# Patient Record
Sex: MALE | Race: WHITE | NOT HISPANIC OR LATINO | Employment: OTHER | URBAN - METROPOLITAN AREA
[De-identification: names, ages, dates, MRNs, and addresses within clinical notes are randomized per-mention and may not be internally consistent; named-entity substitution may affect disease eponyms.]

---

## 2018-11-07 ENCOUNTER — OFFICE VISIT (OUTPATIENT)
Dept: SURGERY | Facility: CLINIC | Age: 68
End: 2018-11-07

## 2018-11-07 VITALS
BODY MASS INDEX: 34.72 KG/M2 | DIASTOLIC BLOOD PRESSURE: 92 MMHG | HEIGHT: 71 IN | WEIGHT: 248 LBS | SYSTOLIC BLOOD PRESSURE: 148 MMHG

## 2018-11-07 DIAGNOSIS — K43.9 VENTRAL HERNIA WITHOUT OBSTRUCTION OR GANGRENE: Primary | ICD-10-CM

## 2018-11-07 PROBLEM — I10 ESSENTIAL HYPERTENSION: Status: ACTIVE | Noted: 2018-11-07

## 2018-11-07 PROBLEM — E78.5 HYPERLIPIDEMIA: Status: ACTIVE | Noted: 2018-11-07

## 2018-11-07 PROBLEM — J43.9 PULMONARY EMPHYSEMA: Status: ACTIVE | Noted: 2017-12-04

## 2018-11-07 PROBLEM — J44.9 CHRONIC OBSTRUCTIVE LUNG DISEASE (HCC): Status: ACTIVE | Noted: 2018-11-07

## 2018-11-07 PROCEDURE — 99203 OFFICE O/P NEW LOW 30 MIN: CPT | Performed by: SURGERY

## 2018-11-07 RX ORDER — AMLODIPINE BESYLATE 5 MG/1
TABLET ORAL
COMMUNITY
End: 2018-11-16

## 2018-11-07 RX ORDER — LOVASTATIN 40 MG/1
TABLET ORAL
COMMUNITY
End: 2018-11-16

## 2018-11-07 RX ORDER — PREDNISONE 50 MG/1
TABLET ORAL
COMMUNITY
End: 2018-11-16

## 2018-11-07 RX ORDER — PRAMIPEXOLE DIHYDROCHLORIDE 0.5 MG/1
TABLET ORAL
COMMUNITY
End: 2019-01-31

## 2018-11-07 NOTE — PROGRESS NOTES
PATIENT INFORMATION  Yves Hastings  POSSIBLE VENTRAL HERNIA, ON PREDNISONE FOR A COLD, SOME DISCOMFORT, NO IMAGING     - 1950    CHIEF COMPLAINT  Chief Complaint   Patient presents with   • Hernia   Wall bulge    HISTORY OF PRESENT ILLNESS  HPI he complains of an abdominal wall bulge for several months.  He says it is uncomfortable and has been increasing in size.  He had a prior laparoscopic umbilical hernia repair by Dr. Casey.        REVIEW OF SYSTEMS  Review of Systems he continues to smoke and drink alcohol.  He wants to get this done so he can get back to work  Otherwise negative      ACTIVE PROBLEMS  Patient Active Problem List    Diagnosis   • Chronic obstructive lung disease (CMS/HCC) [J44.9]   • Essential hypertension [I10]   • Hyperlipidemia [E78.5]   • Pulmonary emphysema (CMS/HCC) [J43.9]         PAST MEDICAL HISTORY  Past Medical History:   Diagnosis Date   • COPD (chronic obstructive pulmonary disease) (CMS/HCC)    • Emphysema of lung (CMS/HCC)    • HLD (hyperlipidemia)    • HTN (hypertension)          SURGICAL HISTORY  Past Surgical History:   Procedure Laterality Date   • UMBILICAL HERNIA REPAIR      DR. CASEY - YEARS AGO         FAMILY HISTORY  Family History   Problem Relation Age of Onset   • Cancer Mother    • Heart disease Father          SOCIAL HISTORY  Social History     Occupational History   • Not on file.     Social History Main Topics   • Smoking status: Current Every Day Smoker   • Smokeless tobacco: Not on file   • Alcohol use Yes   • Drug use: Unknown   • Sexual activity: Not on file       Debilities/Disabilities Identified: None    Emotional Behavior: Appropriate    CURRENT MEDICATIONS    Current Outpatient Prescriptions:   •  amLODIPine (NORVASC) 5 MG tablet, amlodipine 5 mg tablet  Take one (1) tablet orally (by mouth) once daily, Disp: , Rfl:   •  lovastatin (MEVACOR) 40 MG tablet, lovastatin 40 mg tablet  Take one (1) tablet orally (by mouth) once daily, Disp: ,  "Rfl:   •  mometasone-formoterol (DULERA) 200-5 MCG/ACT inhaler, Every 12 (Twelve) Hours., Disp: , Rfl:   •  pramipexole (MIRAPEX) 0.5 MG tablet, pramipexole 0.5 mg tablet, Disp: , Rfl:   •  predniSONE (DELTASONE) 50 MG tablet, prednisone 50 mg tablet  Take 1 tablet every day by oral route., Disp: , Rfl:     ALLERGIES  Patient has no known allergies.    VITALS  Vitals:    11/07/18 1446   BP: 148/92   Weight: 112 kg (248 lb)   Height: 180.3 cm (71\")       LAST RESULTS   No results found for any previous visit.     No results found.    PHYSICAL EXAM  Physical Exam this an obese white male in no active distress.  He is oriented ×3.  His lungs are clear and equal was heart shows regular rate and rhythm.  His abdomen is obese soft nontender with a reducible ventral hernia this appears to be cephalad to his prior mesh repair.  I reviewed his prior operative note and a ventral ex mesh was used.    ASSESSMENT  Ventral hernia      PLAN  The risks benefits and options were discussed with the patient in detail.  We will proceed with a laparoscopic repair this has been arranged for [facility] on an outpatient basis for November 28    Discussed risks of surgery with patient and in particular increased risk of wound infection, poor wound healing, hernias (with abdominal surgery) and post-operative pulmonary complications associated with smoking.                         "

## 2018-11-07 NOTE — H&P
PATIENT INFORMATION  Yevs Hastings  POSSIBLE VENTRAL HERNIA, ON PREDNISONE FOR A COLD, SOME DISCOMFORT, NO IMAGING     - 1950    CHIEF COMPLAINT  Chief Complaint   Patient presents with   • Hernia   Wall bulge    HISTORY OF PRESENT ILLNESS  HPI he complains of an abdominal wall bulge for several months.  He says it is uncomfortable and has been increasing in size.  He had a prior laparoscopic umbilical hernia repair by Dr. Casey.        REVIEW OF SYSTEMS  Review of Systems he continues to smoke and drink alcohol.  He wants to get this done so he can get back to work  Otherwise negative      ACTIVE PROBLEMS  Patient Active Problem List    Diagnosis   • Chronic obstructive lung disease (CMS/HCC) [J44.9]   • Essential hypertension [I10]   • Hyperlipidemia [E78.5]   • Pulmonary emphysema (CMS/HCC) [J43.9]         PAST MEDICAL HISTORY  Past Medical History:   Diagnosis Date   • COPD (chronic obstructive pulmonary disease) (CMS/HCC)    • Emphysema of lung (CMS/HCC)    • HLD (hyperlipidemia)    • HTN (hypertension)          SURGICAL HISTORY  Past Surgical History:   Procedure Laterality Date   • UMBILICAL HERNIA REPAIR      DR. CASEY - YEARS AGO         FAMILY HISTORY  Family History   Problem Relation Age of Onset   • Cancer Mother    • Heart disease Father          SOCIAL HISTORY  Social History     Occupational History   • Not on file.     Social History Main Topics   • Smoking status: Current Every Day Smoker   • Smokeless tobacco: Not on file   • Alcohol use Yes   • Drug use: Unknown   • Sexual activity: Not on file       Debilities/Disabilities Identified: None    Emotional Behavior: Appropriate    CURRENT MEDICATIONS    Current Outpatient Prescriptions:   •  amLODIPine (NORVASC) 5 MG tablet, amlodipine 5 mg tablet  Take one (1) tablet orally (by mouth) once daily, Disp: , Rfl:   •  lovastatin (MEVACOR) 40 MG tablet, lovastatin 40 mg tablet  Take one (1) tablet orally (by mouth) once daily, Disp: ,  "Rfl:   •  mometasone-formoterol (DULERA) 200-5 MCG/ACT inhaler, Every 12 (Twelve) Hours., Disp: , Rfl:   •  pramipexole (MIRAPEX) 0.5 MG tablet, pramipexole 0.5 mg tablet, Disp: , Rfl:   •  predniSONE (DELTASONE) 50 MG tablet, prednisone 50 mg tablet  Take 1 tablet every day by oral route., Disp: , Rfl:     ALLERGIES  Patient has no known allergies.    VITALS  Vitals:    11/07/18 1446   BP: 148/92   Weight: 112 kg (248 lb)   Height: 180.3 cm (71\")       LAST RESULTS   No results found for any previous visit.     No results found.    PHYSICAL EXAM  Physical Exam this an obese white male in no active distress.  He is oriented ×3.  His lungs are clear and equal was heart shows regular rate and rhythm.  His abdomen is obese soft nontender with a reducible ventral hernia this appears to be cephalad to his prior mesh repair.  I reviewed his prior operative note and a ventral ex mesh was used.    ASSESSMENT  Ventral hernia      PLAN  The risks benefits and options were discussed with the patient in detail.  We will proceed with a laparoscopic repair this has been arranged for [facility] on an outpatient basis for November 28    Discussed risks of surgery with patient and in particular increased risk of wound infection, poor wound healing, hernias (with abdominal surgery) and post-operative pulmonary complications associated with smoking.                         "

## 2018-11-08 ENCOUNTER — HOSPITAL ENCOUNTER (OUTPATIENT)
Facility: HOSPITAL | Age: 68
Setting detail: HOSPITAL OUTPATIENT SURGERY
End: 2018-11-08
Attending: SURGERY | Admitting: SURGERY

## 2018-11-08 PROBLEM — K43.9 VENTRAL HERNIA WITHOUT OBSTRUCTION OR GANGRENE: Status: ACTIVE | Noted: 2018-11-08

## 2018-11-16 ENCOUNTER — HOSPITAL ENCOUNTER (OUTPATIENT)
Dept: GENERAL RADIOLOGY | Facility: HOSPITAL | Age: 68
Discharge: HOME | End: 2018-11-16
Admitting: SURGERY

## 2018-11-16 ENCOUNTER — APPOINTMENT (OUTPATIENT)
Dept: PREADMISSION TESTING | Facility: HOSPITAL | Age: 68
End: 2018-11-16

## 2018-11-16 DIAGNOSIS — K43.9 VENTRAL HERNIA WITHOUT OBSTRUCTION OR GANGRENE: ICD-10-CM

## 2018-11-16 LAB
ANION GAP SERPL CALCULATED.3IONS-SCNC: 9.1 MMOL/L
BASOPHILS # BLD AUTO: 0.04 10*3/MM3 (ref 0–0.2)
BASOPHILS NFR BLD AUTO: 0.5 % (ref 0–2)
BUN BLD-MCNC: 10 MG/DL (ref 8–23)
BUN/CREAT SERPL: 11.1 (ref 7–25)
CALCIUM SPEC-SCNC: 9 MG/DL (ref 8.8–10.5)
CHLORIDE SERPL-SCNC: 102 MMOL/L (ref 98–107)
CO2 SERPL-SCNC: 27.9 MMOL/L (ref 22–29)
CREAT BLD-MCNC: 0.9 MG/DL (ref 0.76–1.27)
DEPRECATED RDW RBC AUTO: 49.3 FL (ref 37–54)
EOSINOPHIL # BLD AUTO: 0.24 10*3/MM3 (ref 0.1–0.3)
EOSINOPHIL NFR BLD AUTO: 2.9 % (ref 0–4)
ERYTHROCYTE [DISTWIDTH] IN BLOOD BY AUTOMATED COUNT: 13.2 % (ref 11.5–14.5)
GFR SERPL CREATININE-BSD FRML MDRD: 84 ML/MIN/1.73
GLUCOSE BLD-MCNC: 95 MG/DL (ref 65–99)
HCT VFR BLD AUTO: 49.2 % (ref 42–52)
HGB BLD-MCNC: 16.4 G/DL (ref 14–18)
IMM GRANULOCYTES # BLD: 0.12 10*3/MM3 (ref 0–0.03)
IMM GRANULOCYTES NFR BLD: 1.4 % (ref 0–0.5)
LYMPHOCYTES # BLD AUTO: 2.55 10*3/MM3 (ref 0.6–4.8)
LYMPHOCYTES NFR BLD AUTO: 30.7 % (ref 20–45)
MCH RBC QN AUTO: 33.4 PG (ref 27–31)
MCHC RBC AUTO-ENTMCNC: 33.3 G/DL (ref 31–37)
MCV RBC AUTO: 100.2 FL (ref 80–94)
MONOCYTES # BLD AUTO: 0.77 10*3/MM3 (ref 0–1)
MONOCYTES NFR BLD AUTO: 9.3 % (ref 3–8)
NEUTROPHILS # BLD AUTO: 4.58 10*3/MM3 (ref 1.5–8.3)
NEUTROPHILS NFR BLD AUTO: 55.2 % (ref 45–70)
NRBC BLD MANUAL-RTO: 0 /100 WBC (ref 0–0)
PLATELET # BLD AUTO: 186 10*3/MM3 (ref 140–500)
PMV BLD AUTO: 8.9 FL (ref 7.4–10.4)
POTASSIUM BLD-SCNC: 4.4 MMOL/L (ref 3.5–5.2)
RBC # BLD AUTO: 4.91 10*6/MM3 (ref 4.7–6.1)
SODIUM BLD-SCNC: 139 MMOL/L (ref 136–145)
WBC NRBC COR # BLD: 8.3 10*3/MM3 (ref 4.8–10.8)

## 2018-11-16 PROCEDURE — 85025 COMPLETE CBC W/AUTO DIFF WBC: CPT | Performed by: SURGERY

## 2018-11-16 PROCEDURE — 71046 X-RAY EXAM CHEST 2 VIEWS: CPT

## 2018-11-16 PROCEDURE — 93005 ELECTROCARDIOGRAM TRACING: CPT

## 2018-11-16 PROCEDURE — 36415 COLL VENOUS BLD VENIPUNCTURE: CPT

## 2018-11-16 PROCEDURE — 80048 BASIC METABOLIC PNL TOTAL CA: CPT | Performed by: SURGERY

## 2018-11-16 PROCEDURE — 93010 ELECTROCARDIOGRAM REPORT: CPT | Performed by: INTERNAL MEDICINE

## 2018-11-16 RX ORDER — AMLODIPINE BESYLATE 5 MG/1
10 TABLET ORAL DAILY
COMMUNITY

## 2018-11-16 RX ORDER — LOVASTATIN 40 MG/1
40 TABLET ORAL NIGHTLY
COMMUNITY
End: 2019-01-31

## 2018-11-16 RX ORDER — ALBUTEROL SULFATE 90 UG/1
2 AEROSOL, METERED RESPIRATORY (INHALATION) EVERY 4 HOURS PRN
COMMUNITY

## 2018-11-16 NOTE — DISCHARGE INSTRUCTIONS
PRE-ADMISSION TESTING INSTRUCTIONS FOR ADULTS    Take these medications the morning of surgery with a small sip of water:  Use your inhalers and take your amlodipine      No aspirin, advil, aleve, ibuprofen, naproxen, diet pills, decongestants, or herbal/vitamins for a week prior to surgery.    General Instructions:    • Do not eat solid food after midnight the night before surgery.  No gum, mints, or hard candy after midnight the night before surgery.  • You may drink clear liquids the day of surgery up until 2 hours before your arrival time.  (Until 4:30 am)  • Clear liquids are liquids you can see through. Nothing RED in color.    Plain water    Sports drinks  Sodas     Gelatin (Jell-O)  Fruit juices without pulp such as white grape juice and apple juice  Popsicles that contain no fruit or yogurt  Tea or coffee (no cream or milk added)    • It is beneficial for you to have a clear drink that contains carbohydrates just before you leave your house and before your fasting time begins.  We suggest a 20 ounce bottle of Gatorade or Powerade for non-diabetic patients or a 20 ounce bottle of G2 or Powerade Zero for diabetic patients.     • Patients who avoid smoking, chewing tobacco and alcohol for 4 weeks prior to surgery have a reduced risk of post-operative complications.  If at all possible, quit smoking as many days before surgery as you can.    • Do not smoke, use chewing tobacco or drink alcohol the day of surgery    • Bring your C-PAP/ BI-PAP machine if you use one.  • Wear clean comfortable clothes and socks.  • Do not wear contact lenses, lotion, deodorant, or make-up.  Bring a case for your glasses if applicable. You may brush your teeth the morning of surgery.  • You may wear dentures/partials, do not put adhesive/glue on them.  • Bring crutches or walker if applicable.  • Leave all other jewelry and valuables at home.      Preventing a Surgical Site Infection:    • Shower the night before and on the morning  of surgery using the chlorhexidine soap you were given.  Use a clean washcloth with the soap.  Place clean sheets on your bed after showering the night before surgery. Do not use the CHG soap on your hair, face, or private areas. Wash your body gently for five (5) minutes. Do not scrub your skin.  Dry with a clean towel and dress in clean clothing.    • Do not shave the surgical area for 10 days-2 weeks prior to surgery  because the razor can irritate skin and make it easier to develop an infection.  • Make sure you, your family, and all healthcare providers clean their hands with soap and water or an alcohol based hand  before caring for you or your wound.      Day of surgery:    Your surgeon’s office will advise you of your arrival time for the day of surgery.    Upon arrival, a Pre-op nurse and Anesthesia provider will review your health history, obtain vital signs, and answer questions you may have.  The only belongings needed at this time will be your home medications and if applicable your C-PAP/BI-PAP machine.  If you are staying overnight your family can leave the rest of your belongings in the car and bring them to your room later.  A Pre-op nurse will start an IV and you may receive medication in preparation for surgery, including something to help you relax.  Your family will be able to see you in the Pre-op area.  While you are in surgery your family should notify the waiting room  if they leave the waiting room area and provide a contact phone number.    IF you have any questions, you can call the Pre-Admission Department at [phone] or your surgeon's office.  Notify your surgeon if  you become sick, have a fever, productive cough, or cannot be here the day of surgery    Please be aware that surgery does come with discomfort.  We want to make every effort to control your discomfort so please discuss any uncontrolled symptoms with your nurse.   Your doctor will most likely have  prescribed pain medications.      If you are going home after surgery, you will receive individualized written care instructions before being discharged.  A responsible adult (over the age of 18) must drive you to and from the hospital on the day of your surgery and stay with you for 24 hours after anesthesia.    If you are staying overnight following surgery, you will be transported to your hospital room following the recovery period.  [facility] has all private rooms.    Deductibles and co-payments are collected on the day of service. Please be prepared to pay the required co-pay, deductible or deposit on the day of service as defined by your plan.    Laparoscopic Ventral (Incisional)  Hernia Repair  Laparoscopic ventral hernia repair is a surgery to fix a ventral hernia. A ventral hernia, also called an incisional hernia, is a bulge of body tissue or intestines that pushes through the front part of the abdomen. This can happen if the connective tissue covering the muscles over the abdomen has a weak spot or is torn because of a surgical cut (incision) from a previous surgery. Laparoscopic ventral hernia repair is often done soon after diagnosis to stop the hernia from getting bigger, becoming uncomfortable, or becoming an emergency. This surgery usually takes about 2 hours, but the time can vary greatly.  LET YOUR HEALTH CARE PROVIDER KNOW ABOUT:  · Any allergies you have.  · All medicines you are taking, including steroids, vitamins, herbs, eye drops, creams, and over-the-counter medicines.  · Previous problems you or members of your family have had with the use of anesthetics.  · Any blood disorders you have.  · Previous surgeries you have had.  · Medical conditions you have.  RISKS AND COMPLICATIONS   Generally, laparoscopic ventral hernia repair is a safe procedure. However, as with any surgical procedure, problems can occur. Possible problems include:  · Bleeding.  · Trouble passing urine or  having a bowel movement after the surgery.  · Infection.  · Pneumonia.  · Blood clots.  · Pain in the area of the hernia.  · A bulge in the area of the hernia that may be caused by a collection of fluid.  · Injury to intestines or other structures in the abdomen.  · Return of the hernia after surgery.  In some cases, your health care provider may need to stop the laparoscopic procedure and do regular, open surgery. This may be necessary for very difficult hernias, when organs are hard to see, or when bleeding problems occur during surgery.  BEFORE THE PROCEDURE   · You may need to have blood tests, urine tests, a chest X-ray, or an electrocardiogram done before the day of the surgery.  · Ask your health care provider about changing or stopping your regular medicines. This is especially important if you are taking diabetes medicines or blood thinners.  · You may need to wash with a special type of germ-killing soap.  · Do not eat or drink anything after midnight the night before the procedure or as directed by your health care provider.  · Make plans to have someone drive you home after the procedure.  PROCEDURE   · Small monitors will be put on your body. They are used to check your heart, blood pressure, and oxygen level.  · An IV access tube will be put into a vein in your hand or arm. Fluids and medicine will flow directly into your body through the IV tube.  · You will be given medicine that makes you go to sleep (general anesthetic).  · Your abdomen will be cleaned with a special soap to kill any germs on your skin.  · Once you are asleep, several small incisions will be made in your abdomen.  · The large space in your abdomen will be filled with air so that it expands. This gives your health care provider more room and a better view.  · A thin, lighted tube with a tiny camera on the end (laparoscope) is put through a small incision in your abdomen. The camera on the laparoscope sends a picture to a TV screen  in the operating room. This gives your health care provider a good view inside your abdomen.  · Hollow tubes are put through the other small incisions in your abdomen. The tools needed for the procedure are put through these tubes.  · Your health care provider puts the tissue or intestines that formed the hernia back in place.  · A screen-like patch (mesh) is used to close the hernia. This helps make the area stronger. Stitches, tacks, or surgical staples are used to keep the mesh in place.  · Medicine and a bandage (dressing) or skin glue will be put over the incisions.  AFTER THE PROCEDURE   · You will stay in a recovery area until the anesthetic wears off. Your blood pressure and pulse will be checked often.  · You may be able to go home the same day or may need to stay in the hospital for 1-2 days after surgery. Your health care provider will decide when you can go home.  · You may feel some pain. You may be given medicine for pain.  · You will be urged to do breathing exercises that involve taking deep breaths. This helps prevent a lung infection after a surgery.  · You may have to wear compression stockings while you are in the hospital. These stockings help keep blood clots from forming in your legs.     This information is not intended to replace advice given to you by your health care provider. Make sure you discuss any questions you have with your health care provider.     Document Released: 12/04/2013 Document Revised: 12/23/2014 Document Reviewed: 12/04/2013  [employer]® Patient Information ©2016 [employer].

## 2018-11-16 NOTE — PAT
Pt and spouse here for PAT visit.  Pre-op tests completed, chg soap given, and instructions reviewed.  Instructed clears until 4:30 am dos, voiced understanding.  Instructed no smoking after MN night prior to surgery, voiced understanding.  Discussed w/WB, CRNA, pt w/exertional wheezing (states chronic).  Requested CXR be done at visit.

## 2018-11-20 ENCOUNTER — ANESTHESIA EVENT (OUTPATIENT)
Dept: PERIOP | Facility: HOSPITAL | Age: 68
End: 2018-11-20

## 2018-11-20 ENCOUNTER — TELEPHONE (OUTPATIENT)
Dept: SURGERY | Facility: CLINIC | Age: 68
End: 2018-11-20

## 2018-11-20 VITALS
HEIGHT: 71 IN | RESPIRATION RATE: 20 BRPM | BODY MASS INDEX: 34.54 KG/M2 | HEART RATE: 84 BPM | WEIGHT: 246.7 LBS | OXYGEN SATURATION: 98 % | DIASTOLIC BLOOD PRESSURE: 84 MMHG | SYSTOLIC BLOOD PRESSURE: 132 MMHG

## 2018-11-20 DIAGNOSIS — R94.31 ABNORMAL EKG: Primary | ICD-10-CM

## 2018-11-20 DIAGNOSIS — Z01.818 PREOPERATIVE CLEARANCE: ICD-10-CM

## 2018-11-20 NOTE — PAT
Pt needs cardiac clearance prior to anesthesia due to abnormal EKG with prior normal EKG.  PAT will follow up with PCP regarding COPD/pulmonary clearance.

## 2018-11-28 ENCOUNTER — TRANSCRIBE ORDERS (OUTPATIENT)
Dept: ADMINISTRATIVE | Facility: HOSPITAL | Age: 68
End: 2018-11-28

## 2018-11-28 ENCOUNTER — ANESTHESIA (OUTPATIENT)
Dept: PERIOP | Facility: HOSPITAL | Age: 68
End: 2018-11-28

## 2018-11-28 DIAGNOSIS — R07.89 CHEST DISCOMFORT: Primary | ICD-10-CM

## 2018-12-05 ENCOUNTER — HOSPITAL ENCOUNTER (OUTPATIENT)
Dept: NUCLEAR MEDICINE | Facility: HOSPITAL | Age: 68
Discharge: HOME | End: 2018-12-05

## 2018-12-05 ENCOUNTER — HOSPITAL ENCOUNTER (OUTPATIENT)
Dept: CARDIOLOGY | Facility: HOSPITAL | Age: 68
Discharge: HOME | End: 2018-12-05

## 2018-12-05 DIAGNOSIS — R07.89 CHEST DISCOMFORT: ICD-10-CM

## 2018-12-05 LAB
BH CV STRESS BP STAGE 1: NORMAL
BH CV STRESS COMMENTS STAGE 1: NORMAL
BH CV STRESS DOSE REGADENOSON STAGE 1: 0.4
BH CV STRESS DURATION MIN STAGE 1: 0
BH CV STRESS DURATION SEC STAGE 1: 30
BH CV STRESS HR STAGE 1: 78
BH CV STRESS O2 STAGE 1: 97
BH CV STRESS PROTOCOL 1: NORMAL
BH CV STRESS RECOVERY BP: NORMAL MMHG
BH CV STRESS RECOVERY HR: 77 BPM
BH CV STRESS RECOVERY O2: 96 %
BH CV STRESS STAGE 1: 1
LV EF NUC BP: 63 %
MAXIMAL PREDICTED HEART RATE: 153 BPM
PERCENT MAX PREDICTED HR: 61.44 %
STRESS BASELINE BP: NORMAL MMHG
STRESS BASELINE HR: 72 BPM
STRESS O2 SAT REST: 96 %
STRESS PERCENT HR: 72 %
STRESS POST ESTIMATED WORKLOAD: 1 METS
STRESS POST EXERCISE DUR SEC: 30 SEC
STRESS POST O2 SAT PEAK: 99 %
STRESS POST PEAK BP: NORMAL MMHG
STRESS POST PEAK HR: 94 BPM
STRESS TARGET HR: 130 BPM

## 2018-12-05 PROCEDURE — 93017 CV STRESS TEST TRACING ONLY: CPT

## 2018-12-05 PROCEDURE — 78452 HT MUSCLE IMAGE SPECT MULT: CPT

## 2018-12-05 PROCEDURE — 0 TECHNETIUM SESTAMIBI: Performed by: NURSE PRACTITIONER

## 2018-12-05 PROCEDURE — 93018 CV STRESS TEST I&R ONLY: CPT | Performed by: INTERNAL MEDICINE

## 2018-12-05 PROCEDURE — 25010000002 REGADENOSON 0.4 MG/5ML SOLUTION: Performed by: NURSE PRACTITIONER

## 2018-12-05 PROCEDURE — 93016 CV STRESS TEST SUPVJ ONLY: CPT | Performed by: INTERNAL MEDICINE

## 2018-12-05 PROCEDURE — 78452 HT MUSCLE IMAGE SPECT MULT: CPT | Performed by: INTERNAL MEDICINE

## 2018-12-05 PROCEDURE — A9500 TC99M SESTAMIBI: HCPCS | Performed by: NURSE PRACTITIONER

## 2018-12-05 RX ADMIN — TECHNETIUM TC 99M SESTAMIBI 1 DOSE: 1 INJECTION INTRAVENOUS at 09:15

## 2018-12-05 RX ADMIN — TECHNETIUM TC 99M SESTAMIBI 1 DOSE: 1 INJECTION INTRAVENOUS at 07:59

## 2018-12-05 RX ADMIN — REGADENOSON 0.4 MG: 0.08 INJECTION, SOLUTION INTRAVENOUS at 09:15

## 2018-12-11 ENCOUNTER — OFFICE VISIT (OUTPATIENT)
Dept: CARDIOLOGY | Facility: CLINIC | Age: 68
End: 2018-12-11

## 2018-12-11 VITALS
HEIGHT: 71 IN | HEART RATE: 77 BPM | WEIGHT: 248 LBS | BODY MASS INDEX: 34.72 KG/M2 | SYSTOLIC BLOOD PRESSURE: 138 MMHG | DIASTOLIC BLOOD PRESSURE: 95 MMHG

## 2018-12-11 DIAGNOSIS — E78.5 HYPERLIPIDEMIA, UNSPECIFIED HYPERLIPIDEMIA TYPE: ICD-10-CM

## 2018-12-11 DIAGNOSIS — R07.2 PRECORDIAL PAIN: Primary | ICD-10-CM

## 2018-12-11 DIAGNOSIS — R07.89 OTHER CHEST PAIN: ICD-10-CM

## 2018-12-11 DIAGNOSIS — E66.09 CLASS 1 OBESITY DUE TO EXCESS CALORIES WITHOUT SERIOUS COMORBIDITY WITH BODY MASS INDEX (BMI) OF 30.0 TO 30.9 IN ADULT: ICD-10-CM

## 2018-12-11 DIAGNOSIS — I10 ESSENTIAL HYPERTENSION: ICD-10-CM

## 2018-12-11 DIAGNOSIS — Z72.0 TOBACCO ABUSE: ICD-10-CM

## 2018-12-11 PROBLEM — E66.811 CLASS 1 OBESITY DUE TO EXCESS CALORIES WITHOUT SERIOUS COMORBIDITY WITH BODY MASS INDEX (BMI) OF 30.0 TO 30.9 IN ADULT: Status: ACTIVE | Noted: 2018-12-11

## 2018-12-11 PROCEDURE — 99204 OFFICE O/P NEW MOD 45 MIN: CPT | Performed by: INTERNAL MEDICINE

## 2018-12-11 PROCEDURE — 93000 ELECTROCARDIOGRAM COMPLETE: CPT | Performed by: INTERNAL MEDICINE

## 2018-12-11 NOTE — PROGRESS NOTES
Subjective:        [facility]  Cardiology Consult Note    Patient Identification:  Name: Yves Hastings  Age: 68 y.o.  Sex: male  :  1950  MRN: [id number]             CC  HERNIA SURG CLEARANCE    FATIGUE    BILETERAL LEG SWELLING    FEET BURNING    History of Present Illness:   68-year-old male with significant cardiac risk factors including COPD, hyperlipidemia, hypertension, sleep apnea, needs hernia surgery clearance, complains of extreme fatigue sensation, also complains of bilateral leg swelling mild-to-moderate in intensity along with the feet burning    Yves Hastings has been complaining of the shortness of breath mild-to-moderate in intensity with mild-to-moderate usually relieved with rest associated with anxiety and fatigue    Patient continues to also complains of chest pains and tightness in chest, patient had a stress test done which was negative    Comorbid cardiac risk factors:     Past Medical History:  Past Medical History:   Diagnosis Date   • COPD (chronic obstructive pulmonary disease) (CMS/HCC)    • Emphysema of lung (CMS/HCC)    • HLD (hyperlipidemia)    • HTN (hypertension)    • Sleep apnea     no machine   • Ventral hernia     sched repair     Past Surgical History:  Past Surgical History:   Procedure Laterality Date   • CYST REMOVAL      tailbone   • UMBILICAL HERNIA REPAIR      DR. CASEY - YEARS AGO      Allergies:  No Known Allergies  Home Meds:    (Not in a hospital admission)  Current Meds:   [unfilled]  Social History:   Social History     Tobacco Use   • Smoking status: Current Every Day Smoker     Packs/day: 1.00     Years: 55.00     Pack years: 55.00     Types: Cigarettes   • Smokeless tobacco: Never Used   Substance Use Topics   • Alcohol use: Yes     Comment: 1/5 whiskey per week      Family History:  Family History   Problem Relation Age of Onset   • Cancer Mother         breast   • Heart disease Mother    • Heart disease Father    • Cancer Maternal  Aunt    • Heart disease Paternal Uncle    • Malig Hyperthermia Neg Hx         Review of Systems    Constitutional: No weakness,fatigue, fever, rigors, chills   Eyes: No vision changes, eye pain   ENT/oropharynx: No difficulty swallowing, sore throat, epistaxis, changes in hearing   Cardiovascular: Chest pain    Respiratory: Shortness of breath    Gastrointestinal: No abdominal pain, nausea, vomiting, diarrhea, bloody stools   Genitourinary: No hematuria, dysuria   Neurological: No headache, tremors, numbness,  one-sided weakness    Musculoskeletal: No cramps, myalgias,  joint pain, joint swelling   Integument: No rash, edema           Constitutional:  Heart Rate:  [77] 77  BP: (138)/(95) 138/95    Physical Exam   General:  Appears in no acute distress  Eyes: PERTL,  HEENT:  No JVD. Thyroid not visibly enlarged. No mucosal pallor or cyanosis  Respiratory: Respirations regular and unlabored at rest. BBS with good air entry in all fields. No crackles, rubs or wheezes auscultated  Cardiovascular: S1S2 Regular rate and rhythm. No murmur, rub or gallop auscultated. No carotid bruits. DP/PT pulses    . No pretibial pitting edema  Gastrointestinal: Abdomen soft, flat, non tender. Bowel sounds present. No hepatosplenomegaly. No ascites  Musculoskeletal: OBREGON x4. No abnormal movements  Extremities: No digital clubbing or cyanosis  Skin: Color pink. Skin warm and dry to touch. No rashes  No xanthoma  Neuro: AAO x3 CN II-XII grossly intact            ECG 12 Lead  Date/Time: 12/11/2018 2:17 PM  Performed by: Mika Carranza MD  Authorized by: Mika Carranza MD   Comparison: not compared with previous ECG   Previous ECG: no previous ECG available  Rhythm: sinus rhythm  ST Flattening: all  Clinical impression: non-specific ECG            Interpretation Summary     · Left ventricular ejection fraction is normal (Calculated EF = 63%).  · Myocardial perfusion imaging indicates a normal myocardial perfusion study with no  evidence of ischemia.  · Impressions are consistent with a low risk study.         Cardiographics  ECG:     Telemetry:    Echocardiogram:     Imaging  Chest X-ray:     Lab Review               [email or web address]@              Assessment:/ Recommendations / Plan:   Patient Active Problem List   Diagnosis   • Chronic obstructive lung disease (CMS/HCC)   • Essential hypertension   • Hyperlipidemia   • Pulmonary emphysema (CMS/HCC)   • Ventral hernia without obstruction or gangrene                    ICD-10-CM ICD-9-CM   1. Precordial pain R07.2 786.51   2. Tobacco abuse Z72.0 305.1   3. Essential hypertension I10 401.9   4. Hyperlipidemia, unspecified hyperlipidemia type E78.5 272.4   5. Class 1 obesity due to excess calories without serious comorbidity with body mass index (BMI) of 30.0 to 30.9 in adult E66.09 278.00    Z68.30 V85.30   6. Other chest pain  R07.89 786.59     1. Tobacco abuse  Yves Hastings has been explained that tobacco abuse is extremely harmful to the body including to the cardiovascular, it significantly increases the risk of atherosclerosis, myocardial infarction, strokes and peripheral vascular disease  Strongly advised to stop tobacco abuse  Secondhand smoking also has been explained    [unfilled]    - Case Request Cath Lab: Left Heart Cath  - CBC & Differential  - Basic Metabolic Panel  - aPTT  - Protime-INR  - Adult Transthoracic Echo Complete W/ Cont if Necessary Per Protocol  - CBC Auto Differential    2. Essential hypertension  Blood pressure controlled  - ECG 12 Lead  - Case Request Cath Lab: Left Heart Cath  - CBC & Differential  - Basic Metabolic Panel  - aPTT  - Protime-INR  - Adult Transthoracic Echo Complete W/ Cont if Necessary Per Protocol  - CBC Auto Differential    3. Hyperlipidemia, unspecified hyperlipidemia type  Risk of the hyperlipidemia, importance of the treatment has been explained    Pros and cons of the antilipemic drugs has been explained    Regular blood workup as well as  side effects including the liver failure, myelopathy death has been explained        - ECG 12 Lead  - Case Request Cath Lab: Left Heart Cath  - CBC & Differential  - Basic Metabolic Panel  - aPTT  - Protime-INR  - Adult Transthoracic Echo Complete W/ Cont if Necessary Per Protocol  - CBC Auto Differential    4. Class 1 obesity due to excess calories without serious comorbidity with body mass index (BMI) of 30.0 to 30.9 in adult  Significant risk of obesity to CAD, HTN has been explained  Advantages of wt reduction has been explained    - Case Request Cath Lab: Left Heart Cath  - CBC & Differential  - Basic Metabolic Panel  - aPTT  - Protime-INR  - Adult Transthoracic Echo Complete W/ Cont if Necessary Per Protocol  - CBC Auto Differential    5. Precordial pain    - Case Request Cath Lab: Left Heart Cath  - CBC & Differential  - Basic Metabolic Panel  - aPTT  - Protime-INR  - Adult Transthoracic Echo Complete W/ Cont if Necessary Per Protocol  - CBC Auto Differential    6. Other chest pain     - aPTT  - Adult Transthoracic Echo Complete W/ Cont if Necessary Per Protocol       STILL HAVING CP      ECHO    Procedure, risks and options of cardiac cath explained to pt INCLUDING BUT NOT LIMITED TO MI, STROKE, DEATH, INFECTION HAEMORRHAGE, . Pt understands well and agrees with no further questions.    Labs/tests ordered for am      Mika Carranza MD  12/11/2018, 2:16 PM      EMR Dragon/Transcription:   Dictated utilizing Dragon dictation

## 2018-12-11 NOTE — H&P (VIEW-ONLY)
Subjective:        [facility]  Cardiology Consult Note    Patient Identification:  Name: Yves Hastings  Age: 68 y.o.  Sex: male  :  1950  MRN: [id number]             CC  HERNIA SURG CLEARANCE    FATIGUE    BILETERAL LEG SWELLING    FEET BURNING    History of Present Illness:   68-year-old male with significant cardiac risk factors including COPD, hyperlipidemia, hypertension, sleep apnea, needs hernia surgery clearance, complains of extreme fatigue sensation, also complains of bilateral leg swelling mild-to-moderate in intensity along with the feet burning    Yves Hastings has been complaining of the shortness of breath mild-to-moderate in intensity with mild-to-moderate usually relieved with rest associated with anxiety and fatigue    Patient continues to also complains of chest pains and tightness in chest, patient had a stress test done which was negative    Comorbid cardiac risk factors:     Past Medical History:  Past Medical History:   Diagnosis Date   • COPD (chronic obstructive pulmonary disease) (CMS/HCC)    • Emphysema of lung (CMS/HCC)    • HLD (hyperlipidemia)    • HTN (hypertension)    • Sleep apnea     no machine   • Ventral hernia     sched repair     Past Surgical History:  Past Surgical History:   Procedure Laterality Date   • CYST REMOVAL      tailbone   • UMBILICAL HERNIA REPAIR      DR. CASEY - YEARS AGO      Allergies:  No Known Allergies  Home Meds:    (Not in a hospital admission)  Current Meds:   [unfilled]  Social History:   Social History     Tobacco Use   • Smoking status: Current Every Day Smoker     Packs/day: 1.00     Years: 55.00     Pack years: 55.00     Types: Cigarettes   • Smokeless tobacco: Never Used   Substance Use Topics   • Alcohol use: Yes     Comment: 1/5 whiskey per week      Family History:  Family History   Problem Relation Age of Onset   • Cancer Mother         breast   • Heart disease Mother    • Heart disease Father    • Cancer Maternal  Aunt    • Heart disease Paternal Uncle    • Malig Hyperthermia Neg Hx         Review of Systems    Constitutional: No weakness,fatigue, fever, rigors, chills   Eyes: No vision changes, eye pain   ENT/oropharynx: No difficulty swallowing, sore throat, epistaxis, changes in hearing   Cardiovascular: Chest pain    Respiratory: Shortness of breath    Gastrointestinal: No abdominal pain, nausea, vomiting, diarrhea, bloody stools   Genitourinary: No hematuria, dysuria   Neurological: No headache, tremors, numbness,  one-sided weakness    Musculoskeletal: No cramps, myalgias,  joint pain, joint swelling   Integument: No rash, edema           Constitutional:  Heart Rate:  [77] 77  BP: (138)/(95) 138/95    Physical Exam   General:  Appears in no acute distress  Eyes: PERTL,  HEENT:  No JVD. Thyroid not visibly enlarged. No mucosal pallor or cyanosis  Respiratory: Respirations regular and unlabored at rest. BBS with good air entry in all fields. No crackles, rubs or wheezes auscultated  Cardiovascular: S1S2 Regular rate and rhythm. No murmur, rub or gallop auscultated. No carotid bruits. DP/PT pulses    . No pretibial pitting edema  Gastrointestinal: Abdomen soft, flat, non tender. Bowel sounds present. No hepatosplenomegaly. No ascites  Musculoskeletal: OBREGON x4. No abnormal movements  Extremities: No digital clubbing or cyanosis  Skin: Color pink. Skin warm and dry to touch. No rashes  No xanthoma  Neuro: AAO x3 CN II-XII grossly intact            ECG 12 Lead  Date/Time: 12/11/2018 2:17 PM  Performed by: Mika Carranza MD  Authorized by: Mika Carranza MD   Comparison: not compared with previous ECG   Previous ECG: no previous ECG available  Rhythm: sinus rhythm  ST Flattening: all  Clinical impression: non-specific ECG            Interpretation Summary     · Left ventricular ejection fraction is normal (Calculated EF = 63%).  · Myocardial perfusion imaging indicates a normal myocardial perfusion study with no  evidence of ischemia.  · Impressions are consistent with a low risk study.         Cardiographics  ECG:     Telemetry:    Echocardiogram:     Imaging  Chest X-ray:     Lab Review               [email or web address]@              Assessment:/ Recommendations / Plan:   Patient Active Problem List   Diagnosis   • Chronic obstructive lung disease (CMS/HCC)   • Essential hypertension   • Hyperlipidemia   • Pulmonary emphysema (CMS/HCC)   • Ventral hernia without obstruction or gangrene                    ICD-10-CM ICD-9-CM   1. Precordial pain R07.2 786.51   2. Tobacco abuse Z72.0 305.1   3. Essential hypertension I10 401.9   4. Hyperlipidemia, unspecified hyperlipidemia type E78.5 272.4   5. Class 1 obesity due to excess calories without serious comorbidity with body mass index (BMI) of 30.0 to 30.9 in adult E66.09 278.00    Z68.30 V85.30   6. Other chest pain  R07.89 786.59     1. Tobacco abuse  Yves Hastings has been explained that tobacco abuse is extremely harmful to the body including to the cardiovascular, it significantly increases the risk of atherosclerosis, myocardial infarction, strokes and peripheral vascular disease  Strongly advised to stop tobacco abuse  Secondhand smoking also has been explained    [unfilled]    - Case Request Cath Lab: Left Heart Cath  - CBC & Differential  - Basic Metabolic Panel  - aPTT  - Protime-INR  - Adult Transthoracic Echo Complete W/ Cont if Necessary Per Protocol  - CBC Auto Differential    2. Essential hypertension  Blood pressure controlled  - ECG 12 Lead  - Case Request Cath Lab: Left Heart Cath  - CBC & Differential  - Basic Metabolic Panel  - aPTT  - Protime-INR  - Adult Transthoracic Echo Complete W/ Cont if Necessary Per Protocol  - CBC Auto Differential    3. Hyperlipidemia, unspecified hyperlipidemia type  Risk of the hyperlipidemia, importance of the treatment has been explained    Pros and cons of the antilipemic drugs has been explained    Regular blood workup as well as  side effects including the liver failure, myelopathy death has been explained        - ECG 12 Lead  - Case Request Cath Lab: Left Heart Cath  - CBC & Differential  - Basic Metabolic Panel  - aPTT  - Protime-INR  - Adult Transthoracic Echo Complete W/ Cont if Necessary Per Protocol  - CBC Auto Differential    4. Class 1 obesity due to excess calories without serious comorbidity with body mass index (BMI) of 30.0 to 30.9 in adult  Significant risk of obesity to CAD, HTN has been explained  Advantages of wt reduction has been explained    - Case Request Cath Lab: Left Heart Cath  - CBC & Differential  - Basic Metabolic Panel  - aPTT  - Protime-INR  - Adult Transthoracic Echo Complete W/ Cont if Necessary Per Protocol  - CBC Auto Differential    5. Precordial pain    - Case Request Cath Lab: Left Heart Cath  - CBC & Differential  - Basic Metabolic Panel  - aPTT  - Protime-INR  - Adult Transthoracic Echo Complete W/ Cont if Necessary Per Protocol  - CBC Auto Differential    6. Other chest pain     - aPTT  - Adult Transthoracic Echo Complete W/ Cont if Necessary Per Protocol       STILL HAVING CP      ECHO    Procedure, risks and options of cardiac cath explained to pt INCLUDING BUT NOT LIMITED TO MI, STROKE, DEATH, INFECTION HAEMORRHAGE, . Pt understands well and agrees with no further questions.    Labs/tests ordered for am      Mika Carranza MD  12/11/2018, 2:16 PM      EMR Dragon/Transcription:   Dictated utilizing Dragon dictation

## 2018-12-12 ENCOUNTER — TRANSCRIBE ORDERS (OUTPATIENT)
Dept: ADMINISTRATIVE | Facility: HOSPITAL | Age: 68
End: 2018-12-12

## 2018-12-12 ENCOUNTER — APPOINTMENT (OUTPATIENT)
Dept: LAB | Facility: HOSPITAL | Age: 68
End: 2018-12-12
Attending: INTERNAL MEDICINE

## 2018-12-12 DIAGNOSIS — R07.9 CHEST PAIN, UNSPECIFIED TYPE: Primary | ICD-10-CM

## 2018-12-12 LAB
ANION GAP SERPL CALCULATED.3IONS-SCNC: 11.8 MMOL/L
APTT PPP: 29.4 SECONDS (ref 24.3–38.1)
BASOPHILS # BLD AUTO: 0.06 10*3/MM3 (ref 0–0.2)
BASOPHILS NFR BLD AUTO: 0.8 % (ref 0–2)
BUN BLD-MCNC: 10 MG/DL (ref 8–23)
BUN/CREAT SERPL: 11.4 (ref 7–25)
CALCIUM SPEC-SCNC: 8.9 MG/DL (ref 8.8–10.5)
CHLORIDE SERPL-SCNC: 101 MMOL/L (ref 98–107)
CO2 SERPL-SCNC: 24.2 MMOL/L (ref 22–29)
CREAT BLD-MCNC: 0.88 MG/DL (ref 0.76–1.27)
DEPRECATED RDW RBC AUTO: 48.7 FL (ref 37–54)
EOSINOPHIL # BLD AUTO: 0.26 10*3/MM3 (ref 0.1–0.3)
EOSINOPHIL NFR BLD AUTO: 3.4 % (ref 0–4)
ERYTHROCYTE [DISTWIDTH] IN BLOOD BY AUTOMATED COUNT: 13.1 % (ref 11.5–14.5)
GFR SERPL CREATININE-BSD FRML MDRD: 86 ML/MIN/1.73
GLUCOSE BLD-MCNC: 105 MG/DL (ref 65–99)
HCT VFR BLD AUTO: 48.2 % (ref 42–52)
HGB BLD-MCNC: 16.2 G/DL (ref 14–18)
IMM GRANULOCYTES # BLD: 0.06 10*3/MM3 (ref 0–0.03)
IMM GRANULOCYTES NFR BLD: 0.8 % (ref 0–0.5)
INR PPP: 0.86 (ref 0.9–1.1)
LYMPHOCYTES # BLD AUTO: 2.46 10*3/MM3 (ref 0.6–4.8)
LYMPHOCYTES NFR BLD AUTO: 31.9 % (ref 20–45)
MCH RBC QN AUTO: 33.7 PG (ref 27–31)
MCHC RBC AUTO-ENTMCNC: 33.6 G/DL (ref 31–37)
MCV RBC AUTO: 100.2 FL (ref 80–94)
MONOCYTES # BLD AUTO: 0.74 10*3/MM3 (ref 0–1)
MONOCYTES NFR BLD AUTO: 9.6 % (ref 3–8)
NEUTROPHILS # BLD AUTO: 4.14 10*3/MM3 (ref 1.5–8.3)
NEUTROPHILS NFR BLD AUTO: 53.5 % (ref 45–70)
NRBC BLD MANUAL-RTO: 0 /100 WBC (ref 0–0)
PLATELET # BLD AUTO: 192 10*3/MM3 (ref 140–500)
PMV BLD AUTO: 9.1 FL (ref 7.4–10.4)
POTASSIUM BLD-SCNC: 4.8 MMOL/L (ref 3.5–5.2)
PROTHROMBIN TIME: 11.7 SECONDS (ref 12.1–15)
RBC # BLD AUTO: 4.81 10*6/MM3 (ref 4.7–6.1)
SODIUM BLD-SCNC: 137 MMOL/L (ref 136–145)
WBC NRBC COR # BLD: 7.72 10*3/MM3 (ref 4.8–10.8)

## 2018-12-12 PROCEDURE — 85730 THROMBOPLASTIN TIME PARTIAL: CPT | Performed by: INTERNAL MEDICINE

## 2018-12-12 PROCEDURE — 85025 COMPLETE CBC W/AUTO DIFF WBC: CPT | Performed by: INTERNAL MEDICINE

## 2018-12-12 PROCEDURE — 85610 PROTHROMBIN TIME: CPT | Performed by: INTERNAL MEDICINE

## 2018-12-12 PROCEDURE — 80048 BASIC METABOLIC PNL TOTAL CA: CPT | Performed by: INTERNAL MEDICINE

## 2018-12-12 PROCEDURE — 36415 COLL VENOUS BLD VENIPUNCTURE: CPT | Performed by: INTERNAL MEDICINE

## 2018-12-14 ENCOUNTER — HOSPITAL ENCOUNTER (OUTPATIENT)
Facility: HOSPITAL | Age: 68
Setting detail: HOSPITAL OUTPATIENT SURGERY
Discharge: HOME | End: 2018-12-14
Attending: INTERNAL MEDICINE | Admitting: INTERNAL MEDICINE

## 2018-12-14 VITALS
TEMPERATURE: 99.2 F | RESPIRATION RATE: 16 BRPM | DIASTOLIC BLOOD PRESSURE: 98 MMHG | HEIGHT: 71 IN | BODY MASS INDEX: 34.72 KG/M2 | HEART RATE: 75 BPM | WEIGHT: 248 LBS | OXYGEN SATURATION: 95 % | SYSTOLIC BLOOD PRESSURE: 129 MMHG

## 2018-12-14 DIAGNOSIS — E66.09 CLASS 1 OBESITY DUE TO EXCESS CALORIES WITHOUT SERIOUS COMORBIDITY WITH BODY MASS INDEX (BMI) OF 30.0 TO 30.9 IN ADULT: ICD-10-CM

## 2018-12-14 DIAGNOSIS — R07.2 PRECORDIAL PAIN: ICD-10-CM

## 2018-12-14 DIAGNOSIS — I10 ESSENTIAL HYPERTENSION: ICD-10-CM

## 2018-12-14 DIAGNOSIS — E78.5 HYPERLIPIDEMIA, UNSPECIFIED HYPERLIPIDEMIA TYPE: ICD-10-CM

## 2018-12-14 DIAGNOSIS — Z72.0 TOBACCO ABUSE: ICD-10-CM

## 2018-12-14 LAB — ACT BLD: 164 SECONDS (ref 82–152)

## 2018-12-14 PROCEDURE — C1887 CATHETER, GUIDING: HCPCS | Performed by: INTERNAL MEDICINE

## 2018-12-14 PROCEDURE — 93458 L HRT ARTERY/VENTRICLE ANGIO: CPT | Performed by: INTERNAL MEDICINE

## 2018-12-14 PROCEDURE — C1769 GUIDE WIRE: HCPCS | Performed by: INTERNAL MEDICINE

## 2018-12-14 PROCEDURE — C1894 INTRO/SHEATH, NON-LASER: HCPCS | Performed by: INTERNAL MEDICINE

## 2018-12-14 PROCEDURE — 25010000002 BH (CUPID ONLY) ADENOSINE 6 MG/100ML MIXTURE: Performed by: INTERNAL MEDICINE

## 2018-12-14 PROCEDURE — 0 IOPAMIDOL PER 1 ML: Performed by: INTERNAL MEDICINE

## 2018-12-14 PROCEDURE — 99153 MOD SED SAME PHYS/QHP EA: CPT | Performed by: INTERNAL MEDICINE

## 2018-12-14 PROCEDURE — 25010000002 FENTANYL CITRATE (PF) 100 MCG/2ML SOLUTION: Performed by: INTERNAL MEDICINE

## 2018-12-14 PROCEDURE — 25010000002 MIDAZOLAM PER 1 MG: Performed by: INTERNAL MEDICINE

## 2018-12-14 PROCEDURE — 93571 IV DOP VEL&/PRESS C FLO 1ST: CPT | Performed by: INTERNAL MEDICINE

## 2018-12-14 PROCEDURE — 99152 MOD SED SAME PHYS/QHP 5/>YRS: CPT | Performed by: INTERNAL MEDICINE

## 2018-12-14 PROCEDURE — 25010000002 HEPARIN (PORCINE) PER 1000 UNITS: Performed by: INTERNAL MEDICINE

## 2018-12-14 PROCEDURE — 85347 COAGULATION TIME ACTIVATED: CPT

## 2018-12-14 RX ORDER — LIDOCAINE HYDROCHLORIDE 20 MG/ML
INJECTION, SOLUTION INFILTRATION; PERINEURAL AS NEEDED
Status: DISCONTINUED | OUTPATIENT
Start: 2018-12-14 | End: 2018-12-14 | Stop reason: HOSPADM

## 2018-12-14 RX ORDER — FENTANYL CITRATE 50 UG/ML
INJECTION, SOLUTION INTRAMUSCULAR; INTRAVENOUS AS NEEDED
Status: DISCONTINUED | OUTPATIENT
Start: 2018-12-14 | End: 2018-12-14 | Stop reason: HOSPADM

## 2018-12-14 RX ORDER — SODIUM CHLORIDE 9 MG/ML
100 INJECTION, SOLUTION INTRAVENOUS CONTINUOUS
Status: DISCONTINUED | OUTPATIENT
Start: 2018-12-14 | End: 2018-12-14 | Stop reason: HOSPADM

## 2018-12-14 RX ORDER — MIDAZOLAM HYDROCHLORIDE 1 MG/ML
INJECTION INTRAMUSCULAR; INTRAVENOUS AS NEEDED
Status: DISCONTINUED | OUTPATIENT
Start: 2018-12-14 | End: 2018-12-14 | Stop reason: HOSPADM

## 2018-12-14 RX ORDER — LIDOCAINE HYDROCHLORIDE 10 MG/ML
0.1 INJECTION, SOLUTION EPIDURAL; INFILTRATION; INTRACAUDAL; PERINEURAL ONCE AS NEEDED
Status: DISCONTINUED | OUTPATIENT
Start: 2018-12-14 | End: 2018-12-14 | Stop reason: HOSPADM

## 2018-12-14 RX ORDER — SODIUM CHLORIDE 9 MG/ML
75 INJECTION, SOLUTION INTRAVENOUS CONTINUOUS
Status: DISCONTINUED | OUTPATIENT
Start: 2018-12-14 | End: 2018-12-14 | Stop reason: HOSPADM

## 2018-12-14 RX ORDER — SODIUM CHLORIDE 0.9 % (FLUSH) 0.9 %
3-10 SYRINGE (ML) INJECTION AS NEEDED
Status: DISCONTINUED | OUTPATIENT
Start: 2018-12-14 | End: 2018-12-14 | Stop reason: HOSPADM

## 2018-12-14 RX ORDER — SODIUM CHLORIDE 9 MG/ML
250 INJECTION, SOLUTION INTRAVENOUS ONCE AS NEEDED
Status: DISCONTINUED | OUTPATIENT
Start: 2018-12-14 | End: 2018-12-14 | Stop reason: HOSPADM

## 2018-12-14 RX ORDER — HEPARIN SODIUM 1000 [USP'U]/ML
INJECTION, SOLUTION INTRAVENOUS; SUBCUTANEOUS AS NEEDED
Status: DISCONTINUED | OUTPATIENT
Start: 2018-12-14 | End: 2018-12-14 | Stop reason: HOSPADM

## 2018-12-14 RX ORDER — SODIUM CHLORIDE 0.9 % (FLUSH) 0.9 %
3 SYRINGE (ML) INJECTION EVERY 12 HOURS SCHEDULED
Status: DISCONTINUED | OUTPATIENT
Start: 2018-12-14 | End: 2018-12-14 | Stop reason: HOSPADM

## 2018-12-14 RX ADMIN — SODIUM CHLORIDE 75 ML/HR: 9 INJECTION, SOLUTION INTRAVENOUS at 08:50

## 2018-12-14 NOTE — DISCHARGE INSTRUCTIONS
[facility]  [address] [place] [address]      Coronary Angiogram (Femoral Approach) After Care     Refer to this sheet in the next few weeks. These instructions provide you with information on caring for yourself after your procedure. Your health care provider may also give you more specific instructions. Your treatment has been planned according to current medical practices, but problems sometimes occur. Call your health care provider if you have any problems or questions after your procedure.      What to Expect After the Procedure:  After your procedure, it is typical to have the following sensations:  · Minor discomfort or tenderness and a small bump at the catheter insertion site. The bump should usually decrease in size and tenderness within 1 to 2 weeks.  · Any bruising will usually fade within 2 to 4 weeks.  Home Care Instructions:  · Do not apply powder or lotion to the site.  · Do not take baths, swim, or use a hot tub until your health care provider approves and the site is completely healed.  · Do not bend, squat, or lift anything over 20 lb (9 kg) or as directed by your health care provider. However, we recommend lifting nothing heavier than a gallon of milk.    · You may shower 24 hours after the procedure. Remove the bandage (dressing) and gently wash the site with plain soap and water. Gently pat the site dry. You may apply a band aid daily for 2 days if desired.    · Inspect the site at least twice daily.  · Increase your fluid intake for the next 2 days.    · Limit your activity for the first 48 hours. .    · Avoid strenuous activity for 1 week or as advised by your physician.    · Follow instructions about when you can drive or return to work as directed by your physician.    · Hold direct pressure over the site when you cough, sneeze, laugh or change positions.  Do this for the next 2 days.    · Do not operate machinery or power tools for 24 hours.  · A responsible adult  should be with you for the first 24 hours after you arrive home. Do not make any important legal decisions or sign legal papers for 24 hours.  Do not drink alcohol for 24 hours.  · Metformin or any medications containing Metformin should not be taken for 48 hours after your procedure.    Call Your Doctor If:  · You have drainage (other than a small amount of blood on the dressing).  · You have chills or a fever > 101.  · You have redness, warmth, swelling(larger than a walnut), or pain at the insertion site  · .You have heavy bleeding from the site. If this happens, hold pressure on the site and call 911.  · You develop chest pain or shortness of breath, feel faint, or pass out.  · You develop pain, discoloration, coldness, numbness, tingling, or severe bruising in the leg that held the catheter.  · You develop bleeding from any other place, such as the bowels.    Make Sure You:  · Understand these instructions.  · Will watch your condition.  · Will get help right away if you are not doing well or get worse.

## 2018-12-18 ENCOUNTER — HOSPITAL ENCOUNTER (OUTPATIENT)
Dept: CARDIOLOGY | Facility: HOSPITAL | Age: 68
Discharge: HOME | End: 2018-12-18
Attending: INTERNAL MEDICINE | Admitting: INTERNAL MEDICINE

## 2018-12-18 VITALS — WEIGHT: 248 LBS | HEIGHT: 71 IN | BODY MASS INDEX: 34.72 KG/M2

## 2018-12-18 PROCEDURE — 93306 TTE W/DOPPLER COMPLETE: CPT | Performed by: INTERNAL MEDICINE

## 2018-12-18 PROCEDURE — 93306 TTE W/DOPPLER COMPLETE: CPT

## 2018-12-19 LAB
BH CV ECHO MEAS - ACS: 2.2 CM
BH CV ECHO MEAS - AO MAX PG (FULL): 2.2 MMHG
BH CV ECHO MEAS - AO MAX PG: 5.9 MMHG
BH CV ECHO MEAS - AO MEAN PG (FULL): 1 MMHG
BH CV ECHO MEAS - AO MEAN PG: 3 MMHG
BH CV ECHO MEAS - AO ROOT AREA (BSA CORRECTED): 1.5
BH CV ECHO MEAS - AO ROOT AREA: 9.1 CM^2
BH CV ECHO MEAS - AO ROOT DIAM: 3.4 CM
BH CV ECHO MEAS - AO V2 MAX: 121 CM/SEC
BH CV ECHO MEAS - AO V2 MEAN: 72.4 CM/SEC
BH CV ECHO MEAS - AO V2 VTI: 21.1 CM
BH CV ECHO MEAS - AVA(I,A): 3.5 CM^2
BH CV ECHO MEAS - AVA(I,D): 3.5 CM^2
BH CV ECHO MEAS - AVA(V,A): 3.3 CM^2
BH CV ECHO MEAS - AVA(V,D): 3.3 CM^2
BH CV ECHO MEAS - BSA(HAYCOCK): 2.4 M^2
BH CV ECHO MEAS - BSA: 2.3 M^2
BH CV ECHO MEAS - BZI_BMI: 34.6 KILOGRAMS/M^2
BH CV ECHO MEAS - BZI_METRIC_HEIGHT: 180.3 CM
BH CV ECHO MEAS - BZI_METRIC_WEIGHT: 112.5 KG
BH CV ECHO MEAS - EDV(CUBED): 76.8 ML
BH CV ECHO MEAS - EDV(MOD-SP2): 84.7 ML
BH CV ECHO MEAS - EDV(MOD-SP4): 98.9 ML
BH CV ECHO MEAS - EDV(TEICH): 80.8 ML
BH CV ECHO MEAS - EF(CUBED): 83.7 %
BH CV ECHO MEAS - EF(MOD-BP): 71 %
BH CV ECHO MEAS - EF(MOD-SP2): 70.1 %
BH CV ECHO MEAS - EF(MOD-SP4): 71 %
BH CV ECHO MEAS - EF(TEICH): 77.1 %
BH CV ECHO MEAS - ESV(CUBED): 12.5 ML
BH CV ECHO MEAS - ESV(MOD-SP2): 25.3 ML
BH CV ECHO MEAS - ESV(MOD-SP4): 28.7 ML
BH CV ECHO MEAS - ESV(TEICH): 18.5 ML
BH CV ECHO MEAS - FS: 45.4 %
BH CV ECHO MEAS - IVS/LVPW: 1.2
BH CV ECHO MEAS - IVSD: 1.6 CM
BH CV ECHO MEAS - LA DIMENSION: 3.9 CM
BH CV ECHO MEAS - LA/AO: 1.1
BH CV ECHO MEAS - LAT PEAK E' VEL: 9 CM/SEC
BH CV ECHO MEAS - LV DIASTOLIC VOL/BSA (35-75): 42.8 ML/M^2
BH CV ECHO MEAS - LV MASS(C)D: 244.7 GRAMS
BH CV ECHO MEAS - LV MASS(C)DI: 105.9 GRAMS/M^2
BH CV ECHO MEAS - LV MAX PG: 3.6 MMHG
BH CV ECHO MEAS - LV MEAN PG: 2 MMHG
BH CV ECHO MEAS - LV SYSTOLIC VOL/BSA (12-30): 12.4 ML/M^2
BH CV ECHO MEAS - LV V1 MAX: 95.3 CM/SEC
BH CV ECHO MEAS - LV V1 MEAN: 56.3 CM/SEC
BH CV ECHO MEAS - LV V1 VTI: 17.6 CM
BH CV ECHO MEAS - LVIDD: 4.3 CM
BH CV ECHO MEAS - LVIDS: 2.3 CM
BH CV ECHO MEAS - LVLD AP2: 8 CM
BH CV ECHO MEAS - LVLD AP4: 7.7 CM
BH CV ECHO MEAS - LVLS AP2: 5.8 CM
BH CV ECHO MEAS - LVLS AP4: 6 CM
BH CV ECHO MEAS - LVOT AREA (M): 4.2 CM^2
BH CV ECHO MEAS - LVOT AREA: 4.2 CM^2
BH CV ECHO MEAS - LVOT DIAM: 2.3 CM
BH CV ECHO MEAS - LVPWD: 1.3 CM
BH CV ECHO MEAS - MED PEAK E' VEL: 7 CM/SEC
BH CV ECHO MEAS - MV A DUR: 0.11 SEC
BH CV ECHO MEAS - MV A MAX VEL: 64 CM/SEC
BH CV ECHO MEAS - MV DEC SLOPE: 429 CM/SEC^2
BH CV ECHO MEAS - MV DEC TIME: 0.24 SEC
BH CV ECHO MEAS - MV E MAX VEL: 55.8 CM/SEC
BH CV ECHO MEAS - MV E/A: 0.87
BH CV ECHO MEAS - MV MAX PG: 2.5 MMHG
BH CV ECHO MEAS - MV MEAN PG: 1 MMHG
BH CV ECHO MEAS - MV P1/2T MAX VEL: 80.1 CM/SEC
BH CV ECHO MEAS - MV P1/2T: 54.7 MSEC
BH CV ECHO MEAS - MV V2 MAX: 78.9 CM/SEC
BH CV ECHO MEAS - MV V2 MEAN: 50.2 CM/SEC
BH CV ECHO MEAS - MV V2 VTI: 20.4 CM
BH CV ECHO MEAS - MVA P1/2T LCG: 2.7 CM^2
BH CV ECHO MEAS - MVA(P1/2T): 4 CM^2
BH CV ECHO MEAS - MVA(VTI): 3.6 CM^2
BH CV ECHO MEAS - PA ACC TIME: 0.16 SEC
BH CV ECHO MEAS - PA MAX PG (FULL): 0.73 MMHG
BH CV ECHO MEAS - PA MAX PG: 3.9 MMHG
BH CV ECHO MEAS - PA PR(ACCEL): 9.3 MMHG
BH CV ECHO MEAS - PA V2 MAX: 99.1 CM/SEC
BH CV ECHO MEAS - PULM A REVS DUR: 0.1 SEC
BH CV ECHO MEAS - PULM A REVS VEL: 29.4 CM/SEC
BH CV ECHO MEAS - PULM DIAS VEL: 37.9 CM/SEC
BH CV ECHO MEAS - PULM S/D: 1.4
BH CV ECHO MEAS - PULM SYS VEL: 51.9 CM/SEC
BH CV ECHO MEAS - PVA(V,A): 3.4 CM^2
BH CV ECHO MEAS - PVA(V,D): 3.4 CM^2
BH CV ECHO MEAS - QP/QS: 1.1
BH CV ECHO MEAS - RV MAX PG: 3.2 MMHG
BH CV ECHO MEAS - RV MEAN PG: 2 MMHG
BH CV ECHO MEAS - RV V1 MAX: 89.4 CM/SEC
BH CV ECHO MEAS - RV V1 MEAN: 62.2 CM/SEC
BH CV ECHO MEAS - RV V1 VTI: 21 CM
BH CV ECHO MEAS - RVOT AREA: 3.8 CM^2
BH CV ECHO MEAS - RVOT DIAM: 2.2 CM
BH CV ECHO MEAS - SI(AO): 82.9 ML/M^2
BH CV ECHO MEAS - SI(CUBED): 27.8 ML/M^2
BH CV ECHO MEAS - SI(LVOT): 31.6 ML/M^2
BH CV ECHO MEAS - SI(MOD-SP2): 25.7 ML/M^2
BH CV ECHO MEAS - SI(MOD-SP4): 30.4 ML/M^2
BH CV ECHO MEAS - SI(TEICH): 27 ML/M^2
BH CV ECHO MEAS - SV(AO): 191.6 ML
BH CV ECHO MEAS - SV(CUBED): 64.3 ML
BH CV ECHO MEAS - SV(LVOT): 73.1 ML
BH CV ECHO MEAS - SV(MOD-SP2): 59.4 ML
BH CV ECHO MEAS - SV(MOD-SP4): 70.2 ML
BH CV ECHO MEAS - SV(RVOT): 79.8 ML
BH CV ECHO MEAS - SV(TEICH): 62.3 ML
BH CV ECHO MEAS - TAPSE (>1.6): 2.4 CM2
BH CV ECHO MEASUREMENTS AVERAGE E/E' RATIO: 6.98
BH CV VAS BP RIGHT ARM: NORMAL MMHG
BH CV XLRA - RV BASE: 3.7 CM
BH CV XLRA - TDI S': 13 CM/SEC
LEFT ATRIUM VOLUME INDEX: 26 ML/M2
MAXIMAL PREDICTED HEART RATE: 153 BPM
STRESS TARGET HR: 130 BPM

## 2018-12-21 ENCOUNTER — TRANSCRIBE ORDERS (OUTPATIENT)
Dept: SLEEP MEDICINE | Facility: HOSPITAL | Age: 68
End: 2018-12-21

## 2018-12-21 DIAGNOSIS — G47.33 OSA (OBSTRUCTIVE SLEEP APNEA): Primary | ICD-10-CM

## 2018-12-26 ENCOUNTER — HOSPITAL ENCOUNTER (OUTPATIENT)
Dept: SLEEP MEDICINE | Facility: HOSPITAL | Age: 68
Discharge: HOME | End: 2018-12-26
Admitting: INTERNAL MEDICINE

## 2018-12-26 DIAGNOSIS — G47.33 OSA (OBSTRUCTIVE SLEEP APNEA): ICD-10-CM

## 2018-12-26 PROCEDURE — 95806 SLEEP STUDY UNATT&RESP EFFT: CPT

## 2019-01-03 ENCOUNTER — HOSPITAL ENCOUNTER (OUTPATIENT)
Dept: SLEEP MEDICINE | Facility: HOSPITAL | Age: 69
Discharge: HOME | End: 2019-01-03

## 2019-01-08 ENCOUNTER — OFFICE VISIT (OUTPATIENT)
Dept: CARDIOLOGY | Facility: CLINIC | Age: 69
End: 2019-01-08

## 2019-01-08 VITALS
HEIGHT: 71 IN | HEART RATE: 77 BPM | BODY MASS INDEX: 35.42 KG/M2 | WEIGHT: 253 LBS | SYSTOLIC BLOOD PRESSURE: 143 MMHG | DIASTOLIC BLOOD PRESSURE: 93 MMHG

## 2019-01-08 DIAGNOSIS — E78.5 HYPERLIPIDEMIA, UNSPECIFIED HYPERLIPIDEMIA TYPE: Primary | ICD-10-CM

## 2019-01-08 DIAGNOSIS — E66.09 CLASS 1 OBESITY DUE TO EXCESS CALORIES WITHOUT SERIOUS COMORBIDITY WITH BODY MASS INDEX (BMI) OF 30.0 TO 30.9 IN ADULT: ICD-10-CM

## 2019-01-08 DIAGNOSIS — Z72.0 TOBACCO ABUSE: ICD-10-CM

## 2019-01-08 DIAGNOSIS — I10 ESSENTIAL HYPERTENSION: ICD-10-CM

## 2019-01-08 PROCEDURE — 99213 OFFICE O/P EST LOW 20 MIN: CPT | Performed by: INTERNAL MEDICINE

## 2019-01-08 NOTE — PROGRESS NOTES
Subjective:        Yves Hastings is a 68 y.o. male who here for follow up    CC  PT NOT TAKING NORVASC AND LIPITOR DUE TO LEG MEDS  HPI  68-year-old male here for the follow-up after the recent diagnostic heart catheter, patient had no complications    Patient has stopped taking Norvasc as well as Lipitor as the patient has been complaining that the legs are hurting     Problem List Items Addressed This Visit        Cardiovascular and Mediastinum    Essential hypertension    Hyperlipidemia - Primary       Digestive    Class 1 obesity due to excess calories without serious comorbidity with body mass index (BMI) of 30.0 to 30.9 in adult       Other    Tobacco abuse        1. .Normal left main  2. Tortuous with 30-40% ostial LAD with negative FFR, with mild haziness  3. Circumflex artery nondominant at the level of obtuse marginal branch 50-60% stenosis  4. Right coronary artery dominant and normal  5. Normal LV gram  6. Normal FFR of proximal ostial LAD 0.95     Recommendations:      1. Moderate disease of the circumflex and as there was a question of ostial LAD FFR was performed which was normal, will be treated medically         The following portions of the patient's history were reviewed and updated as appropriate: allergies, current medications, past family history, past medical history, past social history, past surgical history and problem list.    Past Medical History:   Diagnosis Date   • COPD (chronic obstructive pulmonary disease) (CMS/HCC)    • Emphysema of lung (CMS/HCC)    • HLD (hyperlipidemia)    • HTN (hypertension)    • Sleep apnea     no machine   • Ventral hernia     sched repair     reports that he has been smoking cigarettes.  He has a 55.00 pack-year smoking history. he has never used smokeless tobacco. He reports that he drinks about 4.2 oz of alcohol per week. He reports that he uses drugs. Drug: Marijuana.   Family History   Problem Relation Age of Onset   • Cancer Mother         breast  "  • Heart disease Mother    • Heart disease Father    • Cancer Maternal Aunt    • Heart disease Paternal Uncle    • Malig Hyperthermia Neg Hx        Review of Systems  Constitutional: No wt loss, fever, fatigue  Gastrointestinal: No nausea, abdominal pain  Behavioral/Psych: No insomnia or anxiety   Cardiovascular no chest pains or tightness in chest  Objective:       Physical Exam           Physical Exam  /93   Pulse 77   Ht 180.3 cm (71\")   Wt 115 kg (253 lb)   BMI 35.29 kg/m²     General appearance: NAD, conversant   Eyes: anicteric sclerae, moist conjunctivae; no lid-lag; PERRLA   HENT: Atraumatic; oropharynx clear with moist mucous membranes and no mucosal ulcerations;  normal hard and soft palate   Neck: Trachea midline; FROM, supple, no thyromegaly or lymphadenopathy   Lungs: CTA, with normal respiratory effort and no intercostal retractions   CV: S1-S2 regular, no murmurs, no rub, no gallop   Abdomen: Soft, non-tender; no masses or HSM   Extremities: No peripheral edema or extremity lymphadenopathy  Skin: Normal temperature, turgor and texture; no rash, ulcers or subcutaneous nodules   Psych: Appropriate affect, alert and oriented to person, place and time           Procedures      Echocardiogram:        Current Outpatient Medications:   •  albuterol (PROVENTIL HFA;VENTOLIN HFA) 108 (90 Base) MCG/ACT inhaler, Inhale 2 puffs Every 4 (Four) Hours As Needed for Wheezing., Disp: , Rfl:   •  amLODIPine (NORVASC) 5 MG tablet, Take 10 mg by mouth Daily., Disp: , Rfl:   •  cyanocobalamin (VITAMIN B-12) 500 MCG tablet, Take 500 mcg by mouth Daily., Disp: , Rfl:   •  lovastatin (MEVACOR) 40 MG tablet, Take 40 mg by mouth Every Night., Disp: , Rfl:   •  mometasone-formoterol (DULERA) 200-5 MCG/ACT inhaler, Inhale 2 puffs 2 (Two) Times a Day., Disp: , Rfl:   •  pramipexole (MIRAPEX) 0.5 MG tablet, pramipexole 0.5 mg tablet, Disp: , Rfl:   •  tiotropium (SPIRIVA) 18 MCG per inhalation capsule, Place 1 capsule " into inhaler and inhale Daily., Disp: , Rfl:    Assessment:        Patient Active Problem List   Diagnosis   • Chronic obstructive lung disease (CMS/HCC)   • Essential hypertension   • Hyperlipidemia   • Pulmonary emphysema (CMS/HCC)   • Ventral hernia without obstruction or gangrene   • Tobacco abuse   • Class 1 obesity due to excess calories without serious comorbidity with body mass index (BMI) of 30.0 to 30.9 in adult   • Precordial pain               Plan:            ICD-10-CM ICD-9-CM   1. Hyperlipidemia, unspecified hyperlipidemia type E78.5 272.4   2. Essential hypertension I10 401.9   3. Class 1 obesity due to excess calories without serious comorbidity with body mass index (BMI) of 30.0 to 30.9 in adult E66.09 278.00    Z68.30 V85.30   4. Tobacco abuse Z72.0 305.1     1. Hyperlipidemia, unspecified hyperlipidemia type  Risk of the hyperlipidemia, importance of the treatment has been explained    Pros and cons of the antilipemic drugs has been explained    Regular blood workup as well as side effects including the liver failure, myelopathy death has been explained          2. Essential hypertension  Restart the Norvasc    3. Class 1 obesity due to excess calories without serious comorbidity with body mass index (BMI) of 30.0 to 30.9 in adult  Counseling done    4. Tobacco abuse  Counseling done       RESTART NORVASC 5M     PT NOT TAKING CHOL MEDS    NON COMPLIANCE    SEE IN 1 YR  COUNSELING:    Yves Briones was given to patient for the following topics: diagnostic results, risk factor reductions, impressions, risks and benefits of treatment options and importance of treatment compliance .       SMOKING COUNSELING:    Ready to quit: Yes  Counseling given: Yes      Dictated using Dragon dictation

## 2019-01-09 ENCOUNTER — TELEPHONE (OUTPATIENT)
Dept: SURGERY | Facility: CLINIC | Age: 69
End: 2019-01-09

## 2019-01-10 NOTE — TELEPHONE ENCOUNTER
RECEIVED CARDIAC CLEARANCE. Patient is waiting on sleep study results. They will call me back when ready to schedule surgery.

## 2019-01-15 ENCOUNTER — TELEPHONE (OUTPATIENT)
Dept: SURGERY | Facility: CLINIC | Age: 69
End: 2019-01-15

## 2019-01-15 NOTE — TELEPHONE ENCOUNTER
Ready to schedule surgery.daughter is going to check with sleep doctor for results. And return call to schedule.

## 2019-01-25 NOTE — TELEPHONE ENCOUNTER
Received pulmonary clearance. Patient would like schedule surgery on 2/4/19. Will he need to see PAT again? Did you want to order any testing?

## 2019-01-28 ENCOUNTER — PREP FOR SURGERY (OUTPATIENT)
Dept: OTHER | Facility: HOSPITAL | Age: 69
End: 2019-01-28

## 2019-01-28 DIAGNOSIS — K43.9 VENTRAL HERNIA WITHOUT OBSTRUCTION OR GANGRENE: Primary | ICD-10-CM

## 2019-01-28 RX ORDER — CEFAZOLIN SODIUM 2 G/50ML
2 SOLUTION INTRAVENOUS ONCE
Status: CANCELLED | OUTPATIENT
Start: 2019-01-28 | End: 2019-01-28

## 2019-01-28 NOTE — TELEPHONE ENCOUNTER
PAT scheduled on 1/31/19 @10:00am. patient to arrive at 7:30am on 2/4/19 for surgery. Instructions have been scanned in.

## 2019-01-29 ENCOUNTER — TELEPHONE (OUTPATIENT)
Dept: SLEEP MEDICINE | Facility: HOSPITAL | Age: 69
End: 2019-01-29

## 2019-01-29 NOTE — TELEPHONE ENCOUNTER
Spoke with patient about SS results and CPAP recommendation from Dr. Pham.  Pt gave verbal consent for this office to communicate information and inquires to his daughter, Margarita Hankins @ [phone] (pt's preferred contact #), as pt is hard of hearing.  Pt is amenable to treatment with CPAP as long as insurance covers treatment.  Pt is financially unable to pay for treatment not fully covered by insurance.  Tech also left follow up message for pt's daughter to schedule follow up appt 6-8wks post set up.  Pt was unsure if he would like to schedule compliance visit at [facility] or [facility].  Set up faxed to [facility], spoke with Lisa to verify set up rec'd and insurances accepted. erasto

## 2019-01-31 ENCOUNTER — TRANSCRIBE ORDERS (OUTPATIENT)
Dept: ADMINISTRATIVE | Facility: HOSPITAL | Age: 69
End: 2019-01-31

## 2019-01-31 ENCOUNTER — APPOINTMENT (OUTPATIENT)
Dept: PREADMISSION TESTING | Facility: HOSPITAL | Age: 69
End: 2019-01-31

## 2019-01-31 VITALS
HEART RATE: 72 BPM | RESPIRATION RATE: 16 BRPM | BODY MASS INDEX: 34.86 KG/M2 | OXYGEN SATURATION: 97 % | WEIGHT: 249 LBS | SYSTOLIC BLOOD PRESSURE: 130 MMHG | DIASTOLIC BLOOD PRESSURE: 72 MMHG | HEIGHT: 71 IN

## 2019-01-31 DIAGNOSIS — Z12.2 ENCOUNTER FOR SCREENING FOR LUNG CANCER: Primary | ICD-10-CM

## 2019-01-31 DIAGNOSIS — F17.218 NICOTINE DEPENDENCE, CIGARETTES, WITH OTHER NICOTINE-INDUCED DISORDERS: ICD-10-CM

## 2019-01-31 DIAGNOSIS — K43.9 VENTRAL HERNIA WITHOUT OBSTRUCTION OR GANGRENE: ICD-10-CM

## 2019-01-31 LAB
ANION GAP SERPL CALCULATED.3IONS-SCNC: 9.2 MMOL/L
BUN BLD-MCNC: 13 MG/DL (ref 8–23)
BUN/CREAT SERPL: 13.5 (ref 7–25)
CALCIUM SPEC-SCNC: 9.4 MG/DL (ref 8.8–10.5)
CHLORIDE SERPL-SCNC: 100 MMOL/L (ref 98–107)
CO2 SERPL-SCNC: 27.8 MMOL/L (ref 22–29)
CREAT BLD-MCNC: 0.96 MG/DL (ref 0.76–1.27)
DEPRECATED RDW RBC AUTO: 48.4 FL (ref 37–54)
ERYTHROCYTE [DISTWIDTH] IN BLOOD BY AUTOMATED COUNT: 13 % (ref 11.5–14.5)
GFR SERPL CREATININE-BSD FRML MDRD: 78 ML/MIN/1.73
GLUCOSE BLD-MCNC: 124 MG/DL (ref 65–99)
HCT VFR BLD AUTO: 50.6 % (ref 42–52)
HGB BLD-MCNC: 17.1 G/DL (ref 14–18)
MCH RBC QN AUTO: 33.9 PG (ref 27–31)
MCHC RBC AUTO-ENTMCNC: 33.8 G/DL (ref 31–37)
MCV RBC AUTO: 100.2 FL (ref 80–94)
PLATELET # BLD AUTO: 181 10*3/MM3 (ref 140–500)
PMV BLD AUTO: 8.8 FL (ref 7.4–10.4)
POTASSIUM BLD-SCNC: 4.7 MMOL/L (ref 3.5–5.2)
RBC # BLD AUTO: 5.05 10*6/MM3 (ref 4.7–6.1)
SODIUM BLD-SCNC: 137 MMOL/L (ref 136–145)
WBC NRBC COR # BLD: 6.74 10*3/MM3 (ref 4.8–10.8)

## 2019-01-31 PROCEDURE — 36415 COLL VENOUS BLD VENIPUNCTURE: CPT

## 2019-01-31 PROCEDURE — 85027 COMPLETE CBC AUTOMATED: CPT | Performed by: SURGERY

## 2019-01-31 PROCEDURE — 80048 BASIC METABOLIC PNL TOTAL CA: CPT | Performed by: SURGERY

## 2019-02-01 ENCOUNTER — ANESTHESIA EVENT (OUTPATIENT)
Dept: PERIOP | Facility: HOSPITAL | Age: 69
End: 2019-02-01

## 2019-02-04 ENCOUNTER — ANESTHESIA (OUTPATIENT)
Dept: PERIOP | Facility: HOSPITAL | Age: 69
End: 2019-02-04

## 2019-02-04 ENCOUNTER — HOSPITAL ENCOUNTER (OUTPATIENT)
Facility: HOSPITAL | Age: 69
Setting detail: HOSPITAL OUTPATIENT SURGERY
Discharge: HOME | End: 2019-02-04
Attending: SURGERY | Admitting: SURGERY

## 2019-02-04 VITALS
SYSTOLIC BLOOD PRESSURE: 128 MMHG | WEIGHT: 247.4 LBS | RESPIRATION RATE: 16 BRPM | HEART RATE: 84 BPM | OXYGEN SATURATION: 95 % | TEMPERATURE: 97.5 F | DIASTOLIC BLOOD PRESSURE: 82 MMHG | HEIGHT: 71 IN | BODY MASS INDEX: 34.64 KG/M2

## 2019-02-04 DIAGNOSIS — K43.9 VENTRAL HERNIA WITHOUT OBSTRUCTION OR GANGRENE: ICD-10-CM

## 2019-02-04 PROCEDURE — 49653 PR LAP, VENTRAL HERNIA REPAIR,INCARCERATED: CPT | Performed by: SURGERY

## 2019-02-04 PROCEDURE — 25010000002 NEOSTIGMINE PER 0.5 MG: Performed by: ANESTHESIOLOGY

## 2019-02-04 PROCEDURE — 25010000002 HYDROMORPHONE PER 4 MG: Performed by: ANESTHESIOLOGY

## 2019-02-04 PROCEDURE — 25010000002 DEXAMETHASONE PER 1 MG: Performed by: NURSE ANESTHETIST, CERTIFIED REGISTERED

## 2019-02-04 PROCEDURE — 25010000002 ONDANSETRON PER 1 MG: Performed by: NURSE ANESTHETIST, CERTIFIED REGISTERED

## 2019-02-04 PROCEDURE — 94640 AIRWAY INHALATION TREATMENT: CPT

## 2019-02-04 PROCEDURE — 25010000003 CEFAZOLIN SODIUM-DEXTROSE 2-3 GM-%(50ML) RECONSTITUTED SOLUTION: Performed by: SURGERY

## 2019-02-04 PROCEDURE — 25010000002 FENTANYL CITRATE (PF) 100 MCG/2ML SOLUTION: Performed by: ANESTHESIOLOGY

## 2019-02-04 PROCEDURE — C1781 MESH (IMPLANTABLE): HCPCS | Performed by: SURGERY

## 2019-02-04 PROCEDURE — S0260 H&P FOR SURGERY: HCPCS | Performed by: SURGERY

## 2019-02-04 PROCEDURE — 25010000002 PROPOFOL 10 MG/ML EMULSION: Performed by: ANESTHESIOLOGY

## 2019-02-04 PROCEDURE — 25010000002 SUCCINYLCHOLINE PER 20 MG: Performed by: ANESTHESIOLOGY

## 2019-02-04 PROCEDURE — 25010000002 MIDAZOLAM PER 1 MG: Performed by: NURSE ANESTHETIST, CERTIFIED REGISTERED

## 2019-02-04 DEVICE — BARD COMPOSIX L/P MESH
Type: IMPLANTABLE DEVICE | Site: ABDOMEN | Status: FUNCTIONAL
Brand: BARD COMPOSIX L/P MESH

## 2019-02-04 RX ORDER — GLYCOPYRROLATE 0.2 MG/ML
INJECTION INTRAMUSCULAR; INTRAVENOUS AS NEEDED
Status: DISCONTINUED | OUTPATIENT
Start: 2019-02-04 | End: 2019-02-04 | Stop reason: SURG

## 2019-02-04 RX ORDER — SODIUM CHLORIDE 0.9 % (FLUSH) 0.9 %
3 SYRINGE (ML) INJECTION EVERY 12 HOURS SCHEDULED
Status: DISCONTINUED | OUTPATIENT
Start: 2019-02-04 | End: 2019-02-04 | Stop reason: HOSPADM

## 2019-02-04 RX ORDER — SODIUM CHLORIDE 9 MG/ML
40 INJECTION, SOLUTION INTRAVENOUS AS NEEDED
Status: DISCONTINUED | OUTPATIENT
Start: 2019-02-04 | End: 2019-02-04 | Stop reason: HOSPADM

## 2019-02-04 RX ORDER — LIDOCAINE HYDROCHLORIDE 10 MG/ML
0.5 INJECTION, SOLUTION EPIDURAL; INFILTRATION; INTRACAUDAL; PERINEURAL ONCE AS NEEDED
Status: COMPLETED | OUTPATIENT
Start: 2019-02-04 | End: 2019-02-04

## 2019-02-04 RX ORDER — PROPOFOL 10 MG/ML
VIAL (ML) INTRAVENOUS AS NEEDED
Status: DISCONTINUED | OUTPATIENT
Start: 2019-02-04 | End: 2019-02-04 | Stop reason: SURG

## 2019-02-04 RX ORDER — LOVASTATIN 40 MG/1
40 TABLET ORAL NIGHTLY
COMMUNITY

## 2019-02-04 RX ORDER — MAGNESIUM HYDROXIDE 1200 MG/15ML
LIQUID ORAL AS NEEDED
Status: DISCONTINUED | OUTPATIENT
Start: 2019-02-04 | End: 2019-02-04 | Stop reason: HOSPADM

## 2019-02-04 RX ORDER — DEXAMETHASONE SODIUM PHOSPHATE 4 MG/ML
8 INJECTION, SOLUTION INTRA-ARTICULAR; INTRALESIONAL; INTRAMUSCULAR; INTRAVENOUS; SOFT TISSUE ONCE AS NEEDED
Status: COMPLETED | OUTPATIENT
Start: 2019-02-04 | End: 2019-02-04

## 2019-02-04 RX ORDER — MIDAZOLAM HYDROCHLORIDE 1 MG/ML
1 INJECTION INTRAMUSCULAR; INTRAVENOUS
Status: DISCONTINUED | OUTPATIENT
Start: 2019-02-04 | End: 2019-02-04 | Stop reason: HOSPADM

## 2019-02-04 RX ORDER — ESMOLOL HYDROCHLORIDE 10 MG/ML
INJECTION INTRAVENOUS AS NEEDED
Status: DISCONTINUED | OUTPATIENT
Start: 2019-02-04 | End: 2019-02-04 | Stop reason: SURG

## 2019-02-04 RX ORDER — BUPIVACAINE HYDROCHLORIDE AND EPINEPHRINE 2.5; 5 MG/ML; UG/ML
INJECTION, SOLUTION INFILTRATION; PERINEURAL AS NEEDED
Status: DISCONTINUED | OUTPATIENT
Start: 2019-02-04 | End: 2019-02-04 | Stop reason: HOSPADM

## 2019-02-04 RX ORDER — OXYCODONE HYDROCHLORIDE AND ACETAMINOPHEN 5; 325 MG/1; MG/1
1-2 TABLET ORAL EVERY 4 HOURS PRN
Qty: 40 TABLET | Refills: 0 | Status: SHIPPED | OUTPATIENT
Start: 2019-02-04 | End: 2021-06-15

## 2019-02-04 RX ORDER — SODIUM CHLORIDE, SODIUM LACTATE, POTASSIUM CHLORIDE, CALCIUM CHLORIDE 600; 310; 30; 20 MG/100ML; MG/100ML; MG/100ML; MG/100ML
9 INJECTION, SOLUTION INTRAVENOUS CONTINUOUS
Status: DISCONTINUED | OUTPATIENT
Start: 2019-02-04 | End: 2019-02-04 | Stop reason: HOSPADM

## 2019-02-04 RX ORDER — CEFAZOLIN SODIUM 2 G/50ML
2 SOLUTION INTRAVENOUS ONCE
Status: COMPLETED | OUTPATIENT
Start: 2019-02-04 | End: 2019-02-04

## 2019-02-04 RX ORDER — ONDANSETRON 2 MG/ML
4 INJECTION INTRAMUSCULAR; INTRAVENOUS ONCE AS NEEDED
Status: DISCONTINUED | OUTPATIENT
Start: 2019-02-04 | End: 2019-02-04 | Stop reason: HOSPADM

## 2019-02-04 RX ORDER — ROCURONIUM BROMIDE 10 MG/ML
INJECTION, SOLUTION INTRAVENOUS AS NEEDED
Status: DISCONTINUED | OUTPATIENT
Start: 2019-02-04 | End: 2019-02-04 | Stop reason: SURG

## 2019-02-04 RX ORDER — IPRATROPIUM BROMIDE AND ALBUTEROL SULFATE 2.5; .5 MG/3ML; MG/3ML
3 SOLUTION RESPIRATORY (INHALATION) ONCE
Status: COMPLETED | OUTPATIENT
Start: 2019-02-04 | End: 2019-02-04

## 2019-02-04 RX ORDER — FAMOTIDINE 20 MG/1
20 TABLET, FILM COATED ORAL EVERY 12 HOURS SCHEDULED
Status: DISCONTINUED | OUTPATIENT
Start: 2019-02-04 | End: 2019-02-04 | Stop reason: HOSPADM

## 2019-02-04 RX ORDER — SODIUM CHLORIDE 0.9 % (FLUSH) 0.9 %
1-10 SYRINGE (ML) INJECTION AS NEEDED
Status: DISCONTINUED | OUTPATIENT
Start: 2019-02-04 | End: 2019-02-04 | Stop reason: HOSPADM

## 2019-02-04 RX ORDER — OXYCODONE HYDROCHLORIDE AND ACETAMINOPHEN 5; 325 MG/1; MG/1
1 TABLET ORAL ONCE AS NEEDED
Status: COMPLETED | OUTPATIENT
Start: 2019-02-04 | End: 2019-02-04

## 2019-02-04 RX ORDER — FENTANYL CITRATE 50 UG/ML
INJECTION, SOLUTION INTRAMUSCULAR; INTRAVENOUS AS NEEDED
Status: DISCONTINUED | OUTPATIENT
Start: 2019-02-04 | End: 2019-02-04 | Stop reason: SURG

## 2019-02-04 RX ORDER — MEPERIDINE HYDROCHLORIDE 25 MG/ML
12.5 INJECTION INTRAMUSCULAR; INTRAVENOUS; SUBCUTANEOUS
Status: DISCONTINUED | OUTPATIENT
Start: 2019-02-04 | End: 2019-02-04 | Stop reason: HOSPADM

## 2019-02-04 RX ORDER — SODIUM CHLORIDE, SODIUM LACTATE, POTASSIUM CHLORIDE, CALCIUM CHLORIDE 600; 310; 30; 20 MG/100ML; MG/100ML; MG/100ML; MG/100ML
100 INJECTION, SOLUTION INTRAVENOUS CONTINUOUS
Status: DISCONTINUED | OUTPATIENT
Start: 2019-02-04 | End: 2019-02-04 | Stop reason: HOSPADM

## 2019-02-04 RX ORDER — ONDANSETRON 2 MG/ML
4 INJECTION INTRAMUSCULAR; INTRAVENOUS ONCE AS NEEDED
Status: COMPLETED | OUTPATIENT
Start: 2019-02-04 | End: 2019-02-04

## 2019-02-04 RX ORDER — LIDOCAINE HYDROCHLORIDE 20 MG/ML
INJECTION, SOLUTION INFILTRATION; PERINEURAL AS NEEDED
Status: DISCONTINUED | OUTPATIENT
Start: 2019-02-04 | End: 2019-02-04 | Stop reason: SURG

## 2019-02-04 RX ORDER — SUCCINYLCHOLINE CHLORIDE 20 MG/ML
INJECTION INTRAMUSCULAR; INTRAVENOUS AS NEEDED
Status: DISCONTINUED | OUTPATIENT
Start: 2019-02-04 | End: 2019-02-04 | Stop reason: SURG

## 2019-02-04 RX ORDER — MIDAZOLAM HYDROCHLORIDE 1 MG/ML
2 INJECTION INTRAMUSCULAR; INTRAVENOUS
Status: DISCONTINUED | OUTPATIENT
Start: 2019-02-04 | End: 2019-02-04 | Stop reason: HOSPADM

## 2019-02-04 RX ORDER — GLYCOPYRROLATE 0.2 MG/ML
0.2 INJECTION INTRAMUSCULAR; INTRAVENOUS
Status: DISCONTINUED | OUTPATIENT
Start: 2019-02-04 | End: 2019-02-04 | Stop reason: HOSPADM

## 2019-02-04 RX ADMIN — ONDANSETRON 4 MG: 2 INJECTION, SOLUTION INTRAMUSCULAR; INTRAVENOUS at 09:40

## 2019-02-04 RX ADMIN — GLYCOPYRROLATE 0.2 MG: 0.2 INJECTION INTRAMUSCULAR; INTRAVENOUS at 09:37

## 2019-02-04 RX ADMIN — IPRATROPIUM BROMIDE AND ALBUTEROL SULFATE 3 ML: .5; 3 SOLUTION RESPIRATORY (INHALATION) at 09:29

## 2019-02-04 RX ADMIN — GLYCOPYRROLATE 0.2 MG: 0.2 INJECTION INTRAMUSCULAR; INTRAVENOUS at 10:47

## 2019-02-04 RX ADMIN — LIDOCAINE HYDROCHLORIDE 100 MG: 20 INJECTION, SOLUTION INFILTRATION; PERINEURAL at 09:50

## 2019-02-04 RX ADMIN — FAMOTIDINE 20 MG: 10 INJECTION, SOLUTION INTRAVENOUS at 09:38

## 2019-02-04 RX ADMIN — FENTANYL CITRATE 50 MCG: 50 INJECTION, SOLUTION INTRAMUSCULAR; INTRAVENOUS at 09:48

## 2019-02-04 RX ADMIN — SUCCINYLCHOLINE CHLORIDE 120 MG: 20 INJECTION, SOLUTION INTRAMUSCULAR; INTRAVENOUS at 09:50

## 2019-02-04 RX ADMIN — ROCURONIUM BROMIDE 25 MG: 10 INJECTION INTRAVENOUS at 09:56

## 2019-02-04 RX ADMIN — MIDAZOLAM HYDROCHLORIDE 1 MG: 1 INJECTION, SOLUTION INTRAMUSCULAR; INTRAVENOUS at 09:37

## 2019-02-04 RX ADMIN — PROPOFOL 20 MG: 10 INJECTION, EMULSION INTRAVENOUS at 10:12

## 2019-02-04 RX ADMIN — FENTANYL CITRATE 50 MCG: 50 INJECTION, SOLUTION INTRAMUSCULAR; INTRAVENOUS at 11:25

## 2019-02-04 RX ADMIN — SODIUM CHLORIDE, POTASSIUM CHLORIDE, SODIUM LACTATE AND CALCIUM CHLORIDE 9 ML/HR: 600; 310; 30; 20 INJECTION, SOLUTION INTRAVENOUS at 08:24

## 2019-02-04 RX ADMIN — LIDOCAINE HYDROCHLORIDE 0.5 ML: 10 INJECTION, SOLUTION EPIDURAL; INFILTRATION; INTRACAUDAL; PERINEURAL at 08:23

## 2019-02-04 RX ADMIN — DEXAMETHASONE SODIUM PHOSPHATE 8 MG: 4 INJECTION, SOLUTION INTRAMUSCULAR; INTRAVENOUS at 09:40

## 2019-02-04 RX ADMIN — PROPOFOL 20 MG: 10 INJECTION, EMULSION INTRAVENOUS at 10:24

## 2019-02-04 RX ADMIN — PROPOFOL 30 MG: 10 INJECTION, EMULSION INTRAVENOUS at 10:15

## 2019-02-04 RX ADMIN — PROPOFOL 20 MG: 10 INJECTION, EMULSION INTRAVENOUS at 10:04

## 2019-02-04 RX ADMIN — PROPOFOL 20 MG: 10 INJECTION, EMULSION INTRAVENOUS at 10:20

## 2019-02-04 RX ADMIN — NEOSTIGMINE METHYLSULFATE 2 MG: 1 INJECTION, SOLUTION INTRAMUSCULAR; INTRAVENOUS; SUBCUTANEOUS at 10:47

## 2019-02-04 RX ADMIN — PROPOFOL 150 MG: 10 INJECTION, EMULSION INTRAVENOUS at 09:50

## 2019-02-04 RX ADMIN — HYDROMORPHONE HYDROCHLORIDE 0.5 MG: 1 INJECTION, SOLUTION INTRAMUSCULAR; INTRAVENOUS; SUBCUTANEOUS at 13:25

## 2019-02-04 RX ADMIN — LIDOCAINE HYDROCHLORIDE 80 MG: 20 INJECTION, SOLUTION INFILTRATION; PERINEURAL at 10:55

## 2019-02-04 RX ADMIN — PROPOFOL 20 MG: 10 INJECTION, EMULSION INTRAVENOUS at 10:17

## 2019-02-04 RX ADMIN — ESMOLOL HYDROCHLORIDE 10 MG: 100 INJECTION, SOLUTION INTRAVENOUS at 10:11

## 2019-02-04 RX ADMIN — OXYCODONE AND ACETAMINOPHEN 1 TABLET: 5; 325 TABLET ORAL at 14:06

## 2019-02-04 RX ADMIN — PROPOFOL 30 MG: 10 INJECTION, EMULSION INTRAVENOUS at 09:53

## 2019-02-04 RX ADMIN — CEFAZOLIN SODIUM 2 G: 2 SOLUTION INTRAVENOUS at 09:54

## 2019-02-04 RX ADMIN — HYDROMORPHONE HYDROCHLORIDE 1 MG: 1 INJECTION, SOLUTION INTRAMUSCULAR; INTRAVENOUS; SUBCUTANEOUS at 11:40

## 2019-02-04 RX ADMIN — ROCURONIUM BROMIDE 5 MG: 10 INJECTION INTRAVENOUS at 09:49

## 2019-02-04 RX ADMIN — ESMOLOL HYDROCHLORIDE 10 MG: 100 INJECTION, SOLUTION INTRAVENOUS at 10:20

## 2019-02-04 NOTE — INTERVAL H&P NOTE
"  H&P updated. The patient was examined and the following changes are noted:  vs  /97 (BP Location: Left arm, Patient Position: Sitting)   Pulse 73   Temp 97.5 °F (36.4 °C) (Oral)   Resp 16   Ht 180.3 cm (70.98\")   Wt 112 kg (247 lb 6.4 oz)   SpO2 94%   BMI 34.52 kg/m²         "

## 2019-02-04 NOTE — ANESTHESIA PREPROCEDURE EVALUATION
Anesthesia Evaluation     Patient summary reviewed and Nursing notes reviewed   no history of anesthetic complications:  NPO Solid Status: > 8 hours  NPO Liquid Status: > 8 hours           Airway   Mallampati: I  TM distance: >3 FB  Neck ROM: full  No difficulty expected  Dental    (+) edentulous    Pulmonary - normal exam   (+) a smoker (1+ ppd x 50+ yrs, 3 ppd up to about 1 year ago) Current Smoked day of surgery, COPD moderate, shortness of breath, sleep apnea, decreased breath sounds, wheezes,     ROS comment: Room air SaO2 95  Cardiovascular - normal exam  Exercise tolerance: good (4-7 METS)    ECG reviewed  Rhythm: regular  Rate: normal    (+) hypertension well controlled less than 2 medications, TERRY, hyperlipidemia,     ROS comment: · Left atrial cavity size is mildly dilated.  ·· Left ventricular ejection fraction is normal (Calculated EF = 63%).  · Myocardial perfusion imaging indicates a normal myocardial perfusion study with no evidence of ischemia.  · Impressions are consistent with a low risk study.    Calculated EF = 71.0%    Neuro/Psych- negative ROS  GI/Hepatic/Renal/Endo    (+) obesity,  GERD (Rx w/ OTC ),      Musculoskeletal     Abdominal   (+) obese,    Substance History   (+) alcohol use,      OB/GYN          Other   (+) arthritis                   Anesthesia Plan    ASA 3     general     intravenous induction   Anesthetic plan, all risks, benefits, and alternatives have been provided, discussed and informed consent has been obtained with: patient.  Use of blood products discussed with patient  Consented to blood products.

## 2019-02-04 NOTE — ANESTHESIA POSTPROCEDURE EVALUATION
Patient: Yves Hastings    Procedure Summary     Date:  02/04/19 Room / Location:   [facility] 4 /  [facility]    Anesthesia Start:  0945 Anesthesia Stop:  1101    Procedure:  VENTRAL/INCISIONAL HERNIA REPAIR LAPAROSCOPIC (N/A Abdomen) Diagnosis:       Ventral hernia without obstruction or gangrene      (Ventral hernia without obstruction or gangrene [K43.9])    Surgeon:  Adelfo Nieves MD Provider:  Kavya Yee MD    Anesthesia Type:  general ASA Status:  3          Anesthesia Type: general  Last vitals  BP   124/82 (02/04/19 1215)   Temp   97.5 °F (36.4 °C) (02/04/19 1215)   Pulse   80 (02/04/19 1215)   Resp   16 (02/04/19 1215)     SpO2   93 % (02/04/19 1215)     Post Anesthesia Care and Evaluation    Patient location during evaluation: PHASE II  Patient participation: complete - patient participated  Level of consciousness: awake and alert  Pain score: 0  Pain management: adequate  Airway patency: patent  Anesthetic complications: No anesthetic complications  PONV Status: none  Cardiovascular status: acceptable  Respiratory status: acceptable  Hydration status: acceptable

## 2019-02-04 NOTE — OP NOTE
Preoperative diagnosis ventral hernia  Postoperative Diagnosis is same  Procedure laparoscopic repair of ventral hernia incarcerated  Complications none  Drains none  Specimen none  Estimated blood loss 25 cc  Anesthesia Gen. via endotracheal tube  Surgeon Dr. Nieves  Assistant Bridgette Mercer  Findings omental adhesions to the prior umbilical hernia repair 1 cm epigastric hernia with incarcerated omentum  Procedure per satisfactory induction of general anesthesia via endotracheal tube the patient was straight cathetered and oral gastric tube was placed.  His abdomen was prepped and draped sterile fashion.  5 mm Visiport was placed in left upper quadrant under direct vision after the site had been locally infiltrated with quarter percent Marcaine with epinephrine.  Abdomen was insufflated to 14 mmHg with use CO2.  General survey of the abdomen showed omentum adhesed up to the anterior abdominal wall.  5 mm ports ×2 placed one in the left lower quadrant one in the right upper quadrant and a 12 mm port was placed in the right lower quadrant under direct vision after each site had been locally infiltrated with quarter percent Marcaine with epinephrine.  omentum was taken down from the anterior abdominal wall sharply between hemoclips where appropriate and with use of electrocautery where appropriate.  He had incarcerated omentum in the epigastric hernia this sac was divided with use of electrocautery.  Hemostasis was assured.  The new hernia was approximately 2 cm cephalad to the prior mesh repair.  A 10.4 cm round composex LP mesh was chosen to cover the defect 0 Vicryl suture was placed centrally in the Marlex side.  Mesh was rolled dropped within the abdomen Endo Close instrument was passed through the abdominal wall through the center of the defect suture was grasped and the mesh was pulled up to the anterior abdominal wall orienting the Marlex side to the peritoneum.  The mesh was tacked circumferentially for 2 rows of  protacks there was good coverage circumferentially. permanentTacks were utilized because the mesh covered a portion of the prior mesh repair.  Mesh lay flat on the abdominal wall and there was good coverage.  Hemostasis was assured.  Suture was cut to length.  12 mm port site fascia was closed in interrupted simple fashion with use of 0 Ethibond placing this suture with the Endo Close instrument.  Fascia was well closed at this juncture.  5 mm ports were sequentially pulled back all were hemostatic.  Abdomen was desufflated.  Skin incisions were closed with 4-0 Monocryl running subcuticular stitch burying the knots.  Wounds were cleaned and dried and sterilely dressed.  The patient tolerated the procedure well was transferred to the recovery room in stable condition.  When he is awake alert stable tolerating by mouth and has voided he'll be discharged home to follow-up in my office next week call for problems.  Diet as tolerated.  No lifting more than 5 pounds ×6 weeks.  He may shower in 24 hours.  He was given a prescription for Percocet 5/325 one to 2 by mouth every 4 hours prn  Pain 40 these were dispensed without refills.  If He has problems voiding he should report to the emergency room.  He should call for problems and call for an appointment.

## 2019-02-04 NOTE — ANESTHESIA PROCEDURE NOTES
Airway  Urgency: elective    Date/Time: 2/4/2019 9:53 AM  Airway not difficult    General Information and Staff    Patient location during procedure: OR  Anesthesiologist: Kavya Yee MD    Indications and Patient Condition  Indications for airway management: airway protection    Preoxygenated: yes  MILS maintained throughout  Mask difficulty assessment: 2 - vent by mask + OA or adjuvant +/- NMBA    Final Airway Details  Final airway type: endotracheal airway      Successful airway: ETT  Cuffed: yes   Successful intubation technique: direct laryngoscopy  Facilitating devices/methods: intubating stylet  Endotracheal tube insertion site: oral  Blade: Lucas  Blade size: 3  ETT size (mm): 7.5  Cormack-Lehane Classification: grade I - full view of glottis  Placement verified by: chest auscultation and capnometry   Cuff volume (mL): 7  Measured from: lips  ETT to lips (cm): 21  Number of attempts at approach: 1    Additional Comments  Atraumatic intubation, equal bilateral breath sounds, positive end tidal CO2.

## 2019-02-04 NOTE — H&P
Adelfo Nieves MD   Physician   General Surgery   H&P   Signed   Encounter Date:  2018               Signed                   Show:Clear all  []Manual[x]Template[]Copied    Added by:  [x]Adelfo Nieves MD      []Katya for details            PATIENT INFORMATION  Yves Hastings  POSSIBLE VENTRAL HERNIA, ON PREDNISONE FOR A COLD, SOME DISCOMFORT, NO IMAGING      - 1950     CHIEF COMPLAINT      Chief Complaint   Patient presents with   • Hernia   Wall bulge     HISTORY OF PRESENT ILLNESS  HPI he complains of an abdominal wall bulge for several months.  He says it is uncomfortable and has been increasing in size.  He had a prior laparoscopic umbilical hernia repair by Dr. Casey.           REVIEW OF SYSTEMS  Review of Systems he continues to smoke and drink alcohol.  He wants to get this done so he can get back to work  Otherwise negative        ACTIVE PROBLEMS  Patient Active Problem List     Diagnosis   • Chronic obstructive lung disease (CMS/HCC) [J44.9]   • Essential hypertension [I10]   • Hyperlipidemia [E78.5]   • Pulmonary emphysema (CMS/HCC) [J43.9]            PAST MEDICAL HISTORY       Past Medical History:   Diagnosis Date   • COPD (chronic obstructive pulmonary disease) (CMS/HCC)     • Emphysema of lung (CMS/HCC)     • HLD (hyperlipidemia)     • HTN (hypertension)              SURGICAL HISTORY        Past Surgical History:   Procedure Laterality Date   • UMBILICAL HERNIA REPAIR         DR. CASEY - YEARS AGO            FAMILY HISTORY        Family History   Problem Relation Age of Onset   • Cancer Mother     • Heart disease Father              SOCIAL HISTORY  Social History          Occupational History   • Not on file.           Social History Main Topics   • Smoking status: Current Every Day Smoker   • Smokeless tobacco: Not on file   • Alcohol use Yes   • Drug use: Unknown   • Sexual activity: Not on file         Debilities/Disabilities Identified: None     Emotional Behavior:  "Appropriate     CURRENT MEDICATIONS     Current Outpatient Prescriptions:   •  amLODIPine (NORVASC) 5 MG tablet, amlodipine 5 mg tablet  Take one (1) tablet orally (by mouth) once daily, Disp: , Rfl:   •  lovastatin (MEVACOR) 40 MG tablet, lovastatin 40 mg tablet  Take one (1) tablet orally (by mouth) once daily, Disp: , Rfl:   •  mometasone-formoterol (DULERA) 200-5 MCG/ACT inhaler, Every 12 (Twelve) Hours., Disp: , Rfl:   •  pramipexole (MIRAPEX) 0.5 MG tablet, pramipexole 0.5 mg tablet, Disp: , Rfl:   •  predniSONE (DELTASONE) 50 MG tablet, prednisone 50 mg tablet  Take 1 tablet every day by oral route., Disp: , Rfl:      ALLERGIES  Patient has no known allergies.     VITALS      Vitals:     11/07/18 1446   BP: 148/92   Weight: 112 kg (248 lb)   Height: 180.3 cm (71\")         LAST RESULTS           No results found for any previous visit.      No results found.     PHYSICAL EXAM  Physical Exam this an obese white male in no active distress.  He is oriented ×3.  His lungs are clear and equal was heart shows regular rate and rhythm.  His abdomen is obese soft nontender with a reducible ventral hernia this appears to be cephalad to his prior mesh repair.  I reviewed his prior operative note and a ventral ex mesh was used.     ASSESSMENT  Ventral hernia        PLAN  The risks benefits and options were discussed with the patient in detail.  We will proceed with a laparoscopic repair this has been arranged for [facility] on an outpatient basis for November 28     Discussed risks of surgery with patient and in particular increased risk of wound infection, poor wound healing, hernias (with abdominal surgery) and post-operative pulmonary complications associated with smoking.                                                      Office Visit on 11/7/2018            Routing History            Detailed Report          "

## 2019-02-18 ENCOUNTER — OFFICE VISIT (OUTPATIENT)
Dept: SURGERY | Facility: CLINIC | Age: 69
End: 2019-02-18

## 2019-02-18 DIAGNOSIS — Z09 SURGICAL FOLLOW-UP CARE: Primary | ICD-10-CM

## 2019-02-18 PROCEDURE — 99024 POSTOP FOLLOW-UP VISIT: CPT | Performed by: SURGERY

## 2019-02-18 NOTE — PROGRESS NOTES
2 wk PO repair of ventral hernia incarcerated, BURNING AROUND INCISION   Complains of some burning around his right lower quadrant incision.  This is at the 12 mm port site.  Incisions are healing well there is no impulse is no cellulitis.  His surgery was discussed and I told him that this is normal.  I will see him back in 1 month

## 2019-03-25 ENCOUNTER — OFFICE VISIT (OUTPATIENT)
Dept: SURGERY | Facility: CLINIC | Age: 69
End: 2019-03-25

## 2019-03-25 DIAGNOSIS — Z09 SURGICAL FOLLOW-UP CARE: Primary | ICD-10-CM

## 2019-03-25 PROCEDURE — 99024 POSTOP FOLLOW-UP VISIT: CPT | Performed by: SURGERY

## 2019-03-25 NOTE — PROGRESS NOTES
6 wk PO lap repair of ventral hernia incarcerated - no problems  He is without complaints although he has gained some weight.  His incisions are healing well.  There is no impulse.  There is no evidence of cellulitis.  He can resume normal activities and I will see him back as needed

## 2019-04-03 ENCOUNTER — APPOINTMENT (OUTPATIENT)
Dept: CT IMAGING | Facility: HOSPITAL | Age: 69
End: 2019-04-03

## 2019-07-29 ENCOUNTER — TRANSCRIBE ORDERS (OUTPATIENT)
Dept: ADMINISTRATIVE | Facility: HOSPITAL | Age: 69
End: 2019-07-29

## 2019-10-29 ENCOUNTER — TRANSCRIBE ORDERS (OUTPATIENT)
Dept: ADMINISTRATIVE | Facility: HOSPITAL | Age: 69
End: 2019-10-29

## 2019-10-29 DIAGNOSIS — Z12.2 ENCOUNTER FOR SCREENING FOR LUNG CANCER: Primary | ICD-10-CM

## 2019-10-31 ENCOUNTER — HOSPITAL ENCOUNTER (OUTPATIENT)
Dept: CT IMAGING | Facility: HOSPITAL | Age: 69
Discharge: HOME | End: 2019-10-31
Admitting: NURSE PRACTITIONER

## 2019-10-31 DIAGNOSIS — Z12.2 ENCOUNTER FOR SCREENING FOR LUNG CANCER: ICD-10-CM

## 2019-10-31 PROCEDURE — G0297 LDCT FOR LUNG CA SCREEN: HCPCS

## 2021-05-03 ENCOUNTER — TELEPHONE (OUTPATIENT)
Dept: GASTROENTEROLOGY | Facility: CLINIC | Age: 71
End: 2021-05-03

## 2021-05-04 ENCOUNTER — PREP FOR SURGERY (OUTPATIENT)
Dept: OTHER | Facility: HOSPITAL | Age: 71
End: 2021-05-04

## 2021-05-04 DIAGNOSIS — Z12.11 ENCOUNTER FOR SCREENING FOR MALIGNANT NEOPLASM OF COLON: Primary | ICD-10-CM

## 2021-05-18 PROBLEM — Z12.11 ENCOUNTER FOR SCREENING FOR MALIGNANT NEOPLASM OF COLON: Status: ACTIVE | Noted: 2021-05-18

## 2021-05-28 ENCOUNTER — APPOINTMENT (OUTPATIENT)
Dept: CT IMAGING | Facility: HOSPITAL | Age: 71
End: 2021-05-28

## 2021-05-28 ENCOUNTER — HOSPITAL ENCOUNTER (OUTPATIENT)
Facility: HOSPITAL | Age: 71
Setting detail: OBSERVATION
Discharge: HOME | End: 2021-05-29
Attending: EMERGENCY MEDICINE | Admitting: SURGERY

## 2021-05-28 DIAGNOSIS — K61.1 PERIRECTAL ABSCESS: Primary | ICD-10-CM

## 2021-05-28 LAB
ALBUMIN SERPL-MCNC: 3.8 G/DL (ref 3.5–5.2)
ALBUMIN/GLOB SERPL: 1.4 G/DL
ALP SERPL-CCNC: 67 U/L (ref 39–117)
ALT SERPL W P-5'-P-CCNC: 11 U/L (ref 1–41)
ANION GAP SERPL CALCULATED.3IONS-SCNC: 9.1 MMOL/L (ref 5–15)
AST SERPL-CCNC: 9 U/L (ref 1–40)
BASOPHILS # BLD AUTO: 0.04 10*3/MM3 (ref 0–0.2)
BASOPHILS NFR BLD AUTO: 0.4 % (ref 0–1.5)
BILIRUB SERPL-MCNC: 0.7 MG/DL (ref 0–1.2)
BUN SERPL-MCNC: 12 MG/DL (ref 8–23)
BUN/CREAT SERPL: 10 (ref 7–25)
CALCIUM SPEC-SCNC: 9 MG/DL (ref 8.6–10.5)
CHLORIDE SERPL-SCNC: 103 MMOL/L (ref 98–107)
CO2 SERPL-SCNC: 22.9 MMOL/L (ref 22–29)
CREAT SERPL-MCNC: 1.2 MG/DL (ref 0.76–1.27)
D-LACTATE SERPL-SCNC: 1.1 MMOL/L (ref 0.5–2)
DEPRECATED RDW RBC AUTO: 46 FL (ref 37–54)
EOSINOPHIL # BLD AUTO: 0.15 10*3/MM3 (ref 0–0.4)
EOSINOPHIL NFR BLD AUTO: 1.4 % (ref 0.3–6.2)
ERYTHROCYTE [DISTWIDTH] IN BLOOD BY AUTOMATED COUNT: 12.4 % (ref 12.3–15.4)
GFR SERPL CREATININE-BSD FRML MDRD: 60 ML/MIN/1.73
GLOBULIN UR ELPH-MCNC: 2.8 GM/DL
GLUCOSE SERPL-MCNC: 106 MG/DL (ref 65–99)
HCT VFR BLD AUTO: 38.2 % (ref 37.5–51)
HGB BLD-MCNC: 13.7 G/DL (ref 13–17.7)
IMM GRANULOCYTES # BLD AUTO: 0.11 10*3/MM3 (ref 0–0.05)
IMM GRANULOCYTES NFR BLD AUTO: 1 % (ref 0–0.5)
INR PPP: 0.99 (ref 0.9–1.1)
LYMPHOCYTES # BLD AUTO: 2.31 10*3/MM3 (ref 0.7–3.1)
LYMPHOCYTES NFR BLD AUTO: 21.2 % (ref 19.6–45.3)
MCH RBC QN AUTO: 36.6 PG (ref 26.6–33)
MCHC RBC AUTO-ENTMCNC: 35.9 G/DL (ref 31.5–35.7)
MCV RBC AUTO: 102.1 FL (ref 79–97)
MONOCYTES # BLD AUTO: 1.13 10*3/MM3 (ref 0.1–0.9)
MONOCYTES NFR BLD AUTO: 10.4 % (ref 5–12)
NEUTROPHILS NFR BLD AUTO: 65.6 % (ref 42.7–76)
NEUTROPHILS NFR BLD AUTO: 7.15 10*3/MM3 (ref 1.7–7)
NRBC BLD AUTO-RTO: 0 /100 WBC (ref 0–0.2)
PLATELET # BLD AUTO: 157 10*3/MM3 (ref 140–450)
PMV BLD AUTO: 9.7 FL (ref 6–12)
POTASSIUM SERPL-SCNC: 4.5 MMOL/L (ref 3.5–5.2)
PROT SERPL-MCNC: 6.6 G/DL (ref 6–8.5)
PROTHROMBIN TIME: 12.9 SECONDS (ref 11.7–14.2)
RBC # BLD AUTO: 3.74 10*6/MM3 (ref 4.14–5.8)
SODIUM SERPL-SCNC: 135 MMOL/L (ref 136–145)
WBC # BLD AUTO: 10.89 10*3/MM3 (ref 3.4–10.8)

## 2021-05-28 PROCEDURE — 96375 TX/PRO/DX INJ NEW DRUG ADDON: CPT

## 2021-05-28 PROCEDURE — U0004 COV-19 TEST NON-CDC HGH THRU: HCPCS | Performed by: EMERGENCY MEDICINE

## 2021-05-28 PROCEDURE — G0378 HOSPITAL OBSERVATION PER HR: HCPCS

## 2021-05-28 PROCEDURE — 25010000002 IOPAMIDOL 61 % SOLUTION: Performed by: EMERGENCY MEDICINE

## 2021-05-28 PROCEDURE — 25010000002 PIPERACILLIN SOD-TAZOBACTAM PER 1 G: Performed by: EMERGENCY MEDICINE

## 2021-05-28 PROCEDURE — 25010000002 HYDROMORPHONE PER 4 MG: Performed by: EMERGENCY MEDICINE

## 2021-05-28 PROCEDURE — 80053 COMPREHEN METABOLIC PANEL: CPT | Performed by: EMERGENCY MEDICINE

## 2021-05-28 PROCEDURE — C9803 HOPD COVID-19 SPEC COLLECT: HCPCS

## 2021-05-28 PROCEDURE — 96374 THER/PROPH/DIAG INJ IV PUSH: CPT

## 2021-05-28 PROCEDURE — 25010000002 DROPERIDOL PER 5 MG: Performed by: EMERGENCY MEDICINE

## 2021-05-28 PROCEDURE — 85610 PROTHROMBIN TIME: CPT | Performed by: EMERGENCY MEDICINE

## 2021-05-28 PROCEDURE — 99283 EMERGENCY DEPT VISIT LOW MDM: CPT

## 2021-05-28 PROCEDURE — 85025 COMPLETE CBC W/AUTO DIFF WBC: CPT | Performed by: EMERGENCY MEDICINE

## 2021-05-28 PROCEDURE — 87040 BLOOD CULTURE FOR BACTERIA: CPT | Performed by: EMERGENCY MEDICINE

## 2021-05-28 PROCEDURE — 83605 ASSAY OF LACTIC ACID: CPT | Performed by: EMERGENCY MEDICINE

## 2021-05-28 PROCEDURE — 96376 TX/PRO/DX INJ SAME DRUG ADON: CPT

## 2021-05-28 PROCEDURE — 25010000002 HYDROMORPHONE 1 MG/ML SOLUTION: Performed by: EMERGENCY MEDICINE

## 2021-05-28 PROCEDURE — 74177 CT ABD & PELVIS W/CONTRAST: CPT

## 2021-05-28 RX ORDER — AMLODIPINE BESYLATE 10 MG/1
10 TABLET ORAL NIGHTLY
Status: DISCONTINUED | OUTPATIENT
Start: 2021-05-29 | End: 2021-05-29 | Stop reason: HOSPADM

## 2021-05-28 RX ORDER — HYDROMORPHONE HYDROCHLORIDE 1 MG/ML
0.5 INJECTION, SOLUTION INTRAMUSCULAR; INTRAVENOUS; SUBCUTANEOUS ONCE
Status: COMPLETED | OUTPATIENT
Start: 2021-05-28 | End: 2021-05-28

## 2021-05-28 RX ORDER — DEXTROSE, SODIUM CHLORIDE, SODIUM LACTATE, POTASSIUM CHLORIDE, AND CALCIUM CHLORIDE 5; .6; .31; .03; .02 G/100ML; G/100ML; G/100ML; G/100ML; G/100ML
125 INJECTION, SOLUTION INTRAVENOUS CONTINUOUS
Status: DISCONTINUED | OUTPATIENT
Start: 2021-05-28 | End: 2021-05-29

## 2021-05-28 RX ORDER — ALBUTEROL SULFATE 2.5 MG/3ML
2.5 SOLUTION RESPIRATORY (INHALATION) EVERY 6 HOURS PRN
Status: DISCONTINUED | OUTPATIENT
Start: 2021-05-28 | End: 2021-05-29 | Stop reason: HOSPADM

## 2021-05-28 RX ORDER — SODIUM CHLORIDE 0.9 % (FLUSH) 0.9 %
10 SYRINGE (ML) INJECTION AS NEEDED
Status: DISCONTINUED | OUTPATIENT
Start: 2021-05-28 | End: 2021-05-29 | Stop reason: HOSPADM

## 2021-05-28 RX ORDER — ATORVASTATIN CALCIUM 20 MG/1
10 TABLET, FILM COATED ORAL DAILY
Status: DISCONTINUED | OUTPATIENT
Start: 2021-05-29 | End: 2021-05-29 | Stop reason: HOSPADM

## 2021-05-28 RX ORDER — DROPERIDOL 2.5 MG/ML
2.5 INJECTION, SOLUTION INTRAMUSCULAR; INTRAVENOUS ONCE
Status: COMPLETED | OUTPATIENT
Start: 2021-05-28 | End: 2021-05-28

## 2021-05-28 RX ORDER — ONDANSETRON 2 MG/ML
4 INJECTION INTRAMUSCULAR; INTRAVENOUS EVERY 6 HOURS PRN
Status: DISCONTINUED | OUTPATIENT
Start: 2021-05-28 | End: 2021-05-29 | Stop reason: HOSPADM

## 2021-05-28 RX ORDER — HYDROMORPHONE HYDROCHLORIDE 1 MG/ML
0.5 INJECTION, SOLUTION INTRAMUSCULAR; INTRAVENOUS; SUBCUTANEOUS
Status: DISCONTINUED | OUTPATIENT
Start: 2021-05-28 | End: 2021-05-29 | Stop reason: HOSPADM

## 2021-05-28 RX ADMIN — IOPAMIDOL 85 ML: 612 INJECTION, SOLUTION INTRAVENOUS at 19:00

## 2021-05-28 RX ADMIN — DROPERIDOL 2.5 MG: 2.5 INJECTION, SOLUTION INTRAMUSCULAR; INTRAVENOUS at 18:22

## 2021-05-28 RX ADMIN — HYDROMORPHONE HYDROCHLORIDE 1 MG: 1 INJECTION, SOLUTION INTRAMUSCULAR; INTRAVENOUS; SUBCUTANEOUS at 18:24

## 2021-05-28 RX ADMIN — TAZOBACTAM SODIUM AND PIPERACILLIN SODIUM 3.38 G: 375; 3 INJECTION, SOLUTION INTRAVENOUS at 20:22

## 2021-05-28 RX ADMIN — HYDROMORPHONE HYDROCHLORIDE 0.5 MG: 1 INJECTION, SOLUTION INTRAMUSCULAR; INTRAVENOUS; SUBCUTANEOUS at 20:33

## 2021-05-28 RX ADMIN — SODIUM CHLORIDE, SODIUM LACTATE, POTASSIUM CHLORIDE, CALCIUM CHLORIDE AND DEXTROSE MONOHYDRATE 125 ML/HR: 5; 600; 310; 30; 20 INJECTION, SOLUTION INTRAVENOUS at 23:40

## 2021-05-29 ENCOUNTER — ANESTHESIA (OUTPATIENT)
Dept: PERIOP | Facility: HOSPITAL | Age: 71
End: 2021-05-29

## 2021-05-29 ENCOUNTER — ANESTHESIA EVENT (OUTPATIENT)
Dept: PERIOP | Facility: HOSPITAL | Age: 71
End: 2021-05-29

## 2021-05-29 VITALS
RESPIRATION RATE: 16 BRPM | BODY MASS INDEX: 33.46 KG/M2 | DIASTOLIC BLOOD PRESSURE: 74 MMHG | SYSTOLIC BLOOD PRESSURE: 123 MMHG | WEIGHT: 239 LBS | HEART RATE: 94 BPM | TEMPERATURE: 98 F | HEIGHT: 71 IN | OXYGEN SATURATION: 95 %

## 2021-05-29 LAB — SARS-COV-2 ORF1AB RESP QL NAA+PROBE: NOT DETECTED

## 2021-05-29 PROCEDURE — 25010000002 PIPERACILLIN SOD-TAZOBACTAM PER 1 G: Performed by: SURGERY

## 2021-05-29 PROCEDURE — 99218 PR INITIAL OBSERVATION CARE/DAY 30 MINUTES: CPT | Performed by: SURGERY

## 2021-05-29 PROCEDURE — 87077 CULTURE AEROBIC IDENTIFY: CPT | Performed by: SURGERY

## 2021-05-29 PROCEDURE — G0378 HOSPITAL OBSERVATION PER HR: HCPCS

## 2021-05-29 PROCEDURE — 25010000002 NEOSTIGMINE 5 MG/10ML SOLUTION: Performed by: NURSE ANESTHETIST, CERTIFIED REGISTERED

## 2021-05-29 PROCEDURE — 25010000002 FENTANYL CITRATE (PF) 50 MCG/ML SOLUTION: Performed by: NURSE ANESTHETIST, CERTIFIED REGISTERED

## 2021-05-29 PROCEDURE — 87015 SPECIMEN INFECT AGNT CONCNTJ: CPT | Performed by: SURGERY

## 2021-05-29 PROCEDURE — 87186 SC STD MICRODIL/AGAR DIL: CPT | Performed by: SURGERY

## 2021-05-29 PROCEDURE — 94799 UNLISTED PULMONARY SVC/PX: CPT

## 2021-05-29 PROCEDURE — 87205 SMEAR GRAM STAIN: CPT | Performed by: SURGERY

## 2021-05-29 PROCEDURE — 94640 AIRWAY INHALATION TREATMENT: CPT

## 2021-05-29 PROCEDURE — 25010000002 DEXAMETHASONE PER 1 MG: Performed by: NURSE ANESTHETIST, CERTIFIED REGISTERED

## 2021-05-29 PROCEDURE — 87075 CULTR BACTERIA EXCEPT BLOOD: CPT | Performed by: SURGERY

## 2021-05-29 PROCEDURE — 87070 CULTURE OTHR SPECIMN AEROBIC: CPT | Performed by: SURGERY

## 2021-05-29 PROCEDURE — 25010000002 HYDROMORPHONE PER 4 MG: Performed by: NURSE ANESTHETIST, CERTIFIED REGISTERED

## 2021-05-29 PROCEDURE — 25010000002 ONDANSETRON PER 1 MG: Performed by: NURSE ANESTHETIST, CERTIFIED REGISTERED

## 2021-05-29 PROCEDURE — 46040 I&D ISCHIORCT&/PERIRCT ABSC: CPT | Performed by: SURGERY

## 2021-05-29 PROCEDURE — 25010000002 PROPOFOL 10 MG/ML EMULSION: Performed by: NURSE ANESTHETIST, CERTIFIED REGISTERED

## 2021-05-29 RX ORDER — OXYCODONE HYDROCHLORIDE AND ACETAMINOPHEN 5; 325 MG/1; MG/1
1 TABLET ORAL EVERY 4 HOURS PRN
Status: DISCONTINUED | OUTPATIENT
Start: 2021-05-29 | End: 2021-05-29 | Stop reason: HOSPADM

## 2021-05-29 RX ORDER — PROMETHAZINE HYDROCHLORIDE 25 MG/1
25 SUPPOSITORY RECTAL ONCE AS NEEDED
Status: DISCONTINUED | OUTPATIENT
Start: 2021-05-29 | End: 2021-05-29

## 2021-05-29 RX ORDER — HYDROMORPHONE HCL 110MG/55ML
PATIENT CONTROLLED ANALGESIA SYRINGE INTRAVENOUS AS NEEDED
Status: DISCONTINUED | OUTPATIENT
Start: 2021-05-29 | End: 2021-05-29 | Stop reason: SURG

## 2021-05-29 RX ORDER — LIDOCAINE HYDROCHLORIDE 20 MG/ML
INJECTION, SOLUTION INFILTRATION; PERINEURAL AS NEEDED
Status: DISCONTINUED | OUTPATIENT
Start: 2021-05-29 | End: 2021-05-29 | Stop reason: SURG

## 2021-05-29 RX ORDER — PROMETHAZINE HYDROCHLORIDE 25 MG/1
25 TABLET ORAL ONCE AS NEEDED
Status: DISCONTINUED | OUTPATIENT
Start: 2021-05-29 | End: 2021-05-29

## 2021-05-29 RX ORDER — MIDAZOLAM HYDROCHLORIDE 1 MG/ML
0.5 INJECTION INTRAMUSCULAR; INTRAVENOUS
Status: DISCONTINUED | OUTPATIENT
Start: 2021-05-29 | End: 2021-05-29 | Stop reason: HOSPADM

## 2021-05-29 RX ORDER — DIPHENHYDRAMINE HCL 25 MG
25 CAPSULE ORAL
Status: DISCONTINUED | OUTPATIENT
Start: 2021-05-29 | End: 2021-05-29

## 2021-05-29 RX ORDER — IBUPROFEN 600 MG/1
600 TABLET ORAL ONCE AS NEEDED
Status: DISCONTINUED | OUTPATIENT
Start: 2021-05-29 | End: 2021-05-29

## 2021-05-29 RX ORDER — GLYCOPYRROLATE 0.2 MG/ML
INJECTION INTRAMUSCULAR; INTRAVENOUS AS NEEDED
Status: DISCONTINUED | OUTPATIENT
Start: 2021-05-29 | End: 2021-05-29 | Stop reason: SURG

## 2021-05-29 RX ORDER — MAGNESIUM HYDROXIDE 1200 MG/15ML
LIQUID ORAL AS NEEDED
Status: DISCONTINUED | OUTPATIENT
Start: 2021-05-29 | End: 2021-05-29 | Stop reason: HOSPADM

## 2021-05-29 RX ORDER — FLUMAZENIL 0.1 MG/ML
0.2 INJECTION INTRAVENOUS AS NEEDED
Status: DISCONTINUED | OUTPATIENT
Start: 2021-05-29 | End: 2021-05-29

## 2021-05-29 RX ORDER — ONDANSETRON 2 MG/ML
4 INJECTION INTRAMUSCULAR; INTRAVENOUS ONCE AS NEEDED
Status: DISCONTINUED | OUTPATIENT
Start: 2021-05-29 | End: 2021-05-29

## 2021-05-29 RX ORDER — HYDROMORPHONE HYDROCHLORIDE 1 MG/ML
0.5 INJECTION, SOLUTION INTRAMUSCULAR; INTRAVENOUS; SUBCUTANEOUS
Status: DISCONTINUED | OUTPATIENT
Start: 2021-05-29 | End: 2021-05-29

## 2021-05-29 RX ORDER — OXYCODONE AND ACETAMINOPHEN 10; 325 MG/1; MG/1
1 TABLET ORAL EVERY 4 HOURS PRN
Status: DISCONTINUED | OUTPATIENT
Start: 2021-05-29 | End: 2021-05-29

## 2021-05-29 RX ORDER — FENTANYL CITRATE 50 UG/ML
INJECTION, SOLUTION INTRAMUSCULAR; INTRAVENOUS AS NEEDED
Status: DISCONTINUED | OUTPATIENT
Start: 2021-05-29 | End: 2021-05-29 | Stop reason: SURG

## 2021-05-29 RX ORDER — NALOXONE HCL 0.4 MG/ML
0.2 VIAL (ML) INJECTION AS NEEDED
Status: DISCONTINUED | OUTPATIENT
Start: 2021-05-29 | End: 2021-05-29

## 2021-05-29 RX ORDER — ALBUTEROL SULFATE 2.5 MG/3ML
2.5 SOLUTION RESPIRATORY (INHALATION) ONCE AS NEEDED
Status: DISCONTINUED | OUTPATIENT
Start: 2021-05-29 | End: 2021-05-29

## 2021-05-29 RX ORDER — FENTANYL CITRATE 50 UG/ML
50 INJECTION, SOLUTION INTRAMUSCULAR; INTRAVENOUS
Status: DISCONTINUED | OUTPATIENT
Start: 2021-05-29 | End: 2021-05-29

## 2021-05-29 RX ORDER — NEOSTIGMINE METHYLSULFATE 0.5 MG/ML
INJECTION, SOLUTION INTRAVENOUS AS NEEDED
Status: DISCONTINUED | OUTPATIENT
Start: 2021-05-29 | End: 2021-05-29 | Stop reason: SURG

## 2021-05-29 RX ORDER — HYDROCODONE BITARTRATE AND ACETAMINOPHEN 7.5; 325 MG/1; MG/1
1 TABLET ORAL ONCE AS NEEDED
Status: DISCONTINUED | OUTPATIENT
Start: 2021-05-29 | End: 2021-05-29

## 2021-05-29 RX ORDER — PROPOFOL 10 MG/ML
VIAL (ML) INTRAVENOUS AS NEEDED
Status: DISCONTINUED | OUTPATIENT
Start: 2021-05-29 | End: 2021-05-29 | Stop reason: SURG

## 2021-05-29 RX ORDER — SODIUM CHLORIDE 0.9 % (FLUSH) 0.9 %
10 SYRINGE (ML) INJECTION EVERY 12 HOURS SCHEDULED
Status: DISCONTINUED | OUTPATIENT
Start: 2021-05-29 | End: 2021-05-29 | Stop reason: HOSPADM

## 2021-05-29 RX ORDER — ONDANSETRON 2 MG/ML
INJECTION INTRAMUSCULAR; INTRAVENOUS AS NEEDED
Status: DISCONTINUED | OUTPATIENT
Start: 2021-05-29 | End: 2021-05-29 | Stop reason: SURG

## 2021-05-29 RX ORDER — SODIUM CHLORIDE, SODIUM LACTATE, POTASSIUM CHLORIDE, CALCIUM CHLORIDE 600; 310; 30; 20 MG/100ML; MG/100ML; MG/100ML; MG/100ML
9 INJECTION, SOLUTION INTRAVENOUS CONTINUOUS PRN
Status: DISCONTINUED | OUTPATIENT
Start: 2021-05-29 | End: 2021-05-29

## 2021-05-29 RX ORDER — DIPHENHYDRAMINE HYDROCHLORIDE 50 MG/ML
12.5 INJECTION INTRAMUSCULAR; INTRAVENOUS
Status: DISCONTINUED | OUTPATIENT
Start: 2021-05-29 | End: 2021-05-29

## 2021-05-29 RX ORDER — SODIUM CHLORIDE 0.9 % (FLUSH) 0.9 %
10 SYRINGE (ML) INJECTION AS NEEDED
Status: DISCONTINUED | OUTPATIENT
Start: 2021-05-29 | End: 2021-05-29 | Stop reason: HOSPADM

## 2021-05-29 RX ORDER — DEXAMETHASONE SODIUM PHOSPHATE 10 MG/ML
INJECTION INTRAMUSCULAR; INTRAVENOUS AS NEEDED
Status: DISCONTINUED | OUTPATIENT
Start: 2021-05-29 | End: 2021-05-29 | Stop reason: SURG

## 2021-05-29 RX ORDER — IPRATROPIUM BROMIDE AND ALBUTEROL SULFATE 2.5; .5 MG/3ML; MG/3ML
3 SOLUTION RESPIRATORY (INHALATION) ONCE
Status: COMPLETED | OUTPATIENT
Start: 2021-05-29 | End: 2021-05-29

## 2021-05-29 RX ORDER — OXYCODONE HYDROCHLORIDE AND ACETAMINOPHEN 5; 325 MG/1; MG/1
TABLET ORAL
Qty: 20 TABLET | Refills: 0 | Status: SHIPPED | OUTPATIENT
Start: 2021-05-29 | End: 2021-06-15

## 2021-05-29 RX ORDER — ROCURONIUM BROMIDE 10 MG/ML
INJECTION, SOLUTION INTRAVENOUS AS NEEDED
Status: DISCONTINUED | OUTPATIENT
Start: 2021-05-29 | End: 2021-05-29 | Stop reason: SURG

## 2021-05-29 RX ORDER — LIDOCAINE HYDROCHLORIDE 10 MG/ML
0.5 INJECTION, SOLUTION EPIDURAL; INFILTRATION; INTRACAUDAL; PERINEURAL ONCE AS NEEDED
Status: DISCONTINUED | OUTPATIENT
Start: 2021-05-29 | End: 2021-05-29 | Stop reason: HOSPADM

## 2021-05-29 RX ORDER — LABETALOL HYDROCHLORIDE 5 MG/ML
5 INJECTION, SOLUTION INTRAVENOUS
Status: DISCONTINUED | OUTPATIENT
Start: 2021-05-29 | End: 2021-05-29

## 2021-05-29 RX ORDER — EPHEDRINE SULFATE 50 MG/ML
5 INJECTION, SOLUTION INTRAVENOUS ONCE AS NEEDED
Status: DISCONTINUED | OUTPATIENT
Start: 2021-05-29 | End: 2021-05-29

## 2021-05-29 RX ORDER — SODIUM CHLORIDE, SODIUM LACTATE, POTASSIUM CHLORIDE, CALCIUM CHLORIDE 600; 310; 30; 20 MG/100ML; MG/100ML; MG/100ML; MG/100ML
100 INJECTION, SOLUTION INTRAVENOUS CONTINUOUS
Status: DISCONTINUED | OUTPATIENT
Start: 2021-05-29 | End: 2021-05-29 | Stop reason: HOSPADM

## 2021-05-29 RX ADMIN — DEXAMETHASONE SODIUM PHOSPHATE 8 MG: 10 INJECTION INTRAMUSCULAR; INTRAVENOUS at 10:38

## 2021-05-29 RX ADMIN — GLYCOPYRROLATE 0.6 MG: 0.2 INJECTION INTRAMUSCULAR; INTRAVENOUS at 11:07

## 2021-05-29 RX ADMIN — PROPOFOL 200 MG: 10 INJECTION, EMULSION INTRAVENOUS at 10:32

## 2021-05-29 RX ADMIN — FENTANYL CITRATE 100 MCG: 50 INJECTION INTRAMUSCULAR; INTRAVENOUS at 10:32

## 2021-05-29 RX ADMIN — ROCURONIUM BROMIDE 35 MG: 50 INJECTION INTRAVENOUS at 10:32

## 2021-05-29 RX ADMIN — IPRATROPIUM BROMIDE AND ALBUTEROL SULFATE 3 ML: 2.5; .5 SOLUTION RESPIRATORY (INHALATION) at 10:17

## 2021-05-29 RX ADMIN — ONDANSETRON 4 MG: 2 INJECTION INTRAMUSCULAR; INTRAVENOUS at 11:07

## 2021-05-29 RX ADMIN — TAZOBACTAM SODIUM AND PIPERACILLIN SODIUM 3.38 G: 375; 3 INJECTION, SOLUTION INTRAVENOUS at 01:24

## 2021-05-29 RX ADMIN — LIDOCAINE HYDROCHLORIDE 100 MG: 20 INJECTION, SOLUTION INFILTRATION; PERINEURAL at 10:32

## 2021-05-29 RX ADMIN — HYDROMORPHONE HYDROCHLORIDE 0.5 MG: 2 INJECTION, SOLUTION INTRAMUSCULAR; INTRAVENOUS; SUBCUTANEOUS at 11:12

## 2021-05-29 RX ADMIN — TAZOBACTAM SODIUM AND PIPERACILLIN SODIUM 3.38 G: 375; 3 INJECTION, SOLUTION INTRAVENOUS at 09:37

## 2021-05-29 RX ADMIN — AMLODIPINE BESYLATE 10 MG: 10 TABLET ORAL at 01:24

## 2021-05-29 RX ADMIN — SODIUM CHLORIDE, POTASSIUM CHLORIDE, SODIUM LACTATE AND CALCIUM CHLORIDE 9 ML/HR: 600; 310; 30; 20 INJECTION, SOLUTION INTRAVENOUS at 10:14

## 2021-05-29 RX ADMIN — NEOSTIGMINE METHYLSULFATE 4 MG: 0.5 INJECTION INTRAVENOUS at 11:07

## 2021-05-29 NOTE — ANESTHESIA PREPROCEDURE EVALUATION
Anesthesia Evaluation     Patient summary reviewed and Nursing notes reviewed   NPO Solid Status: > 8 hours  NPO Liquid Status: > 8 hours           Airway   Mallampati: II  TM distance: >3 FB  Neck ROM: full  No difficulty expected  Dental - normal exam   (+) edentulous    Pulmonary - normal exam   (+) a smoker Current, COPD moderate, sleep apnea,   Cardiovascular - normal exam  Exercise tolerance: good (4-7 METS)    (+) hypertension, hyperlipidemia,       Neuro/Psych- negative ROS  GI/Hepatic/Renal/Endo    (+) obesity,       Musculoskeletal (-) negative ROS    Abdominal    Substance History - negative use     OB/GYN          Other - negative ROS                       Anesthesia Plan    ASA 3 - emergent     general     intravenous induction     Anesthetic plan, all risks, benefits, and alternatives have been provided, discussed and informed consent has been obtained with: patient.    Plan discussed with CRNA.

## 2021-05-29 NOTE — ANESTHESIA POSTPROCEDURE EVALUATION
"Patient: Yves Hastings    Procedure Summary     Date: 05/29/21 Room / Location: [facility] OR 06 / [facility] MAIN OR    Anesthesia Start: 1023 Anesthesia Stop: 1126    Procedure: INCISION AND DRAINAGE OF PERIRECTAL ABSCESS (N/A Perirectal) Diagnosis:       Perirectal abscess      (Perirectal abscess [K61.1])    Surgeons: Reddy Johnson Jr., MD Provider: Walter العلي MD    Anesthesia Type: general ASA Status: 3 - Emergent          Anesthesia Type: general    Vitals  Vitals Value Taken Time   /76 05/29/21 1145   Temp 36.9 °C (98.5 °F) 05/29/21 1145   Pulse 94 05/29/21 1150   Resp 16 05/29/21 1145   SpO2 97 % 05/29/21 1152   Vitals shown include unvalidated device data.        Post Anesthesia Care and Evaluation    Patient location during evaluation: bedside  Patient participation: complete - patient participated  Level of consciousness: awake and alert  Pain management: adequate  Airway patency: patent  Anesthetic complications: No anesthetic complications  PONV Status: none  Cardiovascular status: acceptable and hemodynamically stable  Respiratory status: acceptable and spontaneous ventilation  Hydration status: acceptable    Comments: /74 (BP Location: Left arm, Patient Position: Lying)   Pulse 94   Temp 36.7 °C (98 °F) (Oral)   Resp 16   Ht 180.3 cm (71\")   Wt 108 kg (239 lb)   SpO2 95%   BMI 33.33 kg/m²         "

## 2021-05-29 NOTE — ANESTHESIA PROCEDURE NOTES
Airway  Urgency: elective    Date/Time: 5/29/2021 10:33 AM  Airway not difficult    General Information and Staff    Patient location during procedure: OR  CRNA: Bekah Pleitez CRNA    Indications and Patient Condition  Indications for airway management: airway protection    Preoxygenated: yes  MILS not maintained throughout  Mask difficulty assessment: 1 - vent by mask    Final Airway Details  Final airway type: endotracheal airway      Successful airway: ETT  Cuffed: yes   Successful intubation technique: direct laryngoscopy  Facilitating devices/methods: intubating stylet  Endotracheal tube insertion site: oral  Blade: Kathi  Blade size: 3  ETT size (mm): 8.0  Cormack-Lehane Classification: grade I - full view of glottis  Placement verified by: chest auscultation   Cuff volume (mL): 9  Measured from: lips  Number of attempts at approach: 1  Assessment: lips, teeth, and gum same as pre-op and atraumatic intubation    Additional Comments  PreO2 100% face mask, IV induction, easy mask, DVL x1, cords noted, tube through, cuff up, EBBSH, +etCO2, = chest movement, tube secured in place, atraumatic,  No teeth and lips intact as preop.

## 2021-05-31 LAB
BACTERIA SPEC AEROBE CULT: ABNORMAL
GRAM STN SPEC: ABNORMAL
GRAM STN SPEC: ABNORMAL

## 2021-06-01 LAB — BACTERIA SPEC ANAEROBE CULT: ABNORMAL

## 2021-06-01 NOTE — CASE MANAGEMENT/SOCIAL WORK
Case Management Discharge Note      Final Note: Home--no needs         Selected Continued Care - Discharged on 5/29/2021 Admission date: 5/28/2021 - Discharge disposition: Home or Self Care    Destination    No services have been selected for the patient.              Durable Medical Equipment    No services have been selected for the patient.              Dialysis/Infusion    No services have been selected for the patient.              Home Medical Care    No services have been selected for the patient.              Therapy    No services have been selected for the patient.              Community Resources    No services have been selected for the patient.                       Final Discharge Disposition Code: 01 - home or self-care

## 2021-06-02 LAB
BACTERIA SPEC AEROBE CULT: NORMAL
BACTERIA SPEC AEROBE CULT: NORMAL

## 2021-06-15 ENCOUNTER — OFFICE VISIT (OUTPATIENT)
Dept: SURGERY | Facility: CLINIC | Age: 71
End: 2021-06-15

## 2021-06-15 VITALS — WEIGHT: 238.2 LBS | BODY MASS INDEX: 33.35 KG/M2 | HEIGHT: 71 IN

## 2021-06-15 DIAGNOSIS — Z48.89 POSTOPERATIVE VISIT: Primary | ICD-10-CM

## 2021-06-15 PROCEDURE — 99024 POSTOP FOLLOW-UP VISIT: CPT | Performed by: SURGERY

## 2021-06-15 NOTE — PROGRESS NOTES
Subjective   Yves Hastings is a 71 y.o. male who returns to the office after undergoing an incision and drainage of a perirectal abscess on 5/29/2021.     History of Present Illness     The patient is recovering well with no significant postop symptoms.  He is having no abdominal pain.  He has a good appetite and normal bowel function.  His energy level is good.      Review of Systems   Constitutional: Negative for activity change, appetite change, fatigue and fever.   Respiratory: Negative for chest tightness and shortness of breath.    Cardiovascular: Negative for chest pain and palpitations.   Gastrointestinal: Negative for abdominal pain, constipation, diarrhea and nausea.   Skin: Negative for rash and wound.   Psychiatric/Behavioral: Negative for agitation and confusion.       Objective   Physical Exam  Constitutional:       General: He is not in acute distress.     Appearance: Normal appearance. He is not ill-appearing or toxic-appearing.   Pulmonary:      Effort: Pulmonary effort is normal. No respiratory distress.   Skin:     Comments: Incision: intact with no evidence of infection.   Neurological:      Mental Status: He is alert.   Psychiatric:         Behavior: Behavior normal.         Assessment/Plan   The encounter diagnosis was Postoperative visit.    The patient is recovering well from the incision and drainage of a perirectal abscess.  At this point he has no limitations.  He will follow-up on an as-needed basis.

## 2021-08-16 ENCOUNTER — TRANSCRIBE ORDERS (OUTPATIENT)
Dept: ADMINISTRATIVE | Facility: HOSPITAL | Age: 71
End: 2021-08-16

## 2021-08-16 DIAGNOSIS — R06.02 SOB (SHORTNESS OF BREATH): Primary | ICD-10-CM

## 2021-09-13 ENCOUNTER — HOSPITAL ENCOUNTER (OUTPATIENT)
Dept: CT IMAGING | Facility: HOSPITAL | Age: 71
Discharge: HOME | End: 2021-09-13

## 2021-09-13 ENCOUNTER — CLINICAL SUPPORT (OUTPATIENT)
Dept: OTHER | Facility: HOSPITAL | Age: 71
End: 2021-09-13

## 2021-09-13 DIAGNOSIS — Z12.2 SCREENING FOR MALIGNANT NEOPLASM OF RESPIRATORY ORGAN: ICD-10-CM

## 2021-09-13 DIAGNOSIS — F17.210 SMOKING GREATER THAN 30 PACK YEARS: ICD-10-CM

## 2021-09-13 DIAGNOSIS — Z12.2 SCREENING FOR MALIGNANT NEOPLASM OF RESPIRATORY ORGAN: Primary | ICD-10-CM

## 2021-09-13 PROCEDURE — 71271 CT THORAX LUNG CANCER SCR C-: CPT

## 2021-09-13 PROCEDURE — G0296 VISIT TO DETERM LDCT ELIG: HCPCS | Performed by: CLINICAL NURSE SPECIALIST

## 2021-09-15 NOTE — PROGRESS NOTES
At the patient's request, I have called his daughter Margarita with the results and she will let him know.  We discussed that there were no new changes on his scan as compared to 2019 and nothing concerning for lung cancer.  He does have some emphysema changes, scarring, and coronary artery calcifications that were also visualized on his last scan.  He is aware that his next screening is due in 12 months.

## 2021-10-03 VITALS — HEIGHT: 69 IN | WEIGHT: 240.2 LBS | BODY MASS INDEX: 35.58 KG/M2

## 2021-10-03 NOTE — PROGRESS NOTES
[facility] Low-Dose Lung Cancer CT Screening Visit    Yves Hastings is a pleasant 71 y.o. male seen today at the request of WOO Carroll, in our Lung Cancer Screening Clinic, being seen for Lung Cancer Screening Counseling and a Shared Decision Making Visit prior to Low-Dose Lung Cancer Screening CT exam.    SMOKING HISTORY:   Social History     Tobacco Use   Smoking Status Current Every Day Smoker   • Packs/day: 1.00   • Types: Cigarettes   Smokeless Tobacco Never Used   Tobacco Comment    Began smoking at age 13.  Smoked 1 ppd for 28 years and 2 ppd for 30 years for an 88 pack year history.       Yves Hastings is currently smoking 1 pack per day with an 88 pack year history.  Reports no use of alternate forms of tobacco, electronic cigarettes, marijuana or other substances.  Based on the recommendation of the [place] Preventive Services Task Force, this patient is at high risk for lung cancer and a low-dose CT screening scan is recommended.     We discussed the connection between Radon and Lung Cancer and the availability of free test kits.  He has no basement.  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  He also has worked as a  and  exposed to welding exhaust.  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  Some second-hand smoke exposure as a child with both parents smoking in their home.    The patient has had no hemoptysis, unintentional weight loss or increasing shortness of breath. The patient is asymptomatic and has no signs or symptoms of lung cancer.   The patient reports NO personal history of cancer.  Additionally, reports NO family history of lung cancer.  He has COPD with emphysema.  He has sleep apnea but does not use CPAP.  We discussed the findings on his last scan in 2019 including chronic bronchitis, emphysema and coronary artery disease.      Together we discussed the potential benefits and potential harms of being screened for lung cancer including the potential for follow-up diagnostic testing, risk for over diagnosis, false positive rate and total radiation exposure using the [facility] standardized decision aid. We reviewed the patient's smoking history and counseled on the importance and health benefits of quitting smoking.    Smoking Cessation  DISCUSSION HELD TODAY:     We discussed that there are a number of resources and tobacco cessation interventions to assist with smoking cessation including the 1-800-Quit Now line, Health Department programs, [place] [facility] resources, and use of the [place] Department of Health and Human Services five keys for quitting and quit plan worksheet.  On a scale of zero to ten, the patient rates their motivation and readiness to quit at a 0 out of 10 today.  He has cut back to a pack per day but is not interested in developing a plan to quit tobacco at this time.    Recommendations for continued lung cancer screening:      We discussed the NCCN guidelines for lung cancer screening and the patient verbalized understanding that annual screening is recommended until fifteen years beyond smoking as long as they have no other disease or comorbidity that would prevent them from receiving cancer treatments such as surgery should a lung cancer be detected. The [facility] Lung Cancer Screening Shared Decision-Making Tool was utilized as an aid in discussing whether or not screening was right. The patient has decided to proceed with a Low Dose Lung Cancer Screening CT today. The patient is aware that the results of his screening will be shared with the referring provider or PCP as well.       The patient verbalizes understanding that results of this low dose lung CT exam will be called and that assistance will be provided in arranging any necessary follow-up.    After review of the NCCN  guidelines and recommendations for ongoing screening, the patient verbalized understanding of recommendations for follow-up. We discussed the importance of adherence to continued annual screening until 15 years beyond smoking or until other life-limiting comorbidities are present. We discussed the impact of comorbidities and ability and willing to undergo diagnosis and treatment.    The patient has been counseled on the health benefits of quitting tobacco, smoking cessation strategies and resources, as well as the importance of adherence to annual lung cancer low-dose CT screening.     Lita Dorman, MSN, APRN, ACNS-BC, AOCN, CHPN  Clinical Nurse Specialist  [facility]

## 2021-10-03 NOTE — PATIENT INSTRUCTIONS
The patient verbalizes understanding that he is at high risk for developing lung cancer and that annual screening is recommended through the age of 80.

## 2021-11-03 DIAGNOSIS — Z01.818 OTHER SPECIFIED PRE-OPERATIVE EXAMINATION: Primary | ICD-10-CM

## 2021-11-08 ENCOUNTER — LAB (OUTPATIENT)
Dept: LAB | Facility: HOSPITAL | Age: 71
End: 2021-11-08

## 2021-11-08 DIAGNOSIS — Z01.818 OTHER SPECIFIED PRE-OPERATIVE EXAMINATION: ICD-10-CM

## 2021-11-08 LAB — SARS-COV-2 RNA PNL SPEC NAA+PROBE: NOT DETECTED

## 2021-11-08 PROCEDURE — 87635 SARS-COV-2 COVID-19 AMP PRB: CPT | Performed by: INTERNAL MEDICINE

## 2021-11-08 PROCEDURE — C9803 HOPD COVID-19 SPEC COLLECT: HCPCS

## 2021-11-09 ENCOUNTER — ANESTHESIA EVENT (OUTPATIENT)
Dept: PERIOP | Facility: HOSPITAL | Age: 71
End: 2021-11-09

## 2021-11-10 ENCOUNTER — HOSPITAL ENCOUNTER (OUTPATIENT)
Facility: HOSPITAL | Age: 71
Setting detail: HOSPITAL OUTPATIENT SURGERY
Discharge: HOME | End: 2021-11-10
Attending: INTERNAL MEDICINE | Admitting: INTERNAL MEDICINE

## 2021-11-10 ENCOUNTER — ANESTHESIA (OUTPATIENT)
Dept: PERIOP | Facility: HOSPITAL | Age: 71
End: 2021-11-10

## 2021-11-10 VITALS
DIASTOLIC BLOOD PRESSURE: 74 MMHG | TEMPERATURE: 97.4 F | SYSTOLIC BLOOD PRESSURE: 110 MMHG | OXYGEN SATURATION: 94 % | BODY MASS INDEX: 33.65 KG/M2 | WEIGHT: 240.4 LBS | RESPIRATION RATE: 18 BRPM | HEART RATE: 77 BPM | HEIGHT: 71 IN

## 2021-11-10 DIAGNOSIS — Z12.11 ENCOUNTER FOR SCREENING FOR MALIGNANT NEOPLASM OF COLON: ICD-10-CM

## 2021-11-10 LAB — QT INTERVAL: 413 MS

## 2021-11-10 PROCEDURE — 93010 ELECTROCARDIOGRAM REPORT: CPT | Performed by: INTERNAL MEDICINE

## 2021-11-10 PROCEDURE — 93005 ELECTROCARDIOGRAM TRACING: CPT | Performed by: NURSE ANESTHETIST, CERTIFIED REGISTERED

## 2021-11-10 PROCEDURE — 88305 TISSUE EXAM BY PATHOLOGIST: CPT | Performed by: INTERNAL MEDICINE

## 2021-11-10 PROCEDURE — 94640 AIRWAY INHALATION TREATMENT: CPT

## 2021-11-10 PROCEDURE — 45385 COLONOSCOPY W/LESION REMOVAL: CPT | Performed by: INTERNAL MEDICINE

## 2021-11-10 PROCEDURE — 25010000002 PROPOFOL 10 MG/ML EMULSION: Performed by: REGISTERED NURSE

## 2021-11-10 RX ORDER — SODIUM CHLORIDE 0.9 % (FLUSH) 0.9 %
10 SYRINGE (ML) INJECTION EVERY 12 HOURS SCHEDULED
Status: DISCONTINUED | OUTPATIENT
Start: 2021-11-10 | End: 2021-11-10 | Stop reason: HOSPADM

## 2021-11-10 RX ORDER — SODIUM CHLORIDE 0.9 % (FLUSH) 0.9 %
10 SYRINGE (ML) INJECTION AS NEEDED
Status: DISCONTINUED | OUTPATIENT
Start: 2021-11-10 | End: 2021-11-10 | Stop reason: HOSPADM

## 2021-11-10 RX ORDER — DEXMEDETOMIDINE HYDROCHLORIDE 100 UG/ML
INJECTION, SOLUTION INTRAVENOUS AS NEEDED
Status: DISCONTINUED | OUTPATIENT
Start: 2021-11-10 | End: 2021-11-10 | Stop reason: SURG

## 2021-11-10 RX ORDER — IPRATROPIUM BROMIDE AND ALBUTEROL SULFATE 2.5; .5 MG/3ML; MG/3ML
3 SOLUTION RESPIRATORY (INHALATION) ONCE
Status: COMPLETED | OUTPATIENT
Start: 2021-11-10 | End: 2021-11-10

## 2021-11-10 RX ORDER — LIDOCAINE HYDROCHLORIDE 10 MG/ML
0.5 INJECTION, SOLUTION EPIDURAL; INFILTRATION; INTRACAUDAL; PERINEURAL ONCE AS NEEDED
Status: DISCONTINUED | OUTPATIENT
Start: 2021-11-10 | End: 2021-11-10 | Stop reason: HOSPADM

## 2021-11-10 RX ORDER — SODIUM CHLORIDE, SODIUM LACTATE, POTASSIUM CHLORIDE, CALCIUM CHLORIDE 600; 310; 30; 20 MG/100ML; MG/100ML; MG/100ML; MG/100ML
100 INJECTION, SOLUTION INTRAVENOUS CONTINUOUS
Status: DISCONTINUED | OUTPATIENT
Start: 2021-11-10 | End: 2021-11-10 | Stop reason: HOSPADM

## 2021-11-10 RX ORDER — SODIUM CHLORIDE 9 MG/ML
40 INJECTION, SOLUTION INTRAVENOUS AS NEEDED
Status: DISCONTINUED | OUTPATIENT
Start: 2021-11-10 | End: 2021-11-10 | Stop reason: HOSPADM

## 2021-11-10 RX ORDER — ONDANSETRON 2 MG/ML
4 INJECTION INTRAMUSCULAR; INTRAVENOUS ONCE AS NEEDED
Status: DISCONTINUED | OUTPATIENT
Start: 2021-11-10 | End: 2021-11-10 | Stop reason: HOSPADM

## 2021-11-10 RX ORDER — SODIUM CHLORIDE, SODIUM LACTATE, POTASSIUM CHLORIDE, CALCIUM CHLORIDE 600; 310; 30; 20 MG/100ML; MG/100ML; MG/100ML; MG/100ML
9 INJECTION, SOLUTION INTRAVENOUS CONTINUOUS
Status: DISCONTINUED | OUTPATIENT
Start: 2021-11-10 | End: 2021-11-10 | Stop reason: HOSPADM

## 2021-11-10 RX ORDER — PROPOFOL 10 MG/ML
VIAL (ML) INTRAVENOUS CONTINUOUS PRN
Status: DISCONTINUED | OUTPATIENT
Start: 2021-11-10 | End: 2021-11-10 | Stop reason: SURG

## 2021-11-10 RX ADMIN — IPRATROPIUM BROMIDE AND ALBUTEROL SULFATE 3 ML: .5; 3 SOLUTION RESPIRATORY (INHALATION) at 12:23

## 2021-11-10 RX ADMIN — SODIUM CHLORIDE, POTASSIUM CHLORIDE, SODIUM LACTATE AND CALCIUM CHLORIDE 9 ML/HR: 600; 310; 30; 20 INJECTION, SOLUTION INTRAVENOUS at 12:30

## 2021-11-10 RX ADMIN — DEXMEDETOMIDINE 4 MCG: 100 INJECTION, SOLUTION, CONCENTRATE INTRAVENOUS at 13:46

## 2021-11-10 RX ADMIN — PROPOFOL 75 MCG/KG/MIN: 10 INJECTION, EMULSION INTRAVENOUS at 13:38

## 2021-11-10 RX ADMIN — DEXMEDETOMIDINE 4 MCG: 100 INJECTION, SOLUTION, CONCENTRATE INTRAVENOUS at 13:38

## 2021-11-10 NOTE — DISCHARGE INSTRUCTIONS
Colonoscopy, Adult, Care After  This sheet gives you information about how to care for yourself after your procedure. Your doctor may also give you more specific instructions. If you have problems or questions, call your doctor.  What can I expect after the procedure?  After the procedure, it is common to have:  · A small amount of blood in your poop for 24 hours.  · Some gas.  · Mild cramping or bloating in your belly.  Follow these instructions at home:  General instructions    ***************DO NOT DRIVE TODAY*******************    · For the first 24 hours after the procedure:  ? Do not sign important documents.  ? Do not drink alcohol.  ? Do your daily activities more slowly than normal.  ? Eat foods that are soft and easy to digest.  · Take over-the-counter or prescription medicines only as told by your doctor.  To help cramping and bloating:       · Try walking around.  · Put heat on your belly (abdomen) as told by your doctor. Use a heat source that your doctor recommends, such as a moist heat pack or a heating pad.  ? Put a towel between your skin and the heat source.  ? Leave the heat on for 20-30 minutes.  ? Remove the heat if your skin turns bright red. This is especially important if you cannot feel pain, heat, or cold. You can get burned.  Eating and drinking

## 2021-11-10 NOTE — BRIEF OP NOTE
COLONOSCOPY  Progress Note    Yves Hastings  11/10/2021    Pre-op Diagnosis:   Encounter for screening for malignant neoplasm of colon [Z12.11]       Post-Op Diagnosis Codes:     * Encounter for screening for malignant neoplasm of colon [Z12.11]     * Diverticulosis [K57.90]     * Colon polyp [K63.5]    Procedure/CPT® Codes:        Procedure(s):  COLONOSCOPY; POLYPECTOMY    Surgeon(s):  Alexander Dobbs MD    Anesthesia: Monitored Anesthesia Care    Staff:   Circulator: Ca Guerrero RN  Scrub Person: Guillermina Duong         Estimated Blood Loss: none    Urine Voided: * No values recorded between 11/10/2021  1:38 PM and 11/10/2021  1:59 PM *    Specimens:                Specimens     ID Source Type Tests Collected By Collected At Frozen?    A Large Intestine, Right / Ascending Colon Polyp · TISSUE PATHOLOGY EXAM   Alexander Dobbs MD 11/10/21 5113     Comment: TRAP 1 COLD SNARE                Drains: * No LDAs found *    Findings: Colon to TI good Prep  Polyp-Cold Snare  Sigmoid Diverticulosis    Complications: None          Alexander Dobbs MD     Date: 11/10/2021  Time: 14:01 EST

## 2021-11-10 NOTE — H&P
Patient Care Team:  Jose Gonzales MD as PCP - General (Family Medicine)  Mika Carranza MD as Consulting Physician (Cardiology)  Lita Dorman APRN as Nurse Practitioner (Oncology)    CHIEF COMPLAINT: Screening CRC    HISTORY OF PRESENT ILLNESS:  . 10 years since last Colonoscopy    Past Medical History:   Diagnosis Date   • COPD (chronic obstructive pulmonary disease) (HCC)    • Emphysema of lung (HCC)    • HLD (hyperlipidemia)    • HTN (hypertension)    • Sleep apnea     no machine   • Ventral hernia     sched repair     Past Surgical History:   Procedure Laterality Date   • CARDIAC CATHETERIZATION N/A 12/14/2018    Procedure: Left Heart Cath;  Surgeon: Mika Carranza MD;  Location: [facility] CATH INVASIVE LOCATION;  Service: Cardiology   • CARDIAC CATHETERIZATION N/A 12/14/2018    Procedure: Left ventriculography;  Surgeon: Mika Carranza MD;  Location: [facility] CATH INVASIVE LOCATION;  Service: Cardiology   • CARDIAC CATHETERIZATION N/A 12/14/2018    Procedure: Coronary angiography;  Surgeon: Mika Carranza MD;  Location: [facility] CATH INVASIVE LOCATION;  Service: Cardiology   • CARDIAC CATHETERIZATION  12/14/2018    Procedure: Functional Flow [place];  Surgeon: Mika Carranza MD;  [facility];  Service: Cardiology   • CYST REMOVAL      tailbone   • INCISION AND DRAINAGE PERIRECTAL ABSCESS N/A 5/29/2021    Procedure: INCISION AND DRAINAGE OF PERIRECTAL ABSCESS;  Surgeon: Reddy Johnson Jr., MD;  Location: [facility] OR;  Service: General;  Laterality: N/A;   • UMBILICAL HERNIA REPAIR      DR. CASEY - YEARS AGO   • VEIN LIGATION AND STRIPPING BILATERAL     • VENTRAL/INCISIONAL HERNIA REPAIR N/A 2/4/2019    Procedure: VENTRAL/INCISIONAL HERNIA REPAIR LAPAROSCOPIC;  Surgeon: Adelfo Nieves MD;  Location: [facility] OR;  Service: General     Family History   Problem Relation Age of Onset   • Cancer Mother         breast   • Heart  "disease Mother    • Heart disease Father    • Cancer Maternal Aunt    • Heart disease Paternal Uncle    • Malig Hyperthermia Neg Hx      Social History     Tobacco Use   • Smoking status: Current Every Day Smoker     Packs/day: 1.00     Types: Cigarettes   • Smokeless tobacco: Never Used   • Tobacco comment: Began smoking at age 13.  Smoked 1 ppd for 28 years and 2 ppd for 30 years for an 88 pack year history.   Vaping Use   • Vaping Use: Former   Substance Use Topics   • Alcohol use: Yes     Alcohol/week: 7.0 standard drinks     Types: 7 Shots of liquor per week     Comment: 1/5 whiskey per week   • Drug use: Yes     Types: Marijuana     Comment: history of, doesn't anymore; Reports using THC at bedtime.     Medications Prior to Admission   Medication Sig Dispense Refill Last Dose   • albuterol (PROVENTIL HFA;VENTOLIN HFA) 108 (90 Base) MCG/ACT inhaler Inhale 2 puffs Every 4 (Four) Hours As Needed for Wheezing.   11/9/2021 at Unknown time   • amLODIPine (NORVASC) 5 MG tablet Take 10 mg by mouth Daily.   11/9/2021 at 2130   • lovastatin (MEVACOR) 40 MG tablet Take 40 mg by mouth Every Night.   11/9/2021 at 2130   • tiotropium (SPIRIVA) 18 MCG per inhalation capsule Place 1 capsule into inhaler and inhale Daily.   11/10/2021 at 0800     Allergies:  Patient has no known allergies.    REVIEW OF SYSTEMS:  Please see the above history of present illness for pertinent positives and negatives.  The remainder of the patient's systems have been reviewed and are negative.     Vital Signs  Temp:  [97.8 °F (36.6 °C)] 97.8 °F (36.6 °C)  Heart Rate:  [70-72] 72  Resp:  [12-18] 18  BP: (120)/(74) 120/74    Flowsheet Rows      First Filed Value   Admission Height 180.3 cm (71\") Documented at 11/10/2021 1231   Admission Weight 109 kg (240 lb 6.4 oz) Documented at 11/10/2021 1231           Physical Exam:  Physical Exam   Constitutional: Patient appears well-developed and well-nourished and in no acute distress   HEENT:   Head: " Normocephalic and atraumatic.   Eyes:  Pupils are equal, round, and reactive to light. EOM are intact. Sclerae are anicteric and non-injected.  Mouth and Throat: Patient has moist mucous membranes. Oropharynx is clear of any erythema or exudate.     Neck: Neck supple. No JVD present. No thyromegaly present. No lymphadenopathy present.  Cardiovascular: Regular rate, regular rhythm, S1 normal and S2 normal.  Exam reveals no gallop and no friction rub.  No murmur heard.  Pulmonary/Chest: Lungs are clear to auscultation bilaterally. No respiratory distress. No wheezes. No rhonchi. No rales.   Abdominal: Soft. Bowel sounds are normal. No distension and no mass. There is no hepatosplenomegaly. There is no tenderness.   Musculoskeletal: Normal Muscle tone  Extremities: No edema. Pulses are palpable in all 4 extremities.  Neurological: Patient is alert and oriented to person, place, and time. Cranial nerves II-XII are grossly intact with no focal deficits.  Skin: Skin is warm. No rash noted. Nails show no clubbing.  No cyanosis or erythema.    Debilities/Disabilities Identified: None  Emotional Behavior: Appropriate     Results Review:   I reviewed the patient's new clinical results.    Lab Results (most recent)     None          Imaging Results (Most Recent)     None        reviewed    ECG/EMG Results (most recent)     Procedure Component Value Units Date/Time    ECG 12 Lead [[id number]] Collected: 11/10/21 1232     Updated: 11/10/21 1234     QT Interval 413 ms     Narrative:      HEART RATE= 74  bpm  RR Interval= 808  ms  MT Interval= 172  ms  P Horizontal Axis= -11  deg  P Front Axis= -44  deg  QRSD Interval= 104  ms  QT Interval= 413  ms  QRS Axis= -58  deg  T Wave Axis= 51  deg  - ABNORMAL ECG -  Sinus rhythm  Incomplete RBBB and LAFB  Electronically Signed By:   Date and Time of Study: 2021-11-10 12:32:39        reviewed    Assessment/Plan   Screening CRC/  colonoscopy      I discussed the patient's findings and my  recommendations with patient.     Alexander Dobbs MD  11/10/21  12:59 EST    Time: 10 min prior to procedure.

## 2021-11-10 NOTE — ANESTHESIA PREPROCEDURE EVALUATION
Anesthesia Evaluation     Patient summary reviewed and Nursing notes reviewed   NPO Solid Status: > 8 hours  NPO Liquid Status: > 8 hours           Airway   Mallampati: II  Dental    (+) edentulous    Pulmonary    (+) a smoker Current Smoked day of surgery, COPD, sleep apnea ( does not use CPAP), decreased breath sounds, wheezes,   Cardiovascular   Exercise tolerance: good (4-7 METS)    ECG reviewed  Rhythm: regular  Rate: normal    (+) hypertension well controlled less than 2 medications, hyperlipidemia,       Neuro/Psych  GI/Hepatic/Renal/Endo    (+) obesity,  GERD well controlled,      Musculoskeletal (-) negative ROS    Abdominal   (+) obese,    Substance History   (+) alcohol use ( several times a week, 4 drinks),      OB/GYN negative ob/gyn ROS         Other - negative ROS                       Anesthesia Plan    ASA 2     MAC     intravenous induction     Anesthetic plan, all risks, benefits, and alternatives have been provided, discussed and informed consent has been obtained with: patient.  Use of blood products discussed with patient  Consented to blood products.

## 2021-11-10 NOTE — OP NOTE
COLONOSCOPY  Procedure Report    Patient Name:  Yves Hastings  YOB: 1950    Date of Surgery:  11/10/2021     Indications:  Encounter for screening for malignant neoplasm of colon [Z12.11]      Pre-op Diagnosis:   Encounter for screening for malignant neoplasm of colon [Z12.11]    Post-Op Diagnosis Codes:     * Encounter for screening for malignant neoplasm of colon [Z12.11]     * Diverticulosis [K57.90]     * Colon polyp [K63.5]         Procedure/CPT® Codes:      Procedure(s):  COLONOSCOPY; POLYPECTOMY    Staff:  Surgeon(s):  Alexander Dobbs MD         Anesthesia: Monitored Anesthesia Care    Estimated Blood Loss: none    Specimens:   ID Type Source Tests Collected by Time   A :  Polyp Large Intestine, Right / Ascending Colon TISSUE PATHOLOGY EXAM Alexander Dobbs MD 11/10/2021 1351       Implants:    Nothing was implanted during the procedure      Description of Procedure: After having signed informed consent, he was brought to the endoscopy suite and placed in the left lateral decubitus position and given his IV sedation. Rectal exam revealed no external lesions, normal anal tone, no rectal mass. The prostate bed was particularly flat. The scope advanced into the rectum, through the rectum and sigmoid colon, past scattered diverticulum, through the descending colon, to and around splenic flexure, reduced, advanced through the transverse colon with some looping, to and around the hepatic flexure. The scope was reduced in the ascending colon then advanced, with variable resistance increased using rotational techniques, into the cecum.  The cecum was identified by appendiceal orifice and the ileocecal valve. The cecum itself was normal appearing. The ileocecal valve was intubated. The terminal ileum was normal appearing. The scope was withdrawn back into the ascending colon, withdrawn slowly, examining the colon in circumferential fashion. Two folds from the cecum was a sessile  9-10 mm polyp that was removed with cold snare technique and sent separately as ascending colon polyp. The scope was withdrawn through the remainder of the colon. No further mucosal lesions were noted. The diverticulum were limited to the sigmoid colon. Within the rectum the scope was retroflexed, revealing intact dentate line and no mucosal lesion. The scope was deretroflexed and withdrawn. The patient tolerated the procedure very well.          Findings: Colon to TI good Prep  Polyp-Cold Snare  Sigmoid Diverticulosis     Complications: None    Recommendations: Results to be called. and Repeat in 5 years      Alexander Dobbs MD     Date: 11/10/2021  Time: 14:02 EST

## 2021-11-10 NOTE — ANESTHESIA POSTPROCEDURE EVALUATION
Patient: Yves Hastings    Procedure Summary     Date: 11/10/21 Room / Location: [facility] ENDOSCOPY 1 /  [facility] OR    Anesthesia Start: 1337 Anesthesia Stop: 1400    Procedure: COLONOSCOPY; POLYPECTOMY (N/A ) Diagnosis:       Encounter for screening for malignant neoplasm of colon      Diverticulosis      Colon polyp      (Encounter for screening for malignant neoplasm of colon [Z12.11])    Surgeons: Alexander Dobbs MD Provider: Ezra Oakes CRNA    Anesthesia Type: MAC ASA Status: 2          Anesthesia Type: MAC    Vitals  Vitals Value Taken Time   /74 11/10/21 1431   Temp 97.4 °F (36.3 °C) 11/10/21 1405   Pulse 77 11/10/21 1431   Resp 18 11/10/21 1431   SpO2 94 % 11/10/21 1431           Post Anesthesia Care and Evaluation    Patient location during evaluation: bedside  Patient participation: complete - patient participated  Level of consciousness: awake and alert  Pain score: 0  Pain management: adequate  Airway patency: patent  Anesthetic complications: No anesthetic complications  PONV Status: none  Cardiovascular status: acceptable  Respiratory status: acceptable  Hydration status: acceptable  No anesthesia care post op

## 2021-11-12 LAB
LAB AP CASE REPORT: NORMAL
LAB AP CLINICAL INFORMATION: NORMAL
PATH REPORT.FINAL DX SPEC: NORMAL
PATH REPORT.GROSS SPEC: NORMAL

## 2022-06-30 ENCOUNTER — TRANSCRIBE ORDERS (OUTPATIENT)
Dept: ADMINISTRATIVE | Facility: HOSPITAL | Age: 72
End: 2022-06-30

## 2022-06-30 DIAGNOSIS — Z01.818 OTHER SPECIFIED PRE-OPERATIVE EXAMINATION: Primary | ICD-10-CM

## 2022-07-08 ENCOUNTER — LAB (OUTPATIENT)
Dept: LAB | Facility: HOSPITAL | Age: 72
End: 2022-07-08

## 2022-07-08 DIAGNOSIS — Z01.818 OTHER SPECIFIED PRE-OPERATIVE EXAMINATION: ICD-10-CM

## 2022-07-08 LAB — SARS-COV-2 RNA PNL SPEC NAA+PROBE: NOT DETECTED

## 2022-07-08 PROCEDURE — C9803 HOPD COVID-19 SPEC COLLECT: HCPCS

## 2022-07-08 PROCEDURE — 87635 SARS-COV-2 COVID-19 AMP PRB: CPT | Performed by: INTERNAL MEDICINE

## 2022-07-11 ENCOUNTER — HOSPITAL ENCOUNTER (OUTPATIENT)
Dept: PULMONOLOGY | Facility: HOSPITAL | Age: 72
Discharge: HOME | End: 2022-07-11
Admitting: INTERNAL MEDICINE

## 2022-07-11 DIAGNOSIS — R06.02 SOB (SHORTNESS OF BREATH): ICD-10-CM

## 2022-07-11 LAB
BDY SITE: ABNORMAL
HGB BLDA-MCNC: 10.8 G/DL (ref 14–18)
Lab: ABNORMAL
SAO2 % BLDCOA: 91.1 % (ref 94–99)

## 2022-07-11 PROCEDURE — 94060 EVALUATION OF WHEEZING: CPT

## 2022-07-11 PROCEDURE — 82820 HEMOGLOBIN-OXYGEN AFFINITY: CPT

## 2022-07-11 PROCEDURE — 94729 DIFFUSING CAPACITY: CPT

## 2022-07-11 PROCEDURE — 94726 PLETHYSMOGRAPHY LUNG VOLUMES: CPT

## 2022-07-11 RX ORDER — ALBUTEROL SULFATE 90 UG/1
4 AEROSOL, METERED RESPIRATORY (INHALATION) ONCE
Status: COMPLETED | OUTPATIENT
Start: 2022-07-11 | End: 2022-07-11

## 2022-07-11 RX ADMIN — ALBUTEROL SULFATE 4 PUFF: 90 AEROSOL, METERED RESPIRATORY (INHALATION) at 10:14

## 2022-09-01 ENCOUNTER — TRANSCRIBE ORDERS (OUTPATIENT)
Dept: ADMINISTRATIVE | Facility: HOSPITAL | Age: 72
End: 2022-09-01

## 2022-09-01 DIAGNOSIS — J44.9 CHRONIC OBSTRUCTIVE PULMONARY DISEASE, UNSPECIFIED COPD TYPE: Primary | ICD-10-CM

## 2022-09-08 ENCOUNTER — TRANSCRIBE ORDERS (OUTPATIENT)
Dept: ADMINISTRATIVE | Facility: HOSPITAL | Age: 72
End: 2022-09-08

## 2022-09-08 DIAGNOSIS — Z12.2 ENCOUNTER FOR SCREENING FOR LUNG CANCER: Primary | ICD-10-CM

## 2023-01-12 ENCOUNTER — APPOINTMENT (OUTPATIENT)
Dept: GENERAL RADIOLOGY | Facility: HOSPITAL | Age: 73
End: 2023-01-12
Payer: MEDICARE

## 2023-01-12 ENCOUNTER — HOSPITAL ENCOUNTER (EMERGENCY)
Facility: HOSPITAL | Age: 73
Discharge: ANOTHER HOSPITAL | End: 2023-01-12
Attending: EMERGENCY MEDICINE | Admitting: EMERGENCY MEDICINE
Payer: MEDICARE

## 2023-01-12 VITALS
RESPIRATION RATE: 20 BRPM | SYSTOLIC BLOOD PRESSURE: 106 MMHG | TEMPERATURE: 98.6 F | WEIGHT: 230 LBS | HEIGHT: 71 IN | OXYGEN SATURATION: 97 % | BODY MASS INDEX: 32.2 KG/M2 | DIASTOLIC BLOOD PRESSURE: 71 MMHG | HEART RATE: 82 BPM

## 2023-01-12 DIAGNOSIS — I21.4 NSTEMI (NON-ST ELEVATED MYOCARDIAL INFARCTION): Primary | ICD-10-CM

## 2023-01-12 LAB
ALBUMIN SERPL-MCNC: 4.2 G/DL (ref 3.5–5.2)
ALBUMIN/GLOB SERPL: 1.4 G/DL
ALP SERPL-CCNC: 84 U/L (ref 39–117)
ALT SERPL W P-5'-P-CCNC: 8 U/L (ref 1–41)
ANION GAP SERPL CALCULATED.3IONS-SCNC: 11.4 MMOL/L (ref 5–15)
APTT PPP: 31.5 SECONDS (ref 24.3–38.1)
AST SERPL-CCNC: 17 U/L (ref 1–40)
BASOPHILS # BLD AUTO: 0.08 10*3/MM3 (ref 0–0.2)
BASOPHILS NFR BLD AUTO: 0.7 % (ref 0–1.5)
BILIRUB SERPL-MCNC: 1.1 MG/DL (ref 0–1.2)
BUN SERPL-MCNC: 14 MG/DL (ref 8–23)
BUN/CREAT SERPL: 15.6 (ref 7–25)
CALCIUM SPEC-SCNC: 9 MG/DL (ref 8.6–10.5)
CHLORIDE SERPL-SCNC: 102 MMOL/L (ref 98–107)
CO2 SERPL-SCNC: 22.6 MMOL/L (ref 22–29)
CREAT SERPL-MCNC: 0.9 MG/DL (ref 0.76–1.27)
DEPRECATED RDW RBC AUTO: 61.4 FL (ref 37–54)
EGFRCR SERPLBLD CKD-EPI 2021: 91.3 ML/MIN/1.73
EOSINOPHIL # BLD AUTO: 0.1 10*3/MM3 (ref 0–0.4)
EOSINOPHIL NFR BLD AUTO: 0.9 % (ref 0.3–6.2)
ERYTHROCYTE [DISTWIDTH] IN BLOOD BY AUTOMATED COUNT: 15.2 % (ref 12.3–15.4)
FLUAV RNA RESP QL NAA+PROBE: NOT DETECTED
FLUBV RNA RESP QL NAA+PROBE: NOT DETECTED
GLOBULIN UR ELPH-MCNC: 3 GM/DL
GLUCOSE SERPL-MCNC: 99 MG/DL (ref 65–99)
HCT VFR BLD AUTO: 33.8 % (ref 37.5–51)
HGB BLD-MCNC: 11.1 G/DL (ref 13–17.7)
HOLD SPECIMEN: NORMAL
HOLD SPECIMEN: NORMAL
IMM GRANULOCYTES # BLD AUTO: 0.11 10*3/MM3 (ref 0–0.05)
IMM GRANULOCYTES NFR BLD AUTO: 0.9 % (ref 0–0.5)
INR PPP: 1 (ref 0.9–1.1)
LYMPHOCYTES # BLD AUTO: 2.57 10*3/MM3 (ref 0.7–3.1)
LYMPHOCYTES NFR BLD AUTO: 21.9 % (ref 19.6–45.3)
MCH RBC QN AUTO: 37.9 PG (ref 26.6–33)
MCHC RBC AUTO-ENTMCNC: 32.8 G/DL (ref 31.5–35.7)
MCV RBC AUTO: 115.4 FL (ref 79–97)
MONOCYTES # BLD AUTO: 1.66 10*3/MM3 (ref 0.1–0.9)
MONOCYTES NFR BLD AUTO: 14.1 % (ref 5–12)
NEUTROPHILS NFR BLD AUTO: 61.5 % (ref 42.7–76)
NEUTROPHILS NFR BLD AUTO: 7.23 10*3/MM3 (ref 1.7–7)
NT-PROBNP SERPL-MCNC: 3909 PG/ML (ref 0–900)
PLATELET # BLD AUTO: 134 10*3/MM3 (ref 140–450)
PMV BLD AUTO: 9.6 FL (ref 6–12)
POTASSIUM SERPL-SCNC: 4.3 MMOL/L (ref 3.5–5.2)
PROT SERPL-MCNC: 7.2 G/DL (ref 6–8.5)
PROTHROMBIN TIME: 13.2 SECONDS (ref 12.1–15)
QT INTERVAL: 378 MS
RBC # BLD AUTO: 2.93 10*6/MM3 (ref 4.14–5.8)
RBC MORPH BLD: NORMAL
SARS-COV-2 RNA RESP QL NAA+PROBE: NOT DETECTED
SMALL PLATELETS BLD QL SMEAR: NORMAL
SODIUM SERPL-SCNC: 136 MMOL/L (ref 136–145)
TROPONIN T SERPL-MCNC: 0.14 NG/ML (ref 0–0.03)
TROPONIN T SERPL-MCNC: 0.14 NG/ML (ref 0–0.03)
WBC MORPH BLD: NORMAL
WBC NRBC COR # BLD: 11.75 10*3/MM3 (ref 3.4–10.8)
WHOLE BLOOD HOLD COAG: NORMAL
WHOLE BLOOD HOLD SPECIMEN: NORMAL

## 2023-01-12 PROCEDURE — 87636 SARSCOV2 & INF A&B AMP PRB: CPT | Performed by: EMERGENCY MEDICINE

## 2023-01-12 PROCEDURE — 85025 COMPLETE CBC W/AUTO DIFF WBC: CPT | Performed by: EMERGENCY MEDICINE

## 2023-01-12 PROCEDURE — 85610 PROTHROMBIN TIME: CPT | Performed by: EMERGENCY MEDICINE

## 2023-01-12 PROCEDURE — 84484 ASSAY OF TROPONIN QUANT: CPT | Performed by: EMERGENCY MEDICINE

## 2023-01-12 PROCEDURE — 80053 COMPREHEN METABOLIC PANEL: CPT | Performed by: EMERGENCY MEDICINE

## 2023-01-12 PROCEDURE — 93005 ELECTROCARDIOGRAM TRACING: CPT | Performed by: EMERGENCY MEDICINE

## 2023-01-12 PROCEDURE — 96361 HYDRATE IV INFUSION ADD-ON: CPT

## 2023-01-12 PROCEDURE — 96365 THER/PROPH/DIAG IV INF INIT: CPT

## 2023-01-12 PROCEDURE — 96366 THER/PROPH/DIAG IV INF ADDON: CPT

## 2023-01-12 PROCEDURE — 36415 COLL VENOUS BLD VENIPUNCTURE: CPT

## 2023-01-12 PROCEDURE — 71045 X-RAY EXAM CHEST 1 VIEW: CPT

## 2023-01-12 PROCEDURE — 99285 EMERGENCY DEPT VISIT HI MDM: CPT

## 2023-01-12 PROCEDURE — 85007 BL SMEAR W/DIFF WBC COUNT: CPT | Performed by: EMERGENCY MEDICINE

## 2023-01-12 PROCEDURE — 25010000002 HEPARIN (PORCINE) 25000-0.45 UT/250ML-% SOLUTION: Performed by: EMERGENCY MEDICINE

## 2023-01-12 PROCEDURE — 93010 ELECTROCARDIOGRAM REPORT: CPT | Performed by: INTERNAL MEDICINE

## 2023-01-12 PROCEDURE — 83880 ASSAY OF NATRIURETIC PEPTIDE: CPT | Performed by: EMERGENCY MEDICINE

## 2023-01-12 PROCEDURE — 25010000002 HEPARIN (PORCINE) PER 1000 UNITS: Performed by: EMERGENCY MEDICINE

## 2023-01-12 PROCEDURE — 96376 TX/PRO/DX INJ SAME DRUG ADON: CPT

## 2023-01-12 PROCEDURE — 85730 THROMBOPLASTIN TIME PARTIAL: CPT | Performed by: EMERGENCY MEDICINE

## 2023-01-12 RX ORDER — HEPARIN SODIUM 5000 [USP'U]/ML
4000 INJECTION, SOLUTION INTRAVENOUS; SUBCUTANEOUS ONCE
Status: COMPLETED | OUTPATIENT
Start: 2023-01-12 | End: 2023-01-12

## 2023-01-12 RX ORDER — ASPIRIN 325 MG
325 TABLET ORAL ONCE
Status: COMPLETED | OUTPATIENT
Start: 2023-01-12 | End: 2023-01-12

## 2023-01-12 RX ORDER — SODIUM CHLORIDE 0.9 % (FLUSH) 0.9 %
10 SYRINGE (ML) INJECTION AS NEEDED
Status: DISCONTINUED | OUTPATIENT
Start: 2023-01-12 | End: 2023-01-12 | Stop reason: HOSPADM

## 2023-01-12 RX ORDER — HEPARIN SODIUM 10000 [USP'U]/100ML
9.62 INJECTION, SOLUTION INTRAVENOUS
Status: DISCONTINUED | OUTPATIENT
Start: 2023-01-12 | End: 2023-01-12 | Stop reason: HOSPADM

## 2023-01-12 RX ORDER — SODIUM CHLORIDE, SODIUM LACTATE, POTASSIUM CHLORIDE, CALCIUM CHLORIDE 600; 310; 30; 20 MG/100ML; MG/100ML; MG/100ML; MG/100ML
500 INJECTION, SOLUTION INTRAVENOUS CONTINUOUS
Status: DISCONTINUED | OUTPATIENT
Start: 2023-01-12 | End: 2023-01-12 | Stop reason: HOSPADM

## 2023-01-12 RX ADMIN — ASPIRIN 325 MG: 325 TABLET ORAL at 15:39

## 2023-01-12 RX ADMIN — HEPARIN SODIUM 4000 UNITS: 5000 INJECTION, SOLUTION INTRAVENOUS; SUBCUTANEOUS at 16:13

## 2023-01-12 RX ADMIN — SODIUM CHLORIDE, POTASSIUM CHLORIDE, SODIUM LACTATE AND CALCIUM CHLORIDE 500 ML/HR: 600; 310; 30; 20 INJECTION, SOLUTION INTRAVENOUS at 15:39

## 2023-01-12 RX ADMIN — HEPARIN SODIUM 9.62 UNITS/KG/HR: 10000 INJECTION, SOLUTION INTRAVENOUS at 16:14

## 2023-01-12 NOTE — ED NOTES
Called Report to Candelaria CHAMBERS in the Angioplasty Recovery Unit at [facility]. Pt was transported ALS by [facility].

## 2023-01-12 NOTE — ED PROVIDER NOTES
Subjective   History of Present Illness  72-year-old  gentleman with past medical history of coronary artery disease status post PCI to LAD for anterior STEMI, PCI to circumflex/OM, hypertension, hyperlipidemia, ischemic cardiomyopathy LVEF 40 to 45% comes to intermittent dyspnea on exertion and aching in all 4 extremities.  Patient states that this has been happening intermittently since his last cardiac catheterization in June 2022.  Patient had this procedure done at [facility] and is followed by Dr. Addison for cardiology.  His last visit was in November 2022 for recheck and at that time patient was started on Plavix because he was not taking his Brilinta as prescribed in addition to aspirin.  Patient states that he has no concerns however he thinks his family is overprotective and asked him to come the emergency room today because of his dyspnea and because of his global extremity pain..  Patient denies chest pain diaphoresis fever cough nausea vomiting diarrhea or anorexia.  Patient denies neck or jaw pain.  Patient states he has been taking his medicines as the family told him to although he does not know if he is taking his Plavix specifically or not.        PCI in June 2022: PCI of left circumflex with 4.0 x 38 Xience postdilated 4.2 and 5.0 NC. PCI of OM with 3.5 x 23 postdilated 4.0 NC/culotte stenting.  PCI of LAD with 3.5 x 38 Xience postdilated 4.0 NC              Review of Systems   Constitutional: Negative for activity change, appetite change, chills, diaphoresis, fatigue and fever.   HENT: Negative for congestion, sinus pressure, sneezing and sore throat.    Eyes: Negative for photophobia and visual disturbance.   Respiratory: Positive for shortness of breath. Negative for cough.    Cardiovascular: Negative for chest pain, palpitations and leg swelling.   Gastrointestinal: Negative for abdominal distention, abdominal pain, diarrhea, nausea and vomiting.   Genitourinary: Negative for  dysuria and flank pain.   Musculoskeletal: Positive for arthralgias. Negative for back pain and myalgias.   Skin: Negative for rash.   Neurological: Negative for dizziness, weakness and headaches.   Psychiatric/Behavioral: Negative for behavioral problems and confusion.       Past Medical History:   Diagnosis Date   • COPD (chronic obstructive pulmonary disease) (HCC)    • Emphysema of lung (HCC)    • HLD (hyperlipidemia)    • HTN (hypertension)    • Sleep apnea     no machine   • Ventral hernia     sched repair       No Known Allergies    Past Surgical History:   Procedure Laterality Date   • CARDIAC CATHETERIZATION N/A 12/14/2018    Procedure: Left Heart Cath;  Surgeon: Mika Carranza MD;  Location: [facility] CATH INVASIVE LOCATION;  Service: Cardiology   • CARDIAC CATHETERIZATION N/A 12/14/2018    Procedure: Left ventriculography;  Surgeon: Mika Carranza MD;  Location: [facility] CATH INVASIVE LOCATION;  Service: Cardiology   • CARDIAC CATHETERIZATION N/A 12/14/2018    Procedure: Coronary angiography;  Surgeon: Mika Carranza MD;  Location: [facility] CATH INVASIVE LOCATION;  Service: Cardiology   • CARDIAC CATHETERIZATION  12/14/2018    Procedure: Functional Flow [place];  Surgeon: Mika Carranza MD;  Location: [facility] CATH INVASIVE LOCATION;  Service: Cardiology   • COLONOSCOPY W/ POLYPECTOMY N/A 11/10/2021    Procedure: COLONOSCOPY; POLYPECTOMY;  Surgeon: Alexander Dobbs MD;  Location: [facility] OR;  Service: Gastroenterology;  Laterality: N/A;  DIVERTICULOSIS  POLYP   • CYST REMOVAL      tailbone   • INCISION AND DRAINAGE PERIRECTAL ABSCESS N/A 5/29/2021    Procedure: INCISION AND DRAINAGE OF PERIRECTAL ABSCESS;  Surgeon: Reddy Johnson Jr., MD;  Location: [facility] OR;  Service: General;  Laterality: N/A;   • UMBILICAL HERNIA REPAIR      DR. CASEY - YEARS AGO   • VEIN LIGATION AND STRIPPING BILATERAL     • VENTRAL/INCISIONAL HERNIA REPAIR N/A 2/4/2019    Procedure:  VENTRAL/INCISIONAL HERNIA REPAIR LAPAROSCOPIC;  Surgeon: Adelfo Nieves MD;  Location: [facility];  Service: General       Family History   Problem Relation Age of Onset   • Cancer Mother         breast   • Heart disease Mother    • Heart disease Father    • Cancer Maternal Aunt    • Heart disease Paternal Uncle    • Malig Hyperthermia Neg Hx        Social History     Socioeconomic History   • Marital status:    Tobacco Use   • Smoking status: Every Day     Packs/day: 1.00     Types: Cigarettes   • Smokeless tobacco: Never   • Tobacco comments:     Began smoking at age 13.  Smoked 1 ppd for 28 years and 2 ppd for 30 years for an 88 pack year history.   Vaping Use   • Vaping Use: Former   Substance and Sexual Activity   • Alcohol use: Yes     Alcohol/week: 7.0 standard drinks     Types: 7 Shots of liquor per week     Comment: 1/5 whiskey per week   • Drug use: Yes     Types: Marijuana     Comment: history of, doesn't anymore; Reports using THC at bedtime.   • Sexual activity: Defer           Objective   Physical Exam  Constitutional:       General: He is not in acute distress.     Appearance: Normal appearance. He is not toxic-appearing or diaphoretic.   HENT:      Head: Normocephalic and atraumatic.      Mouth/Throat:      Mouth: Mucous membranes are moist.   Eyes:      Conjunctiva/sclera: Conjunctivae normal.   Cardiovascular:      Rate and Rhythm: Normal rate and regular rhythm.      Pulses: Normal pulses.   Pulmonary:      Effort: Pulmonary effort is normal. No respiratory distress.      Breath sounds: Normal breath sounds. No wheezing or rales.   Abdominal:      General: Abdomen is flat. There is no distension.      Tenderness: There is no abdominal tenderness.   Musculoskeletal:         General: No swelling or signs of injury. Normal range of motion.      Cervical back: Normal range of motion and neck supple.   Skin:     General: Skin is warm and dry.      Findings: No rash.   Neurological:       General: No focal deficit present.      Mental Status: He is alert and oriented to person, place, and time.   Psychiatric:         Mood and Affect: Mood normal.         Behavior: Behavior normal.         Procedures           ED Course  ED Course as of 01/12/23 1624   Thu Jan 12, 2023   1555 Spoke with patient's wife who gives better history as to why they wanted the patient To come to the emergency room.  Patient's wife states patient had difficulty breathing last night could not sleep and had pain radiating down his left arm that continued into the morning intermittently. []   1556 Wife states that he has been taking the Plavix as directed []   1556 Troponin T(!!): 0.136 []   1556 proBNP(!): 3,909.0 []   1556 Hemoglobin(!): 11.1 [BH]   1556 Hematocrit(!): 33.8 [BH]   1556 Patient's EKG does not show acute ST elevation but does show flipped T waves in the lateral leads in addition to which patient's troponin is elevated at 0.136.  My concern is that patient is having an NSTEMI or had an event last evening and as such I have paged Dr. Addison's group at [facility] for probable transfer admission for NSTEMI and possible Cardiac catheterization. []   1557 I have initiated heparin bolus and heparin drip in the emergency room.  If patient starts to have shortness of breath chest pain or radiation down his left arm we will give nitroglycerin but as patient is not complaining of the symptoms now and patient's blood pressure is actually moderately low we will hold nitroglycerin at the present time. []   1617 Awaiting [facility] cardiology callback.  I spoke with Dr. Gorman who is excepted transfer of care here in the ER. []   1623 Spoke with NP Catie Boateng for Dr Addison who agreed with transfer to [facility] for dx of NSTEMI. []      ED Course User Index  [] Cecilio Chan MD                                           Medical Decision Making  My differential diagnosis for dyspnea includes but  is not limited to:  Asthma, COPD, pneumonia, pulmonary embolus, acute respiratory distress syndrome, pneumothorax, pleural effusion, pulmonary fibrosis, congestive heart failure, myocardial infarction, DKA, uremia, acidosis, sepsis, anemia, drug related, hyperventilation, CNS disease    Amount and/or Complexity of Data Reviewed  Labs: ordered.  Radiology: ordered.  ECG/medicine tests: ordered.     Details: EKG time 1444  Normal sinus rhythm  Heart rate 78  Left axis deviation present  LAFB present  T waves are flipped in lateral leads    When compared to EKG on 2021 LAFB is present at that time however patient had a incomplete right bundle branch block at that time.  In addition T wave are not inverted in lateral leads at that time however patient has had a cardiac event with catheterization at least once since the past EKG.      Risk  Prescription drug management.          Final diagnoses:   NSTEMI (non-ST elevated myocardial infarction) (HCC)       ED Disposition  ED Disposition     ED Disposition   Transfer to Another Facility     Condition   --    Comment   --             No follow-up provider specified.       Medication List      No changes were made to your prescriptions during this visit.          Cecilio Chan MD  01/12/23 [id number]

## 2023-03-26 ENCOUNTER — HOSPITAL ENCOUNTER (EMERGENCY)
Facility: HOSPITAL | Age: 73
Discharge: HOME | End: 2023-03-26
Attending: EMERGENCY MEDICINE | Admitting: EMERGENCY MEDICINE
Payer: MEDICARE

## 2023-03-26 ENCOUNTER — APPOINTMENT (OUTPATIENT)
Dept: GENERAL RADIOLOGY | Facility: HOSPITAL | Age: 73
End: 2023-03-26
Payer: MEDICARE

## 2023-03-26 VITALS
DIASTOLIC BLOOD PRESSURE: 95 MMHG | HEART RATE: 65 BPM | TEMPERATURE: 98.2 F | RESPIRATION RATE: 20 BRPM | OXYGEN SATURATION: 97 % | SYSTOLIC BLOOD PRESSURE: 154 MMHG

## 2023-03-26 DIAGNOSIS — D50.8 IRON DEFICIENCY ANEMIA SECONDARY TO INADEQUATE DIETARY IRON INTAKE: Primary | ICD-10-CM

## 2023-03-26 DIAGNOSIS — J43.9 PULMONARY EMPHYSEMA, UNSPECIFIED EMPHYSEMA TYPE: ICD-10-CM

## 2023-03-26 LAB
ALBUMIN SERPL-MCNC: 3.9 G/DL (ref 3.5–5.2)
ALBUMIN/GLOB SERPL: 1.3 G/DL
ALP SERPL-CCNC: 101 U/L (ref 39–117)
ALT SERPL W P-5'-P-CCNC: <5 U/L (ref 1–41)
ANION GAP SERPL CALCULATED.3IONS-SCNC: 9.3 MMOL/L (ref 5–15)
ANISOCYTOSIS BLD QL: NORMAL
AST SERPL-CCNC: 7 U/L (ref 1–40)
BASOPHILS # BLD AUTO: 0.01 10*3/MM3 (ref 0–0.2)
BASOPHILS NFR BLD AUTO: 0.2 % (ref 0–1.5)
BILIRUB SERPL-MCNC: 0.7 MG/DL (ref 0–1.2)
BUN SERPL-MCNC: 15 MG/DL (ref 8–23)
BUN/CREAT SERPL: 12.8 (ref 7–25)
CALCIUM SPEC-SCNC: 9.2 MG/DL (ref 8.6–10.5)
CHLORIDE SERPL-SCNC: 101 MMOL/L (ref 98–107)
CO2 SERPL-SCNC: 24.7 MMOL/L (ref 22–29)
CREAT SERPL-MCNC: 1.17 MG/DL (ref 0.76–1.27)
DEPRECATED RDW RBC AUTO: 68.1 FL (ref 37–54)
EGFRCR SERPLBLD CKD-EPI 2021: 66.2 ML/MIN/1.73
EOSINOPHIL # BLD AUTO: 0.06 10*3/MM3 (ref 0–0.4)
EOSINOPHIL NFR BLD AUTO: 1.4 % (ref 0.3–6.2)
ERYTHROCYTE [DISTWIDTH] IN BLOOD BY AUTOMATED COUNT: 17.3 % (ref 12.3–15.4)
FLUAV RNA RESP QL NAA+PROBE: NOT DETECTED
FLUBV RNA RESP QL NAA+PROBE: NOT DETECTED
GLOBULIN UR ELPH-MCNC: 3.1 GM/DL
GLUCOSE SERPL-MCNC: 119 MG/DL (ref 65–99)
HCT VFR BLD AUTO: 24.3 % (ref 37.5–51)
HGB BLD-MCNC: 7.8 G/DL (ref 13–17.7)
HOLD SPECIMEN: NORMAL
HOLD SPECIMEN: NORMAL
IMM GRANULOCYTES # BLD AUTO: 0.05 10*3/MM3 (ref 0–0.05)
IMM GRANULOCYTES NFR BLD AUTO: 1.2 % (ref 0–0.5)
LYMPHOCYTES # BLD AUTO: 1.24 10*3/MM3 (ref 0.7–3.1)
LYMPHOCYTES NFR BLD AUTO: 29.8 % (ref 19.6–45.3)
MACROCYTES BLD QL SMEAR: NORMAL
MCH RBC QN AUTO: 35.6 PG (ref 26.6–33)
MCHC RBC AUTO-ENTMCNC: 32.1 G/DL (ref 31.5–35.7)
MCV RBC AUTO: 111 FL (ref 79–97)
MONOCYTES # BLD AUTO: 0.77 10*3/MM3 (ref 0.1–0.9)
MONOCYTES NFR BLD AUTO: 18.5 % (ref 5–12)
NEUTROPHILS NFR BLD AUTO: 2.03 10*3/MM3 (ref 1.7–7)
NEUTROPHILS NFR BLD AUTO: 48.9 % (ref 42.7–76)
NRBC BLD AUTO-RTO: 0.5 /100 WBC (ref 0–0.2)
NT-PROBNP SERPL-MCNC: 3877 PG/ML (ref 0–900)
PLAT MORPH BLD: NORMAL
PLATELET # BLD AUTO: 158 10*3/MM3 (ref 140–450)
PMV BLD AUTO: 10.2 FL (ref 6–12)
POTASSIUM SERPL-SCNC: 4.7 MMOL/L (ref 3.5–5.2)
PROT SERPL-MCNC: 7 G/DL (ref 6–8.5)
RBC # BLD AUTO: 2.19 10*6/MM3 (ref 4.14–5.8)
SARS-COV-2 RNA RESP QL NAA+PROBE: NOT DETECTED
SODIUM SERPL-SCNC: 135 MMOL/L (ref 136–145)
TROPONIN T SERPL HS-MCNC: 15 NG/L
WBC MORPH BLD: NORMAL
WBC NRBC COR # BLD: 4.16 10*3/MM3 (ref 3.4–10.8)
WHOLE BLOOD HOLD COAG: NORMAL
WHOLE BLOOD HOLD SPECIMEN: NORMAL

## 2023-03-26 PROCEDURE — 87636 SARSCOV2 & INF A&B AMP PRB: CPT | Performed by: EMERGENCY MEDICINE

## 2023-03-26 PROCEDURE — 93010 ELECTROCARDIOGRAM REPORT: CPT | Performed by: INTERNAL MEDICINE

## 2023-03-26 PROCEDURE — 93005 ELECTROCARDIOGRAM TRACING: CPT

## 2023-03-26 PROCEDURE — 71045 X-RAY EXAM CHEST 1 VIEW: CPT

## 2023-03-26 PROCEDURE — 99284 EMERGENCY DEPT VISIT MOD MDM: CPT

## 2023-03-26 PROCEDURE — 85007 BL SMEAR W/DIFF WBC COUNT: CPT | Performed by: EMERGENCY MEDICINE

## 2023-03-26 PROCEDURE — 80053 COMPREHEN METABOLIC PANEL: CPT | Performed by: EMERGENCY MEDICINE

## 2023-03-26 PROCEDURE — 84484 ASSAY OF TROPONIN QUANT: CPT | Performed by: EMERGENCY MEDICINE

## 2023-03-26 PROCEDURE — 83880 ASSAY OF NATRIURETIC PEPTIDE: CPT | Performed by: EMERGENCY MEDICINE

## 2023-03-26 PROCEDURE — 93005 ELECTROCARDIOGRAM TRACING: CPT | Performed by: EMERGENCY MEDICINE

## 2023-03-26 PROCEDURE — 36415 COLL VENOUS BLD VENIPUNCTURE: CPT

## 2023-03-26 PROCEDURE — 85025 COMPLETE CBC W/AUTO DIFF WBC: CPT | Performed by: EMERGENCY MEDICINE

## 2023-03-26 RX ORDER — SODIUM CHLORIDE 0.9 % (FLUSH) 0.9 %
10 SYRINGE (ML) INJECTION AS NEEDED
Status: DISCONTINUED | OUTPATIENT
Start: 2023-03-26 | End: 2023-03-26 | Stop reason: HOSPADM

## 2023-03-26 RX ORDER — DOXYCYCLINE HYCLATE 50 MG/1
324 CAPSULE, GELATIN COATED ORAL
Qty: 90 TABLET | Refills: 0 | Status: SHIPPED | OUTPATIENT
Start: 2023-03-26 | End: 2023-04-25

## 2023-03-26 NOTE — ED PROVIDER NOTES
Subjective   History of Present Illness  Yves Hastings is a 73-year-old white male who present secondary to shortness of breath.  Patient states he has been short of breath since his open heart surgery in January.  However his family felt that he was too short of breath this morning.  Thus they brought him to the ED for evaluation.  Mr. Hernandez has a history of COPD.  He gave himself 1 mini neb treatment earlier today.  Patient feels better now.  Symptoms have resolved.  Nonetheless patient presents for evaluation.    History provided by:  Patient      Review of Systems   Constitutional: Negative for fever.   HENT: Negative for rhinorrhea.    Eyes: Negative for redness.   Respiratory: Positive for shortness of breath. Negative for cough.    Cardiovascular: Negative for chest pain.   Gastrointestinal: Negative for abdominal pain.   Genitourinary: Negative for dysuria.   Musculoskeletal: Negative for back pain.   Skin: Negative for rash.   Neurological: Negative for syncope.   All other systems reviewed and are negative.      Past Medical History:   Diagnosis Date   • COPD (chronic obstructive pulmonary disease) (HCC)    • Emphysema of lung (HCC)    • HLD (hyperlipidemia)    • HTN (hypertension)    • Myocardial infarction (HCC)    • Sleep apnea     no machine   • Ventral hernia     sched repair       No Known Allergies    Past Surgical History:   Procedure Laterality Date   • CARDIAC CATHETERIZATION N/A 12/14/2018    Procedure: Left Heart Cath;  Surgeon: Mika Carranza MD;  Location: [facility] INVASIVE LOCATION;  Service: Cardiology   • CARDIAC CATHETERIZATION N/A 12/14/2018    Procedure: Left ventriculography;  Surgeon: Mika Carranza MD;  Location: [facility] CATH INVASIVE LOCATION;  Service: Cardiology   • CARDIAC CATHETERIZATION N/A 12/14/2018    Procedure: Coronary angiography;  Surgeon: Mika Carranza MD;  Location: [facility] CATH INVASIVE LOCATION;  Service: Cardiology   • CARDIAC  CATHETERIZATION  12/14/2018    Procedure: Functional Flow Amelia;  Surgeon: Mika Carranza MD;  Location:  [facility] CATH INVASIVE LOCATION;  Service: Cardiology   • COLONOSCOPY W/ POLYPECTOMY N/A 11/10/2021    Procedure: COLONOSCOPY; POLYPECTOMY;  Surgeon: Alexander Dobbs MD;  Location:  [facility] OR;  Service: Gastroenterology;  Laterality: N/A;  DIVERTICULOSIS  POLYP   • CORONARY STENT PLACEMENT     • CYST REMOVAL      tailbone   • INCISION AND DRAINAGE PERIRECTAL ABSCESS N/A 05/29/2021    Procedure: INCISION AND DRAINAGE OF PERIRECTAL ABSCESS;  Surgeon: Reddy Johnson Jr., MD;  Location: [facility] MAIN OR;  Service: General;  Laterality: N/A;   • UMBILICAL HERNIA REPAIR      DR. CASEY - YEARS AGO   • VEIN LIGATION AND STRIPPING BILATERAL     • VENTRAL/INCISIONAL HERNIA REPAIR N/A 02/04/2019    Procedure: VENTRAL/INCISIONAL HERNIA REPAIR LAPAROSCOPIC;  Surgeon: Adelfo Nieves MD;  Location:  [facility] OR;  Service: General       Family History   Problem Relation Age of Onset   • Cancer Mother         breast   • Heart disease Mother    • Heart disease Father    • Cancer Maternal Aunt    • Heart disease Paternal Uncle    • Malig Hyperthermia Neg Hx        Social History     Socioeconomic History   • Marital status:    Tobacco Use   • Smoking status: Every Day     Packs/day: 1.00     Types: Cigarettes   • Smokeless tobacco: Never   • Tobacco comments:     Began smoking at age 13.  Smoked 1 ppd for 28 years and 2 ppd for 30 years for an 88 pack year history.   Vaping Use   • Vaping Use: Former   Substance and Sexual Activity   • Alcohol use: Yes     Alcohol/week: 7.0 standard drinks     Types: 7 Shots of liquor per week     Comment: 1/5 whiskey per week   • Drug use: Yes     Types: Marijuana     Comment: history of, doesn't anymore; Reports using THC at bedtime.   • Sexual activity: Defer           Objective   Physical Exam  Vitals and nursing note reviewed.   Constitutional:       General: He is  not in acute distress.     Appearance: Normal appearance. He is well-developed. He is not ill-appearing, toxic-appearing or diaphoretic.      Comments: 73-year-old white male sitting in bed.  The patient appears in good overall health.  Vital signs notable for BP of 154/95.  Otherwise unremarkable.  Patient friendly and cooperative.  He speaking full sentences without any difficulty.   HENT:      Head: Normocephalic and atraumatic.      Right Ear: Tympanic membrane, ear canal and external ear normal.      Left Ear: Tympanic membrane, ear canal and external ear normal.      Nose: Nose normal.      Mouth/Throat:      Mouth: Mucous membranes are moist.      Pharynx: Oropharynx is clear.   Eyes:      Extraocular Movements: Extraocular movements intact.      Conjunctiva/sclera: Conjunctivae normal.      Pupils: Pupils are equal, round, and reactive to light.   Cardiovascular:      Rate and Rhythm: Normal rate and regular rhythm.      Heart sounds: Normal heart sounds. No murmur heard.    No friction rub. No gallop.   Pulmonary:      Effort: Pulmonary effort is normal. No respiratory distress.      Breath sounds: No stridor. Decreased breath sounds (Slightly decreased in all lung fields.) present. No wheezing, rhonchi or rales.   Abdominal:      General: There is no distension.      Palpations: Abdomen is soft. There is no mass.      Tenderness: There is no abdominal tenderness. There is no guarding or rebound.      Hernia: No hernia is present.   Musculoskeletal:         General: Normal range of motion.      Cervical back: Normal range of motion and neck supple.   Skin:     General: Skin is warm and dry.      Findings: No erythema or rash.   Neurological:      General: No focal deficit present.      Mental Status: He is alert and oriented to person, place, and time.      Cranial Nerves: No cranial nerve deficit.      Deep Tendon Reflexes: Reflexes are normal and symmetric.   Psychiatric:         Mood and Affect: Mood  normal.         Behavior: Behavior normal.         Procedures       EKG 12-lead  Date 3/26/2023  Time 15: 10  Normal sinus rhythm  Normal rate  Left axis deviation  Interventricular conduction delay present  Abnormal R wave progression  LVH with repolarization abnormality  Nonspecific T wave flattening  Abnormal EKG    Similar to EKG dated 1/12/2023.   However inverted T waves seen on prior EKG V4 through V6 are now flattened or upright.    ED Course  ED Course as of 03/26/23 2043   Sun Mar 26, 2023   1559 Obtaining EKG, chest x-ray and full set of lab work.  Patient has no specific complaint at this time.  States he feels better after getting up and around.  Mr. Hastings states a frosty from [employer] would make him feel much better. [SS]   1702 Hemoglobin(!): 7.8 [SS]   1702 Hematocrit(!): 24.3 [SS]   1813 Glucose(!): 119 [SS]   1813 Sodium(!): 135 [SS]   1814 Was notable lab findings are anemia with hemoglobin 7.8 hematocrit 24.3.  Normal platelets.  Troponin is upper limit of normal.  proBNP mildly elevated.  Chest x-ray shows chronic infiltrate and mild edema.  This is unchanged from prior.  I feel patient's shortness of breath a combination of COPD and anemia.  Patient was Hemoccult negative.  No history of GI bleeds.  Will place on iron tablets.  Recommend patient follow-up with his PCP in a couple weeks for repeat blood work.  Discussed at length with the patient and his daughter all results, diagnoses, treatment and follow-up.  Will DC home.    Prescription  1-iron tablets [SS]      ED Course User Index  [SS] Juan Howard MD      Labs Reviewed   COMPREHENSIVE METABOLIC PANEL - Abnormal; Notable for the following components:       Result Value    Glucose 119 (*)     Sodium 135 (*)     All other components within normal limits    Narrative:     GFR Normal >60  Chronic Kidney Disease <60  Kidney Failure <15    The GFR formula is only valid for adults with stable renal function between ages 18 and 70.   BNP  (IN-HOUSE) - Abnormal; Notable for the following components:    proBNP 3,877.0 (*)     All other components within normal limits    Narrative:     Among patients with dyspnea, NT-proBNP is highly sensitive for the detection of acute congestive heart failure. In addition NT-proBNP of <300 pg/ml effectively rules out acute congestive heart failure with 99% negative predictive value.    Results may be falsely decreased if patient taking Biotin.     SINGLE HSTROPONIN T - Abnormal; Notable for the following components:    HS Troponin T 15 (*)     All other components within normal limits    Narrative:     High Sensitive Troponin T Reference Range:  <10.0 ng/L- Negative Female for AMI  <15.0 ng/L- Negative Male for AMI  >=10 - Abnormal Female indicating possible myocardial injury.  >=15 - Abnormal Male indicating possible myocardial injury.   Clinicians would have to utilize clinical acumen, EKG, Troponin, and serial changes to determine if it is an Acute Myocardial Infarction or myocardial injury due to an underlying chronic condition.        CBC WITH AUTO DIFFERENTIAL - Abnormal; Notable for the following components:    RBC 2.19 (*)     Hemoglobin 7.8 (*)     Hematocrit 24.3 (*)     .0 (*)     MCH 35.6 (*)     RDW 17.3 (*)     RDW-SD 68.1 (*)     Monocyte % 18.5 (*)     Immature Grans % 1.2 (*)     nRBC 0.5 (*)     All other components within normal limits   COVID-19 AND FLU A/B, NP SWAB IN TRANSPORT MEDIA 8-12 HR TAT - Normal    Narrative:     Fact sheet for providers: https://www.fda.gov/media/756559/download    Fact sheet for patients: https://www.fda.gov/media/018536/download    Test performed by PCR.   RAINBOW DRAW    Narrative:     The following orders were created for panel order [place] Draw.  Procedure                               Abnormality         Status                     ---------                               -----------         ------                     Green Top (Gel)[[id number]]                                   Final result               Lavender Top[[id number]]                                     Final result               Gold Top - SST[[id number]]                                   Final result               Light Blue Top[[id number]]                                   Final result                 Please view results for these tests on the individual orders.   SCAN SLIDE   CBC AND DIFFERENTIAL    Narrative:     The following orders were created for panel order CBC & Differential.  Procedure                               Abnormality         Status                     ---------                               -----------         ------                     CBC Auto Differential[[id number]]        Abnormal            Final result               Scan Slide[[id number]]                                       Final result                 Please view results for these tests on the individual orders.   GREEN TOP   LAVENDER TOP   GOLD TOP - SST   LIGHT BLUE TOP     XR Chest 1 View    Result Date: 3/26/2023  Narrative: CR Chest 1 Vw INDICATION: Shortness of breath for the past month COMPARISON:  1/12/2023 FINDINGS: Portable AP view(s) of the chest.  Cardiomegaly is unchanged. No progressive mediastinal widening is noted. Increased lung markings are again seen along the left heart border, unchanged. No new focal infiltrates are seen. Vascular markings are mildly prominent but unchanged. No effusions are noted.     Impression: Chronic infiltrate along the left heart border, unchanged. No new infiltrates are seen since the previous exam. Mild vascular congestion is again noted but shows no worsening since the previous examination. Signer Name: Markos Dee MD  Signed: 3/26/2023 4:38 PM  Workstation Name: [id number]  [facility]    XR CHEST 1 VW PORTABLE    Result Date: 3/10/2023  Narrative: CR Chest 1 Vw Portable Date of Exam: 3/10/2023 3:54 PM INDICATION: Chest Pain.  Shortness of breath. COMPARISON  EXAMS: Radiograph 1/25/2023, 1/24/2023, and 1/23/2023. TECHNIQUE: A single portable AP view of the chest was obtained. FINDINGS: Lordotic positioning. Overlying artifacts. Stable sternotomy wires, sternal plate and screw fixation hardware, and postoperative changes in the mediastinum. Ill-defined hazy bilateral perihilar and left greater than right bibasilar opacities with mild interstitial thickening. Blunting of the left costophrenic angle. No pneumothorax. Stable enlargement of the cardiac contours, likely accentuated by technique. No acute osseous abnormality is identified. IMPRESSION: 1. Ill-defined hazy perihilar and left greater than right bibasilar opacities mild interstitial thickening, which could represent atelectasis, pneumonia, and/or mild pulmonary edema, with a possible small left pleural effusion. 2. Stable cardiomegaly, likely accentuated by technique Dictated by: Butch Mann MD Signed by Butch Mann MD on 3/10/2023 4:09 PM ##### Final ##### Dictated by:    BUTCH MANN MD-RAD Dictated DT/TM: 03/10/2023 4:09 pm Interpreted and electronically signed by:  BUTCH MANN MD-RAD Signed DT/TM:  03/10/2023 4:09 pm         My differential diagnosis for dyspnea includes but is not limited to:  Asthma, COPD, COVID-19, pneumonia, pulmonary embolus, acute respiratory distress syndrome, RSV, pneumothorax, pleural effusion, pulmonary fibrosis, congestive heart failure, myocardial infarction, DKA, uremia, acidosis, sepsis, anemia, drug related, hyperventilation, CNS disease                                  MDM    Final diagnoses:   Iron deficiency anemia secondary to inadequate dietary iron intake   Pulmonary emphysema, unspecified emphysema type (HCC)       ED Disposition  ED Disposition     ED Disposition   Discharge    Condition   Stable    Comment   --             Jose Gonzales MD  [address] [facility] [address] [phone]    Schedule an appointment as soon as possible for a visit    For hemoglobin and hematocrit recheck.  Sooner if needed.         Medication List      New Prescriptions    ferrous gluconate 324 MG tablet  Commonly known as: FERGON  Take 1 tablet by mouth 3 (Three) Times a Day With Meals for 30 days.           Where to Get Your Medications      These medications were sent to [id number] - [place], [place] - [address] - [phone]  - [phone]   [address] [place] [address]    Phone: [phone]   · ferrous gluconate 324 MG tablet          Juan Howard MD  03/26/23 2046

## 2023-03-26 NOTE — DISCHARGE INSTRUCTIONS
Medications as directed.  Iron tablets can cause constipation.  Recommend stool softener such as Colace as needed.  Increase fiber intake and plenty of fluids.  Follow-up with your PCP as above.  Follow-up with cardiology and pulmonology as discussed.  Return to ED for worsening symptoms, medical emergencies.

## 2023-03-27 LAB — QT INTERVAL: 443 MS

## 2023-03-31 ENCOUNTER — APPOINTMENT (OUTPATIENT)
Dept: GENERAL RADIOLOGY | Facility: HOSPITAL | Age: 73
End: 2023-03-31
Payer: MEDICARE

## 2023-03-31 ENCOUNTER — HOSPITAL ENCOUNTER (EMERGENCY)
Facility: HOSPITAL | Age: 73
Discharge: ANOTHER HOSPITAL | End: 2023-03-31
Attending: EMERGENCY MEDICINE | Admitting: EMERGENCY MEDICINE
Payer: MEDICARE

## 2023-03-31 VITALS
HEART RATE: 55 BPM | BODY MASS INDEX: 32.2 KG/M2 | OXYGEN SATURATION: 96 % | SYSTOLIC BLOOD PRESSURE: 112 MMHG | TEMPERATURE: 98.3 F | WEIGHT: 230 LBS | DIASTOLIC BLOOD PRESSURE: 69 MMHG | RESPIRATION RATE: 22 BRPM | HEIGHT: 71 IN

## 2023-03-31 DIAGNOSIS — R06.09 DYSPNEA ON EXERTION: ICD-10-CM

## 2023-03-31 DIAGNOSIS — R53.83 FATIGUE, UNSPECIFIED TYPE: ICD-10-CM

## 2023-03-31 DIAGNOSIS — D64.9 SYMPTOMATIC ANEMIA: Primary | ICD-10-CM

## 2023-03-31 DIAGNOSIS — I50.9 ACUTE ON CHRONIC CONGESTIVE HEART FAILURE, UNSPECIFIED HEART FAILURE TYPE: ICD-10-CM

## 2023-03-31 DIAGNOSIS — Z86.79 HISTORY OF CORONARY ARTERY DISEASE: ICD-10-CM

## 2023-03-31 LAB
ABO GROUP BLD: NORMAL
ALBUMIN SERPL-MCNC: 4.2 G/DL (ref 3.5–5.2)
ALBUMIN/GLOB SERPL: 1.3 G/DL
ALP SERPL-CCNC: 104 U/L (ref 39–117)
ALT SERPL W P-5'-P-CCNC: 7 U/L (ref 1–41)
ANION GAP SERPL CALCULATED.3IONS-SCNC: 10.4 MMOL/L (ref 5–15)
APTT PPP: 30.5 SECONDS (ref 22.7–35.4)
AST SERPL-CCNC: 8 U/L (ref 1–40)
B PARAPERT DNA SPEC QL NAA+PROBE: NOT DETECTED
B PERT DNA SPEC QL NAA+PROBE: NOT DETECTED
BASOPHILS # BLD AUTO: 0.03 10*3/MM3 (ref 0–0.2)
BASOPHILS NFR BLD AUTO: 0.8 % (ref 0–1.5)
BILIRUB SERPL-MCNC: 0.6 MG/DL (ref 0–1.2)
BLD GP AB SCN SERPL QL: NEGATIVE
BUN SERPL-MCNC: 17 MG/DL (ref 8–23)
BUN/CREAT SERPL: 14.2 (ref 7–25)
C PNEUM DNA NPH QL NAA+NON-PROBE: NOT DETECTED
CALCIUM SPEC-SCNC: 9.5 MG/DL (ref 8.6–10.5)
CHLORIDE SERPL-SCNC: 106 MMOL/L (ref 98–107)
CO2 SERPL-SCNC: 24.6 MMOL/L (ref 22–29)
CREAT SERPL-MCNC: 1.2 MG/DL (ref 0.76–1.27)
DEPRECATED RDW RBC AUTO: 62.8 FL (ref 37–54)
EGFRCR SERPLBLD CKD-EPI 2021: 64.3 ML/MIN/1.73
EOSINOPHIL # BLD AUTO: 0.05 10*3/MM3 (ref 0–0.4)
EOSINOPHIL NFR BLD AUTO: 1.3 % (ref 0.3–6.2)
ERYTHROCYTE [DISTWIDTH] IN BLOOD BY AUTOMATED COUNT: 16.4 % (ref 12.3–15.4)
FLUAV SUBTYP SPEC NAA+PROBE: NOT DETECTED
FLUBV RNA ISLT QL NAA+PROBE: NOT DETECTED
GEN 5 2HR TROPONIN T REFLEX: 16 NG/L
GLOBULIN UR ELPH-MCNC: 3.3 GM/DL
GLUCOSE SERPL-MCNC: 99 MG/DL (ref 65–99)
HADV DNA SPEC NAA+PROBE: NOT DETECTED
HCOV 229E RNA SPEC QL NAA+PROBE: NOT DETECTED
HCOV HKU1 RNA SPEC QL NAA+PROBE: NOT DETECTED
HCOV NL63 RNA SPEC QL NAA+PROBE: NOT DETECTED
HCOV OC43 RNA SPEC QL NAA+PROBE: NOT DETECTED
HCT VFR BLD AUTO: 23.9 % (ref 37.5–51)
HGB BLD-MCNC: 7.7 G/DL (ref 13–17.7)
HMPV RNA NPH QL NAA+NON-PROBE: NOT DETECTED
HPIV1 RNA ISLT QL NAA+PROBE: NOT DETECTED
HPIV2 RNA SPEC QL NAA+PROBE: NOT DETECTED
HPIV3 RNA NPH QL NAA+PROBE: NOT DETECTED
HPIV4 P GENE NPH QL NAA+PROBE: NOT DETECTED
INR PPP: 1.04 (ref 0.9–1.1)
LYMPHOCYTES # BLD AUTO: 1.2 10*3/MM3 (ref 0.7–3.1)
LYMPHOCYTES NFR BLD AUTO: 30.2 % (ref 19.6–45.3)
M PNEUMO IGG SER IA-ACNC: NOT DETECTED
MCH RBC QN AUTO: 34.5 PG (ref 26.6–33)
MCHC RBC AUTO-ENTMCNC: 32.2 G/DL (ref 31.5–35.7)
MCV RBC AUTO: 107.2 FL (ref 79–97)
MONOCYTES # BLD AUTO: 0.71 10*3/MM3 (ref 0.1–0.9)
MONOCYTES NFR BLD AUTO: 17.8 % (ref 5–12)
NEUTROPHILS NFR BLD AUTO: 1.92 10*3/MM3 (ref 1.7–7)
NEUTROPHILS NFR BLD AUTO: 48.1 % (ref 42.7–76)
NT-PROBNP SERPL-MCNC: 3786 PG/ML (ref 0–900)
PLATELET # BLD AUTO: 161 10*3/MM3 (ref 140–450)
PMV BLD AUTO: 10.3 FL (ref 6–12)
POTASSIUM SERPL-SCNC: 3.9 MMOL/L (ref 3.5–5.2)
PROT SERPL-MCNC: 7.5 G/DL (ref 6–8.5)
PROTHROMBIN TIME: 13.7 SECONDS (ref 11.7–14.2)
QT INTERVAL: 479 MS
QT INTERVAL: 510 MS
RBC # BLD AUTO: 2.23 10*6/MM3 (ref 4.14–5.8)
RH BLD: POSITIVE
RHINOVIRUS RNA SPEC NAA+PROBE: NOT DETECTED
RSV RNA NPH QL NAA+NON-PROBE: NOT DETECTED
SARS-COV-2 RNA NPH QL NAA+NON-PROBE: NOT DETECTED
SODIUM SERPL-SCNC: 141 MMOL/L (ref 136–145)
T&S EXPIRATION DATE: NORMAL
TROPONIN T DELTA: -1 NG/L
TROPONIN T SERPL HS-MCNC: 17 NG/L
WBC NRBC COR # BLD: 3.98 10*3/MM3 (ref 3.4–10.8)

## 2023-03-31 PROCEDURE — 86900 BLOOD TYPING SEROLOGIC ABO: CPT | Performed by: EMERGENCY MEDICINE

## 2023-03-31 PROCEDURE — 86901 BLOOD TYPING SEROLOGIC RH(D): CPT | Performed by: EMERGENCY MEDICINE

## 2023-03-31 PROCEDURE — 86850 RBC ANTIBODY SCREEN: CPT | Performed by: EMERGENCY MEDICINE

## 2023-03-31 PROCEDURE — 25010000002 FUROSEMIDE PER 20 MG: Performed by: EMERGENCY MEDICINE

## 2023-03-31 PROCEDURE — 84484 ASSAY OF TROPONIN QUANT: CPT | Performed by: EMERGENCY MEDICINE

## 2023-03-31 PROCEDURE — 96374 THER/PROPH/DIAG INJ IV PUSH: CPT

## 2023-03-31 PROCEDURE — 93005 ELECTROCARDIOGRAM TRACING: CPT | Performed by: EMERGENCY MEDICINE

## 2023-03-31 PROCEDURE — 99285 EMERGENCY DEPT VISIT HI MDM: CPT

## 2023-03-31 PROCEDURE — 71045 X-RAY EXAM CHEST 1 VIEW: CPT

## 2023-03-31 PROCEDURE — 93010 ELECTROCARDIOGRAM REPORT: CPT | Performed by: INTERNAL MEDICINE

## 2023-03-31 PROCEDURE — 36415 COLL VENOUS BLD VENIPUNCTURE: CPT

## 2023-03-31 PROCEDURE — 85730 THROMBOPLASTIN TIME PARTIAL: CPT | Performed by: EMERGENCY MEDICINE

## 2023-03-31 PROCEDURE — 85610 PROTHROMBIN TIME: CPT | Performed by: EMERGENCY MEDICINE

## 2023-03-31 PROCEDURE — 36430 TRANSFUSION BLD/BLD COMPNT: CPT

## 2023-03-31 PROCEDURE — 93010 ELECTROCARDIOGRAM REPORT: CPT | Performed by: STUDENT IN AN ORGANIZED HEALTH CARE EDUCATION/TRAINING PROGRAM

## 2023-03-31 PROCEDURE — 86900 BLOOD TYPING SEROLOGIC ABO: CPT

## 2023-03-31 PROCEDURE — 83880 ASSAY OF NATRIURETIC PEPTIDE: CPT | Performed by: EMERGENCY MEDICINE

## 2023-03-31 PROCEDURE — 80053 COMPREHEN METABOLIC PANEL: CPT | Performed by: EMERGENCY MEDICINE

## 2023-03-31 PROCEDURE — 0202U NFCT DS 22 TRGT SARS-COV-2: CPT | Performed by: EMERGENCY MEDICINE

## 2023-03-31 PROCEDURE — 86920 COMPATIBILITY TEST SPIN: CPT

## 2023-03-31 PROCEDURE — P9016 RBC LEUKOCYTES REDUCED: HCPCS

## 2023-03-31 PROCEDURE — 85025 COMPLETE CBC W/AUTO DIFF WBC: CPT | Performed by: EMERGENCY MEDICINE

## 2023-03-31 RX ORDER — SODIUM CHLORIDE 0.9 % (FLUSH) 0.9 %
10 SYRINGE (ML) INJECTION AS NEEDED
Status: DISCONTINUED | OUTPATIENT
Start: 2023-03-31 | End: 2023-04-01 | Stop reason: HOSPADM

## 2023-03-31 RX ORDER — ATORVASTATIN CALCIUM 40 MG/1
80 TABLET, FILM COATED ORAL DAILY
Qty: 60 TABLET | Refills: 11 | COMMUNITY
Start: 2022-11-16 | End: 2023-11-16

## 2023-03-31 RX ORDER — CLOPIDOGREL BISULFATE 75 MG/1
TABLET ORAL
COMMUNITY
Start: 2022-11-16

## 2023-03-31 RX ORDER — AMIODARONE HYDROCHLORIDE 200 MG/1
TABLET ORAL
COMMUNITY
Start: 2023-01-25

## 2023-03-31 RX ORDER — FLUOXETINE HYDROCHLORIDE 20 MG/1
CAPSULE ORAL EVERY 24 HOURS
COMMUNITY
Start: 2023-03-10

## 2023-03-31 RX ORDER — LISINOPRIL 10 MG/1
TABLET ORAL
COMMUNITY

## 2023-03-31 RX ORDER — FUROSEMIDE 10 MG/ML
80 INJECTION INTRAMUSCULAR; INTRAVENOUS ONCE
Status: COMPLETED | OUTPATIENT
Start: 2023-03-31 | End: 2023-03-31

## 2023-03-31 RX ORDER — FUROSEMIDE 40 MG/1
40 TABLET ORAL DAILY
Qty: 30 TABLET | Refills: 11 | COMMUNITY
Start: 2023-03-15 | End: 2024-03-14

## 2023-03-31 RX ORDER — NITROGLYCERIN 0.4 MG/1
TABLET SUBLINGUAL
COMMUNITY
Start: 2022-06-29

## 2023-03-31 RX ORDER — HYDROXYZINE HYDROCHLORIDE 25 MG/1
1 TABLET, FILM COATED ORAL 3 TIMES DAILY
COMMUNITY
Start: 2023-01-27

## 2023-03-31 RX ORDER — BUDESONIDE AND FORMOTEROL FUMARATE DIHYDRATE 160; 4.5 UG/1; UG/1
2 AEROSOL RESPIRATORY (INHALATION) 2 TIMES DAILY
COMMUNITY
Start: 2023-02-06

## 2023-03-31 RX ORDER — METOPROLOL SUCCINATE 25 MG/1
TABLET, EXTENDED RELEASE ORAL
COMMUNITY
Start: 2023-03-27

## 2023-03-31 RX ORDER — POTASSIUM CHLORIDE 20 MEQ/1
TABLET, EXTENDED RELEASE ORAL
COMMUNITY

## 2023-03-31 RX ORDER — ASPIRIN 81 MG/1
TABLET ORAL
COMMUNITY

## 2023-03-31 RX ADMIN — FUROSEMIDE 80 MG: 10 INJECTION, SOLUTION INTRAMUSCULAR; INTRAVENOUS at 16:37

## 2023-03-31 NOTE — CASE MANAGEMENT/SOCIAL WORK
Discharge Planning Assessment  [facility]     Patient Name: Yves Hastings  MRN: [id number]  Today's Date: 3/31/2023    Admit Date: 3/31/2023        Discharge Needs Assessment    No documentation.                Discharge Plan     Row Name 03/31/23 1711       Plan    Plan Comments Spoke w/film library who will forward imaging to [facility]- Updated primary RN on ETA of EMS- updated charge RN Tarah- advised patient and family of ETA for EMS transport    Row Name 03/31/23 1703       Plan    Plan Comments Call placed to [facility] to schedule ALS transport to [facility]; left message with automated line              Continued Care and Services - Admitted Since 3/31/2023    Coordination has not been started for this encounter.          Demographic Summary    No documentation.                Functional Status    No documentation.                Psychosocial    No documentation.                Abuse/Neglect    No documentation.                Legal    No documentation.                Substance Abuse    No documentation.                Patient Forms    No documentation.                   Brenda Hinojosa RN

## 2023-03-31 NOTE — ED PROVIDER NOTES
EMERGENCY DEPARTMENT ENCOUNTER    Room Number:  21/21  Date of encounter:  3/31/2023  PCP: Jose Gonzales MD  Historian: Patient and family    Patient was placed in face mask during triage process. Patient was wearing facemask when I entered the room and throughout our encounter. I wore full protective equipment throughout this patient encounter including a face mask, eye protection, and gloves. Hand hygiene was performed before donning protective equipment and again following doffing of PPE after leaving the room.    HPI:  Chief Complaint: Fatigue, dyspnea, anemia  A complete HPI/ROS/PMH/PSH/SH/FH are unobtainable due to: N/A   Context: Yves Hastings is a 73 y.o. male who presents to the ED c/o progressive fatigue and dyspnea ever since CABG in January at [facility].  Patient noted to be anemic last week at [facility].  By report he went to see his pulmonologist today who noted his hemoglobin to be down near 6 and sent him to the ED for further.  No black or bloody stools.  No focal pain just fatigue.  Patient reportedly required transfusion of blood during his admission following CABG.    MEDICAL HISTORY REVIEW  EMR reviewed:    Patient was seen in an outside ED in January of this year and transferred to [facility] for NSTEMI.    PAST MEDICAL HISTORY  Active Ambulatory Problems     Diagnosis Date Noted   • Chronic obstructive lung disease (HCC) 11/07/2018   • Essential hypertension 11/07/2018   • Hyperlipidemia 11/07/2018   • Pulmonary emphysema (HCC) 12/04/2017   • Ventral hernia without obstruction or gangrene 11/08/2018   • Tobacco abuse 12/11/2018   • Class 1 obesity due to excess calories without serious comorbidity with body mass index (BMI) of 30.0 to 30.9 in adult 12/11/2018   • Precordial pain 12/11/2018   • Encounter for screening for malignant neoplasm of colon 05/18/2021   • Perirectal abscess 05/28/2021     Resolved Ambulatory Problems     Diagnosis Date Noted   • No Resolved Ambulatory  Problems     Past Medical History:   Diagnosis Date   • COPD (chronic obstructive pulmonary disease) (HCC)    • Emphysema of lung (HCC)    • HLD (hyperlipidemia)    • HTN (hypertension)    • Myocardial infarction (HCC)    • Sleep apnea    • Ventral hernia          PAST SURGICAL HISTORY  Past Surgical History:   Procedure Laterality Date   • CARDIAC CATHETERIZATION N/A 12/14/2018    Procedure: Left Heart Cath;  Surgeon: Mika Carranza MD;  Location: [facility] CATH INVASIVE LOCATION;  Service: Cardiology   • CARDIAC CATHETERIZATION N/A 12/14/2018    Procedure: Left ventriculography;  Surgeon: Mika Carranza MD;  Location: [facility] CATH INVASIVE LOCATION;  Service: Cardiology   • CARDIAC CATHETERIZATION N/A 12/14/2018    Procedure: Coronary angiography;  Surgeon: Mika Carranza MD;  [facility];  Service: Cardiology   • CARDIAC CATHETERIZATION  12/14/2018    Procedure: Functional Flow [place];  Surgeon: Mika Carranza MD;  Location: [facility] CATH INVASIVE LOCATION;  Service: Cardiology   • COLONOSCOPY W/ POLYPECTOMY N/A 11/10/2021    Procedure: COLONOSCOPY; POLYPECTOMY;  Surgeon: Alexander Dobbs MD;  Location: [facility] OR;  Service: Gastroenterology;  Laterality: N/A;  DIVERTICULOSIS  POLYP   • CORONARY STENT PLACEMENT     • CYST REMOVAL      tailbone   • INCISION AND DRAINAGE PERIRECTAL ABSCESS N/A 05/29/2021    Procedure: INCISION AND DRAINAGE OF PERIRECTAL ABSCESS;  Surgeon: Reddy Johnson Jr., MD;  Location: [facility] OR;  Service: General;  Laterality: N/A;   • UMBILICAL HERNIA REPAIR      DR. CASEY - YEARS AGO   • VEIN LIGATION AND STRIPPING BILATERAL     • VENTRAL/INCISIONAL HERNIA REPAIR N/A 02/04/2019    Procedure: VENTRAL/INCISIONAL HERNIA REPAIR LAPAROSCOPIC;  Surgeon: Adelfo Nieves MD;  Location: [facility] OR;  Service: General         FAMILY HISTORY  Family History   Problem Relation Age of Onset   • Cancer Mother         breast   •  Heart disease Mother    • Heart disease Father    • Cancer Maternal Aunt    • Heart disease Paternal Uncle    • Malig Hyperthermia Neg Hx          SOCIAL HISTORY  Social History     Socioeconomic History   • Marital status:    Tobacco Use   • Smoking status: Every Day     Packs/day: 1.00     Types: Cigarettes   • Smokeless tobacco: Never   • Tobacco comments:     Began smoking at age 13.  Smoked 1 ppd for 28 years and 2 ppd for 30 years for an 88 pack year history.   Vaping Use   • Vaping Use: Former   Substance and Sexual Activity   • Alcohol use: Yes     Alcohol/week: 7.0 standard drinks     Types: 7 Shots of liquor per week     Comment: hx of daily   • Drug use: Yes     Types: Marijuana     Comment: history of, doesn't anymore; Reports using THC at bedtime.   • Sexual activity: Defer         ALLERGIES  Patient has no known allergies.        REVIEW OF SYSTEMS  Review of Systems     All systems reviewed and negative except for those discussed in HPI.       PHYSICAL EXAM    I have reviewed the triage vital signs and nursing notes.    ED Triage Vitals   Temp Heart Rate Resp BP SpO2   03/31/23 1208 03/31/23 1207 03/31/23 1208 03/31/23 1218 03/31/23 1207   97.9 °F (36.6 °C) 66 16 142/78 94 %      Temp src Heart Rate Source Patient Position BP Location FiO2 (%)   03/31/23 1613 -- -- -- --   Oral             Physical Exam    Physical Exam   Constitutional: No distress.   HENT:  Head: Normocephalic and atraumatic.   Oropharynx: Mucous membranes are moist.   Eyes: No scleral icterus.  Mild conjunctival pallor.  Neck: Painless range of motion noted. Neck supple.   Cardiovascular: Normal rate, regular rhythm and intact distal pulses.  Pulmonary/Chest: Tachypnea with any simple movements.  Some dyspnea with prolonged speaking.  Diminished throughout.    Abdominal: Soft. There is no tenderness. There is no rebound and no guarding.   Hemoccult negative  Musculoskeletal: Moves all extremities equally. There is no pedal  edema or calf tenderness.   Neurological: Alert.  Baseline strength and sensation noted.   Skin: Skin is pink, warm, and dry.  Mild pallor.   Psychiatric: Mood and affect normal.   Nursing note and vitals reviewed.    LAB RESULTS  Recent Results (from the past 24 hour(s))   Comprehensive Metabolic Panel    Collection Time: 03/31/23 12:26 PM    Specimen: Blood   Result Value Ref Range    Glucose 99 65 - 99 mg/dL    BUN 17 8 - 23 mg/dL    Creatinine 1.20 0.76 - 1.27 mg/dL    Sodium 141 136 - 145 mmol/L    Potassium 3.9 3.5 - 5.2 mmol/L    Chloride 106 98 - 107 mmol/L    CO2 24.6 22.0 - 29.0 mmol/L    Calcium 9.5 8.6 - 10.5 mg/dL    Total Protein 7.5 6.0 - 8.5 g/dL    Albumin 4.2 3.5 - 5.2 g/dL    ALT (SGPT) 7 1 - 41 U/L    AST (SGOT) 8 1 - 40 U/L    Alkaline Phosphatase 104 39 - 117 U/L    Total Bilirubin 0.6 0.0 - 1.2 mg/dL    Globulin 3.3 gm/dL    A/G Ratio 1.3 g/dL    BUN/Creatinine Ratio 14.2 7.0 - 25.0    Anion Gap 10.4 5.0 - 15.0 mmol/L    eGFR 64.3 >60.0 mL/min/1.73   Protime-INR    Collection Time: 03/31/23 12:26 PM    Specimen: Blood   Result Value Ref Range    Protime 13.7 11.7 - 14.2 Seconds    INR 1.04 0.90 - 1.10   aPTT    Collection Time: 03/31/23 12:26 PM    Specimen: Blood   Result Value Ref Range    PTT 30.5 22.7 - 35.4 seconds   BNP    Collection Time: 03/31/23 12:26 PM    Specimen: Blood   Result Value Ref Range    proBNP 3,786.0 (H) 0.0 - 900.0 pg/mL   High Sensitivity Troponin T    Collection Time: 03/31/23 12:26 PM    Specimen: Blood   Result Value Ref Range    HS Troponin T 17 (H) <15 ng/L   Type & Screen    Collection Time: 03/31/23 12:26 PM    Specimen: Blood   Result Value Ref Range    ABO Type O     RH type Positive     Antibody Screen Negative     T&S Expiration Date 4/3/2023 11:59:59 PM    CBC Auto Differential    Collection Time: 03/31/23 12:26 PM    Specimen: Blood   Result Value Ref Range    WBC 3.98 3.40 - 10.80 10*3/mm3    RBC 2.23 (L) 4.14 - 5.80 10*6/mm3    Hemoglobin 7.7 (L) 13.0 -  17.7 g/dL    Hematocrit 23.9 (L) 37.5 - 51.0 %    .2 (H) 79.0 - 97.0 fL    MCH 34.5 (H) 26.6 - 33.0 pg    MCHC 32.2 31.5 - 35.7 g/dL    RDW 16.4 (H) 12.3 - 15.4 %    RDW-SD 62.8 (H) 37.0 - 54.0 fl    MPV 10.3 6.0 - 12.0 fL    Platelets 161 140 - 450 10*3/mm3    Neutrophil % 48.1 42.7 - 76.0 %    Lymphocyte % 30.2 19.6 - 45.3 %    Monocyte % 17.8 (H) 5.0 - 12.0 %    Eosinophil % 1.3 0.3 - 6.2 %    Basophil % 0.8 0.0 - 1.5 %    Neutrophils, Absolute 1.92 1.70 - 7.00 10*3/mm3    Lymphocytes, Absolute 1.20 0.70 - 3.10 10*3/mm3    Monocytes, Absolute 0.71 0.10 - 0.90 10*3/mm3    Eosinophils, Absolute 0.05 0.00 - 0.40 10*3/mm3    Basophils, Absolute 0.03 0.00 - 0.20 10*3/mm3   ECG 12 Lead Dyspnea    Collection Time: 03/31/23 12:39 PM   Result Value Ref Range    QT Interval 510 ms   High Sensitivity Troponin T 2Hr    Collection Time: 03/31/23  3:22 PM    Specimen: Blood   Result Value Ref Range    HS Troponin T 16 (H) <15 ng/L    Troponin T Delta -1 >=-4 - <+4 ng/L   ECG 12 Lead Dyspnea    Collection Time: 03/31/23  4:28 PM   Result Value Ref Range    QT Interval 479 ms   Prepare RBC, 1 Units    Collection Time: 03/31/23  4:48 PM   Result Value Ref Range    Product Code [id number]     Unit Number [id number]     UNIT  ABO O     UNIT  RH POS     Crossmatch Interpretation Compatible     Dispense Status IS     Blood Expiration Date [id number]     Blood Type Barcode 5100        Ordered the above labs and independently reviewed the results.        RADIOLOGY  XR Chest 1 View    Result Date: 3/31/2023  ONE VIEW PORTABLE CHEST  HISTORY: Shortness of breath. Weakness.  FINDINGS: There is cardiomegaly with sternal wires from previous cardiac surgery. There is moderate vascular congestion suspicious for changes of mild CHF.  This report was finalized on 3/31/2023 4:29 PM by Dr. Caesar Palomino M.D.        I ordered the above noted radiological studies. Reviewed by me and discussed with radiologist.  See dictation for  official radiology interpretation.      PROCEDURES    Procedures        MEDICATIONS GIVEN IN ER    Medications   sodium chloride 0.9 % flush 10 mL (has no administration in time range)   furosemide (LASIX) injection 80 mg (80 mg Intravenous Given 3/31/23 1637)         PROGRESS, DATA ANALYSIS, CONSULTS, AND MEDICAL DECISION MAKING    My differential diagnosis includes but is not limited to generalized weakness, electrolyte abnormality, CVA, TIA, Bell's palsy, acute MI, GI bleed, urinary tract infection, systemic infections including sepsis, alcohol abuse, drug abuse including prescription and street drug.    Total critical care time: Approximately 35 minutes    Due to a high probability of clinically significant, life threatening deterioration, the patient required my highest level of preparedness to intervene emergently and I personally spent this critical care time directly and personally managing the patient. This critical care time included obtaining a history; examining the patient; vital sign monitoring; ordering and review of studies; arranging urgent treatment with development of a management plan; evaluation of patient's response to treatment; frequent reassessment; and, discussions with other providers.    This critical care time was performed to assess and manage the high probability of imminent, life-threatening deterioration that could result in multi-organ failure. It was exclusive of separately billable procedures and treating other patients and teaching time.    Please see MDM section and the rest of the note for further information on patient assessment and treatment.      All labs have been independently reviewed by me.  All radiology studies have been reviewed by me and discussed with radiologist dictating the report.   EKG's independently viewed and interpreted by me.  Discussion below represents my analysis of pertinent findings related to patient's condition, differential diagnosis, treatment  plan and final disposition.      ED Course as of 03/31/23 1712   Fri Mar 31, 2023   1255 EKG           EKG time: 1239  Rhythm/Rate: Sinus, 60  P waves and WV: Borderline prolonged ABBE  QRS, axis: Nonspecific IVCD with slow R wave progression  ST and T waves: No STEMI; ST/T wave repolarization abnormality especially in 1 and aVL; QTc borderline prolonged    Interpreted Contemporaneously by me, independently viewed  Comparison: 3/26/2023-no acute change compared to prior   [RS]   1256 WBC: 3.98 [RS]   1256 Hemoglobin(!): 7.7  Stable as compared to 3/26/2023 [RS]   1256 Platelets: 161 [RS]   1301 Reviewed initial lab test with the patient and family.  Recommend that he return to the care of his primary cardiologist and cardiothoracic surgeon at [facility] where he had his procedure and has not felt well since.  They are agree with plan for transfer. [RS]   1327 Glucose: 99 [RS]   1327 BUN: 17 [RS]   1327 Creatinine: 1.20 [RS]   1327 Sodium: 141 [RS]   1327 Potassium: 3.9 [RS]   1327 ALT (SGPT): 7 [RS]   1327 AST (SGOT): 8 [RS]   1327 Total Bilirubin: 0.6 [RS]   1412 CONSULT        Provider: Jessica FLYNN cardiology    Discussion: Reviewed patient history, ED presentation and evaluation.  Agreeable to accept patient in transfer for further evaluation and treatment.    Agreeable c treatment and planned disposition.         [RS]   1612 Troponin T Delta: -1 [RS]   1622 I reassessed the patient who is sitting up in bed and appears in no distress.  He was placed on supplemental oxygen by nursing staff because I saw SPO2 dropped to 90 at some point.  Patient has no new complaints for me.  I have added a chest x-ray, respiratory viral panel and repeat EKG to his work-up.  We continue to wait for an open bed at the receiving facility. [RS]   1624 RADIOLOGY      Study: Single view portable chest  Findings: Mild diffuse interstitial haziness with mild cardiomegaly.  No pleural effusion appreciated.  I independently viewed and  interpreted these images contemporaneously with treatment.    [RS]   1624 Chest x-ray consistent with congestive heart failure.  It may be because he is anemic.  Furosemide 80 and first unit of packed red cells ordered stat. [RS]   1627 Discussed at length with patient and family delaying care and transfer.  Recommendation for packed red cells is made as well as discussion of risk benefits and alternatives.  They are agreeable to move forward with this plan. [RS]   1630 EKG           EKG time: 1628  Rhythm/Rate: Sinus, 65  P waves and MO: Borderline ABBE prolongation  QRS, axis: IVCD  ST and T waves: No STEMI; QTc borderline prolonged    Interpreted Contemporaneously by me, independently viewed  Comparison: Unchanged as compared to prior same date   [RS]   1711 Small amount of diuresis at this point.  Patient sitting up in bed and looks more spry.  PRBCs being initiated at this time.  We have received a room assignment from [facility]  ALS transfer initiated.  Patient agreeable with plan. [RS]      ED Course User Index  [RS] Ignacio Washington MD       AS OF 17:12 EDT VITALS:    BP - 113/64  HR - 64  TEMP - 98.3 °F (36.8 °C) (Oral)  O2 SATS - 98%        DIAGNOSIS  Final diagnoses:   Symptomatic anemia   History of coronary artery disease   Dyspnea on exertion   Fatigue, unspecified type   Acute on chronic congestive heart failure, unspecified heart failure type ([facility])         DISPOSITION  Transfer-[facility]              Ignacio Washington MD  03/31/23 8652

## 2023-04-01 LAB
BH BB BLOOD EXPIRATION DATE: NORMAL
BH BB BLOOD TYPE BARCODE: 5100
BH BB DISPENSE STATUS: NORMAL
BH BB PRODUCT CODE: NORMAL
BH BB UNIT NUMBER: NORMAL
CROSSMATCH INTERPRETATION: NORMAL
UNIT  ABO: NORMAL
UNIT  RH: NORMAL

## 2023-08-09 ENCOUNTER — HOSPITAL ENCOUNTER (OUTPATIENT)
Dept: PULMONOLOGY | Facility: HOSPITAL | Age: 73
Discharge: HOME | End: 2023-08-09
Payer: MEDICARE

## 2023-08-09 ENCOUNTER — HOSPITAL ENCOUNTER (OUTPATIENT)
Dept: CT IMAGING | Facility: HOSPITAL | Age: 73
Discharge: HOME | End: 2023-08-09
Payer: MEDICARE

## 2023-08-09 DIAGNOSIS — Z12.2 ENCOUNTER FOR SCREENING FOR LUNG CANCER: ICD-10-CM

## 2023-08-09 DIAGNOSIS — J44.9 CHRONIC OBSTRUCTIVE PULMONARY DISEASE, UNSPECIFIED COPD TYPE: ICD-10-CM

## 2023-08-09 PROCEDURE — 94060 EVALUATION OF WHEEZING: CPT

## 2023-08-09 PROCEDURE — 71271 CT THORAX LUNG CANCER SCR C-: CPT

## 2023-08-09 RX ORDER — ALBUTEROL SULFATE 2.5 MG/3ML
2.5 SOLUTION RESPIRATORY (INHALATION) ONCE
Status: COMPLETED | OUTPATIENT
Start: 2023-08-09 | End: 2023-08-09

## 2023-08-09 RX ADMIN — ALBUTEROL SULFATE 2.5 MG: 2.5 SOLUTION RESPIRATORY (INHALATION) at 14:42

## 2023-09-26 ENCOUNTER — OFFICE VISIT (OUTPATIENT)
Dept: CARDIOLOGY | Age: 73
End: 2023-09-26
Payer: MEDICARE

## 2023-09-26 VITALS
WEIGHT: 223 LBS | DIASTOLIC BLOOD PRESSURE: 67 MMHG | SYSTOLIC BLOOD PRESSURE: 109 MMHG | BODY MASS INDEX: 31.22 KG/M2 | HEART RATE: 76 BPM | HEIGHT: 71 IN

## 2023-09-26 DIAGNOSIS — R94.31 ABNORMAL ELECTROCARDIOGRAM: ICD-10-CM

## 2023-09-26 DIAGNOSIS — E78.5 HYPERLIPIDEMIA, UNSPECIFIED HYPERLIPIDEMIA TYPE: ICD-10-CM

## 2023-09-26 DIAGNOSIS — I25.10 ATHEROSCLEROSIS OF NATIVE CORONARY ARTERY OF NATIVE HEART WITHOUT ANGINA PECTORIS: ICD-10-CM

## 2023-09-26 DIAGNOSIS — I10 ESSENTIAL HYPERTENSION: Primary | ICD-10-CM

## 2023-09-26 PROCEDURE — 93000 ELECTROCARDIOGRAM COMPLETE: CPT | Performed by: INTERNAL MEDICINE

## 2023-09-26 PROCEDURE — 99203 OFFICE O/P NEW LOW 30 MIN: CPT | Performed by: INTERNAL MEDICINE

## 2023-09-26 RX ORDER — CARVEDILOL 3.12 MG/1
3.12 TABLET ORAL 2 TIMES DAILY WITH MEALS
COMMUNITY

## 2023-09-26 RX ORDER — IPRATROPIUM BROMIDE AND ALBUTEROL SULFATE 2.5; .5 MG/3ML; MG/3ML
SOLUTION RESPIRATORY (INHALATION)
COMMUNITY
Start: 2022-06-29

## 2023-09-26 RX ORDER — FERROUS FUMARATE 324(106)MG
1 TABLET ORAL EVERY OTHER DAY
COMMUNITY

## 2023-09-26 RX ORDER — FLUTICASONE FUROATE, UMECLIDINIUM BROMIDE AND VILANTEROL TRIFENATATE 100; 62.5; 25 UG/1; UG/1; UG/1
POWDER RESPIRATORY (INHALATION)
COMMUNITY
Start: 2023-09-24

## 2023-09-26 NOTE — PROGRESS NOTES
Patient states he needs to establish care. Has had cardiac stent. Patient has also had multiple heart attacks after stents and pacemaker.    Subjective:        [facility]  Cardiology Consult Note    Patient Identification:  Name: Yves Hastings  Age: 73 y.o.  Sex: male  :  1950  MRN: [id number]             CC  Cad  stent  Cabg  Fatigue  Varicose veins  Smokes    History of Present Illness:   73-year-old male here for the cardiac evaluation as well as establishment of the care, with known history of coronary artery disease stents has been feeling weak and fatigued    Comorbid cardiac risk factors:     Past Medical History:  Past Medical History:   Diagnosis Date    COPD (chronic obstructive pulmonary disease)     Emphysema of lung     HLD (hyperlipidemia)     HTN (hypertension)     Myocardial infarction     Sleep apnea     no machine    Ventral hernia     sched repair     Past Surgical History:  Past Surgical History:   Procedure Laterality Date    CARDIAC CATHETERIZATION N/A 2018    Procedure: Left Heart Cath;  Surgeon: Mika Carranza MD;  Location: [facility] INVASIVE LOCATION;  Service: Cardiology    CARDIAC CATHETERIZATION N/A 2018    Procedure: Left ventriculography;  Surgeon: Mika Carranza MD;  [facility];  Service: Cardiology    CARDIAC CATHETERIZATION N/A 2018    Procedure: Coronary angiography;  Surgeon: Mika Carranza MD;  Location: [facility] CATH INVASIVE LOCATION;  Service: Cardiology    CARDIAC CATHETERIZATION  2018    Procedure: Functional Flow [place];  Surgeon: Mika Carranza MD;  Location: [facility] INVASIVE LOCATION;  Service: Cardiology    COLONOSCOPY W/ POLYPECTOMY N/A 11/10/2021    Procedure: COLONOSCOPY; POLYPECTOMY;  Surgeon: Alexander Dobbs MD;  Location: [facility];  Service: Gastroenterology;  Laterality: N/A;  DIVERTICULOSIS  POLYP    CORONARY STENT PLACEMENT      CYST  REMOVAL      tailbone    INCISION AND DRAINAGE PERIRECTAL ABSCESS N/A 05/29/2021    Procedure: INCISION AND DRAINAGE OF PERIRECTAL ABSCESS;  Surgeon: Reddy Johnson Jr., MD;  Location: [facility] MAIN OR;  Service: General;  Laterality: N/A;    UMBILICAL HERNIA REPAIR      DR. CASEY - YEARS AGO    VEIN LIGATION AND STRIPPING BILATERAL      VENTRAL/INCISIONAL HERNIA REPAIR N/A 02/04/2019    Procedure: VENTRAL/INCISIONAL HERNIA REPAIR LAPAROSCOPIC;  Surgeon: Adelfo Nieves MD;  Location: [facility] OR;  Service: General      Allergies:  No Known Allergies  Home Meds:  (Not in a hospital admission)    Current Meds:     Current Outpatient Medications:     albuterol (PROVENTIL HFA;VENTOLIN HFA) 108 (90 Base) MCG/ACT inhaler, Inhale 2 puffs Every 4 (Four) Hours As Needed for Wheezing., Disp: , Rfl:     aspirin 81 MG EC tablet, aspirin 81 mg tablet,delayed release  TAKE 1 TABLET BY MOUTH DAILY, Disp: , Rfl:     atorvastatin (LIPITOR) 40 MG tablet, Take 2 tablets by mouth Daily., Disp: 60 tablet, Rfl: 11    carvedilol (COREG) 3.125 MG tablet, Take 1 tablet by mouth 2 (Two) Times a Day With Meals., Disp: , Rfl:     clopidogrel (PLAVIX) 75 MG tablet, clopidogrel 75 mg tablet, Disp: , Rfl:     empagliflozin (Jardiance) 10 MG tablet tablet, Take 1 tablet by mouth Daily., Disp: , Rfl:     Ferrous Fumarate (Ferrocite) 324 (106 Fe) MG tablet, Take 1 tablet by mouth Every Other Day., Disp: , Rfl:     FLUoxetine (PROzac) 20 MG capsule, Daily., Disp: , Rfl:     furosemide (LASIX) 40 MG tablet, Take 1 tablet by mouth Daily., Disp: 30 tablet, Rfl: 11    ipratropium-albuterol (DUO-NEB) 0.5-2.5 mg/3 ml nebulizer, ipratropium 0.5 mg-albuterol 3 mg (2.5 mg base)/3 mL nebulization soln, Disp: , Rfl:     potassium chloride (K-DUR,KLOR-CON) 20 MEQ CR tablet, potassium chloride ER 20 mEq tablet,extended release  TAKE 1 TABLET BY MOUTH EVERY DAY, Disp: , Rfl:     Trelegy Ellipta 100-62.5-25 MCG/ACT inhaler, , Disp: , Rfl:   Social History:    Social History     Tobacco Use    Smoking status: Every Day     Packs/day: 1.00     Types: Cigarettes    Smokeless tobacco: Never    Tobacco comments:     Began smoking at age 13.  Smoked 1 ppd for 28 years and 2 ppd for 30 years for an 88 pack year history.   Substance Use Topics    Alcohol use: Not Currently     Alcohol/week: 7.0 standard drinks     Types: 7 Shots of liquor per week     Comment: hx of daily      Family History:  Family History   Problem Relation Age of Onset    Cancer Mother         breast    Heart disease Mother     Heart disease Father     Cancer Maternal Aunt     Heart disease Paternal Uncle     Malig Hyperthermia Neg Hx         Review of Systems    Constitutional: Weak and fatigued   Eyes: No vision changes, eye pain   ENT/oropharynx: No difficulty swallowing, sore throat, epistaxis, changes in hearing   Cardiovascular: No chest pain, chest tightness, palpitations, paroxysmal nocturnal dyspnea, orthopnea, diaphoresis, dizziness / syncopal episode   Respiratory: No shortness of breath, dyspnea on exertion, cough, wheezing hemoptysis   Gastrointestinal: No abdominal pain, nausea, vomiting, diarrhea, bloody stools   Genitourinary: No hematuria, dysuria   Neurological: No headache, tremors, numbness,  one-sided weakness    Musculoskeletal: No cramps, myalgias,  joint pain, joint swelling   Integument: No rash, edema           Constitutional:       Physical Exam   General:  Appears in no acute distress  Eyes: PERTL,  HEENT:  No JVD. Thyroid not visibly enlarged. No mucosal pallor or cyanosis  Respiratory: Respirations regular and unlabored at rest. BBS with good air entry in all fields. No crackles, rubs or wheezes auscultated  Cardiovascular: S1S2 Regular rate and rhythm. No murmur, rub or gallop auscultated. No carotid bruits. DP/PT pulses    . No pretibial pitting edema  Gastrointestinal: Abdomen soft, flat, non tender. Bowel sounds present. No hepatosplenomegaly. No ascites  Musculoskeletal:  OBREGON x4. No abnormal movements  Extremities: No digital clubbing or cyanosis  Skin: Color pink. Skin warm and dry to touch. No rashes  No xanthoma  Neuro: AAO x3 CN II-XII grossly intact            ECG 12 Lead    Date/Time: 9/26/2023 2:10 PM  Performed by: Mika Carranza MD  Authorized by: Mika Carranza MD   Comparison: compared with previous ECG   Similar to previous ECG  Rhythm: sinus rhythm  ST Flattening: all    Clinical impression: non-specific ECG            Cardiographics  ECG:     Telemetry:    Echocardiogram:     Imaging  Chest X-ray:     Lab Review               @[email or web address]@              Assessment:/ Recommendations / Plan:   Patient Active Problem List   Diagnosis    Chronic obstructive lung disease    Essential hypertension    Hyperlipidemia    Pulmonary emphysema    Ventral hernia without obstruction or gangrene    Tobacco abuse    Class 1 obesity due to excess calories without serious comorbidity with body mass index (BMI) of 30.0 to 30.9 in adult    Precordial pain    Encounter for screening for malignant neoplasm of colon    Perirectal abscess                    ICD-10-CM ICD-9-CM   1. Essential hypertension  I10 401.9   2. Hyperlipidemia, unspecified hyperlipidemia type  E78.5 272.4   3. Abnormal electrocardiogram  R94.31 794.31   4. Atherosclerosis of native coronary artery of native heart without angina pectoris  I25.10 414.01     1. Essential hypertension  Continue current treatment  - Stress Test With Myocardial Perfusion One Day  - Adult Transthoracic Echo Complete W/ Cont if Necessary Per Protocol    2. Hyperlipidemia, unspecified hyperlipidemia type  Continue current treatment  - Stress Test With Myocardial Perfusion One Day  - Adult Transthoracic Echo Complete W/ Cont if Necessary Per Protocol    3. Abnormal electrocardiogram  Considering the patient's symptoms as well as clinical situation and  EKG findings, along with cardiac risk factors, ischemic workup is necessary to  rule out ischemic cardiomyopathy, stress induced arrhythmias, and functional capacity for diagnosis as well as prognostic consideration    - Stress Test With Myocardial Perfusion One Day  - Adult Transthoracic Echo Complete W/ Cont if Necessary Per Protocol    4. Atherosclerosis of native coronary artery of native heart without angina pectoris  Considering patient's medical condition as well as the risk factors, patient will require echocardiogram for further evaluation for the LV function, four-chamber evaluation, including the pressures, valvular function and  pericardial disease and pericardial effusion    - Stress Test With Myocardial Perfusion One Day  - Adult Transthoracic Echo Complete W/ Cont if Necessary Per Protocol    Pham, echo  Pacer check  Change depression meds  Stop smoking  excecise  Cardiac rehab  Labs/tests ordered for am      Mika Carranza MD  9/30/2023, 18:06 EDT      EMR Dragon/Transcription:   Dictated utilizing Dragon dictation

## 2023-10-02 ENCOUNTER — TELEPHONE (OUTPATIENT)
Dept: CARDIOLOGY | Facility: CLINIC | Age: 73
End: 2023-10-02

## 2023-10-02 NOTE — TELEPHONE ENCOUNTER
Caller: Doreen López    Relationship: Emergency Contact    Best call back number: [phone]    What orders are you requesting (i.e. lab or imaging): MRI    In what timeframe would the patient need to come in: ASAP    Where will you receive your lab/imaging services: [facility]    Additional notes: PT'S DAUGHTER IS CALLING TO SEE IF DR. NAVARRO CAN SEND IN ORDERS FOR AN MRI. SHE SAID HE IS ADMITTED AT [facility] AND THEY NEED TO DO AN MRI BUT WONT BECAUSE OF HIS PACEMAKER. SHE ALSO SAID THAT SHE WANTED TO SEE IF DR. NAVARRO CAN HELP WITH GETTING THE PT SWITCHED TO A [facility] WHERE DR. NAVARRO CAN COME AND SEE HIM.

## 2023-10-10 ENCOUNTER — OFFICE VISIT (OUTPATIENT)
Dept: CARDIOLOGY | Facility: CLINIC | Age: 73
End: 2023-10-10
Payer: MEDICARE

## 2023-10-10 VITALS
BODY MASS INDEX: 30.4 KG/M2 | DIASTOLIC BLOOD PRESSURE: 64 MMHG | WEIGHT: 218 LBS | HEART RATE: 84 BPM | OXYGEN SATURATION: 97 % | SYSTOLIC BLOOD PRESSURE: 108 MMHG

## 2023-10-10 DIAGNOSIS — I25.10 ATHEROSCLEROSIS OF NATIVE CORONARY ARTERY OF NATIVE HEART WITHOUT ANGINA PECTORIS: ICD-10-CM

## 2023-10-10 DIAGNOSIS — I10 ESSENTIAL HYPERTENSION: Primary | ICD-10-CM

## 2023-10-10 DIAGNOSIS — E78.5 HYPERLIPIDEMIA, UNSPECIFIED HYPERLIPIDEMIA TYPE: ICD-10-CM

## 2023-10-10 PROCEDURE — 99214 OFFICE O/P EST MOD 30 MIN: CPT | Performed by: INTERNAL MEDICINE

## 2023-10-10 PROCEDURE — 3078F DIAST BP <80 MM HG: CPT | Performed by: INTERNAL MEDICINE

## 2023-10-10 PROCEDURE — 3074F SYST BP LT 130 MM HG: CPT | Performed by: INTERNAL MEDICINE

## 2023-10-10 RX ORDER — METOPROLOL SUCCINATE 25 MG/1
25 TABLET, EXTENDED RELEASE ORAL DAILY
COMMUNITY

## 2023-10-10 RX ORDER — ROPINIROLE 0.25 MG/1
0.25 TABLET, FILM COATED ORAL NIGHTLY
COMMUNITY

## 2023-10-10 RX ORDER — AMIODARONE HYDROCHLORIDE 200 MG/1
200 TABLET ORAL DAILY
COMMUNITY

## 2023-10-10 RX ORDER — PANTOPRAZOLE SODIUM 40 MG/1
40 TABLET, DELAYED RELEASE ORAL DAILY
COMMUNITY
Start: 2023-10-05

## 2023-10-10 RX ORDER — POTASSIUM CHLORIDE 1500 MG/1
20 TABLET, EXTENDED RELEASE ORAL DAILY
COMMUNITY

## 2023-10-10 RX ORDER — HYDROXYZINE HYDROCHLORIDE 25 MG/1
25 TABLET, FILM COATED ORAL 3 TIMES DAILY PRN
COMMUNITY

## 2023-10-10 RX ORDER — PREDNISONE 20 MG/1
20 TABLET ORAL DAILY
COMMUNITY
End: 2023-10-10

## 2023-10-10 NOTE — PROGRESS NOTES
RESULTS   Subjective:        Yves Hastings is a 73 y.o. male who here for follow up    CC  TIRED  ? TIA   HPI  73-year-old male with coronary artery disease hyperlipidemia hypertension feeling weak as well as tired     Problems Addressed this Visit          Cardiac and Vasculature    Essential hypertension - Primary    Relevant Medications    metoprolol succinate XL (TOPROL-XL) 25 MG 24 hr tablet    Hyperlipidemia    Atherosclerosis of native coronary artery of native heart without angina pectoris    Relevant Medications    metoprolol succinate XL (TOPROL-XL) 25 MG 24 hr tablet     Diagnoses         Codes Comments    Essential hypertension    -  Primary ICD-10-CM: I10  ICD-9-CM: 401.9     Hyperlipidemia, unspecified hyperlipidemia type     ICD-10-CM: E78.5  ICD-9-CM: 272.4     Atherosclerosis of native coronary artery of native heart without angina pectoris     ICD-10-CM: I25.10  ICD-9-CM: 414.01           .    The following portions of the patient's history were reviewed and updated as appropriate: allergies, current medications, past family history, past medical history, past social history, past surgical history and problem list.    Past Medical History:   Diagnosis Date    COPD (chronic obstructive pulmonary disease)     Emphysema of lung     HLD (hyperlipidemia)     [place] (hard of hearing)     ears hearing aids    HTN (hypertension)     Myocardial infarction     Short-term memory loss     Sleep apnea     no machine    Stroke     Ventral hernia     sched repair     reports that he quit smoking about 9 months ago. His smoking use included cigarettes. He started smoking about 60 years ago. He has a 60.00 pack-year smoking history. He has never used smokeless tobacco. He reports that he does not currently use alcohol after a past usage of about 7.0 standard drinks of alcohol per week. He reports that he does not currently use drugs after having used the following drugs: Marijuana.   Family History   Problem  Relation Age of Onset    Cancer Mother         breast    Heart disease Mother     Heart disease Father     Cancer Maternal Aunt     Heart disease Paternal Uncle     Malig Hyperthermia Neg Hx        Review of Systems  Constitutional: No wt loss, fever, fatigue  Gastrointestinal: No nausea, abdominal pain  Behavioral/Psych: No insomnia or anxiety   Cardiovascular feeling weak and tired  Objective:       Physical Exam  /64 (BP Location: Left arm, Patient Position: Sitting, Cuff Size: Adult)   Pulse 84   Wt 98.9 kg (218 lb)   SpO2 97%   BMI 30.40 kg/m²   General appearance: No acute changes   Neck: Trachea midline; NECK, supple, no thyromegaly or lymphadenopathy   Lungs: Normal size and shape, normal breath sounds, equal distribution of air, no rales and rhonchi   CV: S1-S2 regular, no murmurs, no rub, no gallop   Abdomen: Soft, nontender; no masses , no abnormal abdominal sounds   Extremities: No deformity , normal color , no peripheral edema   Skin: Normal temperature, turgor and texture; no rash, ulcers          Procedures      Echocardiogram:        Current Outpatient Medications:     albuterol (PROVENTIL HFA;VENTOLIN HFA) 108 (90 Base) MCG/ACT inhaler, Inhale 2 puffs Every 4 (Four) Hours As Needed for Wheezing., Disp: , Rfl:     amiodarone (PACERONE) 200 MG tablet, Take 1 tablet by mouth Daily., Disp: , Rfl:     aspirin 81 MG EC tablet, aspirin 81 mg tablet,delayed release  TAKE 1 TABLET BY MOUTH DAILY, Disp: , Rfl:     atorvastatin (LIPITOR) 40 MG tablet, Take 2 tablets by mouth Daily., Disp: 60 tablet, Rfl: 11    clopidogrel (PLAVIX) 75 MG tablet, clopidogrel 75 mg tablet, Disp: , Rfl:     cyanocobalamin (VITAMIN B-12) 1000 MCG tablet, Take 1 tablet by mouth Daily., Disp: , Rfl:     empagliflozin (Jardiance) 10 MG tablet tablet, Take 1 tablet by mouth Daily., Disp: , Rfl:     Ferrous Fumarate (Ferrocite) 324 (106 Fe) MG tablet, Take 1 tablet by mouth Every Other Day., Disp: , Rfl:     furosemide (LASIX)  40 MG tablet, Take 1 tablet by mouth Daily., Disp: 30 tablet, Rfl: 11    hydrOXYzine (ATARAX) 25 MG tablet, Take 1 tablet by mouth 3 (Three) Times a Day As Needed for Itching., Disp: , Rfl:     ipratropium-albuterol (DUO-NEB) 0.5-2.5 mg/3 ml nebulizer, ipratropium 0.5 mg-albuterol 3 mg (2.5 mg base)/3 mL nebulization soln, Disp: , Rfl:     metoprolol succinate XL (TOPROL-XL) 25 MG 24 hr tablet, Take 1 tablet by mouth Daily., Disp: , Rfl:     pantoprazole (PROTONIX) 40 MG EC tablet, Take 1 tablet by mouth Daily., Disp: , Rfl:     potassium chloride (K-DUR,KLOR-CON) 20 MEQ CR tablet, potassium chloride ER 20 mEq tablet,extended release  TAKE 1 TABLET BY MOUTH EVERY DAY, Disp: , Rfl:     potassium chloride ER (K-TAB) 20 MEQ tablet controlled-release ER tablet, Take 1 tablet by mouth Daily., Disp: , Rfl:     rOPINIRole (REQUIP) 0.25 MG tablet, Take 1 tablet by mouth Every Night. Take 1 hour before bedtime., Disp: , Rfl:     sertraline (ZOLOFT) 50 MG tablet, Take 1 tablet by mouth Daily., Disp: , Rfl:     Trelegy Ellipta 100-62.5-25 MCG/ACT inhaler, , Disp: , Rfl:    Assessment:        Patient Active Problem List   Diagnosis    Chronic obstructive lung disease    Essential hypertension    Hyperlipidemia    Pulmonary emphysema    Ventral hernia without obstruction or gangrene    Tobacco abuse    Class 1 obesity due to excess calories without serious comorbidity with body mass index (BMI) of 30.0 to 30.9 in adult    Precordial pain    Encounter for screening for malignant neoplasm of colon    Perirectal abscess    Atherosclerosis of native coronary artery of native heart without angina pectoris               Plan:            ICD-10-CM ICD-9-CM   1. Essential hypertension  I10 401.9   2. Hyperlipidemia, unspecified hyperlipidemia type  E78.5 272.4   3. Atherosclerosis of native coronary artery of native heart without angina pectoris  I25.10 414.01     1. Essential hypertension  Blood pressure controlled    2.  Hyperlipidemia, unspecified hyperlipidemia type  Continue current treatment    3. Atherosclerosis of native coronary artery of native heart without angina pectoris  No angina pectoris    Considering the patient's symptoms as well as clinical situation and  EKG findings, along with cardiac risk factors, ischemic workup is necessary to rule out ischemic cardiomyopathy, stress induced arrhythmias, and functional capacity for diagnosis as well as prognostic consideration        JAMAL GUSTAFSON,   COUNSELING:    Yves Briones was given to patient for the following topics: diagnostic results, risk factor reductions, impressions, risks and benefits of treatment options and importance of treatment compliance .       SMOKING COUNSELING:        Dictated using Dragon dictation

## 2023-10-16 ENCOUNTER — HOSPITAL ENCOUNTER (OUTPATIENT)
Dept: CARDIOLOGY | Facility: HOSPITAL | Age: 73
Discharge: HOME | End: 2023-10-16
Payer: MEDICARE

## 2023-10-16 VITALS
DIASTOLIC BLOOD PRESSURE: 58 MMHG | WEIGHT: 215 LBS | HEART RATE: 71 BPM | OXYGEN SATURATION: 95 % | BODY MASS INDEX: 30.1 KG/M2 | SYSTOLIC BLOOD PRESSURE: 106 MMHG | HEIGHT: 71 IN

## 2023-10-16 PROCEDURE — 93017 CV STRESS TEST TRACING ONLY: CPT

## 2023-10-16 PROCEDURE — 0 TECHNETIUM SESTAMIBI: Performed by: INTERNAL MEDICINE

## 2023-10-16 PROCEDURE — A9500 TC99M SESTAMIBI: HCPCS | Performed by: INTERNAL MEDICINE

## 2023-10-16 PROCEDURE — 25010000002 REGADENOSON 0.4 MG/5ML SOLUTION: Performed by: INTERNAL MEDICINE

## 2023-10-16 PROCEDURE — 78452 HT MUSCLE IMAGE SPECT MULT: CPT

## 2023-10-16 RX ORDER — REGADENOSON 0.08 MG/ML
0.4 INJECTION, SOLUTION INTRAVENOUS
Status: COMPLETED | OUTPATIENT
Start: 2023-10-16 | End: 2023-10-16

## 2023-10-16 RX ADMIN — TECHNETIUM TC 99M SESTAMIBI 1 DOSE: 1 INJECTION INTRAVENOUS at 08:10

## 2023-10-16 RX ADMIN — REGADENOSON 0.4 MG: 0.08 INJECTION, SOLUTION INTRAVENOUS at 10:42

## 2023-10-16 RX ADMIN — TECHNETIUM TC 99M SESTAMIBI 1 DOSE: 1 INJECTION INTRAVENOUS at 10:42

## 2023-10-17 PROBLEM — I25.10 ATHEROSCLEROSIS OF NATIVE CORONARY ARTERY OF NATIVE HEART WITHOUT ANGINA PECTORIS: Status: ACTIVE | Noted: 2023-10-17

## 2023-10-23 LAB
BH CV REST NUCLEAR ISOTOPE DOSE: 10.7 MCI
BH CV STRESS BP STAGE 1: NORMAL
BH CV STRESS COMMENTS STAGE 1: NORMAL
BH CV STRESS DOSE REGADENOSON STAGE 1: 0.4
BH CV STRESS DURATION MIN STAGE 1: 1
BH CV STRESS DURATION SEC STAGE 1: 37
BH CV STRESS GRADE STAGE 1: 0
BH CV STRESS HR STAGE 1: 74
BH CV STRESS METS STAGE 1: 1.4
BH CV STRESS NUCLEAR ISOTOPE DOSE: 30.9 MCI
BH CV STRESS PROTOCOL 1: NORMAL
BH CV STRESS RECOVERY BP: NORMAL MMHG
BH CV STRESS RECOVERY HR: 71 BPM
BH CV STRESS RECOVERY O2: 95 %
BH CV STRESS SPEED STAGE 1: 1
BH CV STRESS STAGE 1: 1
LV EF NUC BP: 36 %
MAXIMAL PREDICTED HEART RATE: 148 BPM
PERCENT MAX PREDICTED HR: 50 %
STRESS BASELINE BP: NORMAL MMHG
STRESS BASELINE HR: 71 BPM
STRESS O2 SAT REST: 95 %
STRESS PERCENT HR: 59 %
STRESS POST ESTIMATED WORKLOAD: 1.4 METS
STRESS POST EXERCISE DUR MIN: 1 MIN
STRESS POST EXERCISE DUR SEC: 37 SEC
STRESS POST PEAK BP: NORMAL MMHG
STRESS POST PEAK HR: 74 BPM
STRESS TARGET HR: 126 BPM

## 2023-11-14 ENCOUNTER — OFFICE VISIT (OUTPATIENT)
Dept: CARDIOLOGY | Facility: CLINIC | Age: 73
End: 2023-11-14
Payer: MEDICARE

## 2023-11-14 VITALS
HEART RATE: 69 BPM | DIASTOLIC BLOOD PRESSURE: 60 MMHG | BODY MASS INDEX: 31.22 KG/M2 | WEIGHT: 223 LBS | SYSTOLIC BLOOD PRESSURE: 99 MMHG | HEIGHT: 71 IN

## 2023-11-14 DIAGNOSIS — E78.5 HYPERLIPIDEMIA, UNSPECIFIED HYPERLIPIDEMIA TYPE: ICD-10-CM

## 2023-11-14 DIAGNOSIS — I10 ESSENTIAL HYPERTENSION: Primary | ICD-10-CM

## 2023-11-14 DIAGNOSIS — I25.10 ATHEROSCLEROSIS OF NATIVE CORONARY ARTERY OF NATIVE HEART WITHOUT ANGINA PECTORIS: ICD-10-CM

## 2023-11-14 DIAGNOSIS — R94.31 ABNORMAL ELECTROCARDIOGRAM: ICD-10-CM

## 2023-11-14 PROCEDURE — 3078F DIAST BP <80 MM HG: CPT | Performed by: INTERNAL MEDICINE

## 2023-11-14 PROCEDURE — 99213 OFFICE O/P EST LOW 20 MIN: CPT | Performed by: INTERNAL MEDICINE

## 2023-11-14 PROCEDURE — 3074F SYST BP LT 130 MM HG: CPT | Performed by: INTERNAL MEDICINE

## 2023-11-14 RX ORDER — CARVEDILOL 3.12 MG/1
3.12 TABLET ORAL 2 TIMES DAILY WITH MEALS
COMMUNITY
Start: 2023-11-11

## 2023-11-14 RX ORDER — ATORVASTATIN CALCIUM 80 MG/1
1 TABLET, FILM COATED ORAL DAILY
COMMUNITY
Start: 2023-11-01

## 2023-11-14 NOTE — PROGRESS NOTES
Subjective:        Yves Hastings is a 73 y.o. male who here for follow up    CC  Positive stress test  HPI  73-year-old male recently has been diagnosed with myelodysplastic syndrome denies any chest pains or tightness in the chest     Problems Addressed this Visit          Cardiac and Vasculature    Essential hypertension - Primary    Relevant Medications    carvedilol (COREG) 3.125 MG tablet    Hyperlipidemia    Relevant Medications    atorvastatin (LIPITOR) 80 MG tablet    Atherosclerosis of native coronary artery of native heart without angina pectoris    Relevant Medications    carvedilol (COREG) 3.125 MG tablet    Abnormal electrocardiogram     Diagnoses         Codes Comments    Essential hypertension    -  Primary ICD-10-CM: I10  ICD-9-CM: 401.9     Hyperlipidemia, unspecified hyperlipidemia type     ICD-10-CM: E78.5  ICD-9-CM: 272.4     Atherosclerosis of native coronary artery of native heart without angina pectoris     ICD-10-CM: I25.10  ICD-9-CM: 414.01     Abnormal electrocardiogram     ICD-10-CM: R94.31  ICD-9-CM: 794.31           .    The following portions of the patient's history were reviewed and updated as appropriate: allergies, current medications, past family history, past medical history, past social history, past surgical history and problem list.    Past Medical History:   Diagnosis Date    COPD (chronic obstructive pulmonary disease)     Emphysema of lung     HLD (hyperlipidemia)     [place] (hard of hearing)     ears hearing aids    HTN (hypertension)     Myelodysplastic syndrome, unspecified 11/06/2023    Myocardial infarction     Short-term memory loss     Sleep apnea     no machine    Stroke     Ventral hernia     sched repair     reports that he quit smoking about 10 months ago. His smoking use included cigarettes. He started smoking about 60 years ago. He has a 60.00 pack-year smoking history. He has never used smokeless tobacco. He reports that he does not currently use alcohol after  "a past usage of about 7.0 standard drinks of alcohol per week. He reports that he does not currently use drugs after having used the following drugs: Marijuana.   Family History   Problem Relation Age of Onset    Cancer Mother         breast    Heart disease Mother     Heart disease Father     Cancer Maternal Aunt     Heart disease Paternal Uncle     Malig Hyperthermia Neg Hx        Review of Systems  Constitutional: No wt loss, fever, fatigue  Gastrointestinal: No nausea, abdominal pain  Behavioral/Psych: No insomnia or anxiety   Cardiovascular no chest pains or tightness in the chest  Objective:       Physical Exam           Physical Exam  BP 99/60   Pulse 69   Ht 180.3 cm (71\")   Wt 101 kg (223 lb)   BMI 31.10 kg/m²     General appearance: No acute changes   Eyes: Sclerae conjunctivae normal, pupils reactive   HENT: Atraumatic; oropharynx clear with moist mucous membranes and no mucosal ulcerations;  Neck: Trachea midline; NECK, supple, no thyromegaly or lymphadenopathy   Lungs: Normal size and shape, normal breath sounds, equal distribution of air, no rales and rhonchi   CV: S1-S2 regular, no murmurs, no rub, no gallop   Abdomen: Soft, nontender; no masses , no abnormal abdominal sounds   Extremities: No deformity , normal color , no peripheral edema   Skin: Normal temperature, turgor and texture; no rash, ulcers  Psych: Appropriate affect, alert and oriented to person, place and time         Interpretation Summary         Findings consistent with an equivocal ECG stress test.    Left ventricular ejection fraction is moderately reduced (Calculated EF = 36%).    Myocardial perfusion imaging indicates a moderate-sized infarct located in the anterior wall, inferior wall and apex with no significant ischemia noted.    Impressions are consistent with a high risk study.    Procedures      Echocardiogram:        Current Outpatient Medications:     albuterol (PROVENTIL HFA;VENTOLIN HFA) 108 (90 Base) MCG/ACT " inhaler, Inhale 2 puffs Every 4 (Four) Hours As Needed for Wheezing., Disp: , Rfl:     amiodarone (PACERONE) 200 MG tablet, Take 1 tablet by mouth Daily., Disp: , Rfl:     aspirin 81 MG EC tablet, aspirin 81 mg tablet,delayed release  TAKE 1 TABLET BY MOUTH DAILY, Disp: , Rfl:     atorvastatin (LIPITOR) 80 MG tablet, Take 1 tablet by mouth Daily., Disp: , Rfl:     azaCITIDine in sterile water (preservative free), Inject  under the skin into the appropriate area as directed 1 (One) Time., Disp: , Rfl:     carvedilol (COREG) 3.125 MG tablet, Take 1 tablet by mouth 2 (Two) Times a Day With Meals., Disp: , Rfl:     clopidogrel (PLAVIX) 75 MG tablet, clopidogrel 75 mg tablet, Disp: , Rfl:     cyanocobalamin (VITAMIN B-12) 1000 MCG tablet, Take 1 tablet by mouth Daily., Disp: , Rfl:     empagliflozin (Jardiance) 10 MG tablet tablet, Take 1 tablet by mouth Daily., Disp: , Rfl:     Ferrous Fumarate (Ferrocite) 324 (106 Fe) MG tablet, Take 1 tablet by mouth Every Other Day., Disp: , Rfl:     furosemide (LASIX) 40 MG tablet, Take 1 tablet by mouth Daily., Disp: 30 tablet, Rfl: 11    hydrOXYzine (ATARAX) 25 MG tablet, Take 1 tablet by mouth 3 (Three) Times a Day As Needed for Itching., Disp: , Rfl:     ipratropium-albuterol (DUO-NEB) 0.5-2.5 mg/3 ml nebulizer, ipratropium 0.5 mg-albuterol 3 mg (2.5 mg base)/3 mL nebulization soln, Disp: , Rfl:     pantoprazole (PROTONIX) 40 MG EC tablet, Take 1 tablet by mouth Daily., Disp: , Rfl:     potassium chloride (K-DUR,KLOR-CON) 20 MEQ CR tablet, potassium chloride ER 20 mEq tablet,extended release  TAKE 1 TABLET BY MOUTH EVERY DAY, Disp: , Rfl:     potassium chloride ER (K-TAB) 20 MEQ tablet controlled-release ER tablet, Take 1 tablet by mouth Daily., Disp: , Rfl:     rOPINIRole (REQUIP) 0.25 MG tablet, Take 1 tablet by mouth Every Night. Take 1 hour before bedtime., Disp: , Rfl:     Trelegy Ellipta 100-62.5-25 MCG/ACT inhaler, , Disp: , Rfl:    Assessment:        @[facility] [place]@             Plan:            ICD-10-CM ICD-9-CM   1. Essential hypertension  I10 401.9   2. Hyperlipidemia, unspecified hyperlipidemia type  E78.5 272.4   3. Atherosclerosis of native coronary artery of native heart without angina pectoris  I25.10 414.01   4. Abnormal electrocardiogram  R94.31 794.31     1. Essential hypertension  Blood pressure under control    2. Hyperlipidemia, unspecified hyperlipidemia type  Continue current treatment    3. Atherosclerosis of native coronary artery of native heart without angina pectoris  No angina pectoris    4. Abnormal electrocardiogram  No angina pectoris      ABNORMAL CARDIAC FUN TEST    RECENT DIAGNOSED MYELODYSPLASTIC SYNDROME    SEE IN 3 MONTHS  COUNSELING:    Yves Briones was given to patient for the following topics: diagnostic results, risk factor reductions, impressions, risks and benefits of treatment options and importance of treatment compliance .       SMOKING COUNSELING:        Dictated using Dragon dictation

## 2023-11-15 PROBLEM — R94.31 ABNORMAL ELECTROCARDIOGRAM: Status: ACTIVE | Noted: 2023-11-15

## 2024-01-08 ENCOUNTER — TELEPHONE (OUTPATIENT)
Dept: CARDIOLOGY | Facility: CLINIC | Age: 74
End: 2024-01-08

## 2024-01-08 NOTE — TELEPHONE ENCOUNTER
Caller: Anil López    Relationship to patient: Emergency Contact    Best call back number:  [phone]    Patient is needing: PATIENT'S DAUGHTER ANIL STATED THAT PATIENT HAS CANCER . HIS CANCER DOCTOR DR AMANDA -[phone] STATED PATIENTS HEART IS ONLY PUMPING AT 44% AND [facility]

## 2024-01-09 ENCOUNTER — HOSPITAL ENCOUNTER (OUTPATIENT)
Facility: HOSPITAL | Age: 74
Discharge: HOME | End: 2024-01-15
Attending: INTERNAL MEDICINE | Admitting: INTERNAL MEDICINE
Payer: MEDICARE

## 2024-01-09 ENCOUNTER — OFFICE VISIT (OUTPATIENT)
Dept: CARDIOLOGY | Facility: CLINIC | Age: 74
End: 2024-01-09
Payer: MEDICARE

## 2024-01-09 VITALS
SYSTOLIC BLOOD PRESSURE: 98 MMHG | WEIGHT: 206 LBS | HEART RATE: 72 BPM | BODY MASS INDEX: 28.84 KG/M2 | DIASTOLIC BLOOD PRESSURE: 63 MMHG | HEIGHT: 71 IN

## 2024-01-09 DIAGNOSIS — I10 ESSENTIAL HYPERTENSION: ICD-10-CM

## 2024-01-09 DIAGNOSIS — R94.30 ABNORMAL CARDIAC FUNCTION TEST: ICD-10-CM

## 2024-01-09 DIAGNOSIS — I42.0 CARDIOMYOPATHY, DILATED: ICD-10-CM

## 2024-01-09 DIAGNOSIS — R94.31 ABNORMAL ELECTROCARDIOGRAM: ICD-10-CM

## 2024-01-09 DIAGNOSIS — R07.9 CHEST PAIN, UNSPECIFIED TYPE: Primary | ICD-10-CM

## 2024-01-09 DIAGNOSIS — I25.10 ATHEROSCLEROSIS OF NATIVE CORONARY ARTERY OF NATIVE HEART WITHOUT ANGINA PECTORIS: Primary | ICD-10-CM

## 2024-01-09 PROCEDURE — 85730 THROMBOPLASTIN TIME PARTIAL: CPT

## 2024-01-09 PROCEDURE — 85007 BL SMEAR W/DIFF WBC COUNT: CPT

## 2024-01-09 PROCEDURE — 85610 PROTHROMBIN TIME: CPT

## 2024-01-09 PROCEDURE — 80053 COMPREHEN METABOLIC PANEL: CPT

## 2024-01-09 PROCEDURE — G0378 HOSPITAL OBSERVATION PER HR: HCPCS

## 2024-01-09 PROCEDURE — 3078F DIAST BP <80 MM HG: CPT | Performed by: INTERNAL MEDICINE

## 2024-01-09 PROCEDURE — 83735 ASSAY OF MAGNESIUM: CPT

## 2024-01-09 PROCEDURE — 85025 COMPLETE CBC W/AUTO DIFF WBC: CPT

## 2024-01-09 PROCEDURE — 3074F SYST BP LT 130 MM HG: CPT | Performed by: INTERNAL MEDICINE

## 2024-01-09 PROCEDURE — 93010 ELECTROCARDIOGRAM REPORT: CPT | Performed by: INTERNAL MEDICINE

## 2024-01-09 PROCEDURE — 93005 ELECTROCARDIOGRAM TRACING: CPT

## 2024-01-09 PROCEDURE — 99214 OFFICE O/P EST MOD 30 MIN: CPT | Performed by: INTERNAL MEDICINE

## 2024-01-09 PROCEDURE — 83880 ASSAY OF NATRIURETIC PEPTIDE: CPT

## 2024-01-09 RX ORDER — CARVEDILOL 3.12 MG/1
3.12 TABLET ORAL 2 TIMES DAILY WITH MEALS
Status: DISCONTINUED | OUTPATIENT
Start: 2024-01-10 | End: 2024-01-15 | Stop reason: HOSPADM

## 2024-01-09 RX ORDER — SPIRONOLACTONE 25 MG/1
25 TABLET ORAL DAILY
COMMUNITY
Start: 2023-10-21

## 2024-01-09 RX ORDER — NITROGLYCERIN 0.4 MG/1
0.4 TABLET SUBLINGUAL
Status: DISCONTINUED | OUTPATIENT
Start: 2024-01-09 | End: 2024-01-15 | Stop reason: HOSPADM

## 2024-01-09 RX ORDER — SODIUM CHLORIDE 0.9 % (FLUSH) 0.9 %
10 SYRINGE (ML) INJECTION AS NEEDED
Status: DISCONTINUED | OUTPATIENT
Start: 2024-01-09 | End: 2024-01-15 | Stop reason: HOSPADM

## 2024-01-09 RX ORDER — ATORVASTATIN CALCIUM 80 MG/1
80 TABLET, FILM COATED ORAL NIGHTLY
Status: DISCONTINUED | OUTPATIENT
Start: 2024-01-10 | End: 2024-01-10

## 2024-01-09 RX ORDER — SODIUM CHLORIDE 0.9 % (FLUSH) 0.9 %
10 SYRINGE (ML) INJECTION EVERY 12 HOURS SCHEDULED
Status: DISCONTINUED | OUTPATIENT
Start: 2024-01-09 | End: 2024-01-13

## 2024-01-09 RX ORDER — SODIUM CHLORIDE 9 MG/ML
40 INJECTION, SOLUTION INTRAVENOUS AS NEEDED
Status: DISCONTINUED | OUTPATIENT
Start: 2024-01-09 | End: 2024-01-15 | Stop reason: HOSPADM

## 2024-01-09 NOTE — Clinical Note
A 4 fr sheath was  inserted using micropuncture technique with ultrasound guidance into the right femoral artery. Angio rfa to confirm sheath placement.

## 2024-01-09 NOTE — PROGRESS NOTES
REFERRED BY DR. AMANDA, WOULD LIKE ANOTHER ECHO DONE TO CHECK HEART FUNCTION   Subjective:        Yves Hastings is a 73 y.o. male who here for follow up    CC  VEAK TIRED  PRESSURE IN CHEST  HPI  73-year-old male with coronary artery disease had a stress test done in October which was highly abnormal, has been complaining of the tightness and pressure sensations in the chest off-and-on     Problems Addressed this Visit          Cardiac and Vasculature    Essential hypertension    Atherosclerosis of native coronary artery of native heart without angina pectoris - Primary    Abnormal electrocardiogram    Abnormal cardiac function test     Diagnoses         Codes Comments    Atherosclerosis of native coronary artery of native heart without angina pectoris    -  Primary ICD-10-CM: I25.10  ICD-9-CM: 414.01     Essential hypertension     ICD-10-CM: I10  ICD-9-CM: 401.9     Abnormal electrocardiogram     ICD-10-CM: R94.31  ICD-9-CM: 794.31     Abnormal cardiac function test     ICD-10-CM: R94.30  ICD-9-CM: 794.30           .    The following portions of the patient's history were reviewed and updated as appropriate: allergies, current medications, past family history, past medical history, past social history, past surgical history and problem list.    Past Medical History:   Diagnosis Date    COPD (chronic obstructive pulmonary disease)     Emphysema of lung     HLD (hyperlipidemia)     [place] (hard of hearing)     ears hearing aids    HTN (hypertension)     Myelodysplastic syndrome, unspecified 11/06/2023    Myocardial infarction     Short-term memory loss     Sleep apnea     no machine    Stroke     Ventral hernia     sched repair     reports that he has been smoking cigarettes. He started smoking about 61 years ago. He has a 60.00 pack-year smoking history. He has never used smokeless tobacco. He reports that he does not currently use alcohol after a past usage of about 7.0 standard drinks of alcohol per week. He  "reports that he does not currently use drugs after having used the following drugs: Marijuana.   Family History   Problem Relation Age of Onset    Cancer Mother         breast    Heart disease Mother     Heart disease Father     Cancer Maternal Aunt     Heart disease Paternal Uncle     Malig Hyperthermia Neg Hx        Review of Systems  Constitutional: No wt loss, fever, fatigue  Gastrointestinal: No nausea, abdominal pain  Behavioral/Psych: No insomnia or anxiety   Cardiovascular chest pain and tightness in the chest  Objective:       Physical Exam  BP 98/63   Pulse 72   Ht 180.3 cm (71\")   Wt 93.4 kg (206 lb)   BMI 28.73 kg/m²   General appearance: No acute changes   Neck: Trachea midline; NECK, supple, no thyromegaly or lymphadenopathy   Lungs: Normal size and shape, normal breath sounds, equal distribution of air, no rales and rhonchi   CV: S1-S2 regular, no murmurs, no rub, no gallop   Abdomen: Soft, nontender; no masses , no abnormal abdominal sounds   Extremities: No deformity , normal color , no peripheral edema   Skin: Normal temperature, turgor and texture; no rash, ulcers          Procedures      Echocardiogram:    Results for orders placed in visit on 12/11/18    Adult Transthoracic Echo Complete W/ Cont if Necessary Per Protocol    Interpretation Summary  · Left atrial cavity size is mildly dilated.  · Calculated EF = 71.0%  · There is no evidence of pericardial effusion.          Current Outpatient Medications:     albuterol (PROVENTIL HFA;VENTOLIN HFA) 108 (90 Base) MCG/ACT inhaler, Inhale 2 puffs Every 4 (Four) Hours As Needed for Wheezing., Disp: , Rfl:     amiodarone (PACERONE) 200 MG tablet, Take 1 tablet by mouth Daily., Disp: , Rfl:     aspirin 81 MG EC tablet, aspirin 81 mg tablet,delayed release  TAKE 1 TABLET BY MOUTH DAILY, Disp: , Rfl:     atorvastatin (LIPITOR) 80 MG tablet, Take 1 tablet by mouth Daily., Disp: , Rfl:     azaCITIDine in sterile water (preservative free), Inject  under " the skin into the appropriate area as directed 1 (One) Time., Disp: , Rfl:     carvedilol (COREG) 3.125 MG tablet, Take 1 tablet by mouth 2 (Two) Times a Day With Meals., Disp: , Rfl:     clopidogrel (PLAVIX) 75 MG tablet, clopidogrel 75 mg tablet, Disp: , Rfl:     cyanocobalamin (VITAMIN B-12) 1000 MCG tablet, Take 1 tablet by mouth Daily., Disp: , Rfl:     empagliflozin (Jardiance) 10 MG tablet tablet, Take 1 tablet by mouth Daily., Disp: , Rfl:     Ferrous Fumarate (Ferrocite) 324 (106 Fe) MG tablet, Take 1 tablet by mouth Every Other Day., Disp: , Rfl:     furosemide (LASIX) 40 MG tablet, Take 1 tablet by mouth Daily., Disp: 30 tablet, Rfl: 11    hydrOXYzine (ATARAX) 25 MG tablet, Take 1 tablet by mouth 3 (Three) Times a Day As Needed for Itching., Disp: , Rfl:     ipratropium-albuterol (DUO-NEB) 0.5-2.5 mg/3 ml nebulizer, ipratropium 0.5 mg-albuterol 3 mg (2.5 mg base)/3 mL nebulization soln, Disp: , Rfl:     pantoprazole (PROTONIX) 40 MG EC tablet, Take 1 tablet by mouth Daily., Disp: , Rfl:     potassium chloride (K-DUR,KLOR-CON) 20 MEQ CR tablet, potassium chloride ER 20 mEq tablet,extended release  TAKE 1 TABLET BY MOUTH EVERY DAY, Disp: , Rfl:     potassium chloride ER (K-TAB) 20 MEQ tablet controlled-release ER tablet, Take 1 tablet by mouth Daily., Disp: , Rfl:     rOPINIRole (REQUIP) 0.25 MG tablet, Take 1 tablet by mouth Every Night. Take 1 hour before bedtime., Disp: , Rfl:     sertraline (ZOLOFT) 50 MG tablet, Take 1 tablet by mouth Daily., Disp: , Rfl:     Trelegy Ellipta 100-62.5-25 MCG/ACT inhaler, , Disp: , Rfl:    Assessment:                Plan:          ICD-10-CM ICD-9-CM   1. Atherosclerosis of native coronary artery of native heart without angina pectoris  I25.10 414.01   2. Essential hypertension  I10 401.9   3. Abnormal electrocardiogram  R94.31 794.31   4. Abnormal cardiac function test  R94.30 794.30     1. Atherosclerosis of native coronary artery of native heart without angina  pectoris  Considering his significant increase in the symptoms patient will need a diagnostic heart catheterization it will be high risk will get the clearance from the renal as well as heme-onc    Procedure, risks and options of cardiac cath explained to pt INCLUDING BUT NOT LIMITED TO MI, STROKE, DEATH, INFECTION HAEMORRHAGE, . Pt understands well and agrees with no further questions.      2. Essential hypertension  Continue current treatment    3. Abnormal electrocardiogram  Needs further evaluation    4. Abnormal cardiac function test  Needs further evaluation      ADMIT  CATH  COUNSELING:    Yves Briones was given to patient for the following topics: diagnostic results, risk factor reductions, impressions, risks and benefits of treatment options and importance of treatment compliance .       SMOKING COUNSELING:        Dictated using Dragon dictation

## 2024-01-09 NOTE — Clinical Note
A 5 fr sheath was  inserted using micropuncture technique with ultrasound guidance into the right femoral artery. Angio rfa to confirm sheath placement

## 2024-01-10 ENCOUNTER — APPOINTMENT (OUTPATIENT)
Dept: CARDIOLOGY | Facility: HOSPITAL | Age: 74
End: 2024-01-10
Payer: MEDICARE

## 2024-01-10 ENCOUNTER — APPOINTMENT (OUTPATIENT)
Dept: GENERAL RADIOLOGY | Facility: HOSPITAL | Age: 74
End: 2024-01-10
Payer: MEDICARE

## 2024-01-10 PROBLEM — R07.9 CHEST PAIN: Status: ACTIVE | Noted: 2024-01-10

## 2024-01-10 LAB
ABO GROUP BLD: NORMAL
ALBUMIN SERPL-MCNC: 3.5 G/DL (ref 3.5–5.2)
ALBUMIN/GLOB SERPL: 1.2 G/DL
ALP SERPL-CCNC: 97 U/L (ref 39–117)
ALT SERPL W P-5'-P-CCNC: 12 U/L (ref 1–41)
ANION GAP SERPL CALCULATED.3IONS-SCNC: 10.6 MMOL/L (ref 5–15)
ANION GAP SERPL CALCULATED.3IONS-SCNC: 8 MMOL/L (ref 5–15)
ANISOCYTOSIS BLD QL: ABNORMAL
AORTIC DIMENSIONLESS INDEX: 0.6 (DI)
APTT PPP: 35.1 SECONDS (ref 22.7–35.4)
AST SERPL-CCNC: 13 U/L (ref 1–40)
BASOPHILS # BLD MANUAL: 0.04 10*3/MM3 (ref 0–0.2)
BASOPHILS # BLD MANUAL: 0.1 10*3/MM3 (ref 0–0.2)
BASOPHILS NFR BLD MANUAL: 1 % (ref 0–1.5)
BASOPHILS NFR BLD MANUAL: 3 % (ref 0–1.5)
BH CV ECHO MEAS - ACS: 2.29 CM
BH CV ECHO MEAS - AO MAX PG: 12 MMHG
BH CV ECHO MEAS - AO MEAN PG: 5.4 MMHG
BH CV ECHO MEAS - AO ROOT DIAM: 3.6 CM
BH CV ECHO MEAS - AO V2 MAX: 173 CM/SEC
BH CV ECHO MEAS - AO V2 VTI: 33.2 CM
BH CV ECHO MEAS - AVA(I,D): 2.18 CM2
BH CV ECHO MEAS - EDV(CUBED): 237.1 ML
BH CV ECHO MEAS - EDV(MOD-SP2): 186 ML
BH CV ECHO MEAS - EDV(MOD-SP4): 224 ML
BH CV ECHO MEAS - EF(MOD-BP): 36 %
BH CV ECHO MEAS - EF(MOD-SP2): 39.8 %
BH CV ECHO MEAS - EF(MOD-SP4): 35.7 %
BH CV ECHO MEAS - ESV(CUBED): 147.8 ML
BH CV ECHO MEAS - ESV(MOD-SP2): 112 ML
BH CV ECHO MEAS - ESV(MOD-SP4): 144 ML
BH CV ECHO MEAS - FS: 14.6 %
BH CV ECHO MEAS - IVS/LVPW: 1.18 CM
BH CV ECHO MEAS - IVSD: 1.05 CM
BH CV ECHO MEAS - LA A2CS (ATRIAL LENGTH): 6.5 CM
BH CV ECHO MEAS - LA A4C LENGTH: 6.4 CM
BH CV ECHO MEAS - LAT PEAK E' VEL: 8.9 CM/SEC
BH CV ECHO MEAS - LV DIASTOLIC VOL/BSA (35-75): 104.8 CM2
BH CV ECHO MEAS - LV MASS(C)D: 249.4 GRAMS
BH CV ECHO MEAS - LV MAX PG: 4.2 MMHG
BH CV ECHO MEAS - LV MEAN PG: 1.65 MMHG
BH CV ECHO MEAS - LV SYSTOLIC VOL/BSA (12-30): 67.4 CM2
BH CV ECHO MEAS - LV V1 MAX: 103 CM/SEC
BH CV ECHO MEAS - LV V1 VTI: 20.5 CM
BH CV ECHO MEAS - LVIDD: 6.2 CM
BH CV ECHO MEAS - LVIDS: 5.3 CM
BH CV ECHO MEAS - LVOT AREA: 3.5 CM2
BH CV ECHO MEAS - LVOT DIAM: 2.12 CM
BH CV ECHO MEAS - LVPWD: 0.89 CM
BH CV ECHO MEAS - MED PEAK E' VEL: 5.37 CM/SEC
BH CV ECHO MEAS - MV A MAX VEL: 71.6 CM/SEC
BH CV ECHO MEAS - MV DEC SLOPE: 266.6 CM/SEC2
BH CV ECHO MEAS - MV DEC TIME: 0.17 SEC
BH CV ECHO MEAS - MV E MAX VEL: 65.2 CM/SEC
BH CV ECHO MEAS - MV E/A: 0.91
BH CV ECHO MEAS - MV MEAN PG: 1.05 MMHG
BH CV ECHO MEAS - MV P1/2T: 91.2 MSEC
BH CV ECHO MEAS - MV V2 VTI: 35.5 CM
BH CV ECHO MEAS - MVA(P1/2T): 2.41 CM2
BH CV ECHO MEAS - MVA(VTI): 2.05 CM2
BH CV ECHO MEAS - PA ACC SLOPE: 887.8 CM/SEC2
BH CV ECHO MEAS - PA ACC TIME: 0.1 SEC
BH CV ECHO MEAS - PA V2 MAX: 110.3 CM/SEC
BH CV ECHO MEAS - PULM DIAS VEL: 52.1 CM/SEC
BH CV ECHO MEAS - PULM S/D: 1.1
BH CV ECHO MEAS - PULM SYS VEL: 57.1 CM/SEC
BH CV ECHO MEAS - QP/QS: 2.05
BH CV ECHO MEAS - RAP SYSTOLE: 3 MMHG
BH CV ECHO MEAS - RV MAX PG: 2.8 MMHG
BH CV ECHO MEAS - RV V1 MAX: 82.9 CM/SEC
BH CV ECHO MEAS - RV V1 VTI: 17 CM
BH CV ECHO MEAS - RVOT DIAM: 3.3 CM
BH CV ECHO MEAS - RVSP: 35.2 MMHG
BH CV ECHO MEAS - SI(MOD-SP2): 34.6 ML/M2
BH CV ECHO MEAS - SI(MOD-SP4): 37.4 ML/M2
BH CV ECHO MEAS - SV(LVOT): 72.6 ML
BH CV ECHO MEAS - SV(MOD-SP2): 74 ML
BH CV ECHO MEAS - SV(MOD-SP4): 80 ML
BH CV ECHO MEAS - SV(RVOT): 148.6 ML
BH CV ECHO MEAS - TAPSE (>1.6): 1.9 CM
BH CV ECHO MEAS - TR MAX PG: 32.2 MMHG
BH CV ECHO MEAS - TR MAX VEL: 283.7 CM/SEC
BH CV ECHO MEASUREMENTS AVERAGE E/E' RATIO: 9.14
BH CV XLRA - RV BASE: 4.6 CM
BH CV XLRA - RV LENGTH: 7.4 CM
BH CV XLRA - RV MID: 3.1 CM
BH CV XLRA - TDI S': 8.01 CM/SEC
BILIRUB SERPL-MCNC: 0.3 MG/DL (ref 0–1.2)
BLD GP AB SCN SERPL QL: NEGATIVE
BUN SERPL-MCNC: 22 MG/DL (ref 8–23)
BUN SERPL-MCNC: 23 MG/DL (ref 8–23)
BUN/CREAT SERPL: 16.7 (ref 7–25)
BUN/CREAT SERPL: 17.3 (ref 7–25)
CALCIUM SPEC-SCNC: 8.5 MG/DL (ref 8.6–10.5)
CALCIUM SPEC-SCNC: 8.7 MG/DL (ref 8.6–10.5)
CHLORIDE SERPL-SCNC: 102 MMOL/L (ref 98–107)
CHLORIDE SERPL-SCNC: 102 MMOL/L (ref 98–107)
CHOLEST SERPL-MCNC: 78 MG/DL (ref 0–200)
CO2 SERPL-SCNC: 21.4 MMOL/L (ref 22–29)
CO2 SERPL-SCNC: 22 MMOL/L (ref 22–29)
CREAT SERPL-MCNC: 1.32 MG/DL (ref 0.76–1.27)
CREAT SERPL-MCNC: 1.33 MG/DL (ref 0.76–1.27)
DEPRECATED RDW RBC AUTO: 67.8 FL (ref 37–54)
DEPRECATED RDW RBC AUTO: 68.1 FL (ref 37–54)
EGFRCR SERPLBLD CKD-EPI 2021: 56.8 ML/MIN/1.73
EGFRCR SERPLBLD CKD-EPI 2021: 57.3 ML/MIN/1.73
EOSINOPHIL # BLD MANUAL: 0.08 10*3/MM3 (ref 0–0.4)
EOSINOPHIL # BLD MANUAL: 0.14 10*3/MM3 (ref 0–0.4)
EOSINOPHIL NFR BLD MANUAL: 2 % (ref 0.3–6.2)
EOSINOPHIL NFR BLD MANUAL: 4 % (ref 0.3–6.2)
ERYTHROCYTE [DISTWIDTH] IN BLOOD BY AUTOMATED COUNT: 18.9 % (ref 12.3–15.4)
ERYTHROCYTE [DISTWIDTH] IN BLOOD BY AUTOMATED COUNT: 19.1 % (ref 12.3–15.4)
GLOBULIN UR ELPH-MCNC: 2.9 GM/DL
GLUCOSE SERPL-MCNC: 103 MG/DL (ref 65–99)
GLUCOSE SERPL-MCNC: 99 MG/DL (ref 65–99)
HCT VFR BLD AUTO: 23.3 % (ref 37.5–51)
HCT VFR BLD AUTO: 23.3 % (ref 37.5–51)
HDLC SERPL-MCNC: 20 MG/DL (ref 40–60)
HGB BLD-MCNC: 7.3 G/DL (ref 13–17.7)
HGB BLD-MCNC: 7.4 G/DL (ref 13–17.7)
INR PPP: 1.2 (ref 0.9–1.1)
LDLC SERPL CALC-MCNC: 39 MG/DL (ref 0–100)
LDLC/HDLC SERPL: 1.91 {RATIO}
LEFT ATRIUM VOLUME INDEX: 37.9 ML/M2
LYMPHOCYTES # BLD MANUAL: 0.93 10*3/MM3 (ref 0.7–3.1)
LYMPHOCYTES # BLD MANUAL: 1.11 10*3/MM3 (ref 0.7–3.1)
LYMPHOCYTES NFR BLD MANUAL: 1 % (ref 5–12)
LYMPHOCYTES NFR BLD MANUAL: 2 % (ref 5–12)
MAGNESIUM SERPL-MCNC: 1.9 MG/DL (ref 1.6–2.4)
MCH RBC QN AUTO: 31.9 PG (ref 26.6–33)
MCH RBC QN AUTO: 32.3 PG (ref 26.6–33)
MCHC RBC AUTO-ENTMCNC: 31.3 G/DL (ref 31.5–35.7)
MCHC RBC AUTO-ENTMCNC: 31.8 G/DL (ref 31.5–35.7)
MCV RBC AUTO: 101.7 FL (ref 79–97)
MCV RBC AUTO: 101.7 FL (ref 79–97)
METAMYELOCYTES NFR BLD MANUAL: 1 % (ref 0–0)
MONOCYTES # BLD: 0.04 10*3/MM3 (ref 0.1–0.9)
MONOCYTES # BLD: 0.07 10*3/MM3 (ref 0.1–0.9)
MYELOCYTES NFR BLD MANUAL: 2 % (ref 0–0)
NEUTROPHILS # BLD AUTO: 2.05 10*3/MM3 (ref 1.7–7)
NEUTROPHILS # BLD AUTO: 2.66 10*3/MM3 (ref 1.7–7)
NEUTROPHILS NFR BLD MANUAL: 59 % (ref 42.7–76)
NEUTROPHILS NFR BLD MANUAL: 69 % (ref 42.7–76)
NT-PROBNP SERPL-MCNC: 1927 PG/ML (ref 0–900)
NT-PROBNP SERPL-MCNC: 2627 PG/ML (ref 0–900)
PLAT MORPH BLD: NORMAL
PLAT MORPH BLD: NORMAL
PLATELET # BLD AUTO: 315 10*3/MM3 (ref 140–450)
PLATELET # BLD AUTO: 320 10*3/MM3 (ref 140–450)
PMV BLD AUTO: 10 FL (ref 6–12)
PMV BLD AUTO: 9.9 FL (ref 6–12)
POIKILOCYTOSIS BLD QL SMEAR: ABNORMAL
POLYCHROMASIA BLD QL SMEAR: ABNORMAL
POTASSIUM SERPL-SCNC: 4.3 MMOL/L (ref 3.5–5.2)
POTASSIUM SERPL-SCNC: 4.7 MMOL/L (ref 3.5–5.2)
PROT SERPL-MCNC: 6.4 G/DL (ref 6–8.5)
PROTHROMBIN TIME: 15.4 SECONDS (ref 11.7–14.2)
QT INTERVAL: 410 MS
QTC INTERVAL: 449 MS
RBC # BLD AUTO: 2.29 10*6/MM3 (ref 4.14–5.8)
RBC # BLD AUTO: 2.29 10*6/MM3 (ref 4.14–5.8)
RBC MORPH BLD: NORMAL
RH BLD: POSITIVE
SODIUM SERPL-SCNC: 132 MMOL/L (ref 136–145)
SODIUM SERPL-SCNC: 134 MMOL/L (ref 136–145)
T&S EXPIRATION DATE: NORMAL
TRIGL SERPL-MCNC: 99 MG/DL (ref 0–150)
VARIANT LYMPHS NFR BLD MANUAL: 1 % (ref 0–5)
VARIANT LYMPHS NFR BLD MANUAL: 23 % (ref 19.6–45.3)
VARIANT LYMPHS NFR BLD MANUAL: 32 % (ref 19.6–45.3)
VLDLC SERPL-MCNC: 19 MG/DL (ref 5–40)
WBC MORPH BLD: NORMAL
WBC MORPH BLD: NORMAL
WBC NRBC COR # BLD AUTO: 3.47 10*3/MM3 (ref 3.4–10.8)
WBC NRBC COR # BLD AUTO: 3.86 10*3/MM3 (ref 3.4–10.8)

## 2024-01-10 PROCEDURE — 83880 ASSAY OF NATRIURETIC PEPTIDE: CPT | Performed by: HOSPITALIST

## 2024-01-10 PROCEDURE — 96374 THER/PROPH/DIAG INJ IV PUSH: CPT

## 2024-01-10 PROCEDURE — 86923 COMPATIBILITY TEST ELECTRIC: CPT

## 2024-01-10 PROCEDURE — 93306 TTE W/DOPPLER COMPLETE: CPT | Performed by: INTERNAL MEDICINE

## 2024-01-10 PROCEDURE — 99214 OFFICE O/P EST MOD 30 MIN: CPT

## 2024-01-10 PROCEDURE — 99204 OFFICE O/P NEW MOD 45 MIN: CPT | Performed by: INTERNAL MEDICINE

## 2024-01-10 PROCEDURE — G0378 HOSPITAL OBSERVATION PER HR: HCPCS

## 2024-01-10 PROCEDURE — 86850 RBC ANTIBODY SCREEN: CPT | Performed by: INTERNAL MEDICINE

## 2024-01-10 PROCEDURE — 94664 DEMO&/EVAL PT USE INHALER: CPT

## 2024-01-10 PROCEDURE — 80061 LIPID PANEL: CPT

## 2024-01-10 PROCEDURE — 85025 COMPLETE CBC W/AUTO DIFF WBC: CPT

## 2024-01-10 PROCEDURE — 94799 UNLISTED PULMONARY SVC/PX: CPT

## 2024-01-10 PROCEDURE — 86900 BLOOD TYPING SEROLOGIC ABO: CPT

## 2024-01-10 PROCEDURE — 80048 BASIC METABOLIC PNL TOTAL CA: CPT

## 2024-01-10 PROCEDURE — 86900 BLOOD TYPING SEROLOGIC ABO: CPT | Performed by: INTERNAL MEDICINE

## 2024-01-10 PROCEDURE — 25510000001 PERFLUTREN (DEFINITY) 8.476 MG IN SODIUM CHLORIDE (PF) 0.9 % 10 ML INJECTION

## 2024-01-10 PROCEDURE — 86901 BLOOD TYPING SEROLOGIC RH(D): CPT | Performed by: INTERNAL MEDICINE

## 2024-01-10 PROCEDURE — P9016 RBC LEUKOCYTES REDUCED: HCPCS

## 2024-01-10 PROCEDURE — 97162 PT EVAL MOD COMPLEX 30 MIN: CPT

## 2024-01-10 PROCEDURE — 94761 N-INVAS EAR/PLS OXIMETRY MLT: CPT

## 2024-01-10 PROCEDURE — 36430 TRANSFUSION BLD/BLD COMPNT: CPT

## 2024-01-10 PROCEDURE — 94640 AIRWAY INHALATION TREATMENT: CPT

## 2024-01-10 PROCEDURE — 71046 X-RAY EXAM CHEST 2 VIEWS: CPT

## 2024-01-10 PROCEDURE — 25010000002 FUROSEMIDE PER 20 MG

## 2024-01-10 PROCEDURE — 93306 TTE W/DOPPLER COMPLETE: CPT

## 2024-01-10 RX ORDER — PANTOPRAZOLE SODIUM 40 MG/1
40 TABLET, DELAYED RELEASE ORAL
Status: DISCONTINUED | OUTPATIENT
Start: 2024-01-10 | End: 2024-01-11

## 2024-01-10 RX ORDER — ASPIRIN 81 MG/1
81 TABLET ORAL DAILY
Status: DISCONTINUED | OUTPATIENT
Start: 2024-01-10 | End: 2024-01-15 | Stop reason: HOSPADM

## 2024-01-10 RX ORDER — PANTOPRAZOLE SODIUM 40 MG/1
40 TABLET, DELAYED RELEASE ORAL DAILY
Status: DISCONTINUED | OUTPATIENT
Start: 2024-01-10 | End: 2024-01-10

## 2024-01-10 RX ORDER — FERROUS SULFATE 325(65) MG
325 TABLET ORAL
Status: DISCONTINUED | OUTPATIENT
Start: 2024-01-10 | End: 2024-01-15 | Stop reason: HOSPADM

## 2024-01-10 RX ORDER — FUROSEMIDE 10 MG/ML
40 INJECTION INTRAMUSCULAR; INTRAVENOUS ONCE
Status: COMPLETED | OUTPATIENT
Start: 2024-01-10 | End: 2024-01-10

## 2024-01-10 RX ORDER — ATORVASTATIN CALCIUM 20 MG/1
10 TABLET, FILM COATED ORAL NIGHTLY
Status: DISCONTINUED | OUTPATIENT
Start: 2024-01-10 | End: 2024-01-15 | Stop reason: HOSPADM

## 2024-01-10 RX ORDER — SPIRONOLACTONE 25 MG/1
25 TABLET ORAL DAILY
Status: DISCONTINUED | OUTPATIENT
Start: 2024-01-10 | End: 2024-01-15 | Stop reason: HOSPADM

## 2024-01-10 RX ORDER — BUDESONIDE AND FORMOTEROL FUMARATE DIHYDRATE 160; 4.5 UG/1; UG/1
2 AEROSOL RESPIRATORY (INHALATION)
Status: DISCONTINUED | OUTPATIENT
Start: 2024-01-10 | End: 2024-01-15 | Stop reason: HOSPADM

## 2024-01-10 RX ORDER — CLOPIDOGREL BISULFATE 75 MG/1
75 TABLET ORAL DAILY
Status: DISCONTINUED | OUTPATIENT
Start: 2024-01-10 | End: 2024-01-15 | Stop reason: HOSPADM

## 2024-01-10 RX ORDER — FUROSEMIDE 40 MG/1
40 TABLET ORAL DAILY
Status: DISCONTINUED | OUTPATIENT
Start: 2024-01-10 | End: 2024-01-15 | Stop reason: HOSPADM

## 2024-01-10 RX ORDER — ALBUTEROL SULFATE 2.5 MG/3ML
2.5 SOLUTION RESPIRATORY (INHALATION) EVERY 6 HOURS PRN
Status: DISCONTINUED | OUTPATIENT
Start: 2024-01-10 | End: 2024-01-15 | Stop reason: HOSPADM

## 2024-01-10 RX ADMIN — PANTOPRAZOLE SODIUM 40 MG: 40 TABLET, DELAYED RELEASE ORAL at 18:40

## 2024-01-10 RX ADMIN — FUROSEMIDE 40 MG: 40 INJECTION, SOLUTION INTRAMUSCULAR; INTRAVENOUS at 15:54

## 2024-01-10 RX ADMIN — Medication 10 ML: at 08:27

## 2024-01-10 RX ADMIN — Medication 10 ML: at 21:17

## 2024-01-10 RX ADMIN — ASPIRIN 81 MG: 81 TABLET, COATED ORAL at 13:30

## 2024-01-10 RX ADMIN — CLOPIDOGREL BISULFATE 75 MG: 75 TABLET, FILM COATED ORAL at 13:29

## 2024-01-10 RX ADMIN — PERFLUTREN 2 ML: 6.52 INJECTION, SUSPENSION INTRAVENOUS at 09:43

## 2024-01-10 RX ADMIN — PANTOPRAZOLE SODIUM 40 MG: 40 TABLET, DELAYED RELEASE ORAL at 13:29

## 2024-01-10 RX ADMIN — TIOTROPIUM BROMIDE INHALATION SPRAY 2 PUFF: 3.12 SPRAY, METERED RESPIRATORY (INHALATION) at 17:49

## 2024-01-10 RX ADMIN — BUDESONIDE AND FORMOTEROL FUMARATE DIHYDRATE 2 PUFF: 160; 4.5 AEROSOL RESPIRATORY (INHALATION) at 17:50

## 2024-01-10 RX ADMIN — SERTRALINE 50 MG: 50 TABLET, FILM COATED ORAL at 13:30

## 2024-01-10 RX ADMIN — CARVEDILOL 3.12 MG: 3.12 TABLET, FILM COATED ORAL at 18:40

## 2024-01-10 RX ADMIN — CARVEDILOL 3.12 MG: 3.12 TABLET, FILM COATED ORAL at 00:18

## 2024-01-10 RX ADMIN — SPIRONOLACTONE 25 MG: 25 TABLET ORAL at 13:29

## 2024-01-10 RX ADMIN — FERROUS SULFATE TAB 325 MG (65 MG ELEMENTAL FE) 325 MG: 325 (65 FE) TAB at 13:29

## 2024-01-10 RX ADMIN — ATORVASTATIN CALCIUM 80 MG: 80 TABLET, FILM COATED ORAL at 00:17

## 2024-01-10 RX ADMIN — ATORVASTATIN CALCIUM 10 MG: 20 TABLET, FILM COATED ORAL at 21:16

## 2024-01-10 RX ADMIN — FUROSEMIDE 40 MG: 40 TABLET ORAL at 13:30

## 2024-01-10 RX ADMIN — EMPAGLIFLOZIN 10 MG: 10 TABLET, FILM COATED ORAL at 13:30

## 2024-01-10 NOTE — CONSULTS
Internal medicine consult    Referring physician  Dr. SIMENTAL    Chief complaint  Chest tightness    Reason for consult  Follow medical problems    History of present illness  73-year-old white male with history of COPD coronary artery disease hypertension hyperlipidemia obstructive sleep apnea chronic anemia with myelodysplastic syndrome admitted to cardiology service with chest discomfort off-and-on associated with shortness of breath but no other associated symptoms.  Patient also denies any fever chills cough congestion night sweats weight loss or weight gain.  Patient admitted to cardiology service and going for cardiac catheterization and I am asked to follow patient for medical problem.  Patient and his wife kidney in detail history.    PAST MEDICAL HISTORY   Chronic obstructive pulmonary disease      Coronary artery disease      Hyperlipidemia      Hypertension      Myelodysplastic syndrome      Obstructive sleep apnea      Chronic anemia        PAST SURGICAL HISTORY              Procedure Laterality Date    CARDIAC CATHETERIZATION N/A 12/14/2018     Procedure: Left Heart Cath;  Surgeon: Mika Carranza MD;  Location: [facility] CATH INVASIVE LOCATION;  Service: Cardiology    CARDIAC CATHETERIZATION N/A 12/14/2018     Procedure: Left ventriculography;  Surgeon: Mika Carranza MD;  Location[facility] C[facility]ASIVE LOC[facility];  Service: Cardiology    CARDIAC CATHETERIZATION N/A 12/14/2018     Procedure: Coronary angiography;  Surgeon: Mika Carranza MD;  [facility];  Service: Cardiology    CARDIAC CATHETERIZATION   12/14/2018     Procedure: Functional Flow [place];  Surgeon: Mika Carranza MD;  [facility];  Service: Cardiology    COLONOSCOPY W/ POLYPECTOMY N/A 11/10/2021     Procedure: COLONOSCOPY; POLYPECTOMY;  Surgeon: Alexander Dobbs MD;  Location: [facility];  Service: Gastroenterology;  Laterality: N/A;   "DIVERTICULOSIS  POLYP    CORONARY STENT PLACEMENT        CYST REMOVAL         tailbone    INCISION AND DRAINAGE PERIRECTAL ABSCESS N/A 05/29/2021     Procedure: INCISION AND DRAINAGE OF PERIRECTAL ABSCESS;  Surgeon: Reddy Johnson Jr., MD;  Location: [facility] OR;  Service: General;  Laterality: N/A;    UMBILICAL HERNIA REPAIR         DR. CASEY - YEARS AGO    VEIN LIGATION AND STRIPPING BILATERAL        VENTRAL/INCISIONAL HERNIA REPAIR N/A 02/04/2019     Procedure: VENTRAL/INCISIONAL HERNIA REPAIR LAPAROSCOPIC;  Surgeon: Adelfo Nieves MD;  Location: [facility] OR;  Service: General         FAMILY HISTORY           Problem Relation Age of Onset    Cancer Mother           breast    Heart disease Mother      Heart disease Father      Cancer Maternal Aunt      Heart disease Paternal Uncle      Malig Hyperthermia Neg Hx        SOCIAL HISTORY                 Socioeconomic History    Marital status:    Tobacco Use    Smoking status: Every Day       Packs/day: 1.00       Types: Cigarettes    Smokeless tobacco: Never    Tobacco comments:       Began smoking at age 13.  Smoked 1 ppd for 28 years and 2 ppd for 30 years for an 88 pack year history.   Vaping Use    Vaping Use: Former   Substance and Sexual Activity    Alcohol use: Yes       Alcohol/week: 7.0 standard drinks       Types: 7 Shots of liquor per week       Comment: hx of daily    Drug use: Yes       Types: Marijuana       Comment: history of, doesn't anymore; Reports using THC at bedtime.    Sexual activity: Defer         ALLERGIES  Patient has no known allergies.  Home medications reviewed     REVIEW OF SYSTEMS  All systems reviewed and negative except for those discussed in HPI.      PHYSICAL EXAM   Blood pressure 113/76, pulse 70, temperature 97.7 °F (36.5 °C), temperature source Oral, resp. rate 20, height 180 cm (70.87\"), weight 94 kg (207 lb 3.7 oz), SpO2 94%.    Constitutional: Awake and alert no distress.   HEENT: Unremarkable  Neck: Supple. "   Cardiovascular: Normal rate, regular rhythm and intact distal pulses.  Pulmonary/Chest: Normal effort with decreased breath sounds at the bases  Abdominal: Soft.  Nontender nondistended bowel sounds positive  Musculoskeletal: Moves all extremities equally. There is no pedal edema or calf tenderness.   Neurological: Alert.  Baseline strength and sensation noted.   Skin: Skin is pink, warm, and dry.  Mild pallor.   Psychiatric: Mood and affect normal.     LAB RESULTS  Lab Results (last 24 hours)       Procedure Component Value Units Date/Time    Manual Differential [[id number]]  (Abnormal) Collected: 01/10/24 0343    Specimen: Blood Updated: 01/10/24 0547     Neutrophil % 59.0 %      Lymphocyte % 32.0 %      Monocyte % 2.0 %      Eosinophil % 4.0 %      Basophil % 3.0 %      Neutrophils Absolute 2.05 10*3/mm3      Lymphocytes Absolute 1.11 10*3/mm3      Monocytes Absolute 0.07 10*3/mm3      Eosinophils Absolute 0.14 10*3/mm3      Basophils Absolute 0.10 10*3/mm3      RBC Morphology Normal     WBC Morphology Normal     Platelet Morphology Normal    CBC Auto Differential [[id number]]  (Abnormal) Collected: 01/10/24 0343    Specimen: Blood Updated: 01/10/24 0508     WBC 3.47 10*3/mm3      RBC 2.29 10*6/mm3      Hemoglobin 7.3 g/dL      Hematocrit 23.3 %      .7 fL      MCH 31.9 pg      MCHC 31.3 g/dL      RDW 18.9 %      RDW-SD 67.8 fl      MPV 9.9 fL      Platelets 320 10*3/mm3     Basic Metabolic Panel [[id number]]  (Abnormal) Collected: 01/10/24 0343    Specimen: Blood Updated: 01/10/24 0505     Glucose 99 mg/dL      BUN 22 mg/dL      Creatinine 1.32 mg/dL      Sodium 134 mmol/L      Potassium 4.3 mmol/L      Chloride 102 mmol/L      CO2 21.4 mmol/L      Calcium 8.5 mg/dL      BUN/Creatinine Ratio 16.7     Anion Gap 10.6 mmol/L      eGFR 57.3 mL/min/1.73     Narrative:      GFR Normal >60  Chronic Kidney Disease <60  Kidney Failure <15    The GFR formula is only valid for adults with stable renal function  between ages 18 and 70.    Lipid Panel [[id number]]  (Abnormal) Collected: 01/10/24 0343    Specimen: Blood Updated: 01/10/24 0505     Total Cholesterol 78 mg/dL      Triglycerides 99 mg/dL      HDL Cholesterol 20 mg/dL      LDL Cholesterol  39 mg/dL      VLDL Cholesterol 19 mg/dL      LDL/HDL Ratio 1.91    Narrative:      Cholesterol Reference Ranges  (U.S. Department of Health and Human Services ATP III Classifications)    Desirable          <200 mg/dL  Borderline High    200-239 mg/dL  High Risk          >240 mg/dL      Triglyceride Reference Ranges  (U.S. Department of Health and Human Services ATP III Classifications)    Normal           <150 mg/dL  Borderline High  150-199 mg/dL  High             200-499 mg/dL  Very High        >500 mg/dL    HDL Reference Ranges  (U.S. Department of Health and Human Services ATP III Classifications)    Low     <40 mg/dl (major risk factor for CHD)  High    >60 mg/dl ('negative' risk factor for CHD)        LDL Reference Ranges  (U.S. Department of Health and Human Services ATP III Classifications)    Optimal          <100 mg/dL  Near Optimal     100-129 mg/dL  Borderline High  130-159 mg/dL  High             160-189 mg/dL  Very High        >189 mg/dL    Manual Differential [[id number]]  (Abnormal) Collected: 01/09/24 2313    Specimen: Blood Updated: 01/10/24 0039     Neutrophil % 69.0 %      Lymphocyte % 23.0 %      Monocyte % 1.0 %      Eosinophil % 2.0 %      Basophil % 1.0 %      Metamyelocyte % 1.0 %      Myelocyte % 2.0 %      Atypical Lymphocyte % 1.0 %      Neutrophils Absolute 2.66 10*3/mm3      Lymphocytes Absolute 0.93 10*3/mm3      Monocytes Absolute 0.04 10*3/mm3      Eosinophils Absolute 0.08 10*3/mm3      Basophils Absolute 0.04 10*3/mm3      Anisocytosis Slight/1+     Poikilocytes Mod/2+     Polychromasia Slight/1+     WBC Morphology Normal     Platelet Morphology Normal    Magnesium [[id number]]  (Normal) Collected: 01/09/24 2313    Specimen: Blood Updated:  01/10/24 0026     Magnesium 1.9 mg/dL     Comprehensive Metabolic Panel [[id number]]  (Abnormal) Collected: 01/09/24 2313    Specimen: Blood Updated: 01/10/24 0026     Glucose 103 mg/dL      BUN 23 mg/dL      Creatinine 1.33 mg/dL      Sodium 132 mmol/L      Potassium 4.7 mmol/L      Chloride 102 mmol/L      CO2 22.0 mmol/L      Calcium 8.7 mg/dL      Total Protein 6.4 g/dL      Albumin 3.5 g/dL      ALT (SGPT) 12 U/L      AST (SGOT) 13 U/L      Alkaline Phosphatase 97 U/L      Total Bilirubin 0.3 mg/dL      Globulin 2.9 gm/dL      A/G Ratio 1.2 g/dL      BUN/Creatinine Ratio 17.3     Anion Gap 8.0 mmol/L      eGFR 56.8 mL/min/1.73     Narrative:      GFR Normal >60  Chronic Kidney Disease <60  Kidney Failure <15    The GFR formula is only valid for adults with stable renal function between ages 18 and 70.    BNP [[id number]]  (Abnormal) Collected: 01/09/24 2313    Specimen: Blood Updated: 01/10/24 0019     proBNP 1,927.0 pg/mL     Narrative:      This assay is used as an aid in the diagnosis of individuals suspected of having heart failure. It can be used as an aid in the diagnosis of acute decompensated heart failure (ADHF) in patients presenting with signs and symptoms of ADHF to the emergency department (ED). In addition, NT-proBNP of <300 pg/mL indicates ADHF is not likely.    Age Range Result Interpretation  NT-proBNP Concentration (pg/mL:      <50             Positive            >450                   Gray                 300-450                    Negative             <300    50-75           Positive            >900                  Gray                300-900                  Negative            <300      >75             Positive            >1800                  Gray                300-1800                  Negative            <300    aPTT [[id number]]  (Normal) Collected: 01/09/24 2313    Specimen: Blood Updated: 01/10/24 0012     PTT 35.1 seconds     Protime-INR [[id number]]  (Abnormal) Collected: 01/09/24  2313    Specimen: Blood Updated: 01/10/24 0012     Protime 15.4 Seconds      INR 1.20    CBC Auto Differential [[id number]]  (Abnormal) Collected: 01/09/24 2313    Specimen: Blood Updated: 01/10/24 0003     WBC 3.86 10*3/mm3      RBC 2.29 10*6/mm3      Hemoglobin 7.4 g/dL      Hematocrit 23.3 %      .7 fL      MCH 32.3 pg      MCHC 31.8 g/dL      RDW 19.1 %      RDW-SD 68.1 fl      MPV 10.0 fL      Platelets 315 10*3/mm3           Imaging Results (Last 24 Hours)       Procedure Component Value Units Date/Time    XR Chest 2 View [[id number]] Collected: 01/10/24 1437     Updated: 01/10/24 1443    Narrative:      XR CHEST 2 VW-     HISTORY: Male who is 73 years-old, wheezing     TECHNIQUE: Frontal and lateral views of the chest     COMPARISON: 3/31/2023     FINDINGS: The heart size is borderline, with slight prominence of  vascular markings (less than on the prior exam). Left-sided pacemaker,  cardiac leads, sternotomy wires are present. No focal pulmonary  consolidation, pleural effusion, or pneumothorax. No acute osseous  process.       Impression:      No focal pulmonary consolidation. Borderline heart size,  with slight vascular prominence.           This report was finalized on 1/10/2024 2:40 PM by Dr. Alex Deleon M.D on Workstation: [id number]             Results  Scan on 1/9/2024 2237 by [employer], [place]: ECG 12-LEAD         Author: -- Service: -- Author Type: --   Filed: Date of Service: Creation Time:   Status: (Other)   HEART RATE= 72  bpm  RR Interval= 833  ms  DE Interval= 206  ms  P Horizontal Axis= -48  deg  P Front Axis=   deg  QRSD Interval= 135  ms  QT Interval= 410  ms  QTcB= 449  ms  QRS Axis= -65  deg  T Wave Axis= 90  deg  - ABNORMAL ECG -  Atrial-paced complexes  Left bundle branch block  No change from previous tracing          Current Facility-Administered Medications:     albuterol (PROVENTIL) nebulizer solution 0.083% 2.5 mg/3mL, 2.5 mg, Nebulization, Q6H PRN, Es Daugherty  APRN    aspirin EC tablet 81 mg, 81 mg, Oral, Daily, Es Daugherty APRLUIS, 81 mg at 01/10/24 1330    atorvastatin (LIPITOR) tablet 80 mg, 80 mg, Oral, Nightly, Es Daugherty APRN, 80 mg at 01/10/24 0017    budesonide-formoterol (SYMBICORT) 160-4.5 MCG/ACT inhaler 2 puff, 2 puff, Inhalation, BID - RT **AND** tiotropium (SPIRIVA RESPIMAT) 2.5 mcg/act aerosol solution inhaler, 2 puff, Inhalation, Daily - RT, Es Daugherty APRLUIS    carvedilol (COREG) tablet 3.125 mg, 3.125 mg, Oral, BID With Meals, Es Daugherty APRLUIS, 3.125 mg at 01/10/24 0018    clopidogrel (PLAVIX) tablet 75 mg, 75 mg, Oral, Daily, Es Daugherty APRLUIS, 75 mg at 01/10/24 1329    empagliflozin (JARDIANCE) tablet 10 mg, 10 mg, Oral, Daily, Es Daugherty APRN, 10 mg at 01/10/24 1330    ferrous sulfate tablet 325 mg, 325 mg, Oral, Daily With Breakfast, Es Daugherty APRN, 325 mg at 01/10/24 1329    furosemide (LASIX) injection 40 mg, 40 mg, Intravenous, Once, Es Daugherty APRN    furosemide (LASIX) tablet 40 mg, 40 mg, Oral, Daily, Es Daugherty APRN, 40 mg at 01/10/24 1330    nitroglycerin (NITROSTAT) SL tablet 0.4 mg, 0.4 mg, Sublingual, Q5 Min PRN, Es Daugherty APRN    pantoprazole (PROTONIX) EC tablet 40 mg, 40 mg, Oral, Daily, Es Daugherty, APRN, 40 mg at 01/10/24 1329    sertraline (ZOLOFT) tablet 50 mg, 50 mg, Oral, Daily, Es Daugherty APRN, 50 mg at 01/10/24 1330    sodium chloride 0.9 % flush 10 mL, 10 mL, Intravenous, Q12H, Es Daugherty APRN, 10 mL at 01/10/24 0827    sodium chloride 0.9 % flush 10 mL, 10 mL, Intravenous, PRN, Es Daugherty, WOO    sodium chloride 0.9 % infusion 40 mL, 40 mL, Intravenous, PRN, Es Daugherty, APRN    spironolactone (ALDACTONE) tablet 25 mg, 25 mg, Oral, Daily, Es Daugherty APRN, 25 mg at 01/10/24 1329        ASSESSMENT  Chest tightness with known coronary artery disease  Chronic obstructive pulmonary  disease  Hypertension  Hyperlipidemia  Cardiomyopathy with ejection fraction of 30%  Obstructive sleep apnea  Chronic anemia with myelodysplastic syndrome  Gastroesophageal reflux disease    PLAN  I agree with current care  Transfuse 1 unit packed RBC  Cardiac catheterization today  Continue home medications  Stress ulcer DVT prophylaxis  Hematology to follow patient  Supportive care  Patient is full code   Discussed with family nursing staff  Follow Dr. SIMENTAL and further recommendations according to hospital course    OUSMANE JO MD

## 2024-01-10 NOTE — PLAN OF CARE
Problem: Skin Injury Risk Increased  Goal: Skin Health and Integrity  Intervention: Promote and Optimize Oral Intake  Recent Flowsheet Documentation  Taken 1/10/2024 1636 by Felicity Gomez RD  Oral Nutrition Promotion:   calorie-dense liquids provided   nutritional therapy counseling provided     Problem: Malnutrition  Goal: Improved Nutritional Intake  Outcome: Ongoing, Progressing  Intervention: Promote and Optimize Oral Intake  Flowsheets (Taken 1/10/2024 1636)  Oral Nutrition Promotion:   calorie-dense liquids provided   nutritional therapy counseling provided   Goal Outcome Evaluation:   Mighty Shakes BID (chocolate) ordered. Will follow for adequate po intake of meals/ONS.

## 2024-01-10 NOTE — PLAN OF CARE
Goal Outcome Evaluation:  Plan of Care Reviewed With: patient, spouse, daughter           Outcome Evaluation: Pt adm from MD office with fatigue, general weakness, chest pressure, cardiac work up ongoing, pt with multiple medical issues including myelodysplastic syndrome and is currently undergoing chemo, hgb low and will be getting blood later today, cardiac cath planned for Friday. Pt is independent with mobility, he was able to walk in the [place] independently, needed some rest breaks due to fatigue, no indication for PT intervention as pt is independent, rec pt take frequent short walks with family/nursing staff, PT can be reordered if needed      Anticipated Discharge Disposition (PT): home with assist

## 2024-01-10 NOTE — CONSULTS
Nutrition Services    Patient Name:  Yves Hastings  YOB: 1950  MRN: [id number]  Admit Date:  1/9/2024    Assessment Date:  01/10/24    Summary: Consulted per RN Admit Screen/MSA:  Consulted per RN Admit Screen for weight loss, LALO and decreased po intake. Pt here with chest pain, CAD. Pt also has myelodysplastic syndrome and currently on chemo. Pt c/o nausea from chemo at times with decreased appetite and po intake. Pt with 16 lb (7%) wt loss x 2 months. Pt dislikes Boost/Ensure but agrees to try Mighty Shakes. Plans for 1u PRBC transfusion today per Oncology team.     Moderate (non-severe) Malnutrition (Based on ASPEN/AND criteria, pt meets nutrition diagnosis of Moderate Malnutrition of Acute Disease due to inadequate po intake and 16 lb (7%) wt loss x 2 months.)    Recommend:  Mighty Shakes BID (chocolate), will order.     Will follow per protocol.    CLINICAL NUTRITION ASSESSMENT      Reason for Assessment Malnutrition Severity Assessment (MSA), Nurse Admission Screen     Diagnosis/Problem   Chest pain, diagnostic cardiac cath, CAD, HTN, HLD, COPD, Myelodysplastic syndrome on chemo   Medical/Surgical History Past Medical History:   Diagnosis Date    COPD (chronic obstructive pulmonary disease)     Emphysema of lung     HLD (hyperlipidemia)     [place] (hard of hearing)     ears hearing aids    HTN (hypertension)     Myelodysplastic syndrome, unspecified 11/06/2023    Myocardial infarction     Short-term memory loss     Sleep apnea     no machine    Stroke     Ventral hernia     sched repair       Past Surgical History:   Procedure Laterality Date    CARDIAC CATHETERIZATION N/A 12/14/2018    Procedure: Left Heart Cath;  Surgeon: Mika Carranza MD;  Location: [facility] CATH INVASIVE LOCATION;  Service: Cardiology    CARDIAC CATHETERIZATION N/A 12/14/2018    Procedure: Left ventriculography;  Surgeon: Mika Carranza MD;  [facility];  Service: Cardiology     "CARDIAC CATHETERIZATION N/A 12/14/2018    Procedure: Coronary angiography;  Surgeon: Mika Carranza MD;  [facility];  Service: Cardiology    CARDIAC CATHETERIZATION  12/14/2018    Procedure: Functional Flow [place];  Surgeon: Mika Carranza MD;  [facility];  Service: Cardiology    COLONOSCOPY W/ POLYPECTOMY N/A 11/10/2021    Procedure: COLONOSCOPY; POLYPECTOMY;  Surgeon: Alexander Dobbs MD;  Location: [facility] OR;  Service: Gastroenterology;  Laterality: N/A;  DIVERTICULOSIS  POLYP    CORONARY STENT PLACEMENT      CYST REMOVAL      tailbone    INCISION AND DRAINAGE PERIRECTAL ABSCESS N/A 05/29/2021    Procedure: INCISION AND DRAINAGE OF PERIRECTAL ABSCESS;  Surgeon: Reddy Johnson Jr., MD;  Location: [facility] MAIN OR;  Service: General;  Laterality: N/A;    UMBILICAL HERNIA REPAIR      DR. CASEY - YEARS AGO    VEIN LIGATION AND STRIPPING BILATERAL      VENTRAL/INCISIONAL HERNIA REPAIR N/A 02/04/2019    Procedure: VENTRAL/INCISIONAL HERNIA REPAIR LAPAROSCOPIC;  Surgeon: Adelfo Nieves MD;  Location: [facility] OR;  Service: General        Anthropometrics        Current Height  Current Weight  BMI kg/m2 Height: 180 cm (70.87\")  Weight: 94 kg (207 lb 3.7 oz) (01/10/24 0942)  Body mass index is 29.01 kg/m².   Adjusted BMI (if applicable)    BMI Category Overweight (25 - 29.9)   Ideal Body Weight (IBW) 165 lb   Usual Body Weight (UBW) 223-230 lb   Weight Trend Loss, Amount/Timeframe: 16 lb (7%) wt loss x 2 months   Weight History Wt Readings from Last 30 Encounters:   01/10/24 0942 94 kg (207 lb 3.7 oz)   01/09/24 2144 94.3 kg (207 lb 14.4 oz)   01/09/24 2130 94.3 kg (207 lb 14.4 oz)   01/09/24 1529 93.4 kg (206 lb)   11/14/23 1232 101 kg (223 lb)   10/16/23 1027 97.5 kg (215 lb)   10/10/23 1505 98.9 kg (218 lb)   09/26/23 1343 101 kg (223 lb)   03/31/23 1226 104 kg (230 lb)   01/12/23 1439 104 kg (230 lb)   11/10/21 1231 109 kg (240 lb " 6.4 oz)   09/13/21 0830 109 kg (240 lb 3.2 oz)   06/15/21 1007 108 kg (238 lb 3.2 oz)   05/29/21 0934 108 kg (239 lb)   05/28/21 2138 109 kg (239 lb 3.2 oz)   05/28/21 1805 109 kg (240 lb)   02/04/19 0748 112 kg (247 lb 6.4 oz)   01/31/19 1038 113 kg (249 lb)   01/08/19 1442 115 kg (253 lb)   12/18/18 1427 112 kg (248 lb)   12/14/18 0836 112 kg (248 lb)   12/11/18 1358 112 kg (248 lb)   11/16/18 1022 112 kg (246 lb 11.2 oz)   11/07/18 1446 112 kg (248 lb)      --  Estimated/Assessed Needs        Current Weight  Weight: 94 kg (207 lb 3.7 oz) (01/10/24 0942)       Energy Requirements    Weight for Calculation 94 kg   Method for Estimation  20 kcal/kg, 22 kcal/kg   EST Needs (kcal/day) 0711-6958       Protein Requirements    Weight for Calculation 94 kg   EST Protein Needs (g/kg) 1.0 - 1.2 gm/kg   EST Daily Needs (g/day) 94-112g       Fluid Requirements     Method for Estimation 25 mL/kg    EST Needs (mL/day) 2350 ml     Labs       Pertinent Labs    Results from last 7 days   Lab Units 01/10/24  0343 01/09/24  2313   SODIUM mmol/L 134* 132*   POTASSIUM mmol/L 4.3 4.7   CHLORIDE mmol/L 102 102   CO2 mmol/L 21.4* 22.0   BUN mg/dL 22 23   CREATININE mg/dL 1.32* 1.33*   CALCIUM mg/dL 8.5* 8.7   BILIRUBIN mg/dL  --  0.3   ALK PHOS U/L  --  97   ALT (SGPT) U/L  --  12   AST (SGOT) U/L  --  13   GLUCOSE mg/dL 99 103*     Results from last 7 days   Lab Units 01/10/24  0343 01/09/24  2313   MAGNESIUM mg/dL  --  1.9   HEMOGLOBIN g/dL 7.3* 7.4*   HEMATOCRIT % 23.3* 23.3*   WBC 10*3/mm3 3.47 3.86   TRIGLYCERIDES mg/dL 99  --    ALBUMIN g/dL  --  3.5     Results from last 7 days   Lab Units 01/10/24  0343 01/09/24  2313   INR   --  1.20*   APTT seconds  --  35.1   PLATELETS 10*3/mm3 320 315     COVID19   Date Value Ref Range Status   03/31/2023 Not Detected Not Detected - Ref. Range Final     Lab Results   Component Value Date    HGBA1C 5.4 10/02/2023          Medications           Scheduled Medications aspirin, 81 mg, Oral,  Daily  atorvastatin, 10 mg, Oral, Nightly  budesonide-formoterol, 2 puff, Inhalation, BID - RT   And  tiotropium bromide monohydrate, 2 puff, Inhalation, Daily - RT  carvedilol, 3.125 mg, Oral, BID With Meals  clopidogrel, 75 mg, Oral, Daily  empagliflozin, 10 mg, Oral, Daily  ferrous sulfate, 325 mg, Oral, Daily With Breakfast  furosemide, 40 mg, Oral, Daily  pantoprazole, 40 mg, Oral, BID AC  sertraline, 50 mg, Oral, Daily  sodium chloride, 10 mL, Intravenous, Q12H  spironolactone, 25 mg, Oral, Daily       Infusions     PRN Medications   albuterol    nitroglycerin    sodium chloride    sodium chloride     Physical Findings          General Findings alert, hard of hearing, oriented, overweight   Oral/Mouth Cavity tooth or teeth missing   Edema  no edema   Gastrointestinal last bowel movement: 1/9   Skin  skin intact, pale   Tubes/Drains/Lines none   NFPE No clinical signs of muscle wasting or fat loss, See Malnutrition Severity Assessment, Date Completed: 1/10   --  Malnutrition Severity Assessment      Patient meets criteria for : Moderate (non-severe) Malnutrition (Based on ASPEN/AND criteria, pt meets nutrition diagnosis of Moderate Malnutrition of Acute Disease due to inadequate po intake and 16 lb (7%) wt loss x 2 months.)  Malnutrition Type (last 8 hours)       Malnutrition Severity Assessment       Row Name 01/10/24 1622       Malnutrition Severity Assessment    Malnutrition Type Acute Disease or Injury - Related Malnutrition      Row Name 01/10/24 1622       Insufficient Energy Intake     Insufficient Energy Intake Findings Severe    Insufficient Energy Intake  <75% of est. energy requirement for > or equal to 3 months      Row Name 01/10/24 1622       Unintentional Weight Loss     Unintentional Weight Loss Findings Moderate  16 lb (7%) wt loss x 2 months      Row Name 01/10/24 1622       Muscle Loss    Loss of Muscle Mass Findings None      Row Name 01/10/24 1622       Fat Loss    Subcutaneous Fat Loss  Findings None      Row Name 01/10/24 1622       Criteria Met (Must meet criteria for severity in at least 2 of these categories: M Wasting, Fat Loss, Fluid, Secondary Signs, Wt. Status, Intake)    Patient meets criteria for  Moderate (non-severe) Malnutrition  Based on ASPEN/AND criteria, pt meets nutrition diagnosis of Moderate Malnutrition of Acute Disease due to inadequate po intake and 16 lb (7%) wt loss x 2 months.                       Current Nutrition Orders & Evaluation of Intake       Oral Nutrition     Food Allergies NKFA   Current PO Diet Diet: Cardiac Diets; Healthy Heart (2-3 Na+); Texture: Regular Texture (IDDSI 7); Fluid Consistency: Thin (IDDSI 0)   Supplement n/a   PO Evaluation     % PO Intake 0-75%    Factors Affecting Intake: decreased appetite, nausea   --  PES STATEMENT / NUTRITION DIAGNOSIS      Nutrition Dx Problem  Problem: Malnutrition (moderate)  Etiology: Factors Affecting Nutrition - LALO, nausea    Signs/Symptoms: Report of Minimal PO Intake and Unintended Weight Change     NUTRITION INTERVENTION / PLAN OF CARE      Intervention Goal(s) Maintain nutrition status, Reduce/improve symptoms, Meet estimated needs, Disease management/therapy, Tolerate PO , and Accepts oral nutrition supplement         RD Intervention/Action Encourage intake, Continue to monitor, Care plan reviewed, and Recommend/order: ONS   --      Prescription/Orders:       PO Diet       Supplements Mighty Shakes BID (chocolate)      Enteral Nutrition       Parenteral Nutrition    New Prescription Ordered? Yes   --      Monitor/Evaluation Per protocol   Discharge Plan/Needs Pending clinical course   --    RD to follow per protocol.      Electronically signed by:  Felicity Gomez RD  01/10/24 16:22 EST

## 2024-01-10 NOTE — CONSULTS
Subjective     REASON FOR CONSULTATION:    Evaluation and management for MDS and anemia                             REQUESTING PHYSICIAN:  Dr. Tere MD    RECORDS OBTAINED:  Records of the patients history including those obtained from the referring provider were reviewed and summarized in detail.    HISTORY OF PRESENT ILLNESS:  The patient is a 73 y.o. year old male with medical history significant for MDS on chemotherapy, CAD s/p PCI, COPD and hard of hearing was admitted to [facility] [place], operated by [facility] on 1/9/2024 for ongoing anginal symptoms and diagnostic cardiac cath.  Lab work on admission noted hemoglobin of 7.4, .7.  Normal WBC and platelets with ANC of 2660.     Hematology/allergy has been consulted for further evaluation and management.  Patient follows up with Dr. Mikey Adame, with [facility] for his MDS treatment.  States he was started on chemotherapy with Vidaza (azacitidine) ~ 3 months ago.  He receives it 5 days in a row and repeated every month.  Last dose was this Monday 1/8 and Tuesday 1/9.  Patient denies any fever, chills or night sweats.  No bleeding or bruises.    Past Medical History:   Diagnosis Date    COPD (chronic obstructive pulmonary disease)     Emphysema of lung     HLD (hyperlipidemia)     [place] (hard of hearing)     ears hearing aids    HTN (hypertension)     Myelodysplastic syndrome, unspecified 11/06/2023    Myocardial infarction     Short-term memory loss     Sleep apnea     no machine    Stroke     Ventral hernia     sched repair        Past Surgical History:   Procedure Laterality Date    CARDIAC CATHETERIZATION N/A 12/14/2018    Procedure: Left Heart Cath;  Surgeon: Mika Carranza MD;  Location: [facility] CATH INVASIVE LOCATION;  Service: Cardiology    CARDIAC CATHETERIZATION N/A 12/14/2018    Procedure: Left ventriculography;  Surgeon: Mika Carranza MD;  [facility];  Service: Cardiology    CARDIAC CATHETERIZATION N/A  12/14/2018    Procedure: Coronary angiography;  Surgeon: Mika Carranza MD;  [facility];  Service: Cardiology    CARDIAC CATHETERIZATION  12/14/2018    Procedure: Functional Flow [place];  Surgeon: Mika Carranza MD;  Location: [facility] CATH INVASIVE LOCATION;  Service: Cardiology    COLONOSCOPY W/ POLYPECTOMY N/A 11/10/2021    Procedure: COLONOSCOPY; POLYPECTOMY;  Surgeon: Alexander Dobbs MD;  Location: [facility] OR;  Service: Gastroenterology;  Laterality: N/A;  DIVERTICULOSIS  POLYP    CORONARY STENT PLACEMENT      CYST REMOVAL      tailbone    INCISION AND DRAINAGE PERIRECTAL ABSCESS N/A 05/29/2021    Procedure: INCISION AND DRAINAGE OF PERIRECTAL ABSCESS;  Surgeon: Reddy Johnson Jr., MD;  Location: [facility] MAIN OR;  Service: General;  Laterality: N/A;    UMBILICAL HERNIA REPAIR      DR. CASEY - YEARS AGO    VEIN LIGATION AND STRIPPING BILATERAL      VENTRAL/INCISIONAL HERNIA REPAIR N/A 02/04/2019    Procedure: VENTRAL/INCISIONAL HERNIA REPAIR LAPAROSCOPIC;  Surgeon: Adelfo Nieves MD;  Location: [facility] OR;  Service: General        No current facility-administered medications on file prior to encounter.     Current Outpatient Medications on File Prior to Encounter   Medication Sig Dispense Refill    albuterol (PROVENTIL HFA;VENTOLIN HFA) 108 (90 Base) MCG/ACT inhaler Inhale 2 puffs Every 4 (Four) Hours As Needed for Wheezing.      aspirin 81 MG EC tablet aspirin 81 mg tablet,delayed release   TAKE 1 TABLET BY MOUTH DAILY      atorvastatin (LIPITOR) 80 MG tablet Take 1 tablet by mouth Daily.      carvedilol (COREG) 3.125 MG tablet Take 1 tablet by mouth 2 (Two) Times a Day With Meals.      clopidogrel (PLAVIX) 75 MG tablet clopidogrel 75 mg tablet      empagliflozin (Jardiance) 10 MG tablet tablet Take 1 tablet by mouth Daily.      Ferrous Fumarate (Ferrocite) 324 (106 Fe) MG tablet Take 1 tablet by mouth Every Other Day.      furosemide (LASIX) 40 MG  tablet Take 1 tablet by mouth Daily. 30 tablet 11    potassium chloride (K-DUR,KLOR-CON) 20 MEQ CR tablet potassium chloride ER 20 mEq tablet,extended release   TAKE 1 TABLET BY MOUTH EVERY DAY      sertraline (ZOLOFT) 50 MG tablet Take 1 tablet by mouth Daily.      spironolactone (ALDACTONE) 25 MG tablet Take 1 tablet by mouth Daily.      Trelegy Ellipta 100-62.5-25 MCG/ACT inhaler       amiodarone (PACERONE) 200 MG tablet Take 1 tablet by mouth Daily. (Patient not taking: Reported on 2024)      azaCITIDine in sterile water (preservative free) Inject  under the skin into the appropriate area as directed 1 (One) Time. (Patient not taking: Reported on 2024)      cyanocobalamin (VITAMIN B-12) 1000 MCG tablet Take 1 tablet by mouth Daily. (Patient not taking: Reported on 2024)      hydrOXYzine (ATARAX) 25 MG tablet Take 1 tablet by mouth 3 (Three) Times a Day As Needed for Itching. (Patient not taking: Reported on 2024)      ipratropium-albuterol (DUO-NEB) 0.5-2.5 mg/3 ml nebulizer ipratropium 0.5 mg-albuterol 3 mg (2.5 mg base)/3 mL nebulization soln      pantoprazole (PROTONIX) 40 MG EC tablet Take 1 tablet by mouth Daily.      potassium chloride ER (K-TAB) 20 MEQ tablet controlled-release ER tablet Take 1 tablet by mouth Daily.      rOPINIRole (REQUIP) 0.25 MG tablet Take 1 tablet by mouth Every Night. Take 1 hour before bedtime. (Patient not taking: Reported on 2024)          ALLERGIES:  No Known Allergies     Social History     Socioeconomic History    Marital status:    Tobacco Use    Smoking status: Some Days     Packs/day: 0.25     Years: 60.00     Additional pack years: 0.00     Total pack years: 15.00     Types: Cigarettes     Start date:      Last attempt to quit: 2023     Years since quittin.0    Smokeless tobacco: Never    Tobacco comments:     Began smoking at age 13.  Smoked 1 ppd for 28 years and 2 ppd for 30 years for an 88 pack year history.     SMOKES 5 OR LESS  "CIGARETTES A DAY   Vaping Use    Vaping Use: Former   Substance and Sexual Activity    Alcohol use: Not Currently     Alcohol/week: 7.0 standard drinks of alcohol     Types: 7 Shots of liquor per week     Comment: hx of daily    Drug use: Not Currently     Types: Marijuana     Comment: history of, doesn't anymore; Reports using THC at bedtime.    Sexual activity: Defer        Family History   Problem Relation Age of Onset    Cancer Mother         breast    Heart disease Mother     Heart disease Father     Cancer Maternal Aunt     Heart disease Paternal Uncle     Malig Hyperthermia Neg Hx         Review of Systems   As per HPI    Objective     Vitals:    01/10/24 0018 01/10/24 0403 01/10/24 0756 01/10/24 0942   BP: 111/64 100/56 95/64 113/76   BP Location: Right arm Right arm Right arm    Patient Position: Lying Lying Lying    Pulse: 75 77 77 70   Resp:  20 20    Temp: 98.1 °F (36.7 °C) 99.3 °F (37.4 °C) 99.1 °F (37.3 °C)    TempSrc: Oral Oral Oral    SpO2:  (!) 89% 94%    Weight:    94 kg (207 lb 3.7 oz)   Height:    180 cm (70.87\")          No data to display                Physical Exam    CONSTITUTIONAL:  Vital signs reviewed.  No distress, looks comfortable.  EYES:  Conjunctiva and lids unremarkable.    EARS,NOSE,MOUTH,THROAT:  Ears and nose appear unremarkable.    RESPIRATORY:  Normal respiratory effort.  Lungs clear to auscultation bilaterally.  CARDIOVASCULAR:  Normal S1, S2.  No murmurs rubs or gallops.  No significant lower extremity edema.  GASTROINTESTINAL: Abdomen appears unremarkable.  Nondistended   LYMPHATIC:  No cervical, supraclavicular lymphadenopathy.  SKIN:  Warm.  No rashes.  PSYCHIATRIC:  Normal judgment and insight.  Normal mood and affect.  NEURO: AAOx3, no obvious focal deficits.  Is hard of hearing      RECENT LABS:  Hematology WBC   Date Value Ref Range Status   01/10/2024 3.47 3.40 - 10.80 10*3/mm3 Final     RBC   Date Value Ref Range Status   01/10/2024 2.29 (L) 4.14 - 5.80 10*6/mm3 " Final     Hemoglobin   Date Value Ref Range Status   01/10/2024 7.3 (L) 13.0 - 17.7 g/dL Final     Hematocrit   Date Value Ref Range Status   01/10/2024 23.3 (L) 37.5 - 51.0 % Final     Platelets   Date Value Ref Range Status   01/10/2024 320 140 - 450 10*3/mm3 Final          Assessment & Plan   Patient is a pleasant 73-year-old male with medical history significant for MDS on chemotherapy, CAD s/p PCI, COPD and hard of hearing admitted with anginal symptoms and diagnostic cardiac cath.    # MDS, refractory anemia with ring sideroblasts:  Diagnosed October 2023.  Follows up with Dr. Holland, [place] [facility].  Currently receiving treatment with Vidaza, 5 days in a row repeated every 28 days.  Last dose on 1/8 and 1/9/2024.  1/10/2024: Labs from today show hemoglobin of 7.3, WBC 3.47, ANC 2050.  Platelets 320,000.  Patient is planned for diagnostic cardiac cath today.  In view of his symptoms, will transfuse 1 unit PRBC prior to the procedure.    Although patient is not neutropenic, he will be considered immunocompromised given recent chemotherapy and MDS diagnosis.  Will consider G-CSF support if becomes neutropenic.  Continue daily oral iron supplementation  Will continue to follow.    # CAD s/p PCI:   Plan for diagnostic cardiac cath today.  Follows up with .    # COPD: On bronchodilators    Recommendations:  -Transfuse 1 unit PRBC prior to cardiac cath  -Continue daily iron  -Monitor for neutropenia  -Will continue to follow

## 2024-01-10 NOTE — PROGRESS NOTES
[facility]  Cardiology Progress Note    Patient Identification:  Name: Yves Hastings  Age: 73 y.o.  Sex: male  :  1950  MRN: [id number]                 Follow Up / Chief Complaint: Follow up for chest pain    Interval History: Resting in bed, no chest pain, family at bedside       Subjective: no chest pain      Objective:    Past Medical History:  Past Medical History:   Diagnosis Date    COPD (chronic obstructive pulmonary disease)     Emphysema of lung     HLD (hyperlipidemia)     [place] (hard of hearing)     ears hearing aids    HTN (hypertension)     Myelodysplastic syndrome, unspecified 2023    Myocardial infarction     Short-term memory loss     Sleep apnea     no machine    Stroke     Ventral hernia     sched repair     Past Surgical History:  Past Surgical History:   Procedure Laterality Date    CARDIAC CATHETERIZATION N/A 2018    Procedure: Left Heart Cath;  Surgeon: Mika Carranza MD;  Location: [facility] CATH INVASIVE LOCATION;  Service: Cardiology    CARDIAC CATHETERIZATION N/A 2018    Procedure: Left ventriculography;  Surgeon: Mika Carranza MD;  Location: [facility] CATH INVASIVE LOCATION;  Service: Cardiology    CARDIAC CATHETERIZATION N/A 2018    Procedure: Coronary angiography;  Surgeon: Mika Carranza MD;  Location: [facility] CATH INVASIVE LOCATION;  Service: Cardiology    CARDIAC CATHETERIZATION  2018    Procedure: Functional Flow [place];  Surgeon: Mika Carranza MD;  Location: [facility] CATH INVASIVE LOCATION;  Service: Cardiology    COLONOSCOPY W/ POLYPECTOMY N/A 11/10/2021    Procedure: COLONOSCOPY; POLYPECTOMY;  Surgeon: Alexander Dobbs MD;  Location: [facility];  Service: Gastroenterology;  Laterality: N/A;  DIVERTICULOSIS  POLYP    CORONARY STENT PLACEMENT      CYST REMOVAL      tailbone    INCISION AND DRAINAGE PERIRECTAL ABSCESS N/A 2021    Procedure: INCISION AND DRAINAGE OF PERIRECTAL ABSCESS;   Surgeon: Reddy Johnson Jr., MD;  Location: [facility] OR;  Service: General;  Laterality: N/A;    UMBILICAL HERNIA REPAIR      DR. CASEY - YEARS AGO    VEIN LIGATION AND STRIPPING BILATERAL      VENTRAL/INCISIONAL HERNIA REPAIR N/A 2019    Procedure: VENTRAL/INCISIONAL HERNIA REPAIR LAPAROSCOPIC;  Surgeon: Adelfo Nieves MD;  Location: [facility] OR;  Service: General        Social History:   Social History     Tobacco Use    Smoking status: Some Days     Packs/day: 0.25     Years: 60.00     Additional pack years: 0.00     Total pack years: 15.00     Types: Cigarettes     Start date:      Last attempt to quit: 2023     Years since quittin.0    Smokeless tobacco: Never    Tobacco comments:     Began smoking at age 13.  Smoked 1 ppd for 28 years and 2 ppd for 30 years for an 88 pack year history.     SMOKES 5 OR LESS CIGARETTES A DAY   Substance Use Topics    Alcohol use: Not Currently     Alcohol/week: 7.0 standard drinks of alcohol     Types: 7 Shots of liquor per week     Comment: hx of daily      Family History:  Family History   Problem Relation Age of Onset    Cancer Mother         breast    Heart disease Mother     Heart disease Father     Cancer Maternal Aunt     Heart disease Paternal Uncle     Malig Hyperthermia Neg Hx           Allergies:  No Known Allergies  Scheduled Meds:  aspirin, 81 mg, Daily  atorvastatin, 80 mg, Nightly  budesonide-formoterol, 2 puff, BID - RT   And  tiotropium bromide monohydrate, 2 puff, Daily - RT  carvedilol, 3.125 mg, BID With Meals  clopidogrel, 75 mg, Daily  empagliflozin, 10 mg, Daily  ferrous sulfate, 325 mg, Daily With Breakfast  furosemide, 40 mg, Daily  pantoprazole, 40 mg, Daily  sertraline, 50 mg, Daily  sodium chloride, 10 mL, Q12H  spironolactone, 25 mg, Daily            INTAKE AND OUTPUT:    Intake/Output Summary (Last 24 hours) at 1/10/2024 1352  Last data filed at 1/10/2024 1246  Gross per 24 hour   Intake 240 ml   Output --   Net 240 ml        Review of Systems:   GI: no n/v or abd pain  Cardiac: no chest pain or palpitations  Pulmonary: no shortness of breath or cough      Constitutional:  Temp:  [97.7 °F (36.5 °C)-99.3 °F (37.4 °C)] 97.7 °F (36.5 °C)  Heart Rate:  [70-77] 70  Resp:  [20] 20  BP: ()/(56-76) 113/76    Physical Exam:  General:  Appears in no acute distress  Eyes: eom normal no conjunctival drainage  HEENT:  No JVD. Thyroid not visibly enlarged. No mucosal pallor or cyanosis  Respiratory: Respirations regular and unlabored at rest. BBS with good air entry in all fields. + wheezes auscultated  Cardiovascular: S1S2 Regular rate and rhythm. No murmur, rub or gallop. No carotid bruits. DP/PT pulses   2+  . No pretibial pitting edema  Gastrointestinal: Abdomen soft, non tender. Bowel sounds present.   Musculoskeletal: OBREGON x4. No abnormal movements  Neuro: AAO x3 CN II-XII grossly intact  Psych: Mood and affect normal, pleasant and cooperative          Cardiographics    ECG:     Echocardiogram:   Interpretation Summary         Left ventricular ejection fraction appears to be 31 - 35%.    The left ventricular cavity is moderately dilated.    Left ventricular diastolic function was indeterminate.    The right ventricular cavity is mildly dilated.    The left atrial cavity is mildly dilated.    Peak velocity of the flow distal to the aortic valve is 173 cm/s. Aortic valve maximum pressure gradient is 12 mmHg. Aortic valve mean pressure gradient is 5 mmHg. Aortic valve dimensionless index is 0.6 .    Estimated right ventricular systolic pressure from tricuspid regurgitation is mildly elevated (35-45 mmHg). Calculated right ventricular systolic pressure from tricuspid regurgitation is 35 mmHg.     Lab Review       Results from last 7 days   Lab Units 01/09/24  2313   MAGNESIUM mg/dL 1.9     Results from last 7 days   Lab Units 01/10/24  0343   SODIUM mmol/L 134*   POTASSIUM mmol/L 4.3   BUN mg/dL 22   CREATININE mg/dL 1.32*   CALCIUM mg/dL  "8.5*     @[id number]@  Results from last 7 days   Lab Units 01/10/24  0343 01/09/24  2313   WBC 10*3/mm3 3.47 3.86   HEMOGLOBIN g/dL 7.3* 7.4*   HEMATOCRIT % 23.3* 23.3*   PLATELETS 10*3/mm3 320 315     Results from last 7 days   Lab Units 01/09/24  2313   INR  1.20*   APTT seconds 35.1         Assessment:  Coronary artery disease  Chest pressure  Myelodysplastic syndrome  Hypertension  Hyperlipidemia  COPD  Cardiomyopathy        Plan:  BP and heart rate stable  Resume home meds  Consult IM  Appreciate oncology recs, PRBC transfusion today, and following  Will give Lasix 40 mg IV after transfusion. BMP and CBC in am  Echo with a EF 30%, plan for left heart catheterization tentatively Friday as hgb too low to complete today.   Wheezing-home duoneb ordered-rn will notify rt, check cxr      )1/10/2024  WOO [place]      EMR [place]/Transcription:   \"Dictated utilizing Dragon dictation\".     "

## 2024-01-10 NOTE — THERAPY EVALUATION
Patient Name: Yves Hastings  : 1950    MRN: [id number]                              Today's Date: 1/10/2024       Admit Date: 2024    Visit Dx: No diagnosis found.  Patient Active Problem List   Diagnosis    Chronic obstructive lung disease    Essential hypertension    Hyperlipidemia    Pulmonary emphysema    Ventral hernia without obstruction or gangrene    Tobacco abuse    Class 1 obesity due to excess calories without serious comorbidity with body mass index (BMI) of 30.0 to 30.9 in adult    Precordial pain    Encounter for screening for malignant neoplasm of colon    Perirectal abscess    Atherosclerosis of native coronary artery of native heart without angina pectoris    Abnormal electrocardiogram    Abnormal cardiac function test    Chest pain     Past Medical History:   Diagnosis Date    COPD (chronic obstructive pulmonary disease)     Emphysema of lung     HLD (hyperlipidemia)     [place] (hard of hearing)     ears hearing aids    HTN (hypertension)     Myelodysplastic syndrome, unspecified 2023    Myocardial infarction     Short-term memory loss     Sleep apnea     no machine    Stroke     Ventral hernia     sched repair     Past Surgical History:   Procedure Laterality Date    CARDIAC CATHETERIZATION N/A 2018    Procedure: Left Heart Cath;  Surgeon: Mika Carranza MD;  Location:  [facility] CATH INVASIVE LOCATION;  Service: Cardiology    CARDIAC CATHETERIZATION N/A 2018    Procedure: Left ventriculography;  Surgeon: Mika Carranza MD;  Location:  [facility] CATH INVASIVE LOCATION;  Service: Cardiology    CARDIAC CATHETERIZATION N/A 2018    Procedure: Coronary angiography;  Surgeon: Mika Carranza MD;  Location:  [facility] CATH INVASIVE LOCATION;  Service: Cardiology    CARDIAC CATHETERIZATION  2018    Procedure: Functional Flow [place];  Surgeon: Mika Carranza MD;  Location:  [facility] CATH INVASIVE LOCATION;  Service: Cardiology    COLONOSCOPY W/  POLYPECTOMY N/A 11/10/2021    Procedure: COLONOSCOPY; POLYPECTOMY;  Surgeon: Alexander Dobbs MD;  Location: [facility] OR;  Service: Gastroenterology;  Laterality: N/A;  DIVERTICULOSIS  POLYP    CORONARY STENT PLACEMENT      CYST REMOVAL      tailbone    INCISION AND DRAINAGE PERIRECTAL ABSCESS N/A 05/29/2021    Procedure: INCISION AND DRAINAGE OF PERIRECTAL ABSCESS;  Surgeon: Reddy Johnson Jr., MD;  Location: [facility] MAIN OR;  Service: General;  Laterality: N/A;    UMBILICAL HERNIA REPAIR      DR. CASEY - YEARS AGO    VEIN LIGATION AND STRIPPING BILATERAL      VENTRAL/INCISIONAL HERNIA REPAIR N/A 02/04/2019    Procedure: VENTRAL/INCISIONAL HERNIA REPAIR LAPAROSCOPIC;  Surgeon: Adelfo Nieves MD;  Location: [facility] OR;  Service: General      General Information       Row Name 01/10/24 1602          Physical Therapy Time and Intention    Document Type evaluation  -PC     Mode of Treatment physical therapy  -PC       Row Name 01/10/24 1602          General Information    Patient Profile Reviewed yes  -PC     Barriers to Rehab hearing deficit  -PC       Row Name 01/10/24 1602          Living Environment    People in Home spouse  -PC       Row Name 01/10/24 1602          Home Main Entrance    Number of Stairs, Main Entrance one  -PC       Row Name 01/10/24 1602          Stairs Within Home, Primary    Number of Stairs, Within Home, Primary none  -PC       Row Name 01/10/24 1602          Cognition    Orientation Status (Cognition) oriented x 4  -PC       Row Name 01/10/24 1602          Safety Issues, Functional Mobility    Impairments Affecting Function (Mobility) endurance/activity tolerance  -PC               User Key  (r) = Recorded By, (t) = Taken By, (c) = Cosigned By      Initials Name Provider Type    PC Aydee Trammell, PT Physical Therapist                   Mobility       Row Name 01/10/24 1602          Bed Mobility    Bed Mobility bed mobility (all) activities  -PC     All Activities, [place]  (Bed Mobility) independent  -PC       Row Name 01/10/24 1602          Sit-Stand Transfer    Sit-Stand Clinch (Transfers) independent  -PC       Row Name 01/10/24 1602          Gait/Stairs (Locomotion)    Clinch Level (Gait) independent  -PC     Distance in Feet (Gait) 200 ft with 2 seated rest breaks  -PC     Comment, (Gait/Stairs) no loss of balance, pt fatigues and needs rest breaks due to fatigue, SOA  -PC               User Key  (r) = Recorded By, (t) = Taken By, (c) = Cosigned By      Initials Name Provider Type    PC Aydee Trammell, PT Physical Therapist                   Obj/Interventions       Row Name 01/10/24 1604          Range of Motion Comprehensive    General Range of Motion no range of motion deficits identified  -       Row Name 01/10/24 1604          Strength Comprehensive (MMT)    Comment, General Manual Muscle Testing (MMT) Assessment no focal deficits, general weakness, fatigue  -PC       Row Name 01/10/24 1604          Balance    Comment, Balance WNL  -PC               User Key  (r) = Recorded By, (t) = Taken By, (c) = Cosigned By      Initials Name Provider Type    PC Aydee Trammell, PT Physical Therapist                   Goals/Plan    No documentation.                  Clinical Impression       Row Name 01/10/24 1604          Pain    Pretreatment Pain Rating 0/10 - no pain  -       Row Name 01/10/24 1604          Plan of Care Review    Plan of Care Reviewed With patient;spouse;daughter  -     Outcome Evaluation Pt adm from MD office with fatigue, general weakness, chest pressure, cardiac work up ongoing, pt with multiple medical issues including myelodysplastic syndrome and is currently undergoing chemo, hgb low and will be getting blood later today, cardiac cath planned for Friday. Pt is independent with mobility, he was able to walk in the matthews independently, needed some rest breaks due to fatigue, no indication for PT intervention as pt is independent, rec pt take  frequent short walks with family/nursing staff, PT can be reordered if needed  -PC       Row Name 01/10/24 1604          Therapy Assessment/Plan (PT)    Criteria for Skilled Interventions Met (PT) no problems identified which require skilled intervention  -PC     Therapy Frequency (PT) evaluation only  -PC       Row Name 01/10/24 1604          Positioning and Restraints    Pre-Treatment Position in bed  -PC     Post Treatment Position bed  -PC     In Bed supine;call light within reach;encouraged to call for assist;with family/caregiver  -PC               User Key  (r) = Recorded By, (t) = Taken By, (c) = Cosigned By      Initials Name Provider Type    PC Aydee Trammell, PT Physical Therapist                   Outcome Measures       Row Name 01/10/24 1618 01/10/24 0809       How much help from another person do you currently need...    Turning from your back to your side while in flat bed without using bedrails? 4  -PC 4  -PCA    Moving from lying on back to sitting on the side of a flat bed without bedrails? 4  -PC 3  -PCA    Moving to and from a bed to a chair (including a wheelchair)? 4  -PC 3  -PCA    Standing up from a chair using your arms (e.g., wheelchair, bedside chair)? 4  -PC 3  -PCA    Climbing 3-5 steps with a railing? 4  -PC 2  -PCA    To walk in hospital room? 4  -PC 2  -PCA    AM-PAC 6 Clicks Score (PT) 24  -PC 17  -PCA    Highest Level of Mobility Goal 8 --> Walked 250 feet or more  -PC 5 --> Static standing  -PCA              User Key  (r) = Recorded By, (t) = Taken By, (c) = Cosigned By      Initials Name Provider Type    Aydee Melissa, PT Physical Therapist    Iftikhar Carlos, RN Registered Nurse                                 Physical Therapy Education       Title: PT OT SLP Therapies (Done)       Topic: Physical Therapy (Done)       Point: Mobility training (Done)       Learning Progress Summary             Patient Acceptance, E,D, DU by PC at 1/10/2024 1618   Family Acceptance, E,D,  DU by PC at 1/10/2024 1618                                         User Key       Initials Effective Dates Name Provider Type Discipline    PC 06/16/21 -  Aydee Trammell, PT Physical Therapist PT                  PT Recommendation and Plan     Plan of Care Reviewed With: patient, spouse, daughter  Outcome Evaluation: Pt adm from MD office with fatigue, general weakness, chest pressure, cardiac work up ongoing, pt with multiple medical issues including myelodysplastic syndrome and is currently undergoing chemo, hgb low and will be getting blood later today, cardiac cath planned for Friday. Pt is independent with mobility, he was able to walk in the matthews independently, needed some rest breaks due to fatigue, no indication for PT intervention as pt is independent, rec pt take frequent short walks with family/nursing staff, PT can be reordered if needed     Time Calculation:         PT Charges       Row Name 01/10/24 1619             Time Calculation    Start Time 1522  -PC      Stop Time 1533  -PC      Time Calculation (min) 11 min  -PC      PT Received On 01/10/24  -PC                User Key  (r) = Recorded By, (t) = Taken By, (c) = Cosigned By      Initials Name Provider Type    PC Aydee Trammell, PT Physical Therapist                  Therapy Charges for Today       Code Description Service Date Service Provider Modifiers Qty    19638738807 HC PT EVAL MOD COMPLEXITY 2 1/10/2024 Aydee Trammell PT GP 1            PT G-Codes  AM-PAC 6 Clicks Score (PT): 24  PT Discharge Summary  Anticipated Discharge Disposition (PT): home with assist    Aydee Trammell PT  1/10/2024

## 2024-01-10 NOTE — H&P
REFERRED BY DR. AMANDA, WOULD LIKE ANOTHER ECHO DONE TO CHECK HEART FUNCTION   Subjective:        Yves Hastings is a 73 y.o. male who here for follow up    CC  VEAK TIRED  PRESSURE IN CHEST  HPI  73-year-old male with coronary artery disease had a stress test done in October which was highly abnormal, has been complaining of the tightness and pressure sensations in the chest off-and-on     Problems Addressed this Visit    None  Diagnoses    None.       .    The following portions of the patient's history were reviewed and updated as appropriate: allergies, current medications, past family history, past medical history, past social history, past surgical history and problem list.    Past Medical History:   Diagnosis Date    COPD (chronic obstructive pulmonary disease)     Emphysema of lung     HLD (hyperlipidemia)     [place] (hard of hearing)     ears hearing aids    HTN (hypertension)     Myelodysplastic syndrome, unspecified 11/06/2023    Myocardial infarction     Short-term memory loss     Sleep apnea     no machine    Stroke     Ventral hernia     sched repair     reports that he has been smoking cigarettes. He started smoking about 61 years ago. He has a 15.00 pack-year smoking history. He has never used smokeless tobacco. He reports that he does not currently use alcohol after a past usage of about 7.0 standard drinks of alcohol per week. He reports that he does not currently use drugs after having used the following drugs: Marijuana.   Family History   Problem Relation Age of Onset    Cancer Mother         breast    Heart disease Mother     Heart disease Father     Cancer Maternal Aunt     Heart disease Paternal Uncle     Malig Hyperthermia Neg Hx        Review of Systems  Constitutional: No wt loss, fever, fatigue  Gastrointestinal: No nausea, abdominal pain  Behavioral/Psych: No insomnia or anxiety   Cardiovascular chest pain and tightness in the chest  Objective:       Physical Exam  /76   Pulse 70   " Temp 99.1 °F (37.3 °C) (Oral)   Resp 20   Ht 180 cm (70.87\")   Wt 94 kg (207 lb 3.7 oz)   SpO2 94%   BMI 29.01 kg/m²   General appearance: No acute changes   Neck: Trachea midline; NECK, supple, no thyromegaly or lymphadenopathy   Lungs: Normal size and shape, normal breath sounds, equal distribution of air, no rales and rhonchi   CV: S1-S2 regular, no murmurs, no rub, no gallop   Abdomen: Soft, nontender; no masses , no abnormal abdominal sounds   Extremities: No deformity , normal color , no peripheral edema   Skin: Normal temperature, turgor and texture; no rash, ulcers          Procedures      Echocardiogram:    Results for orders placed in visit on 12/11/18    Adult Transthoracic Echo Complete W/ Cont if Necessary Per Protocol    Interpretation Summary  · Left atrial cavity size is mildly dilated.  · Calculated EF = 71.0%  · There is no evidence of pericardial effusion.          Current Facility-Administered Medications:     atorvastatin (LIPITOR) tablet 80 mg, 80 mg, Oral, Nightly, Dat Es L, APRN, 80 mg at 01/10/24 0017    carvedilol (COREG) tablet 3.125 mg, 3.125 mg, Oral, BID With Meals, Peevey, Es L, APRN, 3.125 mg at 01/10/24 0018    nitroglycerin (NITROSTAT) SL tablet 0.4 mg, 0.4 mg, Sublingual, Q5 Min PRN, Peevey, Es L, APRN    sodium chloride 0.9 % flush 10 mL, 10 mL, Intravenous, Q12H, Peevey Es L, APRN, 10 mL at 01/10/24 0827    sodium chloride 0.9 % flush 10 mL, 10 mL, Intravenous, PRN, Peevey, Es L, APRN    sodium chloride 0.9 % infusion 40 mL, 40 mL, Intravenous, PRN, Peevey, Es L, APRN   Assessment:                Plan:        No diagnosis found.    1. Atherosclerosis of native coronary artery of native heart without angina pectoris  Considering his significant increase in the symptoms patient will need a diagnostic heart catheterization it will be high risk will get the clearance from the renal as well as heme-onc    Procedure, risks and options of cardiac cath " explained to pt INCLUDING BUT NOT LIMITED TO MI, STROKE, DEATH, INFECTION HAEMORRHAGE, . Pt understands well and agrees with no further questions.      2. Essential hypertension  Continue current treatment    3. Abnormal electrocardiogram  Needs further evaluation    4. Abnormal cardiac function test  Needs further evaluation      ADMIT  CATH  COUNSELING:    Yves Briones was given to patient for the following topics: diagnostic results, risk factor reductions, impressions, risks and benefits of treatment options and importance of treatment compliance .       SMOKING COUNSELING:        Dictated using Dragon dictation

## 2024-01-10 NOTE — PLAN OF CARE
Goal Outcome Evaluation:  Plan of Care Reviewed With: patient, spouse        Progress: no change  Outcome Evaluation: Pt admitted following F/U office visit with c/o weakness, fatigue, intermittent chest pressure. Pt had Abnormal cardiac functions test, subsequently admitted for high risk cardiac catheterization on 1/10/24. Pt currently NPO. Wife at bedside, will spend night. No acute complaints at this time. Atrial paced on monitor, labs collected, results reviewed. Will continue to monitor

## 2024-01-11 PROBLEM — I42.0 CARDIOMYOPATHY, DILATED: Status: ACTIVE | Noted: 2024-01-11

## 2024-01-11 LAB
ALBUMIN SERPL-MCNC: 3.6 G/DL (ref 3.5–5.2)
ALBUMIN/GLOB SERPL: 1.2 G/DL
ALP SERPL-CCNC: 102 U/L (ref 39–117)
ALT SERPL W P-5'-P-CCNC: 12 U/L (ref 1–41)
ANION GAP SERPL CALCULATED.3IONS-SCNC: 12 MMOL/L (ref 5–15)
ANISOCYTOSIS BLD QL: ABNORMAL
AST SERPL-CCNC: 15 U/L (ref 1–40)
BASOPHILS # BLD MANUAL: 0.11 10*3/MM3 (ref 0–0.2)
BASOPHILS NFR BLD MANUAL: 3 % (ref 0–1.5)
BH BB BLOOD EXPIRATION DATE: NORMAL
BH BB BLOOD TYPE BARCODE: 5100
BH BB DISPENSE STATUS: NORMAL
BH BB PRODUCT CODE: NORMAL
BH BB UNIT NUMBER: NORMAL
BILIRUB SERPL-MCNC: 0.3 MG/DL (ref 0–1.2)
BILIRUB UR QL STRIP: NEGATIVE
BUN SERPL-MCNC: 24 MG/DL (ref 8–23)
BUN/CREAT SERPL: 16.2 (ref 7–25)
CALCIUM SPEC-SCNC: 8.8 MG/DL (ref 8.6–10.5)
CHLORIDE SERPL-SCNC: 102 MMOL/L (ref 98–107)
CHOLEST SERPL-MCNC: 81 MG/DL (ref 0–200)
CLARITY UR: CLEAR
CLUMPED PLATELETS: PRESENT
CO2 SERPL-SCNC: 22 MMOL/L (ref 22–29)
COLOR UR: YELLOW
CREAT SERPL-MCNC: 1.48 MG/DL (ref 0.76–1.27)
CREAT UR-MCNC: 71.1 MG/DL
CROSSMATCH INTERPRETATION: NORMAL
DEPRECATED RDW RBC AUTO: 70.9 FL (ref 37–54)
EGFRCR SERPLBLD CKD-EPI 2021: 50 ML/MIN/1.73
EOSINOPHIL # BLD MANUAL: 0.04 10*3/MM3 (ref 0–0.4)
EOSINOPHIL NFR BLD MANUAL: 1 % (ref 0.3–6.2)
ERYTHROCYTE [DISTWIDTH] IN BLOOD BY AUTOMATED COUNT: 19.6 % (ref 12.3–15.4)
GLOBULIN UR ELPH-MCNC: 3.1 GM/DL
GLUCOSE SERPL-MCNC: 104 MG/DL (ref 65–99)
GLUCOSE UR STRIP-MCNC: ABNORMAL MG/DL
HBA1C MFR BLD: 6 % (ref 4.8–5.6)
HCT VFR BLD AUTO: 26.4 % (ref 37.5–51)
HDLC SERPL-MCNC: 23 MG/DL (ref 40–60)
HGB BLD-MCNC: 8.6 G/DL (ref 13–17.7)
HGB UR QL STRIP.AUTO: NEGATIVE
KETONES UR QL STRIP: NEGATIVE
LDLC SERPL CALC-MCNC: 40 MG/DL (ref 0–100)
LDLC/HDLC SERPL: 1.75 {RATIO}
LEUKOCYTE ESTERASE UR QL STRIP.AUTO: NEGATIVE
LYMPHOCYTES # BLD MANUAL: 1.13 10*3/MM3 (ref 0.7–3.1)
LYMPHOCYTES NFR BLD MANUAL: 4 % (ref 5–12)
MACROCYTES BLD QL SMEAR: ABNORMAL
MCH RBC QN AUTO: 32.8 PG (ref 26.6–33)
MCHC RBC AUTO-ENTMCNC: 32.6 G/DL (ref 31.5–35.7)
MCV RBC AUTO: 100.8 FL (ref 79–97)
MONOCYTES # BLD: 0.15 10*3/MM3 (ref 0.1–0.9)
NEUTROPHILS # BLD AUTO: 2.34 10*3/MM3 (ref 1.7–7)
NEUTROPHILS NFR BLD MANUAL: 62 % (ref 42.7–76)
NITRITE UR QL STRIP: NEGATIVE
NRBC BLD AUTO-RTO: 0.3 /100 WBC (ref 0–0.2)
PH UR STRIP.AUTO: 5.5 [PH] (ref 5–8)
PLATELET # BLD AUTO: 319 10*3/MM3 (ref 140–450)
PMV BLD AUTO: 10.3 FL (ref 6–12)
POTASSIUM SERPL-SCNC: 4.1 MMOL/L (ref 3.5–5.2)
PROT ?TM UR-MCNC: 10.5 MG/DL
PROT SERPL-MCNC: 6.7 G/DL (ref 6–8.5)
PROT UR QL STRIP: NEGATIVE
RBC # BLD AUTO: 2.62 10*6/MM3 (ref 4.14–5.8)
SODIUM SERPL-SCNC: 136 MMOL/L (ref 136–145)
SODIUM UR-SCNC: 44 MMOL/L
SP GR UR STRIP: 1.02 (ref 1–1.03)
TRIGL SERPL-MCNC: 89 MG/DL (ref 0–150)
TSH SERPL DL<=0.05 MIU/L-ACNC: 1.71 UIU/ML (ref 0.27–4.2)
UNIT  ABO: NORMAL
UNIT  RH: NORMAL
UROBILINOGEN UR QL STRIP: ABNORMAL
VARIANT LYMPHS NFR BLD MANUAL: 30 % (ref 19.6–45.3)
VLDLC SERPL-MCNC: 18 MG/DL (ref 5–40)
WBC MORPH BLD: NORMAL
WBC NRBC COR # BLD AUTO: 3.78 10*3/MM3 (ref 3.4–10.8)

## 2024-01-11 PROCEDURE — 84300 ASSAY OF URINE SODIUM: CPT | Performed by: INTERNAL MEDICINE

## 2024-01-11 PROCEDURE — 84443 ASSAY THYROID STIM HORMONE: CPT | Performed by: HOSPITALIST

## 2024-01-11 PROCEDURE — 82570 ASSAY OF URINE CREATININE: CPT | Performed by: INTERNAL MEDICINE

## 2024-01-11 PROCEDURE — 94799 UNLISTED PULMONARY SVC/PX: CPT

## 2024-01-11 PROCEDURE — 83036 HEMOGLOBIN GLYCOSYLATED A1C: CPT | Performed by: HOSPITALIST

## 2024-01-11 PROCEDURE — 84156 ASSAY OF PROTEIN URINE: CPT | Performed by: INTERNAL MEDICINE

## 2024-01-11 PROCEDURE — 85025 COMPLETE CBC W/AUTO DIFF WBC: CPT

## 2024-01-11 PROCEDURE — 99214 OFFICE O/P EST MOD 30 MIN: CPT

## 2024-01-11 PROCEDURE — G0378 HOSPITAL OBSERVATION PER HR: HCPCS

## 2024-01-11 PROCEDURE — 85007 BL SMEAR W/DIFF WBC COUNT: CPT

## 2024-01-11 PROCEDURE — 80061 LIPID PANEL: CPT | Performed by: HOSPITALIST

## 2024-01-11 PROCEDURE — 25810000003 SODIUM CHLORIDE 0.9 % SOLUTION: Performed by: INTERNAL MEDICINE

## 2024-01-11 PROCEDURE — 99232 SBSQ HOSP IP/OBS MODERATE 35: CPT | Performed by: INTERNAL MEDICINE

## 2024-01-11 PROCEDURE — 94664 DEMO&/EVAL PT USE INHALER: CPT

## 2024-01-11 PROCEDURE — 81003 URINALYSIS AUTO W/O SCOPE: CPT | Performed by: INTERNAL MEDICINE

## 2024-01-11 PROCEDURE — 94761 N-INVAS EAR/PLS OXIMETRY MLT: CPT

## 2024-01-11 PROCEDURE — 80053 COMPREHEN METABOLIC PANEL: CPT | Performed by: HOSPITALIST

## 2024-01-11 RX ORDER — UREA 10 %
3 LOTION (ML) TOPICAL NIGHTLY PRN
Status: DISCONTINUED | OUTPATIENT
Start: 2024-01-11 | End: 2024-01-15 | Stop reason: HOSPADM

## 2024-01-11 RX ORDER — FAMOTIDINE 20 MG/1
20 TABLET, FILM COATED ORAL 2 TIMES DAILY
Status: DISCONTINUED | OUTPATIENT
Start: 2024-01-11 | End: 2024-01-15 | Stop reason: HOSPADM

## 2024-01-11 RX ORDER — SODIUM CHLORIDE 9 MG/ML
100 INJECTION, SOLUTION INTRAVENOUS CONTINUOUS
Status: ACTIVE | OUTPATIENT
Start: 2024-01-11 | End: 2024-01-12

## 2024-01-11 RX ADMIN — CARVEDILOL 3.12 MG: 3.12 TABLET, FILM COATED ORAL at 18:38

## 2024-01-11 RX ADMIN — Medication 3 MG: at 21:15

## 2024-01-11 RX ADMIN — BUDESONIDE AND FORMOTEROL FUMARATE DIHYDRATE 2 PUFF: 160; 4.5 AEROSOL RESPIRATORY (INHALATION) at 20:30

## 2024-01-11 RX ADMIN — Medication 10 ML: at 20:17

## 2024-01-11 RX ADMIN — SPIRONOLACTONE 25 MG: 25 TABLET ORAL at 08:04

## 2024-01-11 RX ADMIN — FAMOTIDINE 20 MG: 20 TABLET, FILM COATED ORAL at 19:36

## 2024-01-11 RX ADMIN — CLOPIDOGREL BISULFATE 75 MG: 75 TABLET, FILM COATED ORAL at 08:04

## 2024-01-11 RX ADMIN — FERROUS SULFATE TAB 325 MG (65 MG ELEMENTAL FE) 325 MG: 325 (65 FE) TAB at 08:04

## 2024-01-11 RX ADMIN — BUDESONIDE AND FORMOTEROL FUMARATE DIHYDRATE 2 PUFF: 160; 4.5 AEROSOL RESPIRATORY (INHALATION) at 06:17

## 2024-01-11 RX ADMIN — ASPIRIN 81 MG: 81 TABLET, COATED ORAL at 08:04

## 2024-01-11 RX ADMIN — EMPAGLIFLOZIN 10 MG: 10 TABLET, FILM COATED ORAL at 08:03

## 2024-01-11 RX ADMIN — Medication 10 ML: at 09:37

## 2024-01-11 RX ADMIN — SERTRALINE 50 MG: 50 TABLET, FILM COATED ORAL at 08:04

## 2024-01-11 RX ADMIN — CARVEDILOL 3.12 MG: 3.12 TABLET, FILM COATED ORAL at 08:04

## 2024-01-11 RX ADMIN — FUROSEMIDE 40 MG: 40 TABLET ORAL at 10:21

## 2024-01-11 RX ADMIN — SODIUM CHLORIDE 100 ML/HR: 9 INJECTION, SOLUTION INTRAVENOUS at 23:42

## 2024-01-11 RX ADMIN — PANTOPRAZOLE SODIUM 40 MG: 40 TABLET, DELAYED RELEASE ORAL at 06:43

## 2024-01-11 RX ADMIN — ATORVASTATIN CALCIUM 10 MG: 20 TABLET, FILM COATED ORAL at 19:36

## 2024-01-11 RX ADMIN — TIOTROPIUM BROMIDE INHALATION SPRAY 2 PUFF: 3.12 SPRAY, METERED RESPIRATORY (INHALATION) at 06:18

## 2024-01-11 NOTE — CASE MANAGEMENT/SOCIAL WORK
Discharge Planning Assessment  [facility]     Patient Name: Yves Hastings  MRN: [id number]  Today's Date: 1/11/2024    Admit Date: 1/9/2024    Plan: Home w/ wife; Denies needs   Discharge Needs Assessment       Row Name 01/11/24 1602       Living Environment    People in Home spouse    Name(s) of People in Home Sonia Hastings/spouse    Current Living Arrangements home    Potentially Unsafe Housing Conditions none    In the past 12 months has the electric, gas, oil, or water company threatened to shut off services in your home? No    Primary Care Provided by self    Provides Primary Care For no one    Family Caregiver if Needed spouse    Family Caregiver Names Sonia    Quality of Family Relationships helpful;involved;supportive    Able to Return to Prior Arrangements yes       Resource/Environmental Concerns    Resource/Environmental Concerns none    Transportation Concerns none       Transportation Needs    In the past 12 months, has lack of transportation kept you from medical appointments or from getting medications? no    In the past 12 months, has lack of transportation kept you from meetings, work, or from getting things needed for daily living? No       Food Insecurity    Within the past 12 months, you worried that your food would run out before you got the money to buy more. Never true    Within the past 12 months, the food you bought just didn't last and you didn't have money to get more. Never true       Transition Planning    Patient/Family Anticipates Transition to home with family    Patient/Family Anticipated Services at Transition none    Transportation Anticipated family or friend will provide       Discharge Needs Assessment    Readmission Within the Last 30 Days no previous admission in last 30 days    Equipment Currently Used at Home nebulizer;bp cuff;scales    Concerns to be Addressed no discharge needs identified;denies needs/concerns at this time    Anticipated Changes Related to Illness none     Equipment Needed After Discharge none    Provided Post Acute Provider List? Refused    Refused Provider List Comment Pt declined need for list.                   Discharge Plan       Row Name 01/11/24 1603       Plan    Plan Home w/ wife; Denies needs    Plan Comments CCP spoke with Pt, spouse/Sonia Hastings and daughter/Doreen López, at bedside.  CCP role explained and discharge planning discussed.  Face sheet verified.  Pt stated he is IADL's, retired and drives.  Pt lives in a first-floor apartment in [place], [place].  Pt reports PCP is, Brenda Downs.  Pt confirmed pharmacy is, [facility] in [place].  Pt denies use of past home health or going to a sub-acute rehab.  Pt has the following DME- nebulizer, BP cuff monitor and scale.  CCP had received a consult about Pt needing DME.  CCP spoke with Pt about this and he voiced he does not need any DME.  Pt plans to return home at discharge.  Pt denies current discharge needs.  CCP will continue to follow…….Anastasiia MAR /.                  Continued Care and Services - Admitted Since 1/9/2024    Coordination has not been started for this encounter.       Expected Discharge Date and Time       Expected Discharge Date Expected Discharge Time    Jan 13, 2024            Demographic Summary       Row Name 01/11/24 1601       General Information    Admission Type observation    Arrived From home    Required Notices Provided Observation Status Notice    Referral Source admission list    Reason for Consult discharge planning    Preferred Language English       Contact Information    Permission Granted to Share Info With family/designee                   Functional Status       Row Name 01/11/24 1601       Functional Status    Usual Activity Tolerance moderate    Current Activity Tolerance moderate       Assessment of Health Literacy    How often do you have someone help you read hospital materials? Occasionally    Health Literacy Good       Functional Status, IADL     Medications independent    Meal Preparation assistive person    Housekeeping assistive person    Laundry assistive person    Shopping assistive person       Mental Status    General Appearance WDL WDL       Mental Status Summary    Recent Changes in Mental Status/Cognitive Functioning no changes       Employment/    Employment Status retired                   Psychosocial    No documentation.                  Abuse/Neglect    No documentation.                  Legal    No documentation.                  Substance Abuse    No documentation.                  Patient Forms    No documentation.                     Anastasiia Bangura RN

## 2024-01-11 NOTE — CONSULTS
[facility]                                                                                             Nephrology Initial Consult Note    Patient Identification:  Name: Yves Hastings MRN: [id number]  Age: 73 y.o. : 1950  Sex: male  Date:2024    Requesting Physician: As per consult order.  Reason for Consultation: ASHWIN/CKD, need for IV contrast  Information from:patient/ family/ chart      History of Present Illness: This is a 73 y.o. year old male with no prior known history of chronic kidney disease.  Baseline creatinine looks to be around 1.1, no prior nephrology evaluation.  He has a history of coronary artery disease with ischemic cardiomyopathy.  Medical history otherwise significant for COPD, hyperlipidemia hypertension and myelodysplastic syndrome on chemotherapy.  Chronic tobacco use.  He presented to the hospital initially with severe dyspnea on .  He was treated with diuretics with improvement in symptoms, decreased edema improved dyspnea.  He is currently on room air.  BP has been marginal but no brandi hypotension.  Chest x-ray on the  showed no consolidation slight vascular prominence borderline heart size.  Echocardiogram showed EF 30 to 35% dilated left ventricle and indeterminate diastolic function.  While symptoms have improved his creatinine is slightly worse, 1.48 today from 1.33.  He is currently on oral Lasix and spironolactone with plans for left heart catheterization tomorrow morning.  Nephrology was consulted for management of his renal function and recommendations prior to his heart cath.      The following medical history and medications personally reviewed by me:    Problem List:     Chest pain    Cardiomyopathy, dilated      Past Medical History:  Past Medical History:   Diagnosis Date    COPD (chronic obstructive pulmonary disease)     Emphysema of lung     HLD  (hyperlipidemia)     [place] (hard of hearing)     ears hearing aids    HTN (hypertension)     Myelodysplastic syndrome, unspecified 11/06/2023    Myocardial infarction     Short-term memory loss     Sleep apnea     no machine    Stroke     Ventral hernia     sched repair       Past Surgical History:  Past Surgical History:   Procedure Laterality Date    CARDIAC CATHETERIZATION N/A 12/14/2018    Procedure: Left Heart Cath;  Surgeon: Mika Carranza MD;  Location: [facility] CATH INVASIVE LOCATION;  Service: Cardiology    CARDIAC CATHETERIZATION N/A 12/14/2018    Procedure: Left ventriculography;  Surgeon: Mika Carranza MD;  Location: [facility] CATH INVASIVE LOCATION;  Service: Cardiology    CARDIAC CATHETERIZATION N/A 12/14/2018    Procedure: Coronary angiography;  Surgeon: Mika Carranza MD;  Location: [facility] CATH INVASIVE LOCATION;  Service: Cardiology    CARDIAC CATHETERIZATION  12/14/2018    Procedure: Functional Flow [place];  Surgeon: Mika Carranza MD;  Location: [facility] CATH INVASIVE LOCATION;  Service: Cardiology    COLONOSCOPY W/ POLYPECTOMY N/A 11/10/2021    Procedure: COLONOSCOPY; POLYPECTOMY;  Surgeon: Alexander Dobbs MD;  Location: [facility] OR;  Service: Gastroenterology;  Laterality: N/A;  DIVERTICULOSIS  POLYP    CORONARY STENT PLACEMENT      CYST REMOVAL      tailbone    INCISION AND DRAINAGE PERIRECTAL ABSCESS N/A 05/29/2021    Procedure: INCISION AND DRAINAGE OF PERIRECTAL ABSCESS;  Surgeon: Reddy Johnson Jr., MD;  Location: [facility] OR;  Service: General;  Laterality: N/A;    UMBILICAL HERNIA REPAIR      DR. CASEY - YEARS AGO    VEIN LIGATION AND STRIPPING BILATERAL      VENTRAL/INCISIONAL HERNIA REPAIR N/A 02/04/2019    Procedure: VENTRAL/INCISIONAL HERNIA REPAIR LAPAROSCOPIC;  Surgeon: Adelfo Nieves MD;  Location: [facility] OR;  Service: General        Home Meds:   Medications Prior to Admission   Medication Sig Dispense Refill Last Dose    albuterol  (PROVENTIL HFA;VENTOLIN HFA) 108 (90 Base) MCG/ACT inhaler Inhale 2 puffs Every 4 (Four) Hours As Needed for Wheezing.   1/9/2024    aspirin 81 MG EC tablet aspirin 81 mg tablet,delayed release   TAKE 1 TABLET BY MOUTH DAILY   1/9/2024    atorvastatin (LIPITOR) 80 MG tablet Take 1 tablet by mouth Daily.   1/8/2024    carvedilol (COREG) 3.125 MG tablet Take 1 tablet by mouth 2 (Two) Times a Day With Meals.   1/9/2024    clopidogrel (PLAVIX) 75 MG tablet clopidogrel 75 mg tablet   1/9/2024    empagliflozin (Jardiance) 10 MG tablet tablet Take 1 tablet by mouth Daily.   1/9/2024    Ferrous Fumarate (Ferrocite) 324 (106 Fe) MG tablet Take 1 tablet by mouth Every Other Day.   1/9/2024    furosemide (LASIX) 40 MG tablet Take 1 tablet by mouth Daily. 30 tablet 11 1/9/2024    potassium chloride (K-DUR,KLOR-CON) 20 MEQ CR tablet potassium chloride ER 20 mEq tablet,extended release   TAKE 1 TABLET BY MOUTH EVERY DAY   1/9/2024    sertraline (ZOLOFT) 50 MG tablet Take 1 tablet by mouth Daily.   1/9/2024    spironolactone (ALDACTONE) 25 MG tablet Take 1 tablet by mouth Daily.   1/9/2024    Trelegy Ellipta 100-62.5-25 MCG/ACT inhaler    1/9/2024    amiodarone (PACERONE) 200 MG tablet Take 1 tablet by mouth Daily. (Patient not taking: Reported on 1/9/2024)   Not Taking    azaCITIDine in sterile water (preservative free) Inject  under the skin into the appropriate area as directed 1 (One) Time. (Patient not taking: Reported on 1/9/2024)   Not Taking    cyanocobalamin (VITAMIN B-12) 1000 MCG tablet Take 1 tablet by mouth Daily. (Patient not taking: Reported on 1/9/2024)   Not Taking    hydrOXYzine (ATARAX) 25 MG tablet Take 1 tablet by mouth 3 (Three) Times a Day As Needed for Itching. (Patient not taking: Reported on 1/9/2024)   Not Taking    ipratropium-albuterol (DUO-NEB) 0.5-2.5 mg/3 ml nebulizer ipratropium 0.5 mg-albuterol 3 mg (2.5 mg base)/3 mL nebulization soln       pantoprazole (PROTONIX) 40 MG EC tablet Take 1 tablet by  mouth Daily.       potassium chloride ER (K-TAB) 20 MEQ tablet controlled-release ER tablet Take 1 tablet by mouth Daily.       rOPINIRole (REQUIP) 0.25 MG tablet Take 1 tablet by mouth Every Night. Take 1 hour before bedtime. (Patient not taking: Reported on 1/9/2024)   Not Taking       Current Meds:   Current Facility-Administered Medications   Medication Dose Route Frequency Provider Last Rate Last Admin    albuterol (PROVENTIL) nebulizer solution 0.083% 2.5 mg/3mL  2.5 mg Nebulization Q6H PRN Es Daugherty APRN        aspirin EC tablet 81 mg  81 mg Oral Daily Es Daugherty APRN   81 mg at 01/11/24 0804    atorvastatin (LIPITOR) tablet 10 mg  10 mg Oral Nightly Bebeto Aguilar MD   10 mg at 01/10/24 2116    budesonide-formoterol (SYMBICORT) 160-4.5 MCG/ACT inhaler 2 puff  2 puff Inhalation BID - RT Es Daugherty APRN   2 puff at 01/11/24 0617    And    tiotropium (SPIRIVA RESPIMAT) 2.5 mcg/act aerosol solution inhaler  2 puff Inhalation Daily - RT Es Daugherty APRN   2 puff at 01/11/24 0618    carvedilol (COREG) tablet 3.125 mg  3.125 mg Oral BID With Meals Es Daugherty APRN   3.125 mg at 01/11/24 0804    clopidogrel (PLAVIX) tablet 75 mg  75 mg Oral Daily Es Daugherty APRN   75 mg at 01/11/24 0804    empagliflozin (JARDIANCE) tablet 10 mg  10 mg Oral Daily Es Daugherty APRN   10 mg at 01/11/24 0803    famotidine (PEPCID) tablet 20 mg  20 mg Oral BID Bebeto Aguilar MD        ferrous sulfate tablet 325 mg  325 mg Oral Daily With Breakfast Es Daugherty APRN   325 mg at 01/11/24 0804    furosemide (LASIX) tablet 40 mg  40 mg Oral Daily Es Daugherty APRN   40 mg at 01/11/24 1021    nitroglycerin (NITROSTAT) SL tablet 0.4 mg  0.4 mg Sublingual Q5 Min PRN Es Daugherty APRN        sertraline (ZOLOFT) tablet 50 mg  50 mg Oral Daily Es Daugherty APRN   50 mg at 01/11/24 0804    sodium chloride 0.9 % flush 10 mL  10 mL Intravenous Q12H Es Daugherty APRN   10 mL at  24 0937    sodium chloride 0.9 % flush 10 mL  10 mL Intravenous PRN Es Daugherty APRN        sodium chloride 0.9 % infusion 40 mL  40 mL Intravenous PRN Es Daugherty APRN        spironolactone (ALDACTONE) tablet 25 mg  25 mg Oral Daily Es Daugherty APRN   25 mg at 24 0804       Allergies:  No Known Allergies    Social History:   Social History     Socioeconomic History    Marital status:    Tobacco Use    Smoking status: Some Days     Packs/day: 0.25     Years: 60.00     Additional pack years: 0.00     Total pack years: 15.00     Types: Cigarettes     Start date:      Last attempt to quit: 2023     Years since quittin.0    Smokeless tobacco: Never    Tobacco comments:     Began smoking at age 13.  Smoked 1 ppd for 28 years and 2 ppd for 30 years for an 88 pack year history.     SMOKES 5 OR LESS CIGARETTES A DAY   Vaping Use    Vaping Use: Former   Substance and Sexual Activity    Alcohol use: Not Currently     Alcohol/week: 7.0 standard drinks of alcohol     Types: 7 Shots of liquor per week     Comment: hx of daily    Drug use: Not Currently     Types: Marijuana     Comment: history of, doesn't anymore; Reports using THC at bedtime.    Sexual activity: Defer        Family History:  Family History   Problem Relation Age of Onset    Cancer Mother         breast    Heart disease Mother     Heart disease Father     Cancer Maternal Aunt     Heart disease Paternal Uncle     Malig Hyperthermia Neg Hx         Review of Systems: as per HPI, in addition:    General:      Improved weakness / fatigue,                       No fevers / chills                       no weight loss  HEENT:       no dysphagia / odynophagia  Neck:           normal range of motion, no swelling  Respiratory: no cough / congestion                      + Shortness of air                       No wheezing  CV:              No chest pain                       No palpitations  Abdomen/GI: no nausea / vomiting     "                  No diarrhea / constipation                      No abdominal pain  :             no dysuria / urinary frequency                       No urgency, normal output  Endocrine:   no polyuria / polydipsia,                      No heat or cold intolerance  Skin:           no rashes or skin breakdown   Vascular:   No edema                     No claudication  Psych:        no depression/ anxiety  Neuro:        no focal weakness, no seizures  Musculoskeletal: no joint pain or deformities      Physical Exam:  Vitals:   Temp (24hrs), Av.4 °F (36.9 °C), Min:97.9 °F (36.6 °C), Max:99 °F (37.2 °C)    /74 (BP Location: Left arm, Patient Position: Sitting)   Pulse 75   Temp 98.3 °F (36.8 °C) (Oral)   Resp 20   Ht 180 cm (70.87\")   Wt 89.9 kg (198 lb 3.1 oz)   SpO2 98%   BMI 27.75 kg/m²   Intake/Output:     Intake/Output Summary (Last 24 hours) at 2024 1633  Last data filed at 2024 1239  Gross per 24 hour   Intake 1031 ml   Output 2000 ml   Net -969 ml        Wt Readings from Last 1 Encounters:   24 0813 89.9 kg (198 lb 3.1 oz)   01/10/24 0942 94 kg (207 lb 3.7 oz)   24 2144 94.3 kg (207 lb 14.4 oz)   24 2130 94.3 kg (207 lb 14.4 oz)       Exam:    General Appearance:  Awake, alert, oriented x3, no acute distress  Chronically ill-appearing   Head and Face:  Normocephalic, atraumatic, mucus membranes moist, oropharynx clear   Eyes:  No icterus, pupils equal round and reactive to light, extraocular movements intact    ENMT: Moist mucosa, tongue symmetric    Neck: Supple  no jugular venous distention  no thyromegaly   Pulmonary:  Respiratory effort: Normal  Auscultation of lungs: Clear bilaterally  No wheezes  No rhonchi  Good air movement, good expansion   Chest wall:  No tenderness or deformity   Cardiovascular:  Auscultation of the heart: Normal rhythm, no murmurs  No significant edema of bilateral lower extremities   Abdomen:  Abdomen: soft, non-tender, normal bowel " sounds all four quadrants, no masses   Liver and spleen: no hepatosplenomegaly   Musculoskeletal: Digits and nails: normal  Normal range of motion  No joint swelling or gross deformities    Skin: Skin inspection: color normal, no visible rashes or lesions  Skin palpation: texture, turgor normal, no palpable lesions   Lymphatic:  no cervical lymphadenopathy    Psychiatric: Judgement and insight: normal  Orientation to person place and time: normal  Mood and affect: normal       DATA:  Radiology and Labs:  The following labs independently reviewed by me, additional AM labs ordered  Old records independently reviewed showing baseline creatinine around 1.1  The following radiologic studies independently viewed by me, findings chest x-ray and echocardiogram results as detailed above  Interval notes, chart personally reviewed by me.  I have reviewed and summarized old records as detailed above  Plan of care discussed with patient and several family members at bedside.  New problems include ASHWIN      Risk/ complexity of medical care/ medical decision making: High risk, ASHWIN on CKD  Chronic illness with severe exacerbation or progression      Labs:   Recent Results (from the past 24 hour(s))   BNP    Collection Time: 01/10/24  8:14 PM    Specimen: Blood   Result Value Ref Range    proBNP 2,627.0 (H) 0.0 - 900.0 pg/mL   Comprehensive Metabolic Panel    Collection Time: 01/11/24  2:49 AM    Specimen: Blood   Result Value Ref Range    Glucose 104 (H) 65 - 99 mg/dL    BUN 24 (H) 8 - 23 mg/dL    Creatinine 1.48 (H) 0.76 - 1.27 mg/dL    Sodium 136 136 - 145 mmol/L    Potassium 4.1 3.5 - 5.2 mmol/L    Chloride 102 98 - 107 mmol/L    CO2 22.0 22.0 - 29.0 mmol/L    Calcium 8.8 8.6 - 10.5 mg/dL    Total Protein 6.7 6.0 - 8.5 g/dL    Albumin 3.6 3.5 - 5.2 g/dL    ALT (SGPT) 12 1 - 41 U/L    AST (SGOT) 15 1 - 40 U/L    Alkaline Phosphatase 102 39 - 117 U/L    Total Bilirubin 0.3 0.0 - 1.2 mg/dL    Globulin 3.1 gm/dL    A/G Ratio 1.2 g/dL     BUN/Creatinine Ratio 16.2 7.0 - 25.0    Anion Gap 12.0 5.0 - 15.0 mmol/L    eGFR 50.0 (L) >60.0 mL/min/1.73   TSH    Collection Time: 01/11/24  2:49 AM    Specimen: Blood   Result Value Ref Range    TSH 1.710 0.270 - 4.200 uIU/mL   Lipid Panel    Collection Time: 01/11/24  2:49 AM    Specimen: Blood   Result Value Ref Range    Total Cholesterol 81 0 - 200 mg/dL    Triglycerides 89 0 - 150 mg/dL    HDL Cholesterol 23 (L) 40 - 60 mg/dL    LDL Cholesterol  40 0 - 100 mg/dL    VLDL Cholesterol 18 5 - 40 mg/dL    LDL/HDL Ratio 1.75    Hemoglobin A1c    Collection Time: 01/11/24  2:49 AM    Specimen: Blood   Result Value Ref Range    Hemoglobin A1C 6.00 (H) 4.80 - 5.60 %   CBC Auto Differential    Collection Time: 01/11/24  2:49 AM    Specimen: Blood   Result Value Ref Range    WBC 3.78 3.40 - 10.80 10*3/mm3    RBC 2.62 (L) 4.14 - 5.80 10*6/mm3    Hemoglobin 8.6 (L) 13.0 - 17.7 g/dL    Hematocrit 26.4 (L) 37.5 - 51.0 %    .8 (H) 79.0 - 97.0 fL    MCH 32.8 26.6 - 33.0 pg    MCHC 32.6 31.5 - 35.7 g/dL    RDW 19.6 (H) 12.3 - 15.4 %    RDW-SD 70.9 (H) 37.0 - 54.0 fl    MPV 10.3 6.0 - 12.0 fL    Platelets 319 140 - 450 10*3/mm3    nRBC 0.3 (H) 0.0 - 0.2 /100 WBC   Manual Differential    Collection Time: 01/11/24  2:49 AM    Specimen: Blood   Result Value Ref Range    Neutrophil % 62.0 42.7 - 76.0 %    Lymphocyte % 30.0 19.6 - 45.3 %    Monocyte % 4.0 (L) 5.0 - 12.0 %    Eosinophil % 1.0 0.3 - 6.2 %    Basophil % 3.0 (H) 0.0 - 1.5 %    Neutrophils Absolute 2.34 1.70 - 7.00 10*3/mm3    Lymphocytes Absolute 1.13 0.70 - 3.10 10*3/mm3    Monocytes Absolute 0.15 0.10 - 0.90 10*3/mm3    Eosinophils Absolute 0.04 0.00 - 0.40 10*3/mm3    Basophils Absolute 0.11 0.00 - 0.20 10*3/mm3    Anisocytosis Mod/2+ None Seen    Macrocytes Mod/2+ None Seen    WBC Morphology Normal Normal    Clumped Platelets Present None Seen   Prepare RBC, 1 Units    Collection Time: 01/11/24  6:00 AM   Result Value Ref Range    Product Code O3068S45      Unit Number [id number]     UNIT  ABO O     UNIT  RH POS     Crossmatch Interpretation Compatible     Dispense Status PT     Blood Expiration Date [id number]     Blood Type Barcode 5100        Radiology:  Imaging Results (Last 24 Hours)       ** No results found for the last 24 hours. **                 ASSESSMENT:   Acute kidney injury, new this admission.  Initially may have been due to some decompensated heart failure and cardiorenal syndrome.  Currently euvolemic on exam.  Creatinine 1.48 today with prior baseline around 1.1  Stage II CKD, likely vascular disease with ongoing tobacco abuse and history of CAD  Chest pain in need of heart cath  Acute decompensated systolic heart failure.  Seems to be improved  Myelodysplasia  Anemia related to MDS    Chest pain    Cardiomyopathy, dilated        DISCUSSION/PLAN:   Renal function slightly worse since admission and above his usual baseline  He appears euvolemic today on exam  Scheduled for left heart catheterization tomorrow morning.  Okay to proceed from a renal standpoint as long as his labs are fairly stable tomorrow morning  Will hold his diuretics for now  Start gentle IV fluids tonight at midnight with plans to continue until after his heart cath tomorrow  Monitor volume status closely while on IV fluids  Check urine studies and follow-up a.m. labs  Monitor strict I's and O's    Continue to monitor electrolytes and volume closely.    I appreciate the consult request.  Please send me a secure chat message with any nonurgent questions regarding patient care.  For any urgent patient care issues please call my office number below.      Eloy Meier MD  [facility]  Office phone number: [phone]  Answering service phone number: [phone]      1/11/2024        Dictation via Dragon dictation software

## 2024-01-11 NOTE — CASE MANAGEMENT/SOCIAL WORK
Continued Stay Note  [facility]     Patient Name: Yves Hastings  MRN: [id number]  Today's Date: 1/11/2024    Admit Date: 1/9/2024    Plan: Home w/ wife; Denies needs   Discharge Plan       Row Name 01/11/24 1603       Plan    Plan Home w/ wife; Denies needs    Plan Comments CCP spoke with Pt, spouse/Sonia Hastings and daughter/Doreen López, at bedside.  CCP role explained and discharge planning discussed.  Face sheet verified.  Pt stated he is IADL's, retired and drives.  Pt lives in a first-floor apartment in [place], [place].  Pt reports PCP is, Brenda Downs.  Pt confirmed pharmacy is, [facility] in [place].  Pt denies use of past home health or going to a sub-acute rehab.  Pt has the following DME- nebulizer, BP cuff monitor and scale.  CCP had received a consult about Pt needing DME.  CCP spoke with Pt about this and he voiced he does not need any DME.  Pt plans to return home at discharge.  Pt denies current discharge needs.  CCP will continue to follow…….Anastasiia MAR /.                   Discharge Codes    No documentation.                 Expected Discharge Date and Time       Expected Discharge Date Expected Discharge Time    Jan 13, 2024               Anastasiia Bangura RN

## 2024-01-11 NOTE — PLAN OF CARE
Goal Outcome Evaluation:  Plan of Care Reviewed With: patient        Progress: improving  Outcome Evaluation: Pt completed blood transfusion, post transfusion vitals completed, labs collected, results reviewed. Hgb improved at 8.6. Pt ambulating to bathroom with staff assistance, bed alarm activated, output documented in flowsheet. No acute complaints overnight. Pt scheduled for cardiac catheterization on 1/12/24. Will continue to monitor

## 2024-01-11 NOTE — PROGRESS NOTES
[facility]  Cardiology Progress Note    Patient Identification:  Name: Yves Hastings  Age: 73 y.o.  Sex: male  :  1950  MRN: [id number]                 Follow Up / Chief Complaint: Follow up for chest pain    Interval History: Resting in bed, on room air, no chest pain or shortness of breath       Subjective: no chest pain      Objective:    Past Medical History:  Past Medical History:   Diagnosis Date    COPD (chronic obstructive pulmonary disease)     Emphysema of lung     HLD (hyperlipidemia)     [place] (hard of hearing)     ears hearing aids    HTN (hypertension)     Myelodysplastic syndrome, unspecified 2023    Myocardial infarction     Short-term memory loss     Sleep apnea     no machine    Stroke     Ventral hernia     sched repair     Past Surgical History:  Past Surgical History:   Procedure Laterality Date    CARDIAC CATHETERIZATION N/A 2018    Procedure: Left Heart Cath;  Surgeon: Mika Carranza MD;  Location: [facility] INVASIVE LOCATION;  Service: Cardiology    CARDIAC CATHETERIZATION N/A 2018    Procedure: Left ventriculography;  Surgeon: Mika Carranza MD;  Location: [facility] CATH INVASIVE LOCATION;  Service: Cardiology    CARDIAC CATHETERIZATION N/A 2018    Procedure: Coronary angiography;  Surgeon: Mika Carranza MD;  Location: [facility] CATH INVASIVE LOCATION;  Service: Cardiology    CARDIAC CATHETERIZATION  2018    Procedure: Functional Flow Ramona;  Surgeon: Mika Carranza MD;  Location: [facility] CATH INVASIVE LOCATION;  Service: Cardiology    COLONOSCOPY W/ POLYPECTOMY N/A 11/10/2021    Procedure: COLONOSCOPY; POLYPECTOMY;  Surgeon: Alexander Dobbs MD;  Location: [facility];  Service: Gastroenterology;  Laterality: N/A;  DIVERTICULOSIS  POLYP    CORONARY STENT PLACEMENT      CYST REMOVAL      tailbone    INCISION AND DRAINAGE PERIRECTAL ABSCESS N/A 2021    Procedure: INCISION AND DRAINAGE OF  PERIRECTAL ABSCESS;  Surgeon: Reddy Johnson Jr., MD;  Location: [facility] MAIN OR;  Service: General;  Laterality: N/A;    UMBILICAL HERNIA REPAIR      DR. CASEY - YEARS AGO    VEIN LIGATION AND STRIPPING BILATERAL      VENTRAL/INCISIONAL HERNIA REPAIR N/A 2019    Procedure: VENTRAL/INCISIONAL HERNIA REPAIR LAPAROSCOPIC;  Surgeon: Adelfo Nieves MD;  Location: [facility] OR;  Service: General        Social History:   Social History     Tobacco Use    Smoking status: Some Days     Packs/day: 0.25     Years: 60.00     Additional pack years: 0.00     Total pack years: 15.00     Types: Cigarettes     Start date:      Last attempt to quit: 2023     Years since quittin.0    Smokeless tobacco: Never    Tobacco comments:     Began smoking at age 13.  Smoked 1 ppd for 28 years and 2 ppd for 30 years for an 88 pack year history.     SMOKES 5 OR LESS CIGARETTES A DAY   Substance Use Topics    Alcohol use: Not Currently     Alcohol/week: 7.0 standard drinks of alcohol     Types: 7 Shots of liquor per week     Comment: hx of daily      Family History:  Family History   Problem Relation Age of Onset    Cancer Mother         breast    Heart disease Mother     Heart disease Father     Cancer Maternal Aunt     Heart disease Paternal Uncle     Malig Hyperthermia Neg Hx           Allergies:  No Known Allergies  Scheduled Meds:  aspirin, 81 mg, Daily  atorvastatin, 10 mg, Nightly  budesonide-formoterol, 2 puff, BID - RT   And  tiotropium bromide monohydrate, 2 puff, Daily - RT  carvedilol, 3.125 mg, BID With Meals  clopidogrel, 75 mg, Daily  empagliflozin, 10 mg, Daily  ferrous sulfate, 325 mg, Daily With Breakfast  furosemide, 40 mg, Daily  pantoprazole, 40 mg, BID AC  sertraline, 50 mg, Daily  sodium chloride, 10 mL, Q12H  spironolactone, 25 mg, Daily            INTAKE AND OUTPUT:    Intake/Output Summary (Last 24 hours) at 2024 1030  Last data filed at 2024 0850  Gross per 24 hour   Intake 1031 ml    Output 2100 ml   Net -1069 ml       Review of Systems:   GI: no n/v or abd pain  Cardiac: no chest pain or palpitations  Pulmonary: no shortness of breath or cough        Constitutional:  Temp:  [97.7 °F (36.5 °C)-99 °F (37.2 °C)] 98.4 °F (36.9 °C)  Heart Rate:  [71-84] 80  Resp:  [18-20] 18  BP: ()/(60-79) 109/77    Physical Exam:  General:  Appears in no acute distress  Eyes: eom normal no conjunctival drainage  HEENT:  No JVD. Thyroid not visibly enlarged. No mucosal pallor or cyanosis  Respiratory: Respirations regular and unlabored at rest. BBS with good air entry in all fields. No crackles, rubs or wheezes auscultated  Cardiovascular: S1S2 Regular rate and rhythm. No murmur, rub or gallop. No carotid bruits. DP/PT pulses   2+  . No pretibial pitting edema  Gastrointestinal: Abdomen soft, non tender. Bowel sounds present.   Musculoskeletal: OBREGON x4. No abnormal movements  Neuro: AAO x3 CN II-XII grossly intact  Psych: Mood and affect normal, pleasant and cooperative            Cardiographics    ECG:     Echocardiogram:   Interpretation Summary         Left ventricular ejection fraction appears to be 31 - 35%.    The left ventricular cavity is moderately dilated.    Left ventricular diastolic function was indeterminate.    The right ventricular cavity is mildly dilated.    The left atrial cavity is mildly dilated.    Peak velocity of the flow distal to the aortic valve is 173 cm/s. Aortic valve maximum pressure gradient is 12 mmHg. Aortic valve mean pressure gradient is 5 mmHg. Aortic valve dimensionless index is 0.6 .    Estimated right ventricular systolic pressure from tricuspid regurgitation is mildly elevated (35-45 mmHg). Calculated right ventricular systolic pressure from tricuspid regurgitation is 35 mmHg.     Lab Review       Results from last 7 days   Lab Units 01/09/24  2313   MAGNESIUM mg/dL 1.9     Results from last 7 days   Lab Units 01/11/24  0249   SODIUM mmol/L 136   POTASSIUM mmol/L 4.1  "  BUN mg/dL 24*   CREATININE mg/dL 1.48*   CALCIUM mg/dL 8.8     @[id number]@  Results from last 7 days   Lab Units 01/11/24  0249 01/10/24  0343 01/09/24  2313   WBC 10*3/mm3 3.78 3.47 3.86   HEMOGLOBIN g/dL 8.6* 7.3* 7.4*   HEMATOCRIT % 26.4* 23.3* 23.3*   PLATELETS 10*3/mm3 319 320 315     Results from last 7 days   Lab Units 01/09/24  2313   INR  1.20*   APTT seconds 35.1         Assessment:  Coronary artery disease  Chest pressure  Myelodysplastic syndrome  Hypertension  Hyperlipidemia  COPD  Cardiomyopathy        Plan:  BP and HR stable  Volume status improved-continue po lasix 40mg daily and spironolactone, cr 1.48, bmp in am.   S/P 1 u PRBC yestserday hgb 8.6, cbc in am. Hem/Onc following, appreciate attention to patient  LHC tomorrow, npo after mn, bmp in am    Procedures, risks, and options of cardiac cath explained to patient, including but not limited to MI, Stroke, death, infections, hemorrhage. Patient understands well and agrees, all questions answered.      )1/11/2024  WOO Maravilla/Transcription:   \"Dictated utilizing Dragon dictation\".     "

## 2024-01-11 NOTE — PROGRESS NOTES
Subjective     HISTORY OF PRESENT ILLNESS:   No acute issues overnight.  Patient resting comfortably in bed.  Vital stable.    Past Medical History, Past Surgical History, Social History, Family History have been reviewed and are without significant changes except as mentioned.    Review of Systems   A comprehensive 14 point review of systems was performed and was negative except as mentioned.    Medications:  The current medication list was reviewed in the EMR    ALLERGIES:  No Known Allergies    Objective      Vitals:    01/11/24 0417 01/11/24 0618 01/11/24 0805 01/11/24 0813   BP: 108/65  109/77    BP Location: Left arm  Left arm    Patient Position: Lying  Lying    Pulse: 84 73 80    Resp: 20  18    Temp: 99 °F (37.2 °C)  98.4 °F (36.9 °C)    TempSrc: Oral  Oral    SpO2: 94% 91% 91%    Weight:    89.9 kg (198 lb 3.1 oz)   Height:              No data to display                Physical Exam    CONSTITUTIONAL:  Vital signs reviewed.  No distress, looks comfortable.  EYES:  Conjunctiva and lids unremarkable.    EARS,NOSE,MOUTH,THROAT:  Ears and nose appear unremarkable.    RESPIRATORY:  Normal respiratory effort.    CARDIOVASCULAR:  Normal heart rate  GASTROINTESTINAL: Abdomen appears unremarkable.  Nondistended   LYMPHATIC:  No cervical, supraclavicular lymphadenopathy.  SKIN:  Warm.  No rashes.  PSYCHIATRIC:  Normal judgment and insight.  Normal mood and affect.  NEURO: AAOx3, no obvious focal deficits.      RECENT LABS:  Hematology WBC   Date Value Ref Range Status   01/11/2024 3.78 3.40 - 10.80 10*3/mm3 Final     RBC   Date Value Ref Range Status   01/11/2024 2.62 (L) 4.14 - 5.80 10*6/mm3 Final     Hemoglobin   Date Value Ref Range Status   01/11/2024 8.6 (L) 13.0 - 17.7 g/dL Final     Hematocrit   Date Value Ref Range Status   01/11/2024 26.4 (L) 37.5 - 51.0 % Final     Platelets   Date Value Ref Range Status   01/11/2024 319 140 - 450 10*3/mm3 Final              Assessment & Plan   Patient is a pleasant  73-year-old male with medical history significant for MDS on chemotherapy, CAD s/p PCI, COPD and hard of hearing admitted with anginal symptoms and diagnostic cardiac cath.     # MDS, refractory anemia with ring sideroblasts:  Diagnosed October 2023.  Follows up with Dr. Holland, [facility].  Currently receiving treatment with Vidaza, 5 days in a row repeated every 28 days.  Last dose on 1/8 and 1/9/2024.  1/10/2024: Labs from today show hemoglobin of 7.3, WBC 3.47, ANC 2050.  Platelets 320,000.  Patient is planned for diagnostic cardiac cath today.  In view of his symptoms, will transfuse 1 unit PRBC prior to the procedure.  Although patient is not neutropenic, he will be considered immunocompromised given recent chemotherapy and MDS diagnosis.  Will consider G-CSF support if becomes neutropenic.  1/11/2024: Patient did not undergo cardiac cath yesterday.  Tentative plan to do the procedure on Friday.  Hemoglobin improved to 8.6 after 1 unit PRBC transfusion yesterday.  Patient denies any chest pain or shortness of breath.  Patient can be transfused 1 unit PRBC prior to cardiac cath tomorrow if hemoglobin 8 g/dL or below.   Continue daily oral iron supplementation     # CAD s/p PCI:   Plan for diagnostic cardiac cath.  Follows up with .     # COPD: On bronchodilators     Recommendations:  - Patient can be transfused 1 unit PRBC prior to cardiac cath tomorrow if hemoglobin < 8 g/dL or as per cardiology's desired level.  -Continue daily iron  -Monitor for neutropenia  -Patient will follow-up with Dr. Adame, [facility] after discharge for continued oncologic care.    Will sign off.  Please call us with any questions.

## 2024-01-11 NOTE — PROGRESS NOTES
"Daily progress note    Referring physician  Dr. SIMENTAL    Subjective  Awake and alert and feeling better with no new complaint and wants to go home.  Patient denies any chest pain shortness of breath palpitation.    History of present illness  73-year-old white male with history of COPD coronary artery disease hypertension hyperlipidemia obstructive sleep apnea chronic anemia with myelodysplastic syndrome admitted to cardiology service with chest discomfort off-and-on associated with shortness of breath but no other associated symptoms.  Patient also denies any fever chills cough congestion night sweats weight loss or weight gain.  Patient admitted to cardiology service and going for cardiac catheterization and I am asked to follow patient for medical problem.  Patient and his wife kidney in detail history.     REVIEW OF SYSTEMS  All systems reviewed and negative except for those discussed in HPI.      PHYSICAL EXAM   Blood pressure 106/62, pulse 74, temperature 98.2 °F (36.8 °C), temperature source Oral, resp. rate 20, height 180 cm (70.87\"), weight 89.9 kg (198 lb 3.1 oz), SpO2 93%.    Constitutional: Awake and alert no distress.   HEENT: Unremarkable  Neck: Supple.   Cardiovascular: Normal rate, regular rhythm and intact distal pulses.  Pulmonary/Chest: Normal effort with decreased breath sounds at the bases  Abdominal: Soft.  Nontender nondistended bowel sounds positive  Musculoskeletal: Moves all extremities equally. There is no pedal edema or calf tenderness.   Neurological: Alert.  Baseline strength and sensation noted.   Skin: Skin is pink, warm, and dry.  Mild pallor.   Psychiatric: Mood and affect normal.     LAB RESULTS  Lab Results (last 24 hours)       Procedure Component Value Units Date/Time    Manual Differential [[id number]]  (Abnormal) Collected: 01/11/24 0249    Specimen: Blood Updated: 01/11/24 0503     Neutrophil % 62.0 %      Comment: 2+ bands seen        Lymphocyte % 30.0 %      Monocyte % 4.0 %     "  Eosinophil % 1.0 %      Basophil % 3.0 %      Neutrophils Absolute 2.34 10*3/mm3      Lymphocytes Absolute 1.13 10*3/mm3      Monocytes Absolute 0.15 10*3/mm3      Eosinophils Absolute 0.04 10*3/mm3      Basophils Absolute 0.11 10*3/mm3      Anisocytosis Mod/2+     Macrocytes Mod/2+     WBC Morphology Normal     Clumped Platelets Present    TSH [[id number]]  (Normal) Collected: 01/11/24 0249    Specimen: Blood Updated: 01/11/24 0424     TSH 1.710 uIU/mL     Comprehensive Metabolic Panel [[id number]]  (Abnormal) Collected: 01/11/24 0249    Specimen: Blood Updated: 01/11/24 0419     Glucose 104 mg/dL      BUN 24 mg/dL      Creatinine 1.48 mg/dL      Sodium 136 mmol/L      Potassium 4.1 mmol/L      Chloride 102 mmol/L      CO2 22.0 mmol/L      Calcium 8.8 mg/dL      Total Protein 6.7 g/dL      Albumin 3.6 g/dL      ALT (SGPT) 12 U/L      AST (SGOT) 15 U/L      Alkaline Phosphatase 102 U/L      Total Bilirubin 0.3 mg/dL      Globulin 3.1 gm/dL      A/G Ratio 1.2 g/dL      BUN/Creatinine Ratio 16.2     Anion Gap 12.0 mmol/L      eGFR 50.0 mL/min/1.73     Narrative:      GFR Normal >60  Chronic Kidney Disease <60  Kidney Failure <15    The GFR formula is only valid for adults with stable renal function between ages 18 and 70.    Lipid Panel [[id number]]  (Abnormal) Collected: 01/11/24 0249    Specimen: Blood Updated: 01/11/24 0419     Total Cholesterol 81 mg/dL      Triglycerides 89 mg/dL      HDL Cholesterol 23 mg/dL      LDL Cholesterol  40 mg/dL      VLDL Cholesterol 18 mg/dL      LDL/HDL Ratio 1.75    Narrative:      Cholesterol Reference Ranges  (U.S. Department of Health and Human Services ATP III Classifications)    Desirable          <200 mg/dL  Borderline High    200-239 mg/dL  High Risk          >240 mg/dL      Triglyceride Reference Ranges  (U.S. Department of Health and Human Services ATP III Classifications)    Normal           <150 mg/dL  Borderline High  150-199 mg/dL  High             200-499 mg/dL  Very  High        >500 mg/dL    HDL Reference Ranges  (U.S. Department of Health and Human Services ATP III Classifications)    Low     <40 mg/dl (major risk factor for CHD)  High    >60 mg/dl ('negative' risk factor for CHD)        LDL Reference Ranges  (U.S. Department of Health and Human Services ATP III Classifications)    Optimal          <100 mg/dL  Near Optimal     100-129 mg/dL  Borderline High  130-159 mg/dL  High             160-189 mg/dL  Very High        >189 mg/dL    Hemoglobin A1c [[id number]]  (Abnormal) Collected: 01/11/24 0249    Specimen: Blood Updated: 01/11/24 0411     Hemoglobin A1C 6.00 %     Narrative:      Hemoglobin A1C Ranges:    Increased Risk for Diabetes  5.7% to 6.4%  Diabetes                     >= 6.5%  Diabetic Goal                < 7.0%    CBC & Differential [[id number]]  (Abnormal) Collected: 01/11/24 0249    Specimen: Blood Updated: 01/11/24 0400    Narrative:      The following orders were created for panel order CBC & Differential.  Procedure                               Abnormality         Status                     ---------                               -----------         ------                     CBC Auto Differential[[id number]]        Abnormal            Final result                 Please view results for these tests on the individual orders.    CBC Auto Differential [[id number]]  (Abnormal) Collected: 01/11/24 0249    Specimen: Blood Updated: 01/11/24 0400     WBC 3.78 10*3/mm3      RBC 2.62 10*6/mm3      Hemoglobin 8.6 g/dL      Hematocrit 26.4 %      .8 fL      MCH 32.8 pg      MCHC 32.6 g/dL      RDW 19.6 %      RDW-SD 70.9 fl      MPV 10.3 fL      Platelets 319 10*3/mm3      nRBC 0.3 /100 WBC     BNP [[id number]]  (Abnormal) Collected: 01/10/24 2014    Specimen: Blood Updated: 01/10/24 2052     proBNP 2,627.0 pg/mL     Narrative:      This assay is used as an aid in the diagnosis of individuals suspected of having heart failure. It can be used as an aid in the  diagnosis of acute decompensated heart failure (ADHF) in patients presenting with signs and symptoms of ADHF to the emergency department (ED). In addition, NT-proBNP of <300 pg/mL indicates ADHF is not likely.    Age Range Result Interpretation  NT-proBNP Concentration (pg/mL:      <50             Positive            >450                   Gray                 300-450                    Negative             <300    50-75           Positive            >900                  Gray                300-900                  Negative            <300      >75             Positive            >1800                  Gray                300-1800                  Negative            <300          Imaging Results (Last 24 Hours)       Procedure Component Value Units Date/Time    XR Chest 2 View [[id number]] Collected: 01/10/24 1437     Updated: 01/10/24 1443    Narrative:      XR CHEST 2 VW-     HISTORY: Male who is 73 years-old, wheezing     TECHNIQUE: Frontal and lateral views of the chest     COMPARISON: 3/31/2023     FINDINGS: The heart size is borderline, with slight prominence of  vascular markings (less than on the prior exam). Left-sided pacemaker,  cardiac leads, sternotomy wires are present. No focal pulmonary  consolidation, pleural effusion, or pneumothorax. No acute osseous  process.       Impression:      No focal pulmonary consolidation. Borderline heart size,  with slight vascular prominence.           This report was finalized on 1/10/2024 2:40 PM by Dr. Alex Deleon M.D on Workstation: [employer]             Results  [facility]2024 [facility]         Author: -- Service: -- Author Type: --   Filed: Date of Service: Creation Time:   Status: (Other)   HEART RATE= 72  bpm  RR Interval= 833  ms  NH Interval= 206  ms  P Horizontal Axis= -48  deg  P Front Axis=   deg  QRSD Interval= 135  ms  QT Interval= 410  ms  QTcB= 449  ms  QRS Axis= -65  deg  T Wave Axis= 90  deg  - ABNORMAL ECG  -  Atrial-paced complexes  Left bundle branch block  No change from previous tracing          Current Facility-Administered Medications:     albuterol (PROVENTIL) nebulizer solution 0.083% 2.5 mg/3mL, 2.5 mg, Nebulization, Q6H PRN, Es Daugherty APRN    aspirin EC tablet 81 mg, 81 mg, Oral, Daily, Es Daugherty APRN, 81 mg at 01/11/24 0804    atorvastatin (LIPITOR) tablet 10 mg, 10 mg, Oral, Nightly, Bebeto Aguilar MD, 10 mg at 01/10/24 2116    budesonide-formoterol (SYMBICORT) 160-4.5 MCG/ACT inhaler 2 puff, 2 puff, Inhalation, BID - RT, 2 puff at 01/11/24 0617 **AND** tiotropium (SPIRIVA RESPIMAT) 2.5 mcg/act aerosol solution inhaler, 2 puff, Inhalation, Daily - RT, Es Daugherty APRN, 2 puff at 01/11/24 0618    carvedilol (COREG) tablet 3.125 mg, 3.125 mg, Oral, BID With Meals, Es Daugherty, APRN, 3.125 mg at 01/11/24 0804    clopidogrel (PLAVIX) tablet 75 mg, 75 mg, Oral, Daily, Es Daugherty APRN, 75 mg at 01/11/24 0804    empagliflozin (JARDIANCE) tablet 10 mg, 10 mg, Oral, Daily, Es Daugherty APRN, 10 mg at 01/11/24 0803    ferrous sulfate tablet 325 mg, 325 mg, Oral, Daily With Breakfast, Es Daugherty APRN, 325 mg at 01/11/24 0804    furosemide (LASIX) tablet 40 mg, 40 mg, Oral, Daily, Es Daugherty APRN, 40 mg at 01/11/24 1021    nitroglycerin (NITROSTAT) SL tablet 0.4 mg, 0.4 mg, Sublingual, Q5 Min PRN, Es Daugherty, APRN    pantoprazole (PROTONIX) EC tablet 40 mg, 40 mg, Oral, BID AC, Bebeto Aguilar MD, 40 mg at 01/11/24 0643    sertraline (ZOLOFT) tablet 50 mg, 50 mg, Oral, Daily, Es Daugherty APRN, 50 mg at 01/11/24 0804    sodium chloride 0.9 % flush 10 mL, 10 mL, Intravenous, Q12H, Es Daugherty APRN, 10 mL at 01/11/24 0937    sodium chloride 0.9 % flush 10 mL, 10 mL, Intravenous, PRN, Es Daugherty APRN    sodium chloride 0.9 % infusion 40 mL, 40 mL, Intravenous, PRN, Es Daugherty APRN    spironolactone (ALDACTONE) tablet 25 mg, 25 mg, Oral,  Daily, Es Daugherty, APRN, 25 mg at 01/11/24 0804        ASSESSMENT  Chest tightness with known coronary artery disease  Chronic obstructive pulmonary disease  Hypertension  Hyperlipidemia  Cardiomyopathy with ejection fraction of 30%  Obstructive sleep apnea  Chronic anemia with myelodysplastic syndrome  Gastroesophageal reflux disease    PLAN  CPM  Transfuse as needed  Cardiac catheterization per cardiology  Continue home medications  Stress ulcer DVT prophylaxis  Hematology and nephrology to follow patient  Supportive care  Discussed with family nursing staff  Follow Dr. SIMENTAL and further recommendations according to hospital course    OUSMANE JO MD    Copied text in this note has been reviewed and is accurate as of 01/11/24

## 2024-01-12 LAB
ALBUMIN SERPL-MCNC: 3.3 G/DL (ref 3.5–5.2)
ANION GAP SERPL CALCULATED.3IONS-SCNC: 11 MMOL/L (ref 5–15)
BASOPHILS # BLD MANUAL: 0.07 10*3/MM3 (ref 0–0.2)
BASOPHILS NFR BLD MANUAL: 2 % (ref 0–1.5)
BUN SERPL-MCNC: 22 MG/DL (ref 8–23)
BUN/CREAT SERPL: 15.2 (ref 7–25)
CALCIUM SPEC-SCNC: 8.3 MG/DL (ref 8.6–10.5)
CHLORIDE SERPL-SCNC: 102 MMOL/L (ref 98–107)
CO2 SERPL-SCNC: 21 MMOL/L (ref 22–29)
CREAT SERPL-MCNC: 1.45 MG/DL (ref 0.76–1.27)
DEPRECATED RDW RBC AUTO: 67.7 FL (ref 37–54)
EGFRCR SERPLBLD CKD-EPI 2021: 51.2 ML/MIN/1.73
EOSINOPHIL # BLD MANUAL: 0.11 10*3/MM3 (ref 0–0.4)
EOSINOPHIL NFR BLD MANUAL: 3 % (ref 0.3–6.2)
ERYTHROCYTE [DISTWIDTH] IN BLOOD BY AUTOMATED COUNT: 19.1 % (ref 12.3–15.4)
GLUCOSE SERPL-MCNC: 110 MG/DL (ref 65–99)
HCT VFR BLD AUTO: 23.6 % (ref 37.5–51)
HCT VFR BLD AUTO: 32.6 % (ref 37.5–51)
HGB BLD-MCNC: 10.5 G/DL (ref 13–17.7)
HGB BLD-MCNC: 7.9 G/DL (ref 13–17.7)
LYMPHOCYTES # BLD MANUAL: 1.48 10*3/MM3 (ref 0.7–3.1)
MCH RBC QN AUTO: 33.5 PG (ref 26.6–33)
MCHC RBC AUTO-ENTMCNC: 33.5 G/DL (ref 31.5–35.7)
MCV RBC AUTO: 100 FL (ref 79–97)
MYELOCYTES NFR BLD MANUAL: 2 % (ref 0–0)
NEUTROPHILS # BLD AUTO: 1.96 10*3/MM3 (ref 1.7–7)
NEUTROPHILS NFR BLD MANUAL: 53 % (ref 42.7–76)
NRBC SPEC MANUAL: 1 /100 WBC (ref 0–0.2)
PHOSPHATE SERPL-MCNC: 2.9 MG/DL (ref 2.5–4.5)
PLAT MORPH BLD: NORMAL
PLATELET # BLD AUTO: 266 10*3/MM3 (ref 140–450)
PMV BLD AUTO: 10.4 FL (ref 6–12)
POTASSIUM SERPL-SCNC: 4 MMOL/L (ref 3.5–5.2)
RBC # BLD AUTO: 2.36 10*6/MM3 (ref 4.14–5.8)
RBC MORPH BLD: NORMAL
SODIUM SERPL-SCNC: 134 MMOL/L (ref 136–145)
VARIANT LYMPHS NFR BLD MANUAL: 40 % (ref 19.6–45.3)
WBC MORPH BLD: NORMAL
WBC NRBC COR # BLD AUTO: 3.7 10*3/MM3 (ref 3.4–10.8)

## 2024-01-12 PROCEDURE — 93458 L HRT ARTERY/VENTRICLE ANGIO: CPT | Performed by: INTERNAL MEDICINE

## 2024-01-12 PROCEDURE — 94799 UNLISTED PULMONARY SVC/PX: CPT

## 2024-01-12 PROCEDURE — 80069 RENAL FUNCTION PANEL: CPT | Performed by: INTERNAL MEDICINE

## 2024-01-12 PROCEDURE — C1894 INTRO/SHEATH, NON-LASER: HCPCS | Performed by: INTERNAL MEDICINE

## 2024-01-12 PROCEDURE — 86900 BLOOD TYPING SEROLOGIC ABO: CPT

## 2024-01-12 PROCEDURE — C1769 GUIDE WIRE: HCPCS | Performed by: INTERNAL MEDICINE

## 2024-01-12 PROCEDURE — 85025 COMPLETE CBC W/AUTO DIFF WBC: CPT

## 2024-01-12 PROCEDURE — G0378 HOSPITAL OBSERVATION PER HR: HCPCS

## 2024-01-12 PROCEDURE — 25010000002 FENTANYL CITRATE (PF) 50 MCG/ML SOLUTION: Performed by: INTERNAL MEDICINE

## 2024-01-12 PROCEDURE — 25510000001 IOPAMIDOL PER 1 ML: Performed by: INTERNAL MEDICINE

## 2024-01-12 PROCEDURE — C1760 CLOSURE DEV, VASC: HCPCS | Performed by: INTERNAL MEDICINE

## 2024-01-12 PROCEDURE — 85007 BL SMEAR W/DIFF WBC COUNT: CPT

## 2024-01-12 PROCEDURE — 94664 DEMO&/EVAL PT USE INHALER: CPT

## 2024-01-12 PROCEDURE — 25810000003 SODIUM CHLORIDE 0.9 % SOLUTION: Performed by: INTERNAL MEDICINE

## 2024-01-12 PROCEDURE — 94761 N-INVAS EAR/PLS OXIMETRY MLT: CPT

## 2024-01-12 PROCEDURE — 36430 TRANSFUSION BLD/BLD COMPNT: CPT

## 2024-01-12 PROCEDURE — 85014 HEMATOCRIT: CPT | Performed by: INTERNAL MEDICINE

## 2024-01-12 PROCEDURE — P9016 RBC LEUKOCYTES REDUCED: HCPCS

## 2024-01-12 PROCEDURE — 85018 HEMOGLOBIN: CPT | Performed by: INTERNAL MEDICINE

## 2024-01-12 PROCEDURE — 25010000002 MIDAZOLAM PER 1 MG: Performed by: INTERNAL MEDICINE

## 2024-01-12 RX ORDER — SODIUM CHLORIDE 9 MG/ML
INJECTION, SOLUTION INTRAVENOUS
Status: COMPLETED | OUTPATIENT
Start: 2024-01-12 | End: 2024-01-12

## 2024-01-12 RX ORDER — FUROSEMIDE 10 MG/ML
20 INJECTION INTRAMUSCULAR; INTRAVENOUS AS NEEDED
Status: DISCONTINUED | OUTPATIENT
Start: 2024-01-12 | End: 2024-01-13

## 2024-01-12 RX ORDER — MIDAZOLAM HYDROCHLORIDE 1 MG/ML
INJECTION INTRAMUSCULAR; INTRAVENOUS
Status: DISCONTINUED | OUTPATIENT
Start: 2024-01-12 | End: 2024-01-12 | Stop reason: HOSPADM

## 2024-01-12 RX ORDER — LIDOCAINE HYDROCHLORIDE 20 MG/ML
INJECTION, SOLUTION INFILTRATION; PERINEURAL
Status: DISCONTINUED | OUTPATIENT
Start: 2024-01-12 | End: 2024-01-12 | Stop reason: HOSPADM

## 2024-01-12 RX ORDER — FENTANYL CITRATE 50 UG/ML
INJECTION, SOLUTION INTRAMUSCULAR; INTRAVENOUS
Status: DISCONTINUED | OUTPATIENT
Start: 2024-01-12 | End: 2024-01-12 | Stop reason: HOSPADM

## 2024-01-12 RX ADMIN — Medication 10 ML: at 20:06

## 2024-01-12 RX ADMIN — SERTRALINE 50 MG: 50 TABLET, FILM COATED ORAL at 08:06

## 2024-01-12 RX ADMIN — BUDESONIDE AND FORMOTEROL FUMARATE DIHYDRATE 2 PUFF: 160; 4.5 AEROSOL RESPIRATORY (INHALATION) at 21:15

## 2024-01-12 RX ADMIN — ATORVASTATIN CALCIUM 10 MG: 20 TABLET, FILM COATED ORAL at 20:05

## 2024-01-12 RX ADMIN — TIOTROPIUM BROMIDE INHALATION SPRAY 2 PUFF: 3.12 SPRAY, METERED RESPIRATORY (INHALATION) at 06:33

## 2024-01-12 RX ADMIN — Medication 3 MG: at 20:04

## 2024-01-12 RX ADMIN — FAMOTIDINE 20 MG: 20 TABLET, FILM COATED ORAL at 08:06

## 2024-01-12 RX ADMIN — FERROUS SULFATE TAB 325 MG (65 MG ELEMENTAL FE) 325 MG: 325 (65 FE) TAB at 08:06

## 2024-01-12 RX ADMIN — FAMOTIDINE 20 MG: 20 TABLET, FILM COATED ORAL at 20:05

## 2024-01-12 RX ADMIN — Medication 10 ML: at 08:52

## 2024-01-12 RX ADMIN — BUDESONIDE AND FORMOTEROL FUMARATE DIHYDRATE 2 PUFF: 160; 4.5 AEROSOL RESPIRATORY (INHALATION) at 06:33

## 2024-01-12 RX ADMIN — CARVEDILOL 3.12 MG: 3.12 TABLET, FILM COATED ORAL at 16:47

## 2024-01-12 NOTE — PROGRESS NOTES
"Daily progress note    Referring physician  Dr. HOLA Quintana  Doing same with no new complaints and denies any chest pain shortness of breath palpitation.  Patient family at bedside.    History of present illness  73-year-old white male with history of COPD coronary artery disease hypertension hyperlipidemia obstructive sleep apnea chronic anemia with myelodysplastic syndrome admitted to cardiology service with chest discomfort off-and-on associated with shortness of breath but no other associated symptoms.  Patient also denies any fever chills cough congestion night sweats weight loss or weight gain.  Patient admitted to cardiology service and going for cardiac catheterization and I am asked to follow patient for medical problem.  Patient and his wife kidney in detail history.     REVIEW OF SYSTEMS  Unremarkable     PHYSICAL EXAM   Blood pressure 108/77, pulse 77, temperature 98.7 °F (37.1 °C), temperature source Oral, resp. rate 12, height 180 cm (70.87\"), weight 90.9 kg (200 lb 8 oz), SpO2 90%.    Constitutional: Awake and alert no distress.   HEENT: Unremarkable  Neck: Supple.   Cardiovascular: Normal rate, regular rhythm and intact distal pulses.  Pulmonary/Chest: Normal effort with decreased breath sounds at the bases  Abdominal: Soft.  Nontender nondistended bowel sounds positive  Musculoskeletal: Moves all extremities equally. There is no pedal edema or calf tenderness.   Neurological: Alert.  Baseline strength and sensation noted.   Skin: Skin is pink, warm, and dry.  Mild pallor.   Psychiatric: Mood and affect normal.     LAB RESULTS  Lab Results (last 24 hours)       Procedure Component Value Units Date/Time    Manual Differential [[id number]]  (Abnormal) Collected: 01/12/24 0326    Specimen: Blood Updated: 01/12/24 0434     Neutrophil % 53.0 %      Lymphocyte % 40.0 %      Eosinophil % 3.0 %      Basophil % 2.0 %      Myelocyte % 2.0 %      Neutrophils Absolute 1.96 10*3/mm3      Lymphocytes Absolute " 1.48 10*3/mm3      Eosinophils Absolute 0.11 10*3/mm3      Basophils Absolute 0.07 10*3/mm3      nRBC 1.0 /100 WBC      RBC Morphology Normal     WBC Morphology Normal     Platelet Morphology Normal    Renal Function Panel [[id number]]  (Abnormal) Collected: 01/12/24 0326    Specimen: Blood Updated: 01/12/24 0402     Glucose 110 mg/dL      BUN 22 mg/dL      Creatinine 1.45 mg/dL      Sodium 134 mmol/L      Potassium 4.0 mmol/L      Chloride 102 mmol/L      CO2 21.0 mmol/L      Calcium 8.3 mg/dL      Albumin 3.3 g/dL      Phosphorus 2.9 mg/dL      Anion Gap 11.0 mmol/L      BUN/Creatinine Ratio 15.2     eGFR 51.2 mL/min/1.73     Narrative:      GFR Normal >60  Chronic Kidney Disease <60  Kidney Failure <15    The GFR formula is only valid for adults with stable renal function between ages 18 and 70.    CBC & Differential [[id number]]  (Abnormal) Collected: 01/12/24 0326    Specimen: Blood Updated: 01/12/24 0353    Narrative:      The following orders were created for panel order CBC & Differential.  Procedure                               Abnormality         Status                     ---------                               -----------         ------                     CBC Auto Differential[[id number]]        Abnormal            Final result                 Please view results for these tests on the individual orders.    CBC Auto Differential [[id number]]  (Abnormal) Collected: 01/12/24 0326    Specimen: Blood Updated: 01/12/24 0353     WBC 3.70 10*3/mm3      RBC 2.36 10*6/mm3      Hemoglobin 7.9 g/dL      Hematocrit 23.6 %      .0 fL      MCH 33.5 pg      MCHC 33.5 g/dL      RDW 19.1 %      RDW-SD 67.7 fl      MPV 10.4 fL      Platelets 266 10*3/mm3     Sodium, Urine, Random - Urine, Clean Catch [[id number]] Collected: 01/11/24 2130    Specimen: Urine, Clean Catch Updated: 01/11/24 2205     Sodium, Urine 44 mmol/L     Narrative:      Reference intervals for random urine have not been established.  Clinical usage  is dependent upon physician's interpretation in combination with other laboratory tests.       Protein, Urine, Random - Urine, Clean Catch [[id number]] Collected: 01/11/24 2130    Specimen: Urine, Clean Catch Updated: 01/11/24 2204     Total Protein, Urine 10.5 mg/dL     Narrative:      Reference intervals for random urine have not been established.  Clinical usage is dependent upon physician's interpretation in combination with other laboratory tests.       Creatinine Urine Random (kidney function) GFR component - Urine, Clean Catch [[id number]] Collected: 01/11/24 2130    Specimen: Urine, Clean Catch Updated: 01/11/24 2204     Creatinine, Urine 71.1 mg/dL     Narrative:      Reference intervals for random urine have not been established.  Clinical usage is dependent upon physician's interpretation in combination with other laboratory tests.       Urinalysis With Microscopic If Indicated (No Culture) - Urine, Clean Catch [[id number]]  (Abnormal) Collected: 01/11/24 2130    Specimen: Urine, Clean Catch Updated: 01/11/24 2141     Color, UA Yellow     Appearance, UA Clear     pH, UA 5.5     Specific Gravity, UA 1.025     Glucose, UA >=1000 mg/dL (3+)     Ketones, UA Negative     Bilirubin, UA Negative     Blood, UA Negative     Protein, UA Negative     Leuk Esterase, UA Negative     Nitrite, UA Negative     Urobilinogen, UA 0.2 E.U./dL    Narrative:      Urine microscopic not indicated.          Imaging Results (Last 24 Hours)       ** No results found for the last 24 hours. **          Results  Scan on 1/9/2024 2237 by [employer] [place]: ECG 12-LEAD         Author: -- Service: -- Author Type: --   Filed: Date of Service: Creation Time:   Status: (Other)   HEART RATE= 72  bpm  RR Interval= 833  ms  MN Interval= 206  ms  P Horizontal Axis= -48  deg  P Front Axis=   deg  QRSD Interval= 135  ms  QT Interval= 410  ms  QTcB= 449  ms  QRS Axis= -65  deg  T Wave Axis= 90  deg  - ABNORMAL ECG -  Atrial-paced complexes  Left  bundle branch block  No change from previous tracing          Current Facility-Administered Medications:     [MAR Hold] albuterol (PROVENTIL) nebulizer solution 0.083% 2.5 mg/3mL, 2.5 mg, Nebulization, Q6H PRN, Es Daugherty APRN    [MAR Hold] aspirin EC tablet 81 mg, 81 mg, Oral, Daily, Es Daugherty APRN, 81 mg at 01/11/24 0804    [MAR Hold] atorvastatin (LIPITOR) tablet 10 mg, 10 mg, Oral, Nightly, Bebeto Aguilar MD, 10 mg at 01/11/24 1936    [MAR Hold] budesonide-formoterol (SYMBICORT) 160-4.5 MCG/ACT inhaler 2 puff, 2 puff, Inhalation, BID - RT, 2 puff at 01/12/24 0633 **AND** [MAR Hold] tiotropium (SPIRIVA RESPIMAT) 2.5 mcg/act aerosol solution inhaler, 2 puff, Inhalation, Daily - RT, Es Daugherty APRN, 2 puff at 01/12/24 0633    carvedilol (COREG) tablet 3.125 mg, 3.125 mg, Oral, BID With Meals, Es Daugherty APRN, 3.125 mg at 01/11/24 1838    [MAR Hold] clopidogrel (PLAVIX) tablet 75 mg, 75 mg, Oral, Daily, Es Daugherty APRN, 75 mg at 01/11/24 0804    [Held by provider] empagliflozin (JARDIANCE) tablet 10 mg, 10 mg, Oral, Daily, Es Daugherty APRN, 10 mg at 01/11/24 0803    famotidine (PEPCID) tablet 20 mg, 20 mg, Oral, BID, Bebeto Aguilar MD, 20 mg at 01/12/24 0806    [MAR Hold] ferrous sulfate tablet 325 mg, 325 mg, Oral, Daily With Breakfast, Es Daugherty APRN, 325 mg at 01/12/24 0806    [MAR Hold] furosemide (LASIX) injection 20 mg, 20 mg, Intravenous, PRN, Es Daugherty APRN    [Held by provider] furosemide (LASIX) tablet 40 mg, 40 mg, Oral, Daily, Es Daugherty APRN, 40 mg at 01/11/24 1021    [MAR Hold] melatonin tablet 3 mg, 3 mg, Oral, Nightly PRN, Mika Carranza MD, 3 mg at 01/11/24 2115    [MAR Hold] nitroglycerin (NITROSTAT) SL tablet 0.4 mg, 0.4 mg, Sublingual, Q5 Min PRN, Es Daugherty APRN    [MAR Hold] sertraline (ZOLOFT) tablet 50 mg, 50 mg, Oral, Daily, Es Daugherty APRN, 50 mg at 01/12/24 0806    [MAR Hold] sodium chloride 0.9 % flush 10  mL, 10 mL, Intravenous, Q12H, Es Daugherty, APRN, 10 mL at 01/12/24 0852    [MAR Hold] sodium chloride 0.9 % flush 10 mL, 10 mL, Intravenous, PRN, Es Daugherty, APRN    [MAR Hold] sodium chloride 0.9 % infusion 40 mL, 40 mL, Intravenous, PRN, Es Daugherty, APRN    sodium chloride 0.9 % infusion, 100 mL/hr, Intravenous, Continuous, Eloy Meier MD, Last Rate: 100 mL/hr at 01/11/24 2342, 100 mL/hr at 01/11/24 2342    [Held by provider] spironolactone (ALDACTONE) tablet 25 mg, 25 mg, Oral, Daily, Es Daugherty APRN, 25 mg at 01/11/24 0804        ASSESSMENT  Chest tightness with known coronary artery disease  Chronic obstructive pulmonary disease  Hypertension  Hyperlipidemia  Cardiomyopathy with ejection fraction of 30%  Obstructive sleep apnea  Chronic anemia with myelodysplastic syndrome  Gastroesophageal reflux disease    PLAN  CPM  Transfuse 1 unit packed RBC today  Cardiac catheterization today  Continue home medications  Stress ulcer DVT prophylaxis  Hematology and nephrology to follow patient  Supportive care  Discussed with family nursing staff  Follow Dr. SIMENTAL and further recommendations according to hospital course    OUSMANE JO MD    Copied text in this note has been reviewed and is accurate as of 01/12/24

## 2024-01-12 NOTE — PROGRESS NOTES
"   LOS: 0 days     Chief Complaint/ Reason for encounter: CKD need for IV contrast    Subjective   01/12/24 : Patient is doing well today with no new complaints  Good appetite with no nausea or vomiting  No shortness of breath chest pain or edema  Voiding well with no dysuria          Medical history reviewed:  History of Present Illness    Subjective    History taken from: Patient and chart    Vital Signs  Temp:  [97.5 °F (36.4 °C)-98.8 °F (37.1 °C)] 98.7 °F (37.1 °C)  Heart Rate:  [73-84] 77  Resp:  [6-20] 12  BP: ()/(56-79) 108/77       Wt Readings from Last 1 Encounters:   01/12/24 0328 90.9 kg (200 lb 8 oz)   01/11/24 0813 89.9 kg (198 lb 3.1 oz)   01/10/24 0942 94 kg (207 lb 3.7 oz)   01/09/24 2144 94.3 kg (207 lb 14.4 oz)   01/09/24 2130 94.3 kg (207 lb 14.4 oz)       Objective:  Vital signs: (most recent): Blood pressure 108/77, pulse 77, temperature 98.7 °F (37.1 °C), temperature source Oral, resp. rate 12, height 180 cm (70.87\"), weight 90.9 kg (200 lb 8 oz), SpO2 90%.                Objective:  General Appearance:  Comfortable, well-appearing, in no acute distress and not in pain.  Awake, alert, oriented  HEENT: Mucous membranes moist, no injury, oropharynx clear  Lungs:  Normal effort and normal respiratory rate.  Breath sounds clear to auscultation.  No  respiratory distress.  No rales, decreased breath sounds or rhonchi.    Heart: Normal rate.  Regular rhythm.  S1, S2 normal.  No murmur.   Abdomen: Abdomen is soft.  Bowel sounds are normal, no abdominal tenderness.  There is no rebound or guarding  Extremities: No edema of bilateral lower extremities  Neurological: No focal motor or sensory deficits, pupils reactive  Skin:  Warm and dry.  No rash or cyanosis.       Results Review:    Intake/Output:     Intake/Output Summary (Last 24 hours) at 1/12/2024 1400  Last data filed at 1/12/2024 1247  Gross per 24 hour   Intake 1021 ml   Output 200 ml   Net 821 ml         DATA:  Radiology and Labs:  The " following labs independently reviewed by me. Additional labs ordered for tomorrow a.m.  Interval notes, chart personally reviewed by me.   Old records independently reviewed showing CKD 3  The following radiologic studies independently viewed by me, findings chest x-ray showed no focal consolidation slight vascular prominence  New problems include ASHWIN on CKD  Discussed with family members at bedside    Risk/ complexity of medical care/ medical decision making moderate complexity    Labs:   Recent Results (from the past 24 hour(s))   Urinalysis With Microscopic If Indicated (No Culture) - Urine, Clean Catch    Collection Time: 01/11/24  9:30 PM    Specimen: Urine, Clean Catch   Result Value Ref Range    Color, UA Yellow Yellow, Straw    Appearance, UA Clear Clear    pH, UA 5.5 5.0 - 8.0    Specific Gravity, UA 1.025 1.005 - 1.030    Glucose, UA >=1000 mg/dL (3+) (A) Negative    Ketones, UA Negative Negative    Bilirubin, UA Negative Negative    Blood, UA Negative Negative    Protein, UA Negative Negative    Leuk Esterase, UA Negative Negative    Nitrite, UA Negative Negative    Urobilinogen, UA 0.2 E.U./dL 0.2 - 1.0 E.U./dL   Sodium, Urine, Random - Urine, Clean Catch    Collection Time: 01/11/24  9:30 PM    Specimen: Urine, Clean Catch   Result Value Ref Range    Sodium, Urine 44 mmol/L   Protein, Urine, Random - Urine, Clean Catch    Collection Time: 01/11/24  9:30 PM    Specimen: Urine, Clean Catch   Result Value Ref Range    Total Protein, Urine 10.5 mg/dL   Creatinine Urine Random (kidney function) GFR component - Urine, Clean Catch    Collection Time: 01/11/24  9:30 PM    Specimen: Urine, Clean Catch   Result Value Ref Range    Creatinine, Urine 71.1 mg/dL   Renal Function Panel    Collection Time: 01/12/24  3:26 AM    Specimen: Blood   Result Value Ref Range    Glucose 110 (H) 65 - 99 mg/dL    BUN 22 8 - 23 mg/dL    Creatinine 1.45 (H) 0.76 - 1.27 mg/dL    Sodium 134 (L) 136 - 145 mmol/L    Potassium 4.0 3.5 -  5.2 mmol/L    Chloride 102 98 - 107 mmol/L    CO2 21.0 (L) 22.0 - 29.0 mmol/L    Calcium 8.3 (L) 8.6 - 10.5 mg/dL    Albumin 3.3 (L) 3.5 - 5.2 g/dL    Phosphorus 2.9 2.5 - 4.5 mg/dL    Anion Gap 11.0 5.0 - 15.0 mmol/L    BUN/Creatinine Ratio 15.2 7.0 - 25.0    eGFR 51.2 (L) >60.0 mL/min/1.73   CBC Auto Differential    Collection Time: 01/12/24  3:26 AM    Specimen: Blood   Result Value Ref Range    WBC 3.70 3.40 - 10.80 10*3/mm3    RBC 2.36 (L) 4.14 - 5.80 10*6/mm3    Hemoglobin 7.9 (L) 13.0 - 17.7 g/dL    Hematocrit 23.6 (L) 37.5 - 51.0 %    .0 (H) 79.0 - 97.0 fL    MCH 33.5 (H) 26.6 - 33.0 pg    MCHC 33.5 31.5 - 35.7 g/dL    RDW 19.1 (H) 12.3 - 15.4 %    RDW-SD 67.7 (H) 37.0 - 54.0 fl    MPV 10.4 6.0 - 12.0 fL    Platelets 266 140 - 450 10*3/mm3   Manual Differential    Collection Time: 01/12/24  3:26 AM    Specimen: Blood   Result Value Ref Range    Neutrophil % 53.0 42.7 - 76.0 %    Lymphocyte % 40.0 19.6 - 45.3 %    Eosinophil % 3.0 0.3 - 6.2 %    Basophil % 2.0 (H) 0.0 - 1.5 %    Myelocyte % 2.0 (H) 0.0 - 0.0 %    Neutrophils Absolute 1.96 1.70 - 7.00 10*3/mm3    Lymphocytes Absolute 1.48 0.70 - 3.10 10*3/mm3    Eosinophils Absolute 0.11 0.00 - 0.40 10*3/mm3    Basophils Absolute 0.07 0.00 - 0.20 10*3/mm3    nRBC 1.0 (H) 0.0 - 0.2 /100 WBC    RBC Morphology Normal Normal    WBC Morphology Normal Normal    Platelet Morphology Normal Normal   Prepare RBC, 1 Units    Collection Time: 01/12/24  9:15 AM   Result Value Ref Range    Product Code Z2450W67     Unit Number [id number]     UNIT  ABO O     UNIT  RH POS     Crossmatch Interpretation Compatible     Dispense Status IS     Blood Expiration Date [id number]     Blood Type Barcode 5100        Radiology:  Pertinent radiology studies were reviewed as described above      Medications have been reviewed separately in chart overview      ASSESSMENT:  Acute kidney injury, new this admission.  Initially may have been due to some decompensated heart failure  and cardiorenal syndrome.  Currently euvolemic on exam.  Creatinine 1.45 today with prior baseline around 1.1  Stage II CKD, likely vascular disease with ongoing tobacco abuse and history of CAD  Chest pain in need of heart cath  Acute decompensated systolic heart failure.  Improved with diuretics  Myelodysplasia  Anemia related to MDS    Chest pain    Cardiomyopathy, dilated           DISCUSSION/PLAN:   Renal function was fairly stable today  Current creatinine may represent his new baseline after adequate diuresis  He appears euvolemic today on exam  Status post left heart cath, await findings  Will hold his diuretics for now, could restart tomorrow if renal function is stable after his cath  Stop IV fluids  Urine studies unremarkable  Monitor strict I's and O's     Continue to monitor electrolytes and volume closely.  Discussed with patient and family    Eloy Meier MD  [facility]   Office phone number: [phone]  Answering service phone number: [phone]    01/12/24  14:00 EST    Dictation performed using Dragon dictation software

## 2024-01-12 NOTE — PLAN OF CARE
Goal Outcome Evaluation:  Plan of Care Reviewed With: patient        Progress: no change       Pt post status heart cath with no intervention. Right femoral site clean, dry, intact, and soft. Received 1 unit of blood this morning. Remains A paced with stable pressures. No c/o pain. Walked with staff and family around the unit x 2. Will continue with plan of care.

## 2024-01-13 LAB
ANION GAP SERPL CALCULATED.3IONS-SCNC: 11 MMOL/L (ref 5–15)
BH BB BLOOD EXPIRATION DATE: NORMAL
BH BB BLOOD TYPE BARCODE: 5100
BH BB DISPENSE STATUS: NORMAL
BH BB PRODUCT CODE: NORMAL
BH BB UNIT NUMBER: NORMAL
BUN SERPL-MCNC: 17 MG/DL (ref 8–23)
BUN/CREAT SERPL: 14.9 (ref 7–25)
CALCIUM SPEC-SCNC: 8.3 MG/DL (ref 8.6–10.5)
CHLORIDE SERPL-SCNC: 106 MMOL/L (ref 98–107)
CO2 SERPL-SCNC: 21 MMOL/L (ref 22–29)
CREAT SERPL-MCNC: 1.14 MG/DL (ref 0.76–1.27)
CROSSMATCH INTERPRETATION: NORMAL
DEPRECATED RDW RBC AUTO: 66.3 FL (ref 37–54)
EGFRCR SERPLBLD CKD-EPI 2021: 68.3 ML/MIN/1.73
EOSINOPHIL # BLD MANUAL: 0.15 10*3/MM3 (ref 0–0.4)
EOSINOPHIL NFR BLD MANUAL: 5 % (ref 0.3–6.2)
ERYTHROCYTE [DISTWIDTH] IN BLOOD BY AUTOMATED COUNT: 18.6 % (ref 12.3–15.4)
GLUCOSE SERPL-MCNC: 95 MG/DL (ref 65–99)
HCT VFR BLD AUTO: 27.5 % (ref 37.5–51)
HGB BLD-MCNC: 8.9 G/DL (ref 13–17.7)
LYMPHOCYTES # BLD MANUAL: 0.84 10*3/MM3 (ref 0.7–3.1)
LYMPHOCYTES NFR BLD MANUAL: 1 % (ref 5–12)
MCH RBC QN AUTO: 32 PG (ref 26.6–33)
MCHC RBC AUTO-ENTMCNC: 32.4 G/DL (ref 31.5–35.7)
MCV RBC AUTO: 98.9 FL (ref 79–97)
MONOCYTES # BLD: 0.03 10*3/MM3 (ref 0.1–0.9)
NEUTROPHILS # BLD AUTO: 1.99 10*3/MM3 (ref 1.7–7)
NEUTROPHILS NFR BLD MANUAL: 66 % (ref 42.7–76)
PLAT MORPH BLD: NORMAL
PLATELET # BLD AUTO: 282 10*3/MM3 (ref 140–450)
PMV BLD AUTO: 9.7 FL (ref 6–12)
POTASSIUM SERPL-SCNC: 4 MMOL/L (ref 3.5–5.2)
RBC # BLD AUTO: 2.78 10*6/MM3 (ref 4.14–5.8)
RBC MORPH BLD: NORMAL
SODIUM SERPL-SCNC: 138 MMOL/L (ref 136–145)
UNIT  ABO: NORMAL
UNIT  RH: NORMAL
VARIANT LYMPHS NFR BLD MANUAL: 1 % (ref 0–5)
VARIANT LYMPHS NFR BLD MANUAL: 27 % (ref 19.6–45.3)
WBC MORPH BLD: NORMAL
WBC NRBC COR # BLD AUTO: 3.01 10*3/MM3 (ref 3.4–10.8)

## 2024-01-13 PROCEDURE — 94799 UNLISTED PULMONARY SVC/PX: CPT

## 2024-01-13 PROCEDURE — 85025 COMPLETE CBC W/AUTO DIFF WBC: CPT | Performed by: INTERNAL MEDICINE

## 2024-01-13 PROCEDURE — 99214 OFFICE O/P EST MOD 30 MIN: CPT | Performed by: NURSE PRACTITIONER

## 2024-01-13 PROCEDURE — 80048 BASIC METABOLIC PNL TOTAL CA: CPT | Performed by: INTERNAL MEDICINE

## 2024-01-13 PROCEDURE — 85007 BL SMEAR W/DIFF WBC COUNT: CPT | Performed by: INTERNAL MEDICINE

## 2024-01-13 PROCEDURE — G0378 HOSPITAL OBSERVATION PER HR: HCPCS

## 2024-01-13 RX ADMIN — CLOPIDOGREL BISULFATE 75 MG: 75 TABLET, FILM COATED ORAL at 08:04

## 2024-01-13 RX ADMIN — FAMOTIDINE 20 MG: 20 TABLET, FILM COATED ORAL at 20:01

## 2024-01-13 RX ADMIN — FAMOTIDINE 20 MG: 20 TABLET, FILM COATED ORAL at 08:03

## 2024-01-13 RX ADMIN — BUDESONIDE AND FORMOTEROL FUMARATE DIHYDRATE 2 PUFF: 160; 4.5 AEROSOL RESPIRATORY (INHALATION) at 21:49

## 2024-01-13 RX ADMIN — BUDESONIDE AND FORMOTEROL FUMARATE DIHYDRATE 2 PUFF: 160; 4.5 AEROSOL RESPIRATORY (INHALATION) at 10:11

## 2024-01-13 RX ADMIN — Medication 10 ML: at 08:04

## 2024-01-13 RX ADMIN — Medication 3 MG: at 23:15

## 2024-01-13 RX ADMIN — ATORVASTATIN CALCIUM 10 MG: 20 TABLET, FILM COATED ORAL at 20:01

## 2024-01-13 RX ADMIN — SERTRALINE 50 MG: 50 TABLET, FILM COATED ORAL at 08:04

## 2024-01-13 RX ADMIN — TIOTROPIUM BROMIDE INHALATION SPRAY 2 PUFF: 3.12 SPRAY, METERED RESPIRATORY (INHALATION) at 10:11

## 2024-01-13 RX ADMIN — ASPIRIN 81 MG: 81 TABLET, COATED ORAL at 08:04

## 2024-01-13 RX ADMIN — CARVEDILOL 3.12 MG: 3.12 TABLET, FILM COATED ORAL at 08:04

## 2024-01-13 RX ADMIN — FERROUS SULFATE TAB 325 MG (65 MG ELEMENTAL FE) 325 MG: 325 (65 FE) TAB at 08:03

## 2024-01-13 RX ADMIN — CARVEDILOL 3.12 MG: 3.12 TABLET, FILM COATED ORAL at 17:24

## 2024-01-13 NOTE — PLAN OF CARE
Goal Outcome Evaluation:           Progress: no change  Outcome Evaluation: Pt a/o VSS. pt in bed resting. Medication given as ordered. Pt has no complaints of pain. Wife at bedside.

## 2024-01-13 NOTE — PROGRESS NOTES
"   LOS: 0 days     Chief Complaint/ Reason for encounter: CKD need for IV contrast    Subjective   01/12/24 : Patient is doing well today with no new complaints  Good appetite with no nausea or vomiting  No shortness of breath chest pain or edema  Voiding well with no dysuria    1/13 feels good, tolerating po           Medical history reviewed:  History of Present Illness    Subjective    History taken from: Patient and chart    Vital Signs  Temp:  [97.5 °F (36.4 °C)-99.3 °F (37.4 °C)] 98.6 °F (37 °C)  Heart Rate:  [73-79] 73  Resp:  [6-19] 16  BP: ()/(56-80) 108/77       Wt Readings from Last 1 Encounters:   01/13/24 0500 91.2 kg (201 lb 1.6 oz)   01/12/24 0328 90.9 kg (200 lb 8 oz)   01/11/24 0813 89.9 kg (198 lb 3.1 oz)   01/10/24 0942 94 kg (207 lb 3.7 oz)   01/09/24 2144 94.3 kg (207 lb 14.4 oz)   01/09/24 2130 94.3 kg (207 lb 14.4 oz)       Objective:  Vital signs: (most recent): Blood pressure 108/77, pulse 73, temperature 98.6 °F (37 °C), temperature source Oral, resp. rate 16, height 180 cm (70.87\"), weight 91.2 kg (201 lb 1.6 oz), SpO2 94%.                Objective:  General Appearance:  Comfortable, well-appearing, in no acute distress and not in pain.  Awake, alert, oriented  HEENT: Mucous membranes moist, no injury, oropharynx clear  Lungs:  Normal effort and normal respiratory rate.  Breath sounds clear to auscultation.  No  respiratory distress.  No rales, decreased breath sounds or rhonchi.    Heart: Normal rate.  Regular rhythm.  S1, S2 normal.  No murmur.   Abdomen: Abdomen is soft.  Bowel sounds are normal, no abdominal tenderness.  There is no rebound or guarding  Extremities: No edema of bilateral lower extremities  Neurological: No focal motor or sensory deficits, pupils reactive  Skin:  Warm and dry.  No rash or cyanosis.       Results Review:    Intake/Output:     Intake/Output Summary (Last 24 hours) at 1/13/2024 0830  Last data filed at 1/13/2024 0423  Gross per 24 hour   Intake 1440 " ml   Output 200 ml   Net 1240 ml         DATA:  Radiology and Labs:  The following labs independently reviewed by me. Additional labs ordered for tomorrow a.m.  Interval notes, chart personally reviewed by me.   Old records independently reviewed showing CKD 3  The following radiologic studies independently viewed by me, findings chest x-ray showed no focal consolidation slight vascular prominence  New problems include ASHWIN on CKD  Discussed with family members at bedside    Risk/ complexity of medical care/ medical decision making moderate complexity    Labs:   Recent Results (from the past 24 hour(s))   Hemoglobin & Hematocrit, Blood    Collection Time: 01/12/24  4:39 PM    Specimen: Blood   Result Value Ref Range    Hemoglobin 10.5 (L) 13.0 - 17.7 g/dL    Hematocrit 32.6 (L) 37.5 - 51.0 %   Basic Metabolic Panel    Collection Time: 01/13/24  2:55 AM    Specimen: Blood   Result Value Ref Range    Glucose 95 65 - 99 mg/dL    BUN 17 8 - 23 mg/dL    Creatinine 1.14 0.76 - 1.27 mg/dL    Sodium 138 136 - 145 mmol/L    Potassium 4.0 3.5 - 5.2 mmol/L    Chloride 106 98 - 107 mmol/L    CO2 21.0 (L) 22.0 - 29.0 mmol/L    Calcium 8.3 (L) 8.6 - 10.5 mg/dL    BUN/Creatinine Ratio 14.9 7.0 - 25.0    Anion Gap 11.0 5.0 - 15.0 mmol/L    eGFR 68.3 >60.0 mL/min/1.73   CBC Auto Differential    Collection Time: 01/13/24  2:55 AM    Specimen: Blood   Result Value Ref Range    WBC 3.01 (L) 3.40 - 10.80 10*3/mm3    RBC 2.78 (L) 4.14 - 5.80 10*6/mm3    Hemoglobin 8.9 (L) 13.0 - 17.7 g/dL    Hematocrit 27.5 (L) 37.5 - 51.0 %    MCV 98.9 (H) 79.0 - 97.0 fL    MCH 32.0 26.6 - 33.0 pg    MCHC 32.4 31.5 - 35.7 g/dL    RDW 18.6 (H) 12.3 - 15.4 %    RDW-SD 66.3 (H) 37.0 - 54.0 fl    MPV 9.7 6.0 - 12.0 fL    Platelets 282 140 - 450 10*3/mm3   Manual Differential    Collection Time: 01/13/24  2:55 AM    Specimen: Blood   Result Value Ref Range    Neutrophil % 66.0 42.7 - 76.0 %    Lymphocyte % 27.0 19.6 - 45.3 %    Monocyte % 1.0 (L) 5.0 - 12.0 %     Eosinophil % 5.0 0.3 - 6.2 %    Atypical Lymphocyte % 1.0 0.0 - 5.0 %    Neutrophils Absolute 1.99 1.70 - 7.00 10*3/mm3    Lymphocytes Absolute 0.84 0.70 - 3.10 10*3/mm3    Monocytes Absolute 0.03 (L) 0.10 - 0.90 10*3/mm3    Eosinophils Absolute 0.15 0.00 - 0.40 10*3/mm3    RBC Morphology Normal Normal    WBC Morphology Normal Normal    Platelet Morphology Normal Normal   Prepare RBC, 1 Units    Collection Time: 01/13/24  6:00 AM   Result Value Ref Range    Product Code G4361U56     Unit Number [id number]     UNIT  ABO O     UNIT  RH POS     Crossmatch Interpretation Compatible     Dispense Status PT     Blood Expiration Date 934950177219     Blood Type Barcode 5100        Radiology:  Pertinent radiology studies were reviewed as described above      Medications have been reviewed separately in chart overview      ASSESSMENT:  Acute kidney injury, new this admission.  Initially may have been due to some decompensated heart failure and cardiorenal syndrome.  Currently euvolemic on exam.  Creatinine 1.45 today with prior baseline around 1.1  Stage II CKD, likely vascular disease with ongoing tobacco abuse and history of CAD  Chest pain in need of heart cath  Acute decompensated systolic heart failure.  Improved with diuretics  Myelodysplasia  Anemia related to MDS    Chest pain    Cardiomyopathy, dilated           DISCUSSION/PLAN:   Cr down at 1.14 today   Remains euvolemic on exam   Status post left heart cath  Ok with renal to resume lasix and spironolactone at home dose upon discharge   Recommend he see pcp for labs in 1-2 weeks of discharge      Continue to monitor electrolytes and volume closely.  Discussed with patient and family    Jazz Meier MD  [facility]   Office phone number: [phone]  Answering service phone number: [phone]    01/13/24  08:30 EST    Dictation performed using Dragon dictation software

## 2024-01-13 NOTE — PROGRESS NOTES
"[facility]    Patient Name: Yves Hastings  :1950  73 y.o.  LOS: 0  Encounter Provider: WOO Alejandra      Patient Care Team:  Brenda Downs APRN as PCP - General (Family Medicine)  Mika Carranza MD as Consulting Physician (Cardiology)    Chief Complaint:  Dilated cardiomyopathy    Interval History: Feeling OK, still with persistent fatigue.       Objective   Vital Signs  Temp:  [97.5 °F (36.4 °C)-99.3 °F (37.4 °C)] 98.6 °F (37 °C)  Heart Rate:  [73-79] 73  Resp:  [6-19] 16  BP: ()/(56-80) 108/77    Intake/Output Summary (Last 24 hours) at 2024 0858  Last data filed at 2024 0837  Gross per 24 hour   Intake 1680 ml   Output 200 ml   Net 1480 ml     Flowsheet Rows      Flowsheet Row First Filed Value   Admission Height 180.3 cm (70.99\") Documented at 2024 2130   Admission Weight 94.3 kg (207 lb 14.4 oz) Documented at 2024 2130              Vitals reviewed.   Constitutional:       General: Not in acute distress.     Appearance: Well-developed. Not diaphoretic.   HENT:      Head: Normocephalic.   Pulmonary:      Effort: Pulmonary effort is normal. No respiratory distress.      Breath sounds: Normal breath sounds. No wheezing. No rhonchi. No rales.   Cardiovascular:      Normal rate. Regular rhythm.   Pulses:     Radial: 2+ bilaterally.  Edema:     Peripheral edema absent.   Skin:     General: Skin is warm and dry. There is no cyanosis.      Findings: No rash.   Neurological:      Mental Status: Alert and oriented to person, place, and time.   Psychiatric:         Behavior: Behavior normal.         Thought Content: Thought content normal.         Judgment: Judgment normal.           Pertinent Test Results:  Results from last 7 days   Lab Units 24  0255 24  0326 24  0249 01/10/24  0343 24  2313   SODIUM mmol/L 138 134* 136 134* 132*   POTASSIUM mmol/L 4.0 4.0 4.1 4.3 4.7   CHLORIDE mmol/L 106 102 102 102 102   CO2 " mmol/L 21.0* 21.0* 22.0 21.4* 22.0   BUN mg/dL 17 22 24* 22 23   CREATININE mg/dL 1.14 1.45* 1.48* 1.32* 1.33*   GLUCOSE mg/dL 95 110* 104* 99 103*   CALCIUM mg/dL 8.3* 8.3* 8.8 8.5* 8.7   AST (SGOT) U/L  --   --  15  --  13   ALT (SGPT) U/L  --   --  12  --  12         Results from last 7 days   Lab Units 01/13/24  0255 01/12/24  1639 01/12/24  0326 01/11/24  0249 01/10/24  0343 01/09/24  2313   WBC 10*3/mm3 3.01*  --  3.70 3.78 3.47 3.86   HEMOGLOBIN g/dL 8.9* 10.5* 7.9* 8.6* 7.3* 7.4*   HEMATOCRIT % 27.5* 32.6* 23.6* 26.4* 23.3* 23.3*   PLATELETS 10*3/mm3 282  --  266 319 320 315     Results from last 7 days   Lab Units 01/09/24  2313   INR  1.20*   APTT seconds 35.1     Results from last 7 days   Lab Units 01/09/24  2313   MAGNESIUM mg/dL 1.9     Results from last 7 days   Lab Units 01/11/24  0249 01/10/24  0343   CHOLESTEROL mg/dL 81 78   TRIGLYCERIDES mg/dL 89 99   HDL CHOL mg/dL 23* 20*     Results from last 7 days   Lab Units 01/10/24  2014 01/09/24  2313   PROBNP pg/mL 2,627.0* 1,927.0*     Results from last 7 days   Lab Units 01/11/24  0249   TSH uIU/mL 1.710           Medication Review:   aspirin, 81 mg, Oral, Daily  atorvastatin, 10 mg, Oral, Nightly  budesonide-formoterol, 2 puff, Inhalation, BID - RT   And  tiotropium bromide monohydrate, 2 puff, Inhalation, Daily - RT  carvedilol, 3.125 mg, Oral, BID With Meals  clopidogrel, 75 mg, Oral, Daily  [Held by provider] empagliflozin, 10 mg, Oral, Daily  famotidine, 20 mg, Oral, BID  ferrous sulfate, 325 mg, Oral, Daily With Breakfast  [Held by provider] furosemide, 40 mg, Oral, Daily  sertraline, 50 mg, Oral, Daily  sodium chloride, 10 mL, Intravenous, Q12H  [Held by provider] spironolactone, 25 mg, Oral, Daily              Assessment & Plan     Active Hospital Problems    Diagnosis  POA    **Chest pain [R07.9]  Yes    Cardiomyopathy, dilated [I42.0]  Unknown      Resolved Hospital Problems   No resolved problems to display.        Coronary artery disease.  Cath yesterday with mild nonobstructive disease. On DAPT with aspirin and plavix.  Cardiomyopathy. GDMT with spironolactone, carvedilol, and furosemide. Nephrology managing diuretics.  MDS. Hematology following; s/p PRBC 1/11/24  HTN  COPD      WOO Alejandra  [facility]  01/13/24  08:58 EST

## 2024-01-13 NOTE — PROGRESS NOTES
"Daily progress note    Referring physician  Dr. HOLA Quintana  Doing same with no new complaints and denies any chest pain shortness of breath palpitation.  Patient family at bedside.    History of present illness  73-year-old white male with history of COPD coronary artery disease hypertension hyperlipidemia obstructive sleep apnea chronic anemia with myelodysplastic syndrome admitted to cardiology service with chest discomfort off-and-on associated with shortness of breath but no other associated symptoms.  Patient also denies any fever chills cough congestion night sweats weight loss or weight gain.  Patient admitted to cardiology service and going for cardiac catheterization and I am asked to follow patient for medical problem.  Patient and his wife kidney in detail history.     REVIEW OF SYSTEMS  Unremarkable     PHYSICAL EXAM   Blood pressure 117/72, pulse 75, temperature 98.5 °F (36.9 °C), temperature source Oral, resp. rate 18, height 180 cm (70.87\"), weight 91.2 kg (201 lb 1.6 oz), SpO2 94%.    Constitutional: Awake and alert no distress.   HEENT: Unremarkable  Neck: Supple.   Cardiovascular: Normal rate, regular rhythm and intact distal pulses.  Pulmonary/Chest: Normal effort with decreased breath sounds at the bases  Abdominal: Soft.  Nontender nondistended bowel sounds positive  Musculoskeletal: Moves all extremities equally. There is no pedal edema or calf tenderness.   Neurological: Alert.  Baseline strength and sensation noted.   Skin: Skin is pink, warm, and dry.  Mild pallor.   Psychiatric: Mood and affect normal.     LAB RESULTS  Lab Results (last 24 hours)       Procedure Component Value Units Date/Time    Manual Differential [[id number]]  (Abnormal) Collected: 01/13/24 0255    Specimen: Blood Updated: 01/13/24 0511     Neutrophil % 66.0 %      Lymphocyte % 27.0 %      Monocyte % 1.0 %      Eosinophil % 5.0 %      Atypical Lymphocyte % 1.0 %      Neutrophils Absolute 1.99 10*3/mm3      " Lymphocytes Absolute 0.84 10*3/mm3      Monocytes Absolute 0.03 10*3/mm3      Eosinophils Absolute 0.15 10*3/mm3      RBC Morphology Normal     WBC Morphology Normal     Platelet Morphology Normal    Basic Metabolic Panel [[id number]]  (Abnormal) Collected: 01/13/24 0255    Specimen: Blood Updated: 01/13/24 0430     Glucose 95 mg/dL      BUN 17 mg/dL      Creatinine 1.14 mg/dL      Sodium 138 mmol/L      Potassium 4.0 mmol/L      Chloride 106 mmol/L      CO2 21.0 mmol/L      Calcium 8.3 mg/dL      BUN/Creatinine Ratio 14.9     Anion Gap 11.0 mmol/L      eGFR 68.3 mL/min/1.73     Narrative:      GFR Normal >60  Chronic Kidney Disease <60  Kidney Failure <15    The GFR formula is only valid for adults with stable renal function between ages 18 and 70.    CBC & Differential [[id number]]  (Abnormal) Collected: 01/13/24 0255    Specimen: Blood Updated: 01/13/24 0420    Narrative:      The following orders were created for panel order CBC & Differential.  Procedure                               Abnormality         Status                     ---------                               -----------         ------                     CBC Auto Differential[[id number]]        Abnormal            Final result                 Please view results for these tests on the individual orders.    CBC Auto Differential [[id number]]  (Abnormal) Collected: 01/13/24 0255    Specimen: Blood Updated: 01/13/24 0420     WBC 3.01 10*3/mm3      RBC 2.78 10*6/mm3      Hemoglobin 8.9 g/dL      Hematocrit 27.5 %      MCV 98.9 fL      MCH 32.0 pg      MCHC 32.4 g/dL      RDW 18.6 %      RDW-SD 66.3 fl      MPV 9.7 fL      Platelets 282 10*3/mm3     Hemoglobin & Hematocrit, Blood [[id number]]  (Abnormal) Collected: 01/12/24 1639    Specimen: Blood Updated: 01/12/24 1739     Hemoglobin 10.5 g/dL      Hematocrit 32.6 %           Imaging Results (Last 24 Hours)       ** No results found for the last 24 hours. **          Results  Scan on 1/9/2024 2237 by [facility]  [facility], [place]: ECG 12-LEAD         Author: -- Service: -- Author Type: --   Filed: Date of Service: Creation Time:   Status: (Other)   HEART RATE= 72  bpm  RR Interval= 833  ms  [place] Interval= 206  ms  P Horizontal Axis= -48  deg  P Front Axis=   deg  QRSD Interval= 135  ms  QT Interval= 410  ms  QTcB= 449  ms  QRS Axis= -65  deg  T Wave Axis= 90  deg  - ABNORMAL ECG -  Atrial-paced complexes  Left bundle branch block  No change from previous tracing          Current Facility-Administered Medications:     albuterol (PROVENTIL) nebulizer solution 0.083% 2.5 mg/3mL, 2.5 mg, Nebulization, Q6H PRN, Mika Carranza MD    aspirin EC tablet 81 mg, 81 mg, Oral, Daily, Mika Carranza MD, 81 mg at 01/13/24 0804    atorvastatin (LIPITOR) tablet 10 mg, 10 mg, Oral, Nightly, Mika Carranza MD, 10 mg at 01/12/24 2005    budesonide-formoterol (SYMBICORT) 160-4.5 MCG/ACT inhaler 2 puff, 2 puff, Inhalation, BID - RT, 2 puff at 01/13/24 1011 **AND** tiotropium (SPIRIVA RESPIMAT) 2.5 mcg/act aerosol solution inhaler, 2 puff, Inhalation, Daily - RT, Mika Carranza MD, 2 puff at 01/13/24 1011    carvedilol (COREG) tablet 3.125 mg, 3.125 mg, Oral, BID With Meals, Mika Carranza MD, 3.125 mg at 01/13/24 0804    clopidogrel (PLAVIX) tablet 75 mg, 75 mg, Oral, Daily, Mika Carranza MD, 75 mg at 01/13/24 0804    [Held by provider] empagliflozin (JARDIANCE) tablet 10 mg, 10 mg, Oral, Daily, Mika Carranza MD, 10 mg at 01/11/24 0803    famotidine (PEPCID) tablet 20 mg, 20 mg, Oral, BID, Mika Carranza MD, 20 mg at 01/13/24 0803    ferrous sulfate tablet 325 mg, 325 mg, Oral, Daily With Breakfast, Mika Carranza MD, 325 mg at 01/13/24 0803    [Held by provider] furosemide (LASIX) tablet 40 mg, 40 mg, Oral, Daily, Mika Carranza MD, 40 mg at 01/11/24 1021    melatonin tablet 3 mg, 3 mg, Oral, Nightly PRN, Mika Carranza MD, 3 mg at 01/12/24 2004     nitroglycerin (NITROSTAT) SL tablet 0.4 mg, 0.4 mg, Sublingual, Q5 Min PRN, Mika Carranza MD    sertraline (ZOLOFT) tablet 50 mg, 50 mg, Oral, Daily, Mika Carranza MD, 50 mg at 01/13/24 0804    sodium chloride 0.9 % flush 10 mL, 10 mL, Intravenous, Q12H, Mika Carranza MD, 10 mL at 01/13/24 0804    sodium chloride 0.9 % flush 10 mL, 10 mL, Intravenous, PRN, Mika Carranza MD    sodium chloride 0.9 % infusion 40 mL, 40 mL, Intravenous, PRN, Mika Carranza MD    [Held by provider] spironolactone (ALDACTONE) tablet 25 mg, 25 mg, Oral, Daily, Mika Carranza MD, 25 mg at 01/11/24 0804        ASSESSMENT  Coronary artery disease with normal cardiac cath  Cardiomyopathy with ejection fraction 20%  Chronic obstructive pulmonary disease  Hypertension  Hyperlipidemia  Obstructive sleep apnea  Chronic anemia with myelodysplastic syndrome  Gastroesophageal reflux disease    PLAN  CPM  Continue aspirin Plavix Coreg Lasix Aldactone and Lipitor  Adjust home medications  Stress ulcer DVT prophylaxis  Hematology and nephrology to follow patient  Supportive care  Discussed with family nursing staff  Follow Dr. SIMENTAL and further recommendations according to hospital course    OUSMANE JO MD    Copied text in this note has been reviewed and is accurate as of 01/13/24

## 2024-01-14 LAB
ANION GAP SERPL CALCULATED.3IONS-SCNC: 10 MMOL/L (ref 5–15)
BUN SERPL-MCNC: 16 MG/DL (ref 8–23)
BUN/CREAT SERPL: 14.5 (ref 7–25)
CALCIUM SPEC-SCNC: 8.2 MG/DL (ref 8.6–10.5)
CHLORIDE SERPL-SCNC: 104 MMOL/L (ref 98–107)
CO2 SERPL-SCNC: 21 MMOL/L (ref 22–29)
CREAT SERPL-MCNC: 1.1 MG/DL (ref 0.76–1.27)
DEPRECATED RDW RBC AUTO: 64.9 FL (ref 37–54)
EGFRCR SERPLBLD CKD-EPI 2021: 71.3 ML/MIN/1.73
EOSINOPHIL # BLD MANUAL: 0.16 10*3/MM3 (ref 0–0.4)
EOSINOPHIL NFR BLD MANUAL: 5.1 % (ref 0.3–6.2)
ERYTHROCYTE [DISTWIDTH] IN BLOOD BY AUTOMATED COUNT: 18.3 % (ref 12.3–15.4)
GLUCOSE SERPL-MCNC: 104 MG/DL (ref 65–99)
HCT VFR BLD AUTO: 27 % (ref 37.5–51)
HGB BLD-MCNC: 9 G/DL (ref 13–17.7)
LYMPHOCYTES # BLD MANUAL: 1.16 10*3/MM3 (ref 0.7–3.1)
LYMPHOCYTES NFR BLD MANUAL: 5.1 % (ref 5–12)
MCH RBC QN AUTO: 33.1 PG (ref 26.6–33)
MCHC RBC AUTO-ENTMCNC: 33.3 G/DL (ref 31.5–35.7)
MCV RBC AUTO: 99.3 FL (ref 79–97)
MONOCYTES # BLD: 0.16 10*3/MM3 (ref 0.1–0.9)
NEUTROPHILS # BLD AUTO: 1.67 10*3/MM3 (ref 1.7–7)
NEUTROPHILS NFR BLD MANUAL: 53.1 % (ref 42.7–76)
PLAT MORPH BLD: NORMAL
PLATELET # BLD AUTO: 244 10*3/MM3 (ref 140–450)
PMV BLD AUTO: 9.6 FL (ref 6–12)
POTASSIUM SERPL-SCNC: 3.9 MMOL/L (ref 3.5–5.2)
RBC # BLD AUTO: 2.72 10*6/MM3 (ref 4.14–5.8)
RBC MORPH BLD: NORMAL
SODIUM SERPL-SCNC: 135 MMOL/L (ref 136–145)
VARIANT LYMPHS NFR BLD MANUAL: 36.7 % (ref 19.6–45.3)
WBC MORPH BLD: NORMAL
WBC NRBC COR # BLD AUTO: 3.15 10*3/MM3 (ref 3.4–10.8)

## 2024-01-14 PROCEDURE — 80048 BASIC METABOLIC PNL TOTAL CA: CPT | Performed by: HOSPITALIST

## 2024-01-14 PROCEDURE — 99214 OFFICE O/P EST MOD 30 MIN: CPT | Performed by: NURSE PRACTITIONER

## 2024-01-14 PROCEDURE — 85007 BL SMEAR W/DIFF WBC COUNT: CPT | Performed by: HOSPITALIST

## 2024-01-14 PROCEDURE — 85025 COMPLETE CBC W/AUTO DIFF WBC: CPT | Performed by: HOSPITALIST

## 2024-01-14 PROCEDURE — 94799 UNLISTED PULMONARY SVC/PX: CPT

## 2024-01-14 PROCEDURE — G0378 HOSPITAL OBSERVATION PER HR: HCPCS

## 2024-01-14 RX ADMIN — Medication 3 MG: at 21:20

## 2024-01-14 RX ADMIN — ASPIRIN 81 MG: 81 TABLET, COATED ORAL at 08:30

## 2024-01-14 RX ADMIN — EMPAGLIFLOZIN 10 MG: 10 TABLET, FILM COATED ORAL at 08:30

## 2024-01-14 RX ADMIN — FERROUS SULFATE TAB 325 MG (65 MG ELEMENTAL FE) 325 MG: 325 (65 FE) TAB at 08:29

## 2024-01-14 RX ADMIN — TIOTROPIUM BROMIDE INHALATION SPRAY 2 PUFF: 3.12 SPRAY, METERED RESPIRATORY (INHALATION) at 09:45

## 2024-01-14 RX ADMIN — CARVEDILOL 3.12 MG: 3.12 TABLET, FILM COATED ORAL at 08:29

## 2024-01-14 RX ADMIN — FAMOTIDINE 20 MG: 20 TABLET, FILM COATED ORAL at 20:07

## 2024-01-14 RX ADMIN — FAMOTIDINE 20 MG: 20 TABLET, FILM COATED ORAL at 08:29

## 2024-01-14 RX ADMIN — SERTRALINE 50 MG: 50 TABLET, FILM COATED ORAL at 08:29

## 2024-01-14 RX ADMIN — ATORVASTATIN CALCIUM 10 MG: 20 TABLET, FILM COATED ORAL at 20:07

## 2024-01-14 RX ADMIN — CLOPIDOGREL BISULFATE 75 MG: 75 TABLET, FILM COATED ORAL at 08:30

## 2024-01-14 RX ADMIN — SPIRONOLACTONE 25 MG: 25 TABLET ORAL at 08:29

## 2024-01-14 RX ADMIN — BUDESONIDE AND FORMOTEROL FUMARATE DIHYDRATE 2 PUFF: 160; 4.5 AEROSOL RESPIRATORY (INHALATION) at 09:45

## 2024-01-14 RX ADMIN — FUROSEMIDE 40 MG: 40 TABLET ORAL at 08:29

## 2024-01-14 NOTE — PROGRESS NOTES
"   LOS: 0 days     Chief Complaint/ Reason for encounter: CKD need for IV contrast    Subjective   01/12/24 : Patient is doing well today with no new complaints  Good appetite with no nausea or vomiting  No shortness of breath chest pain or edema  Voiding well with no dysuria    1/13 feels good, tolerating po     1/14: Feels well denies complaints good appetite no nausea vomiting  No further chest pain shortness of breath or edema      Medical history reviewed:  History of Present Illness    Subjective    History taken from: Patient and chart    Vital Signs  Temp:  [97.7 °F (36.5 °C)-98.6 °F (37 °C)] 97.7 °F (36.5 °C)  Heart Rate:  [74-79] 76  Resp:  [16-20] 18  BP: ()/(58-79) 108/62       Wt Readings from Last 1 Encounters:   01/14/24 0330 91.5 kg (201 lb 12.8 oz)   01/13/24 0500 91.2 kg (201 lb 1.6 oz)   01/12/24 0328 90.9 kg (200 lb 8 oz)   01/11/24 0813 89.9 kg (198 lb 3.1 oz)   01/10/24 0942 94 kg (207 lb 3.7 oz)   01/09/24 2144 94.3 kg (207 lb 14.4 oz)   01/09/24 2130 94.3 kg (207 lb 14.4 oz)       Objective:  Vital signs: (most recent): Blood pressure 108/62, pulse 76, temperature 97.7 °F (36.5 °C), temperature source Oral, resp. rate 18, height 180 cm (70.87\"), weight 91.5 kg (201 lb 12.8 oz), SpO2 92%.                Objective:  General Appearance:  Comfortable, well-appearing, in no acute distress and not in pain.  Awake, alert, oriented  HEENT: Mucous membranes moist, no injury, oropharynx clear  Lungs:  Normal effort and normal respiratory rate.  Breath sounds clear to auscultation.  No  respiratory distress.  No rales, decreased breath sounds or rhonchi.    Heart: Normal rate.  Regular rhythm.  S1, S2 normal.  No murmur.   Abdomen: Abdomen is soft.  Bowel sounds are normal, no abdominal tenderness.  There is no rebound or guarding  Extremities: No edema of bilateral lower extremities  Neurological: No focal motor or sensory deficits, pupils reactive  Skin:  Warm and dry.  No rash or cyanosis. "       Results Review:    Intake/Output:     Intake/Output Summary (Last 24 hours) at 1/14/2024 1048  Last data filed at 1/14/2024 0945  Gross per 24 hour   Intake 720 ml   Output --   Net 720 ml         DATA:  Radiology and Labs:  The following labs independently reviewed by me. Additional labs ordered for tomorrow a.m.  Interval notes, chart personally reviewed by me.   Old records independently reviewed showing CKD 3    Risk/ complexity of medical care/ medical decision making moderate complexity    Labs:   Recent Results (from the past 24 hour(s))   Basic Metabolic Panel    Collection Time: 01/14/24  5:02 AM    Specimen: Blood   Result Value Ref Range    Glucose 104 (H) 65 - 99 mg/dL    BUN 16 8 - 23 mg/dL    Creatinine 1.10 0.76 - 1.27 mg/dL    Sodium 135 (L) 136 - 145 mmol/L    Potassium 3.9 3.5 - 5.2 mmol/L    Chloride 104 98 - 107 mmol/L    CO2 21.0 (L) 22.0 - 29.0 mmol/L    Calcium 8.2 (L) 8.6 - 10.5 mg/dL    BUN/Creatinine Ratio 14.5 7.0 - 25.0    Anion Gap 10.0 5.0 - 15.0 mmol/L    eGFR 71.3 >60.0 mL/min/1.73   CBC Auto Differential    Collection Time: 01/14/24  5:02 AM    Specimen: Blood   Result Value Ref Range    WBC 3.15 (L) 3.40 - 10.80 10*3/mm3    RBC 2.72 (L) 4.14 - 5.80 10*6/mm3    Hemoglobin 9.0 (L) 13.0 - 17.7 g/dL    Hematocrit 27.0 (L) 37.5 - 51.0 %    MCV 99.3 (H) 79.0 - 97.0 fL    MCH 33.1 (H) 26.6 - 33.0 pg    MCHC 33.3 31.5 - 35.7 g/dL    RDW 18.3 (H) 12.3 - 15.4 %    RDW-SD 64.9 (H) 37.0 - 54.0 fl    MPV 9.6 6.0 - 12.0 fL    Platelets 244 140 - 450 10*3/mm3   Manual Differential    Collection Time: 01/14/24  5:02 AM    Specimen: Blood   Result Value Ref Range    Neutrophil % 53.1 42.7 - 76.0 %    Lymphocyte % 36.7 19.6 - 45.3 %    Monocyte % 5.1 5.0 - 12.0 %    Eosinophil % 5.1 0.3 - 6.2 %    Neutrophils Absolute 1.67 (L) 1.70 - 7.00 10*3/mm3    Lymphocytes Absolute 1.16 0.70 - 3.10 10*3/mm3    Monocytes Absolute 0.16 0.10 - 0.90 10*3/mm3    Eosinophils Absolute 0.16 0.00 - 0.40 10*3/mm3     RBC Morphology Normal Normal    WBC Morphology Normal Normal    Platelet Morphology Normal Normal       Radiology:  Pertinent radiology studies were reviewed as described above      Medications have been reviewed separately in chart overview      ASSESSMENT:  Acute kidney injury, new this admission.  Initially may have been due to some decompensated heart failure and cardiorenal syndrome.  Currently euvolemic on exam.  Improved  Stage II CKD, likely vascular disease with ongoing tobacco abuse and history of CAD  Chest pain in need of heart cath  Acute decompensated systolic heart failure.  Improved with diuretics  Myelodysplasia  Anemia related to MDS    Chest pain    Cardiomyopathy, dilated           DISCUSSION/PLAN:   Creatinine 1.1 today which is back to baseline  Remains euvolemic on exam   Status post left heart cath with no new coronary stenosis  Continue Lasix and spironolactone at current dosages  Recommend he see pcp for labs in 1-2 weeks of discharge   Continue Jardiance  Stable for discharge at any time from renal standpoint      Eloy Meier MD  [facility]   Office phone number: [phone]  Answering service phone number: [phone]    01/14/24  10:48 EST    Dictation performed using Dragon dictation software

## 2024-01-14 NOTE — PLAN OF CARE
Goal Outcome Evaluation:  Plan of Care Reviewed With: patient        Progress: no change     Pt remains A paced with some soft sbp in 90's, asymptomatic. Held night dose of coreg. No c/o pain. Walked with staff and family around the unit x 2. Will continue with plan of care.         Problem: Adult Inpatient Plan of Care  Goal: Plan of Care Review  Outcome: Ongoing, Progressing  Flowsheets (Taken 1/14/2024 6073)  Progress: no change  Plan of Care Reviewed With: patient

## 2024-01-14 NOTE — PROGRESS NOTES
"[facility]    Patient Name: Yves Hastings  :1950  73 y.o.  LOS: 0  Encounter Provider: WOO Alejandra      Patient Care Team:  Brenda Downs APRN as PCP - General (Family Medicine)  Mika Carranza MD as Consulting Physician (Cardiology)    Chief Complaint:  Dilated cardiomyopathy    Interval History: Did well overnight. Still with fatigue and low energy. Hgb stable       Objective   Vital Signs  Temp:  [97.7 °F (36.5 °C)-98.6 °F (37 °C)] 97.7 °F (36.5 °C)  Heart Rate:  [74-80] 77  Resp:  [16-20] 16  BP: ()/(58-79) 108/62    Intake/Output Summary (Last 24 hours) at 2024 0940  Last data filed at 2024 1810  Gross per 24 hour   Intake 480 ml   Output --   Net 480 ml     Flowsheet Rows      Flowsheet Row First Filed Value   Admission Height 180.3 cm (70.99\") Documented at 2024 2130   Admission Weight 94.3 kg (207 lb 14.4 oz) Documented at 2024 2130              Vitals reviewed.   Constitutional:       General: Not in acute distress.     Appearance: Well-developed. Not diaphoretic.   HENT:      Head: Normocephalic.   Pulmonary:      Effort: Pulmonary effort is normal. No respiratory distress.      Breath sounds: Normal breath sounds. No wheezing. No rhonchi. No rales.   Cardiovascular:      Normal rate. Regular rhythm.   Pulses:     Radial: 2+ bilaterally.  Edema:     Peripheral edema absent.   Skin:     General: Skin is warm and dry. There is no cyanosis.      Findings: No rash.   Neurological:      Mental Status: Alert and oriented to person, place, and time.   Psychiatric:         Behavior: Behavior normal.         Thought Content: Thought content normal.         Judgment: Judgment normal.           Pertinent Test Results:  Results from last 7 days   Lab Units 24  0502 24  0255 24  0326 24  0249 01/10/24  0343 24  2313   SODIUM mmol/L 135* 138 134* 136 134* 132*   POTASSIUM mmol/L 3.9 4.0 4.0 4.1 4.3 4.7 "   CHLORIDE mmol/L 104 106 102 102 102 102   CO2 mmol/L 21.0* 21.0* 21.0* 22.0 21.4* 22.0   BUN mg/dL 16 17 22 24* 22 23   CREATININE mg/dL 1.10 1.14 1.45* 1.48* 1.32* 1.33*   GLUCOSE mg/dL 104* 95 110* 104* 99 103*   CALCIUM mg/dL 8.2* 8.3* 8.3* 8.8 8.5* 8.7   AST (SGOT) U/L  --   --   --  15  --  13   ALT (SGPT) U/L  --   --   --  12  --  12         Results from last 7 days   Lab Units 01/14/24  0502 01/13/24  0255 01/12/24  1639 01/12/24  0326 01/11/24  0249 01/10/24  0343 01/09/24  2313   WBC 10*3/mm3 3.15* 3.01*  --  3.70 3.78 3.47 3.86   HEMOGLOBIN g/dL 9.0* 8.9* 10.5* 7.9* 8.6* 7.3* 7.4*   HEMATOCRIT % 27.0* 27.5* 32.6* 23.6* 26.4* 23.3* 23.3*   PLATELETS 10*3/mm3 244 282  --  266 319 320 315     Results from last 7 days   Lab Units 01/09/24  2313   INR  1.20*   APTT seconds 35.1     Results from last 7 days   Lab Units 01/09/24  2313   MAGNESIUM mg/dL 1.9     Results from last 7 days   Lab Units 01/11/24  0249 01/10/24  0343   CHOLESTEROL mg/dL 81 78   TRIGLYCERIDES mg/dL 89 99   HDL CHOL mg/dL 23* 20*     Results from last 7 days   Lab Units 01/10/24  2014 01/09/24  2313   PROBNP pg/mL 2,627.0* 1,927.0*     Results from last 7 days   Lab Units 01/11/24 0249   TSH uIU/mL 1.710           Medication Review:   aspirin, 81 mg, Oral, Daily  atorvastatin, 10 mg, Oral, Nightly  budesonide-formoterol, 2 puff, Inhalation, BID - RT   And  tiotropium bromide monohydrate, 2 puff, Inhalation, Daily - RT  carvedilol, 3.125 mg, Oral, BID With Meals  clopidogrel, 75 mg, Oral, Daily  empagliflozin, 10 mg, Oral, Daily  famotidine, 20 mg, Oral, BID  ferrous sulfate, 325 mg, Oral, Daily With Breakfast  furosemide, 40 mg, Oral, Daily  sertraline, 50 mg, Oral, Daily  spironolactone, 25 mg, Oral, Daily              Assessment & Plan     Active Hospital Problems    Diagnosis  POA    **Chest pain [R07.9]  Yes    Cardiomyopathy, dilated [I42.0]  Unknown      Resolved Hospital Problems   No resolved problems to display.         Coronary artery disease. Cath yesterday with mild nonobstructive disease. On DAPT with aspirin and plavix.  Cardiomyopathy. GDMT with spironolactone, carvedilol, and furosemide. Nephrology managing diuretics.  MDS. Hematology following; s/p PRBC 1/11/24. Hgb stable  HTN. Stable BP on current regimen  COPD      WOO Alejandra  [facility]  01/14/24  09:40 EST

## 2024-01-14 NOTE — PLAN OF CARE
Goal Outcome Evaluation:  Plan of Care Reviewed With: patient        Progress: no change  Outcome Evaluation: VSS; Pt is alert and oriented; Right groin site soft and dry; No complaints noted; PRN sleeping aid given; will continue to montitor

## 2024-01-14 NOTE — PROGRESS NOTES
"Daily progress note    Referring physician  Dr. SIMENTAL    Subjective  Doing better with no specific complaints.  Patient denies any chest pain shortness of breath palpitation    History of present illness  73-year-old white male with history of COPD coronary artery disease hypertension hyperlipidemia obstructive sleep apnea chronic anemia with myelodysplastic syndrome admitted to cardiology service with chest discomfort off-and-on associated with shortness of breath but no other associated symptoms.  Patient also denies any fever chills cough congestion night sweats weight loss or weight gain.  Patient admitted to cardiology service and going for cardiac catheterization and I am asked to follow patient for medical problem.  Patient and his wife kidney in detail history.     REVIEW OF SYSTEMS  Unremarkable except weakness     PHYSICAL EXAM   Blood pressure 108/62, pulse 79, temperature 97.9 °F (36.6 °C), temperature source Oral, resp. rate 18, height 180 cm (70.87\"), weight 91.5 kg (201 lb 12.8 oz), SpO2 93%.    Constitutional: Awake and alert no distress.   HEENT: Unremarkable  Neck: Supple.   Cardiovascular: Normal rate, regular rhythm and intact distal pulses.  Pulmonary/Chest: Normal effort with decreased breath sounds at the bases  Abdominal: Soft.  Nontender nondistended bowel sounds positive  Musculoskeletal: Moves all extremities equally. There is no pedal edema or calf tenderness.   Neurological: Alert.  Baseline strength and sensation noted.   Skin: Skin is pink, warm, and dry.  Mild pallor.   Psychiatric: Mood and affect normal.     LAB RESULTS  Lab Results (last 24 hours)       Procedure Component Value Units Date/Time    Manual Differential [[id number]]  (Abnormal) Collected: 01/14/24 0502    Specimen: Blood Updated: 01/14/24 0615     Neutrophil % 53.1 %      Lymphocyte % 36.7 %      Monocyte % 5.1 %      Eosinophil % 5.1 %      Neutrophils Absolute 1.67 10*3/mm3      Lymphocytes Absolute 1.16 10*3/mm3      " Monocytes Absolute 0.16 10*3/mm3      Eosinophils Absolute 0.16 10*3/mm3      RBC Morphology Normal     WBC Morphology Normal     Platelet Morphology Normal    Basic Metabolic Panel [[id number]]  (Abnormal) Collected: 01/14/24 0502    Specimen: Blood Updated: 01/14/24 0553     Glucose 104 mg/dL      BUN 16 mg/dL      Creatinine 1.10 mg/dL      Sodium 135 mmol/L      Potassium 3.9 mmol/L      Chloride 104 mmol/L      CO2 21.0 mmol/L      Calcium 8.2 mg/dL      BUN/Creatinine Ratio 14.5     Anion Gap 10.0 mmol/L      eGFR 71.3 mL/min/1.73     Narrative:      GFR Normal >60  Chronic Kidney Disease <60  Kidney Failure <15    The GFR formula is only valid for adults with stable renal function between ages 18 and 70.    CBC & Differential [[id number]]  (Abnormal) Collected: 01/14/24 0502    Specimen: Blood Updated: 01/14/24 0537    Narrative:      The following orders were created for panel order CBC & Differential.  Procedure                               Abnormality         Status                     ---------                               -----------         ------                     CBC Auto Differential[[id number]]        Abnormal            Final result                 Please view results for these tests on the individual orders.    CBC Auto Differential [[id number]]  (Abnormal) Collected: 01/14/24 0502    Specimen: Blood Updated: 01/14/24 0537     WBC 3.15 10*3/mm3      RBC 2.72 10*6/mm3      Hemoglobin 9.0 g/dL      Hematocrit 27.0 %      MCV 99.3 fL      MCH 33.1 pg      MCHC 33.3 g/dL      RDW 18.3 %      RDW-SD 64.9 fl      MPV 9.6 fL      Platelets 244 10*3/mm3           Imaging Results (Last 24 Hours)       ** No results found for the last 24 hours. **          Results  Scan on 1/9/2024 2237 by [facility], [place]: ECG 12-LEAD         Author: -- Service: -- Author Type: --   Filed: Date of Service: Creation Time:   Status: (Other)   HEART RATE= 72  bpm  RR Interval= 833  ms  MT Interval= 206  ms  P Horizontal  Axis= -48  deg  P Front Axis=   deg  QRSD Interval= 135  ms  QT Interval= 410  ms  QTcB= 449  ms  QRS Axis= -65  deg  T Wave Axis= 90  deg  - ABNORMAL ECG -  Atrial-paced complexes  Left bundle branch block  No change from previous tracing          Current Facility-Administered Medications:     albuterol (PROVENTIL) nebulizer solution 0.083% 2.5 mg/3mL, 2.5 mg, Nebulization, Q6H PRN, Mika Carranza MD    aspirin EC tablet 81 mg, 81 mg, Oral, Daily, Mika Carranza MD, 81 mg at 01/14/24 0830    atorvastatin (LIPITOR) tablet 10 mg, 10 mg, Oral, Nightly, Mika Carranza MD, 10 mg at 01/13/24 2001    budesonide-formoterol (SYMBICORT) 160-4.5 MCG/ACT inhaler 2 puff, 2 puff, Inhalation, BID - RT, 2 puff at 01/14/24 0945 **AND** tiotropium (SPIRIVA RESPIMAT) 2.5 mcg/act aerosol solution inhaler, 2 puff, Inhalation, Daily - RT, Mika Carranza MD, 2 puff at 01/14/24 0945    carvedilol (COREG) tablet 3.125 mg, 3.125 mg, Oral, BID With Meals, Mika Carranza MD, 3.125 mg at 01/14/24 0829    clopidogrel (PLAVIX) tablet 75 mg, 75 mg, Oral, Daily, Mika Carranza MD, 75 mg at 01/14/24 0830    empagliflozin (JARDIANCE) tablet 10 mg, 10 mg, Oral, Daily, Bebeto Aguilar MD, 10 mg at 01/14/24 0830    famotidine (PEPCID) tablet 20 mg, 20 mg, Oral, BID, Mika Carranza MD, 20 mg at 01/14/24 0829    ferrous sulfate tablet 325 mg, 325 mg, Oral, Daily With Breakfast, Mika Carranza MD, 325 mg at 01/14/24 0829    furosemide (LASIX) tablet 40 mg, 40 mg, Oral, Daily, Bebeto Aguilar MD, 40 mg at 01/14/24 0829    melatonin tablet 3 mg, 3 mg, Oral, Nightly PRN, Mika Carranza MD, 3 mg at 01/13/24 2315    nitroglycerin (NITROSTAT) SL tablet 0.4 mg, 0.4 mg, Sublingual, Q5 Min PRN, Mika Carranza MD    sertraline (ZOLOFT) tablet 50 mg, 50 mg, Oral, Daily, Mika Carranza MD, 50 mg at 01/14/24 0829    sodium chloride 0.9 % flush 10 mL, 10 mL, Intravenous, PRN,  Mika Carranza MD    sodium chloride 0.9 % infusion 40 mL, 40 mL, Intravenous, PRN, Mika Carranza MD    spironolactone (ALDACTONE) tablet 25 mg, 25 mg, Oral, Daily, Ousmane Aguilar MD, 25 mg at 01/14/24 0829        ASSESSMENT  Coronary artery disease with normal cardiac cath  Cardiomyopathy with ejection fraction 20%  Chronic obstructive pulmonary disease  Hypertension  Hyperlipidemia  Obstructive sleep apnea  Chronic anemia with myelodysplastic syndrome  Gastroesophageal reflux disease    PLAN  CPM  Continue aspirin Plavix Coreg Lasix Aldactone and Lipitor  Adjust home medications  Stress ulcer DVT prophylaxis  Hematology and nephrology to follow patient  Supportive care  Discussed with family nursing staff  Follow Dr. SIMENTAL and further recommendations according to hospital course    OUSMANE AGUILAR MD    Copied text in this note has been reviewed and is accurate as of 01/14/24

## 2024-01-15 ENCOUNTER — READMISSION MANAGEMENT (OUTPATIENT)
Dept: CALL CENTER | Facility: HOSPITAL | Age: 74
End: 2024-01-15
Payer: MEDICARE

## 2024-01-15 VITALS
RESPIRATION RATE: 18 BRPM | SYSTOLIC BLOOD PRESSURE: 102 MMHG | WEIGHT: 198 LBS | OXYGEN SATURATION: 96 % | DIASTOLIC BLOOD PRESSURE: 68 MMHG | TEMPERATURE: 97.8 F | HEART RATE: 73 BPM | BODY MASS INDEX: 27.72 KG/M2 | HEIGHT: 71 IN

## 2024-01-15 PROBLEM — E44.0 MODERATE MALNUTRITION: Status: ACTIVE | Noted: 2024-01-15

## 2024-01-15 LAB
ANION GAP SERPL CALCULATED.3IONS-SCNC: 12 MMOL/L (ref 5–15)
BASOPHILS # BLD AUTO: 0.02 10*3/MM3 (ref 0–0.2)
BASOPHILS NFR BLD AUTO: 0.6 % (ref 0–1.5)
BUN SERPL-MCNC: 14 MG/DL (ref 8–23)
BUN/CREAT SERPL: 14.1 (ref 7–25)
CALCIUM SPEC-SCNC: 8.1 MG/DL (ref 8.6–10.5)
CHLORIDE SERPL-SCNC: 106 MMOL/L (ref 98–107)
CO2 SERPL-SCNC: 20 MMOL/L (ref 22–29)
CREAT SERPL-MCNC: 0.99 MG/DL (ref 0.76–1.27)
DEPRECATED RDW RBC AUTO: 65.5 FL (ref 37–54)
EGFRCR SERPLBLD CKD-EPI 2021: 80.9 ML/MIN/1.73
EOSINOPHIL # BLD AUTO: 0.2 10*3/MM3 (ref 0–0.4)
EOSINOPHIL NFR BLD AUTO: 6 % (ref 0.3–6.2)
ERYTHROCYTE [DISTWIDTH] IN BLOOD BY AUTOMATED COUNT: 18.5 % (ref 12.3–15.4)
GLUCOSE SERPL-MCNC: 98 MG/DL (ref 65–99)
HCT VFR BLD AUTO: 26.4 % (ref 37.5–51)
HGB BLD-MCNC: 8.8 G/DL (ref 13–17.7)
IMM GRANULOCYTES # BLD AUTO: 0.06 10*3/MM3 (ref 0–0.05)
IMM GRANULOCYTES NFR BLD AUTO: 1.8 % (ref 0–0.5)
LYMPHOCYTES # BLD AUTO: 1.15 10*3/MM3 (ref 0.7–3.1)
LYMPHOCYTES NFR BLD AUTO: 34.4 % (ref 19.6–45.3)
MCH RBC QN AUTO: 33 PG (ref 26.6–33)
MCHC RBC AUTO-ENTMCNC: 33.3 G/DL (ref 31.5–35.7)
MCV RBC AUTO: 98.9 FL (ref 79–97)
MONOCYTES # BLD AUTO: 0.32 10*3/MM3 (ref 0.1–0.9)
MONOCYTES NFR BLD AUTO: 9.6 % (ref 5–12)
NEUTROPHILS NFR BLD AUTO: 1.59 10*3/MM3 (ref 1.7–7)
NEUTROPHILS NFR BLD AUTO: 47.6 % (ref 42.7–76)
NRBC BLD AUTO-RTO: 0.6 /100 WBC (ref 0–0.2)
PLATELET # BLD AUTO: 226 10*3/MM3 (ref 140–450)
PMV BLD AUTO: 9.7 FL (ref 6–12)
POTASSIUM SERPL-SCNC: 3.8 MMOL/L (ref 3.5–5.2)
RBC # BLD AUTO: 2.67 10*6/MM3 (ref 4.14–5.8)
SODIUM SERPL-SCNC: 138 MMOL/L (ref 136–145)
WBC NRBC COR # BLD AUTO: 3.34 10*3/MM3 (ref 3.4–10.8)

## 2024-01-15 PROCEDURE — 85025 COMPLETE CBC W/AUTO DIFF WBC: CPT | Performed by: HOSPITALIST

## 2024-01-15 PROCEDURE — 99238 HOSP IP/OBS DSCHRG MGMT 30/<: CPT | Performed by: INTERNAL MEDICINE

## 2024-01-15 PROCEDURE — G0378 HOSPITAL OBSERVATION PER HR: HCPCS

## 2024-01-15 PROCEDURE — 80048 BASIC METABOLIC PNL TOTAL CA: CPT | Performed by: HOSPITALIST

## 2024-01-15 RX ORDER — FAMOTIDINE 20 MG/1
20 TABLET, FILM COATED ORAL 2 TIMES DAILY
Qty: 60 TABLET | Refills: 0 | Status: SHIPPED | OUTPATIENT
Start: 2024-01-15

## 2024-01-15 RX ORDER — FUROSEMIDE 40 MG/1
40 TABLET ORAL DAILY
Qty: 30 TABLET | Refills: 5 | Status: SHIPPED | OUTPATIENT
Start: 2024-01-16

## 2024-01-15 RX ADMIN — FAMOTIDINE 20 MG: 20 TABLET, FILM COATED ORAL at 08:32

## 2024-01-15 RX ADMIN — SPIRONOLACTONE 25 MG: 25 TABLET ORAL at 08:32

## 2024-01-15 RX ADMIN — FUROSEMIDE 40 MG: 40 TABLET ORAL at 08:32

## 2024-01-15 RX ADMIN — SERTRALINE 50 MG: 50 TABLET, FILM COATED ORAL at 08:32

## 2024-01-15 RX ADMIN — CLOPIDOGREL BISULFATE 75 MG: 75 TABLET, FILM COATED ORAL at 08:32

## 2024-01-15 RX ADMIN — ASPIRIN 81 MG: 81 TABLET, COATED ORAL at 08:32

## 2024-01-15 RX ADMIN — EMPAGLIFLOZIN 10 MG: 10 TABLET, FILM COATED ORAL at 08:32

## 2024-01-15 RX ADMIN — CARVEDILOL 3.12 MG: 3.12 TABLET, FILM COATED ORAL at 08:32

## 2024-01-15 RX ADMIN — FERROUS SULFATE TAB 325 MG (65 MG ELEMENTAL FE) 325 MG: 325 (65 FE) TAB at 08:32

## 2024-01-15 NOTE — PROGRESS NOTES
"Daily progress note    Referring physician  Dr. SIMENTAL    Subjective  Doing better with no specific complaints.  Patient denies any chest pain shortness of breath palpitation.  Patient wants to go home    History of present illness  73-year-old white male with history of COPD coronary artery disease hypertension hyperlipidemia obstructive sleep apnea chronic anemia with myelodysplastic syndrome admitted to cardiology service with chest discomfort off-and-on associated with shortness of breath but no other associated symptoms.  Patient also denies any fever chills cough congestion night sweats weight loss or weight gain.  Patient admitted to cardiology service and going for cardiac catheterization and I am asked to follow patient for medical problem.  Patient and his wife kidney in detail history.     REVIEW OF SYSTEMS  Unremarkable except weakness     PHYSICAL EXAM   Blood pressure 102/68, pulse 73, temperature 97.8 °F (36.6 °C), temperature source Oral, resp. rate 18, height 180 cm (70.87\"), weight 89.8 kg (198 lb), SpO2 96%.    Constitutional: Awake and alert no distress.   HEENT: Unremarkable  Neck: Supple.   Cardiovascular: Normal rate, regular rhythm and intact distal pulses.  Pulmonary/Chest: Normal effort with decreased breath sounds at the bases  Abdominal: Soft.  Nontender nondistended bowel sounds positive  Musculoskeletal: Moves all extremities equally. There is no pedal edema or calf tenderness.   Neurological: Alert.  Baseline strength and sensation noted.   Skin: Skin is pink, warm, and dry.  Mild pallor.   Psychiatric: Mood and affect normal.     LAB RESULTS  Lab Results (last 24 hours)       Procedure Component Value Units Date/Time    Basic Metabolic Panel [[id number]]  (Abnormal) Collected: 01/15/24 0327    Specimen: Blood Updated: 01/15/24 0409     Glucose 98 mg/dL      BUN 14 mg/dL      Creatinine 0.99 mg/dL      Sodium 138 mmol/L      Potassium 3.8 mmol/L      Chloride 106 mmol/L      CO2 20.0 mmol/L  "     Calcium 8.1 mg/dL      BUN/Creatinine Ratio 14.1     Anion Gap 12.0 mmol/L      eGFR 80.9 mL/min/1.73     Narrative:      GFR Normal >60  Chronic Kidney Disease <60  Kidney Failure <15    The GFR formula is only valid for adults with stable renal function between ages 18 and 70.    CBC & Differential [[id number]]  (Abnormal) Collected: 01/15/24 0327    Specimen: Blood Updated: 01/15/24 0354    Narrative:      The following orders were created for panel order CBC & Differential.  Procedure                               Abnormality         Status                     ---------                               -----------         ------                     CBC Auto Differential[[id number]]        Abnormal            Final result                 Please view results for these tests on the individual orders.    CBC Auto Differential [[id number]]  (Abnormal) Collected: 01/15/24 0327    Specimen: Blood Updated: 01/15/24 0354     WBC 3.34 10*3/mm3      RBC 2.67 10*6/mm3      Hemoglobin 8.8 g/dL      Hematocrit 26.4 %      MCV 98.9 fL      MCH 33.0 pg      MCHC 33.3 g/dL      RDW 18.5 %      RDW-SD 65.5 fl      MPV 9.7 fL      Platelets 226 10*3/mm3      Neutrophil % 47.6 %      Lymphocyte % 34.4 %      Monocyte % 9.6 %      Eosinophil % 6.0 %      Basophil % 0.6 %      Immature Grans % 1.8 %      Neutrophils, Absolute 1.59 10*3/mm3      Lymphocytes, Absolute 1.15 10*3/mm3      Monocytes, Absolute 0.32 10*3/mm3      Eosinophils, Absolute 0.20 10*3/mm3      Basophils, Absolute 0.02 10*3/mm3      Immature Grans, Absolute 0.06 10*3/mm3      nRBC 0.6 /100 WBC           Imaging Results (Last 24 Hours)       ** No results found for the last 24 hours. **          Results  Scan on 1/9/2024 2237 by [facility], [place]: ECG 12-LEAD         Author: -- Service: -- Author Type: --   Filed: Date of Service: Creation Time:   Status: (Other)   HEART RATE= 72  bpm  RR Interval= 833  ms  ND Interval= 206  ms  P Horizontal Axis= -48  deg  P Front  Axis=   deg  QRSD Interval= 135  ms  QT Interval= 410  ms  QTcB= 449  ms  QRS Axis= -65  deg  T Wave Axis= 90  deg  - ABNORMAL ECG -  Atrial-paced complexes  Left bundle branch block  No change from previous tracing          Current Facility-Administered Medications:     albuterol (PROVENTIL) nebulizer solution 0.083% 2.5 mg/3mL, 2.5 mg, Nebulization, Q6H PRN, Mika Carranza MD    aspirin EC tablet 81 mg, 81 mg, Oral, Daily, Mika Carranza MD, 81 mg at 01/15/24 0832    atorvastatin (LIPITOR) tablet 10 mg, 10 mg, Oral, Nightly, Mika Carranza MD, 10 mg at 01/14/24 2007    budesonide-formoterol (SYMBICORT) 160-4.5 MCG/ACT inhaler 2 puff, 2 puff, Inhalation, BID - RT, 2 puff at 01/14/24 0945 **AND** tiotropium (SPIRIVA RESPIMAT) 2.5 mcg/act aerosol solution inhaler, 2 puff, Inhalation, Daily - RT, Mika Carranza MD, 2 puff at 01/14/24 0945    carvedilol (COREG) tablet 3.125 mg, 3.125 mg, Oral, BID With Meals, Mika Carranza MD, 3.125 mg at 01/15/24 0832    clopidogrel (PLAVIX) tablet 75 mg, 75 mg, Oral, Daily, Mika Carranza MD, 75 mg at 01/15/24 0832    empagliflozin (JARDIANCE) tablet 10 mg, 10 mg, Oral, Daily, Bebeto Aguilar MD, 10 mg at 01/15/24 0832    famotidine (PEPCID) tablet 20 mg, 20 mg, Oral, BID, Mika Carranza MD, 20 mg at 01/15/24 0832    ferrous sulfate tablet 325 mg, 325 mg, Oral, Daily With Breakfast, Mika Carranza MD, 325 mg at 01/15/24 0832    furosemide (LASIX) tablet 40 mg, 40 mg, Oral, Daily, Bebeto Aguilar MD, 40 mg at 01/15/24 0832    melatonin tablet 3 mg, 3 mg, Oral, Nightly PRN, Mika Carranza MD, 3 mg at 01/14/24 2120    nitroglycerin (NITROSTAT) SL tablet 0.4 mg, 0.4 mg, Sublingual, Q5 Min PRN, Mika Carranza MD    sertraline (ZOLOFT) tablet 50 mg, 50 mg, Oral, Daily, Mika Carranza MD, 50 mg at 01/15/24 0832    sodium chloride 0.9 % flush 10 mL, 10 mL, Intravenous, PRN, Mika Carranza MD     sodium chloride 0.9 % infusion 40 mL, 40 mL, Intravenous, PRN, Mika Carranza MD    spironolactone (ALDACTONE) tablet 25 mg, 25 mg, Oral, Daily, Ousmane Aguilar MD, 25 mg at 01/15/24 0832        ASSESSMENT  Coronary artery disease with normal cardiac cath  Cardiomyopathy with ejection fraction 20%  Chronic obstructive pulmonary disease  Hypertension  Hyperlipidemia  Obstructive sleep apnea  Chronic anemia with myelodysplastic syndrome  Gastroesophageal reflux disease    PLAN  CPM  Continue aspirin Plavix Coreg Lasix Aldactone and Lipitor  Adjust home medications  Stress ulcer DVT prophylaxis  Hematology and nephrology to follow patient  Supportive care  Discussed with family nursing staff  Okay to discharge    OUSMANE AGUILAR MD    Copied text in this note has been reviewed and is accurate as of 01/15/24

## 2024-01-15 NOTE — PROGRESS NOTES
"   LOS: 0 days     Chief Complaint/ Reason for encounter: CKD need for IV contrast    Subjective   01/12/24 : Patient is doing well today with no new complaints  Good appetite with no nausea or vomiting  No shortness of breath chest pain or edema  Voiding well with no dysuria    1/13 feels good, tolerating po     1/14: Feels well denies complaints good appetite no nausea vomiting  No further chest pain shortness of breath or edema    1/15: He looks and feels well today denies new complaints, weight down a few pounds no edema shortness of breath chest pain fevers or chills  He is anxious to go home    Medical history reviewed:  History of Present Illness    Subjective    History taken from: Patient and chart    Vital Signs  Temp:  [97.6 °F (36.4 °C)-98.7 °F (37.1 °C)] 97.6 °F (36.4 °C)  Heart Rate:  [73-81] 73  Resp:  [18] 18  BP: ()/(45-74) 102/68       Wt Readings from Last 1 Encounters:   01/15/24 0558 89.8 kg (198 lb)   01/14/24 0330 91.5 kg (201 lb 12.8 oz)   01/13/24 0500 91.2 kg (201 lb 1.6 oz)   01/12/24 0328 90.9 kg (200 lb 8 oz)   01/11/24 0813 89.9 kg (198 lb 3.1 oz)   01/10/24 0942 94 kg (207 lb 3.7 oz)   01/09/24 2144 94.3 kg (207 lb 14.4 oz)   01/09/24 2130 94.3 kg (207 lb 14.4 oz)       Objective:  Vital signs: (most recent): Blood pressure 102/68, pulse 73, temperature 97.6 °F (36.4 °C), temperature source Oral, resp. rate 18, height 180 cm (70.87\"), weight 89.8 kg (198 lb), SpO2 96%.                Objective:  General Appearance:  Comfortable, well-appearing, in no acute distress and not in pain.  Awake, alert, oriented  HEENT: Mucous membranes moist, no injury, oropharynx clear  Lungs:  Normal effort and normal respiratory rate.  Breath sounds clear to auscultation.  No  respiratory distress.  No rales, decreased breath sounds or rhonchi.    Heart: Normal rate.  Regular rhythm.  S1, S2 normal.  No murmur.   Abdomen: Abdomen is soft.  Bowel sounds are normal, no abdominal tenderness.  There is " no rebound or guarding  Extremities: No edema of bilateral lower extremities  Neurological: No focal motor or sensory deficits, pupils reactive  Skin:  Warm and dry.  No rash or cyanosis.       Results Review:    Intake/Output:     Intake/Output Summary (Last 24 hours) at 1/15/2024 1219  Last data filed at 1/15/2024 0842  Gross per 24 hour   Intake 1080 ml   Output --   Net 1080 ml         DATA:  Radiology and Labs:  The following labs independently reviewed by me. Additional labs ordered for tomorrow a.m.  Interval notes, chart personally reviewed by me.   Old records independently reviewed showing CKD 3    Risk/ complexity of medical care/ medical decision making moderate complexity    Labs:   Recent Results (from the past 24 hour(s))   Basic Metabolic Panel    Collection Time: 01/15/24  3:27 AM    Specimen: Blood   Result Value Ref Range    Glucose 98 65 - 99 mg/dL    BUN 14 8 - 23 mg/dL    Creatinine 0.99 0.76 - 1.27 mg/dL    Sodium 138 136 - 145 mmol/L    Potassium 3.8 3.5 - 5.2 mmol/L    Chloride 106 98 - 107 mmol/L    CO2 20.0 (L) 22.0 - 29.0 mmol/L    Calcium 8.1 (L) 8.6 - 10.5 mg/dL    BUN/Creatinine Ratio 14.1 7.0 - 25.0    Anion Gap 12.0 5.0 - 15.0 mmol/L    eGFR 80.9 >60.0 mL/min/1.73   CBC Auto Differential    Collection Time: 01/15/24  3:27 AM    Specimen: Blood   Result Value Ref Range    WBC 3.34 (L) 3.40 - 10.80 10*3/mm3    RBC 2.67 (L) 4.14 - 5.80 10*6/mm3    Hemoglobin 8.8 (L) 13.0 - 17.7 g/dL    Hematocrit 26.4 (L) 37.5 - 51.0 %    MCV 98.9 (H) 79.0 - 97.0 fL    MCH 33.0 26.6 - 33.0 pg    MCHC 33.3 31.5 - 35.7 g/dL    RDW 18.5 (H) 12.3 - 15.4 %    RDW-SD 65.5 (H) 37.0 - 54.0 fl    MPV 9.7 6.0 - 12.0 fL    Platelets 226 140 - 450 10*3/mm3    Neutrophil % 47.6 42.7 - 76.0 %    Lymphocyte % 34.4 19.6 - 45.3 %    Monocyte % 9.6 5.0 - 12.0 %    Eosinophil % 6.0 0.3 - 6.2 %    Basophil % 0.6 0.0 - 1.5 %    Immature Grans % 1.8 (H) 0.0 - 0.5 %    Neutrophils, Absolute 1.59 (L) 1.70 - 7.00 10*3/mm3     Lymphocytes, Absolute 1.15 0.70 - 3.10 10*3/mm3    Monocytes, Absolute 0.32 0.10 - 0.90 10*3/mm3    Eosinophils, Absolute 0.20 0.00 - 0.40 10*3/mm3    Basophils, Absolute 0.02 0.00 - 0.20 10*3/mm3    Immature Grans, Absolute 0.06 (H) 0.00 - 0.05 10*3/mm3    nRBC 0.6 (H) 0.0 - 0.2 /100 WBC       Radiology:  Pertinent radiology studies were reviewed as described above      Medications have been reviewed separately in chart overview      ASSESSMENT:  Acute kidney injury, new this admission.  Initially may have been due to some decompensated heart failure and cardiorenal syndrome.  Currently euvolemic on exam.  Improved, creatinine normalized  Stage II CKD, likely vascular disease with ongoing tobacco abuse and history of CAD  Chest pain, status post left heart cath.  No acute findings  Acute decompensated systolic heart failure.  Improved with diuretics  Myelodysplasia  Anemia related to MDS    Chest pain    Cardiomyopathy, dilated           DISCUSSION/PLAN:   Creatinine 0.99 today which is near baseline  Weights continue to trend down and he is euvolemic today on exam  Status post left heart cath with no new coronary stenosis  Continue Lasix and spironolactone at current dosages at discharge  Recommend he see pcp for labs in 1-2 weeks of discharge   Continue Jardiance  Stable for discharge at any time from renal standpoint      Eloy Meier MD  [facility]   Office phone number: [phone]  Answering service phone number: [phone]    01/15/24  12:19 EST    Dictation performed using Dragon dictation software

## 2024-01-15 NOTE — PLAN OF CARE
Goal Outcome Evaluation:  Plan of Care Reviewed With: patient        Progress: improving  Outcome Evaluation: pt is axox4 but forgetful. denies pain, SOA.  VSS. carvedilol was not given by dayshift rn for 1800 dose due to parameters not med. BP still soft. possible discharge.

## 2024-01-15 NOTE — DISCHARGE SUMMARY
"[facility]  Physician Discharge Summary    Patient Identification:  Name: Yves Hastings  Age: 73 y.o.  Sex: male  :  1950  MRN: [id number]    Admit date: 2024    Discharge date and time: 1/15/24 at 1420      Admitting Physician: Mika Carranza MD     Discharge Physician: Mika Carranza MD    Discharge Diagnoses:   Active Hospital Problems    Diagnosis     **Chest pain     Moderate malnutrition     Cardiomyopathy, dilated        Discharged Condition: stable    Hospital Course:   This is a 73-year-old gentleman who presented to [facility] as direct admit for cardiac catheterization due to chest pain.  He underwent an abnormal stress test prior.  Cardiac cath showed early atherosclerotic plaque otherwise normal coronary arteries.  No ace or arb due to ckd. Add GDMT as able in the outpatient setting. Oncology followed and transfuse blood as needed.  Nephrology followed patient due to elevated creatinine. All providers are ok with discharge.     Consults:   IP CONSULT TO INTERNAL MEDICINE  IP CONSULT TO HEMATOLOGY AND ONCOLOGY  IP CONSULT TO CASE MANAGEMENT   IP CONSULT TO NUTRITION SERVICES  IP CONSULT TO NEPHROLOGY             Physical Exam  /68 (BP Location: Right arm, Patient Position: Lying)   Pulse 73   Temp 97.8 °F (36.6 °C) (Oral)   Resp 18   Ht 180 cm (70.87\")   Wt 89.8 kg (198 lb)   SpO2 96%   BMI 27.72 kg/m²     General appearance: No acute changes   Eyes: Sclerae conjunctivae normal, pupils reactive   HENT: Atraumatic; oropharynx clear with moist mucous membranes and no mucosal ulcerations;  Neck: Trachea midline; NECK, supple, no thyromegaly or lymphadenopathy   Lungs: Normal size and shape, normal breath sounds, equal distribution of air, no rales and rhonchi   CV: S1-S2 regular, no murmurs, no rub, no gallop   Abdomen: Soft, nontender; no masses , no abnormal abdominal sounds   Extremities: No deformity , normal color , " no peripheral edema   Skin: Normal temperature, turgor and texture; no rash, ulcers  Psych: Appropriate affect, alert and oriented to person, place and time             LABS:      Results from last 7 days   Lab Units 01/09/24  2313   MAGNESIUM mg/dL 1.9     Results from last 7 days   Lab Units 01/15/24  0327   SODIUM mmol/L 138   POTASSIUM mmol/L 3.8   BUN mg/dL 14   CREATININE mg/dL 0.99   CALCIUM mg/dL 8.1*     @LABRCNTbnp@  Results from last 7 days   Lab Units 01/15/24  0327 01/14/24  0502 01/13/24  0255   WBC 10*3/mm3 3.34* 3.15* 3.01*   HEMOGLOBIN g/dL 8.8* 9.0* 8.9*   HEMATOCRIT % 26.4* 27.0* 27.5*   PLATELETS 10*3/mm3 226 244 282     Results from last 7 days   Lab Units 01/09/24  2313   INR  1.20*   APTT seconds 35.1     Results from last 7 days   Lab Units 01/11/24  0249   CHOLESTEROL mg/dL 81   TRIGLYCERIDES mg/dL 89   HDL CHOL mg/dL 23*   LDL CHOL mg/dL 40     Disposition:  Home    Discharge Medications:      Discharge Medications        New Medications        Instructions Start Date   famotidine 20 MG tablet  Commonly known as: PEPCID   20 mg, Oral, 2 Times Daily             Continue These Medications        Instructions Start Date   albuterol sulfate  (90 Base) MCG/ACT inhaler  Commonly known as: PROVENTIL HFA;VENTOLIN HFA;PROAIR HFA   2 puffs, Inhalation, Every 4 Hours PRN      aspirin 81 MG EC tablet   aspirin 81 mg tablet,delayed release   TAKE 1 TABLET BY MOUTH DAILY      atorvastatin 80 MG tablet  Commonly known as: LIPITOR   1 tablet, Oral, Daily      carvedilol 3.125 MG tablet  Commonly known as: COREG   3.125 mg, Oral, 2 Times Daily With Meals      clopidogrel 75 MG tablet  Commonly known as: PLAVIX   clopidogrel 75 mg tablet      Ferrocite 324 (106 Fe) MG tablet  Generic drug: Ferrous Fumarate   1 tablet, Oral, Every Other Day      furosemide 40 MG tablet  Commonly known as: LASIX   40 mg, Oral, Daily   Start Date: January 16, 2024     ipratropium-albuterol 0.5-2.5 mg/3 ml  nebulizer  Commonly known as: DUO-NEB   ipratropium 0.5 mg-albuterol 3 mg (2.5 mg base)/3 mL nebulization soln      Jardiance 10 MG tablet tablet  Generic drug: empagliflozin   10 mg, Oral, Daily      pantoprazole 40 MG EC tablet  Commonly known as: PROTONIX   40 mg, Oral, Daily      sertraline 50 MG tablet  Commonly known as: ZOLOFT   1 tablet, Oral, Daily      spironolactone 25 MG tablet  Commonly known as: ALDACTONE   25 mg, Oral, Daily      Trelegy Ellipta 100-62.5-25 MCG/ACT inhaler  Generic drug: Fluticasone-Umeclidin-Vilant              Stop These Medications      amiodarone 200 MG tablet  Commonly known as: PACERONE     potassium chloride 20 MEQ CR tablet  Commonly known as: K-DUR,KLOR-CON     potassium chloride ER 20 MEQ tablet controlled-release ER tablet  Commonly known as: K-TAB            ASK your doctor about these medications        Instructions Start Date   azaCITIDine in sterile water (preservative free)   Once      cyanocobalamin 1000 MCG tablet  Commonly known as: VITAMIN B-12   1,000 mcg, Daily      hydrOXYzine 25 MG tablet  Commonly known as: ATARAX   25 mg, 3 Times Daily PRN      rOPINIRole 0.25 MG tablet  Commonly known as: REQUIP   0.25 mg, Nightly                   Discharge Home Instructions:  Please follow up with nephrology office.   2. Follow-up with your primary care physician in 1 week.  Please call for an appointment.  3.  Follow up with Dr. Carranza on January 29, 2024 1:45 pm.   4.  Will need to follow-up with oncology.    5. Take all medications as prescribed.  6. The importance of taking dual antiplatelets for one year  has been explained, risk of stent thrombosis leading to the acute MI, which carries high morbidity and mortality has been explained. Discontinuation or interruptions of these medications should be under the strict guidance of appropriate health professional.    7. Routine post cardiac catheterization/PCI discharge home care instructions:    1. No submerging  "procedure site below water for 7-10 days.  2. No lifting objects greater than 1 lbs for 3 days.  3. If groin site used, avoid climbing several flights of stairs or sitting for longer than 2 hours at a time for the next 24 hours.   4. Monitor puncture site for bleeding and/or knots;. If bleeding should occur at the groin site: lie flat, apply pressure and return to the ER. If bleeding should occur at the wrist site, apply pressure and return to the ER.  5.  You may apply a DRY Band-Aid over the puncture site if needed. Do not apply any lotions, salves or ointments to site.  6. No driving for 3 days.  7. Return to ER for recurrent symptoms.  8. No smoking.  9. Take all medications as prescribed.        Signed:  Mika Carranza MD  1/15/2024  14:17 EST      [facility] Dragon/Transcription:   \"Dictated utilizing Dragon dictation\".     "

## 2024-01-16 NOTE — OUTREACH NOTE
Prep Survey      Flowsheet Row Responses   [facility] facility patient discharged from? [place]   Is LACE score < 7 ? No   Eligibility Readm Mgmt   Discharge diagnosis Chest pain   Does the patient have one of the following disease processes/diagnoses(primary or secondary)? Other   Does the patient have Home health ordered? No   Is there a DME ordered? No   Prep survey completed? Yes            Coco CELAYA - Registered Nurse

## 2024-01-18 ENCOUNTER — READMISSION MANAGEMENT (OUTPATIENT)
Dept: CALL CENTER | Facility: HOSPITAL | Age: 74
End: 2024-01-18
Payer: MEDICARE

## 2024-01-18 NOTE — OUTREACH NOTE
Medical Week 1 Survey      Flowsheet Row Responses   [facility] patient discharged from? [place]   Does the patient have one of the following disease processes/diagnoses(primary or secondary)? Other   Week 1 attempt successful? Yes   Call start time 1602   Call end time 1604   Discharge diagnosis Chest pain   Person spoke with today (if not patient) and relationship Margarita, harmeet Estevez reviewed with patient/caregiver? Yes   Is the patient having any side effects they believe may be caused by any medication additions or changes? No   Does the patient have all medications ordered at discharge? Yes   Is the patient taking all medications as directed (includes completed medication regime)? Yes   Does the patient have a primary care provider?  Yes   Does the patient have an appointment with their PCP within 7 days of discharge? Yes   Has the patient kept scheduled appointments due by today? N/A   Has home health visited the patient within 72 hours of discharge? N/A   Psychosocial issues? No   Did the patient receive a copy of their discharge instructions? Yes   Nursing interventions Reviewed instructions with patient   What is the patient's perception of their health status since discharge? Improving   Is the patient/caregiver able to teach back signs and symptoms related to disease process for when to call PCP? Yes   Is the patient/caregiver able to teach back signs and symptoms related to disease process for when to call 911? Yes   Is the patient/caregiver able to teach back the hierarchy of who to call/visit for symptoms/problems? PCP, Specialist, Home health nurse, Urgent Care, ED, 911 Yes   If the patient is a current smoker, are they able to teach back resources for cessation? --  [trying to quit, smokes infrequently]   Additional teach back comments denies chest pain   Week 1 call completed? Yes   Call end time 1604            Etta Moura Registered Nurse   · EDC: 8/18/19 by u/s  · Accepts Blood: Y  · Anesthesia Consult: N  · 1T Labs: obtaining records  · Genetic screen: [ ]Carrier screen; order given  · Anatomy US: order given  · 3T Labs:  · GBBS:  · Immunization:  [x]Flu vaccine        [  ]Tdap  · Contraception: possibly IUD  · Breastfeeding  · Boy/Girl, \"Name\"

## 2024-01-29 ENCOUNTER — OFFICE VISIT (OUTPATIENT)
Dept: CARDIOLOGY | Facility: CLINIC | Age: 74
End: 2024-01-29
Payer: MEDICARE

## 2024-01-29 VITALS
BODY MASS INDEX: 28.7 KG/M2 | DIASTOLIC BLOOD PRESSURE: 50 MMHG | WEIGHT: 205 LBS | HEIGHT: 71 IN | SYSTOLIC BLOOD PRESSURE: 84 MMHG | HEART RATE: 77 BPM

## 2024-01-29 DIAGNOSIS — E78.5 HYPERLIPIDEMIA, UNSPECIFIED HYPERLIPIDEMIA TYPE: ICD-10-CM

## 2024-01-29 DIAGNOSIS — I10 ESSENTIAL HYPERTENSION: ICD-10-CM

## 2024-01-29 DIAGNOSIS — Z95.0 PRESENCE OF CARDIAC PACEMAKER: ICD-10-CM

## 2024-01-29 DIAGNOSIS — I25.10 ATHEROSCLEROSIS OF NATIVE CORONARY ARTERY OF NATIVE HEART WITHOUT ANGINA PECTORIS: Primary | ICD-10-CM

## 2024-01-29 PROCEDURE — 3078F DIAST BP <80 MM HG: CPT | Performed by: INTERNAL MEDICINE

## 2024-01-29 PROCEDURE — 3074F SYST BP LT 130 MM HG: CPT | Performed by: INTERNAL MEDICINE

## 2024-01-29 PROCEDURE — 99214 OFFICE O/P EST MOD 30 MIN: CPT | Performed by: INTERNAL MEDICINE

## 2024-01-29 RX ORDER — LOSARTAN POTASSIUM 25 MG/1
25 TABLET ORAL DAILY
COMMUNITY
Start: 2024-01-26 | End: 2025-01-25

## 2024-01-29 NOTE — PROGRESS NOTES
CATH FOLLOW UP    Subjective:        Yves Hastings is a 73 y.o. male who here for follow up    CC  cardiomyopathy  HPI  73-year-old with a cardiomyopathy and a pacemaker, hypertension coronary artery disease here for the follow-up with no complaints of chest pain tightness heaviness or the pressure sensation     Problems Addressed this Visit          Cardiac and Vasculature    Essential hypertension    Relevant Medications    losartan (COZAAR) 25 MG tablet    Hyperlipidemia    Atherosclerosis of native coronary artery of native heart without angina pectoris - Primary    Presence of cardiac pacemaker     Diagnoses         Codes Comments    Atherosclerosis of native coronary artery of native heart without angina pectoris    -  Primary ICD-10-CM: I25.10  ICD-9-CM: 414.01     Essential hypertension     ICD-10-CM: I10  ICD-9-CM: 401.9     Hyperlipidemia, unspecified hyperlipidemia type     ICD-10-CM: E78.5  ICD-9-CM: 272.4     Presence of cardiac pacemaker     ICD-10-CM: Z95.0  ICD-9-CM: V45.01           .    The following portions of the patient's history were reviewed and updated as appropriate: allergies, current medications, past family history, past medical history, past social history, past surgical history and problem list.    Past Medical History:   Diagnosis Date    COPD (chronic obstructive pulmonary disease)     Emphysema of lung     HLD (hyperlipidemia)     [place] (hard of hearing)     ears hearing aids    HTN (hypertension)     Myelodysplastic syndrome, unspecified 11/06/2023    Myocardial infarction     Short-term memory loss     Sleep apnea     no machine    Stroke     Ventral hernia     sched repair     reports that he has been smoking cigarettes. He started smoking about 61 years ago. He has a 15.00 pack-year smoking history. He has never used smokeless tobacco. He reports that he does not currently use alcohol after a past usage of about 7.0 standard drinks of alcohol per week. He reports that he does  "not currently use drugs after having used the following drugs: Marijuana.   Family History   Problem Relation Age of Onset    Cancer Mother         breast    Heart disease Mother     Heart disease Father     Cancer Maternal Aunt     Heart disease Paternal Uncle     Malig Hyperthermia Neg Hx        Review of Systems  Constitutional: No wt loss, fever, fatigue  Gastrointestinal: No nausea, abdominal pain  Behavioral/Psych: No insomnia or anxiety   Cardiovascular no chest pains or tightness in the chest  Objective:       Physical Exam           Physical Exam  BP (!) 84/50   Pulse 77   Ht 180.3 cm (71\")   Wt 93 kg (205 lb)   BMI 28.59 kg/m²     General appearance: No acute changes   Eyes: Sclerae conjunctivae normal, pupils reactive   HENT: Atraumatic; oropharynx clear with moist mucous membranes and no mucosal ulcerations;  Neck: Trachea midline; NECK, supple, no thyromegaly or lymphadenopathy   Lungs: Normal size and shape, normal breath sounds, equal distribution of air, no rales and rhonchi   CV: S1-S2 regular, no murmurs, no rub, no gallop   Abdomen: Soft, nontender; no masses , no abnormal abdominal sounds   Extremities: No deformity , normal color , no peripheral edema   Skin: Normal temperature, turgor and texture; no rash, ulcers  Psych: Appropriate affect, alert and oriented to person, place and time           Procedures      Echocardiogram:    Results for orders placed during the hospital encounter of 01/09/24    Adult Transthoracic Echo Complete W/ Cont if Necessary Per Protocol    Interpretation Summary    Left ventricular ejection fraction appears to be 31 - 35%.    The left ventricular cavity is moderately dilated.    Left ventricular diastolic function was indeterminate.    The right ventricular cavity is mildly dilated.    The left atrial cavity is mildly dilated.    Peak velocity of the flow distal to the aortic valve is 173 cm/s. Aortic valve maximum pressure gradient is 12 mmHg. Aortic valve mean " pressure gradient is 5 mmHg. Aortic valve dimensionless index is 0.6 .    Estimated right ventricular systolic pressure from tricuspid regurgitation is mildly elevated (35-45 mmHg). Calculated right ventricular systolic pressure from tricuspid regurgitation is 35 mmHg.          Current Outpatient Medications:     albuterol (PROVENTIL HFA;VENTOLIN HFA) 108 (90 Base) MCG/ACT inhaler, Inhale 2 puffs Every 4 (Four) Hours As Needed for Wheezing., Disp: , Rfl:     aspirin 81 MG EC tablet, aspirin 81 mg tablet,delayed release  TAKE 1 TABLET BY MOUTH DAILY, Disp: , Rfl:     atorvastatin (LIPITOR) 80 MG tablet, Take 1 tablet by mouth Daily., Disp: , Rfl:     azaCITIDine in sterile water (preservative free), Inject  under the skin into the appropriate area as directed 1 (One) Time., Disp: , Rfl:     carvedilol (COREG) 3.125 MG tablet, Take 1 tablet by mouth 2 (Two) Times a Day With Meals., Disp: , Rfl:     clopidogrel (PLAVIX) 75 MG tablet, clopidogrel 75 mg tablet, Disp: , Rfl:     cyanocobalamin (VITAMIN B-12) 1000 MCG tablet, Take 1 tablet by mouth Daily., Disp: , Rfl:     empagliflozin (Jardiance) 10 MG tablet tablet, Take 1 tablet by mouth Daily., Disp: , Rfl:     Ferrous Fumarate (Ferrocite) 324 (106 Fe) MG tablet, Take 1 tablet by mouth Every Other Day., Disp: , Rfl:     furosemide (LASIX) 40 MG tablet, Take 1 tablet by mouth Daily., Disp: 30 tablet, Rfl: 5    hydrOXYzine (ATARAX) 25 MG tablet, Take 1 tablet by mouth 3 (Three) Times a Day As Needed for Itching., Disp: , Rfl:     ipratropium-albuterol (DUO-NEB) 0.5-2.5 mg/3 ml nebulizer, ipratropium 0.5 mg-albuterol 3 mg (2.5 mg base)/3 mL nebulization soln, Disp: , Rfl:     losartan (COZAAR) 25 MG tablet, Take 1 tablet by mouth Daily., Disp: , Rfl:     pantoprazole (PROTONIX) 40 MG EC tablet, Take 1 tablet by mouth Daily., Disp: , Rfl:     rOPINIRole (REQUIP) 0.25 MG tablet, Take 1 tablet by mouth Every Night. Take 1 hour before bedtime., Disp: , Rfl:     sertraline  (ZOLOFT) 50 MG tablet, Take 1 tablet by mouth Daily., Disp: , Rfl:     spironolactone (ALDACTONE) 25 MG tablet, Take 1 tablet by mouth Daily., Disp: , Rfl:     Trelegy Ellipta 100-62.5-25 MCG/ACT inhaler, , Disp: , Rfl:    Assessment:                Plan:          ICD-10-CM ICD-9-CM   1. Atherosclerosis of native coronary artery of native heart without angina pectoris  I25.10 414.01   2. Essential hypertension  I10 401.9   3. Hyperlipidemia, unspecified hyperlipidemia type  E78.5 272.4   4. Presence of cardiac pacemaker  Z95.0 V45.01     1. Atherosclerosis of native coronary artery of native heart without angina pectoris  No angina pectoris    2. Essential hypertension  Patient understands importance of blood pressure check at home which patient does regularly and the blood pressures are well under control to the level of less than 140/90      3. Hyperlipidemia, unspecified hyperlipidemia type  Continue current treatment    4. Presence of cardiac pacemaker  Continue current treatment      See in 3 months with limited echo      May need upgrade to aicd  COUNSELING:    Yves Briones was given to patient for the following topics: diagnostic results, risk factor reductions, impressions, risks and benefits of treatment options and importance of treatment compliance .       SMOKING COUNSELING:        Dictated using Dragon dictation

## 2024-01-30 PROBLEM — Z95.0 PRESENCE OF CARDIAC PACEMAKER: Status: ACTIVE | Noted: 2024-01-30

## 2024-04-09 ENCOUNTER — OFFICE VISIT (OUTPATIENT)
Dept: CARDIOLOGY | Facility: CLINIC | Age: 74
End: 2024-04-09
Payer: MEDICARE

## 2024-04-09 VITALS
WEIGHT: 216 LBS | HEIGHT: 71 IN | SYSTOLIC BLOOD PRESSURE: 100 MMHG | DIASTOLIC BLOOD PRESSURE: 68 MMHG | BODY MASS INDEX: 30.24 KG/M2 | HEART RATE: 77 BPM

## 2024-04-09 DIAGNOSIS — I10 ESSENTIAL HYPERTENSION: ICD-10-CM

## 2024-04-09 DIAGNOSIS — E78.5 HYPERLIPIDEMIA, UNSPECIFIED HYPERLIPIDEMIA TYPE: ICD-10-CM

## 2024-04-09 DIAGNOSIS — I25.10 ATHEROSCLEROSIS OF NATIVE CORONARY ARTERY OF NATIVE HEART WITHOUT ANGINA PECTORIS: Primary | ICD-10-CM

## 2024-04-09 DIAGNOSIS — Z95.0 PRESENCE OF CARDIAC PACEMAKER: ICD-10-CM

## 2024-04-09 PROCEDURE — 99213 OFFICE O/P EST LOW 20 MIN: CPT | Performed by: INTERNAL MEDICINE

## 2024-04-09 PROCEDURE — 3078F DIAST BP <80 MM HG: CPT | Performed by: INTERNAL MEDICINE

## 2024-04-09 PROCEDURE — 3074F SYST BP LT 130 MM HG: CPT | Performed by: INTERNAL MEDICINE

## 2024-04-09 RX ORDER — AMIODARONE HYDROCHLORIDE 200 MG/1
1 TABLET ORAL DAILY
COMMUNITY

## 2024-04-09 RX ORDER — FLUOXETINE HYDROCHLORIDE 20 MG/1
1 CAPSULE ORAL DAILY
COMMUNITY
Start: 2024-02-01

## 2024-04-09 RX ORDER — FERROUS GLUCONATE 324(38)MG
1 TABLET ORAL
COMMUNITY

## 2024-04-09 NOTE — PROGRESS NOTES
3MO   Subjective:        Yves Hastings is a 74 y.o. male who here for follow up    CC  Follow-up coronary artery disease hypertension hyperlipidemia pacemaker  HPI  74-year-old male with coronary artery disease hyperlipidemia hypertension and a pacemaker here for the follow-up denies any chest pains tightness heaviness or the pressure sensation     Problems Addressed this Visit          Cardiac and Vasculature    Essential hypertension    Hyperlipidemia    Atherosclerosis of native coronary artery of native heart without angina pectoris - Primary    Presence of cardiac pacemaker     Diagnoses         Codes Comments    Atherosclerosis of native coronary artery of native heart without angina pectoris    -  Primary ICD-10-CM: I25.10  ICD-9-CM: 414.01     Essential hypertension     ICD-10-CM: I10  ICD-9-CM: 401.9     Hyperlipidemia, unspecified hyperlipidemia type     ICD-10-CM: E78.5  ICD-9-CM: 272.4     Presence of cardiac pacemaker     ICD-10-CM: Z95.0  ICD-9-CM: V45.01           .    The following portions of the patient's history were reviewed and updated as appropriate: allergies, current medications, past family history, past medical history, past social history, past surgical history and problem list.    Past Medical History:   Diagnosis Date    COPD (chronic obstructive pulmonary disease)     Emphysema of lung     HLD (hyperlipidemia)     [place] (hard of hearing)     ears hearing aids    HTN (hypertension)     Myelodysplastic syndrome, unspecified 11/06/2023    Myocardial infarction     Short-term memory loss     Sleep apnea     no machine    Stroke     Ventral hernia     sched repair     reports that he has been smoking cigarettes. He started smoking about 61 years ago. He has a 15 pack-year smoking history. He has never used smokeless tobacco. He reports that he does not currently use alcohol after a past usage of about 7.0 standard drinks of alcohol per week. He reports that he does not currently use drugs  "after having used the following drugs: Marijuana.   Family History   Problem Relation Age of Onset    Cancer Mother         breast    Heart disease Mother     Heart disease Father     Cancer Maternal Aunt     Heart disease Paternal Uncle     Malig Hyperthermia Neg Hx        Review of Systems  Constitutional: No wt loss, fever, fatigue  Gastrointestinal: No nausea, abdominal pain  Behavioral/Psych: No insomnia or anxiety   Cardiovascular no chest pains or tightness in the chest  Objective:       Physical Exam           Physical Exam  /68   Pulse 77   Ht 180.3 cm (71\")   Wt 98 kg (216 lb)   BMI 30.13 kg/m²     General appearance: No acute changes   Eyes: Sclerae conjunctivae normal, pupils reactive   HENT: Atraumatic; oropharynx clear with moist mucous membranes and no mucosal ulcerations;  Neck: Trachea midline; NECK, supple, no thyromegaly or lymphadenopathy   Lungs: Normal size and shape, normal breath sounds, equal distribution of air, no rales and rhonchi   CV: S1-S2 regular, no murmurs, no rub, no gallop   Abdomen: Soft, nontender; no masses , no abnormal abdominal sounds   Extremities: No deformity , normal color , no peripheral edema   Skin: Normal temperature, turgor and texture; no rash, ulcers  Psych: Appropriate affect, alert and oriented to person, place and time           Procedures      Echocardiogram:    Results for orders placed during the hospital encounter of 01/09/24    Adult Transthoracic Echo Complete W/ Cont if Necessary Per Protocol    Interpretation Summary    Left ventricular ejection fraction appears to be 31 - 35%.    The left ventricular cavity is moderately dilated.    Left ventricular diastolic function was indeterminate.    The right ventricular cavity is mildly dilated.    The left atrial cavity is mildly dilated.    Peak velocity of the flow distal to the aortic valve is 173 cm/s. Aortic valve maximum pressure gradient is 12 mmHg. Aortic valve mean pressure gradient is 5 " mmHg. Aortic valve dimensionless index is 0.6 .    Estimated right ventricular systolic pressure from tricuspid regurgitation is mildly elevated (35-45 mmHg). Calculated right ventricular systolic pressure from tricuspid regurgitation is 35 mmHg.          Current Outpatient Medications:     albuterol (PROVENTIL HFA;VENTOLIN HFA) 108 (90 Base) MCG/ACT inhaler, Inhale 2 puffs Every 4 (Four) Hours As Needed for Wheezing., Disp: , Rfl:     amiodarone (PACERONE) 200 MG tablet, Take 1 tablet by mouth Daily., Disp: , Rfl:     aspirin 81 MG EC tablet, aspirin 81 mg tablet,delayed release  TAKE 1 TABLET BY MOUTH DAILY, Disp: , Rfl:     atorvastatin (LIPITOR) 80 MG tablet, Take 1 tablet by mouth Daily., Disp: , Rfl:     carvedilol (COREG) 3.125 MG tablet, Take 1 tablet by mouth 2 (Two) Times a Day With Meals., Disp: , Rfl:     clopidogrel (PLAVIX) 75 MG tablet, clopidogrel 75 mg tablet, Disp: , Rfl:     cyanocobalamin (VITAMIN B-12) 1000 MCG tablet, Take 1 tablet by mouth Daily., Disp: , Rfl:     empagliflozin (Jardiance) 10 MG tablet tablet, Take 1 tablet by mouth Daily., Disp: , Rfl:     Ferrous Fumarate (Ferrocite) 324 (106 Fe) MG tablet, Take 1 tablet by mouth Every Other Day., Disp: , Rfl:     ferrous gluconate (FERGON) 324 MG tablet, Take 1 tablet by mouth., Disp: , Rfl:     FLUoxetine (PROzac) 20 MG capsule, Take 1 tablet by mouth Daily., Disp: , Rfl:     furosemide (LASIX) 40 MG tablet, Take 1 tablet by mouth Daily., Disp: 30 tablet, Rfl: 5    hydrOXYzine (ATARAX) 25 MG tablet, Take 1 tablet by mouth 3 (Three) Times a Day As Needed for Itching., Disp: , Rfl:     ipratropium-albuterol (DUO-NEB) 0.5-2.5 mg/3 ml nebulizer, ipratropium 0.5 mg-albuterol 3 mg (2.5 mg base)/3 mL nebulization soln, Disp: , Rfl:     losartan (COZAAR) 25 MG tablet, Take 1 tablet by mouth Daily., Disp: , Rfl:     pantoprazole (PROTONIX) 40 MG EC tablet, Take 1 tablet by mouth Daily., Disp: , Rfl:     rOPINIRole (REQUIP) 0.25 MG tablet, Take 1  tablet by mouth Every Night. Take 1 hour before bedtime., Disp: , Rfl:     spironolactone (ALDACTONE) 25 MG tablet, Take 1 tablet by mouth Daily., Disp: , Rfl:     Trelegy Ellipta 100-62.5-25 MCG/ACT inhaler, , Disp: , Rfl:     azaCITIDine in sterile water (preservative free), Inject  under the skin into the appropriate area as directed 1 (One) Time., Disp: , Rfl:    Assessment:                Plan:          ICD-10-CM ICD-9-CM   1. Atherosclerosis of native coronary artery of native heart without angina pectoris  I25.10 414.01   2. Essential hypertension  I10 401.9   3. Hyperlipidemia, unspecified hyperlipidemia type  E78.5 272.4   4. Presence of cardiac pacemaker  Z95.0 V45.01     1. Atherosclerosis of native coronary artery of native heart without angina pectoris  No angina pectoris    2. Essential hypertension  Patient understands importance of blood pressure check at home which patient does regularly and the blood pressures are well under control to the level of less than 140/90      3. Hyperlipidemia, unspecified hyperlipidemia type  Continue current treatment    4. Presence of cardiac pacemaker  Continue current treatment    3 MONTHS WITH ECHO  COUNSELING:    Yves Briones was given to patient for the following topics: diagnostic results, risk factor reductions, impressions, risks and benefits of treatment options and importance of treatment compliance .       SMOKING COUNSELING:        Dictated using Dragon dictation

## 2024-04-23 RX ORDER — FAMOTIDINE 20 MG/1
20 TABLET, FILM COATED ORAL 2 TIMES DAILY
Qty: 180 TABLET | OUTPATIENT
Start: 2024-04-23

## 2024-05-07 ENCOUNTER — CLINICAL SUPPORT NO REQUIREMENTS (OUTPATIENT)
Dept: CARDIOLOGY | Facility: CLINIC | Age: 74
End: 2024-05-07
Payer: MEDICARE

## 2024-05-07 DIAGNOSIS — Z95.0 PRESENCE OF CARDIAC PACEMAKER: Primary | ICD-10-CM

## 2024-07-01 ENCOUNTER — HOSPITAL ENCOUNTER (OUTPATIENT)
Dept: CARDIOLOGY | Facility: HOSPITAL | Age: 74
Discharge: HOME | End: 2024-07-01
Admitting: INTERNAL MEDICINE
Payer: MEDICARE

## 2024-07-01 VITALS
DIASTOLIC BLOOD PRESSURE: 56 MMHG | BODY MASS INDEX: 30.24 KG/M2 | SYSTOLIC BLOOD PRESSURE: 101 MMHG | HEIGHT: 71 IN | WEIGHT: 216 LBS | HEART RATE: 83 BPM

## 2024-07-01 DIAGNOSIS — E78.5 HYPERLIPIDEMIA, UNSPECIFIED HYPERLIPIDEMIA TYPE: ICD-10-CM

## 2024-07-01 DIAGNOSIS — Z95.0 PRESENCE OF CARDIAC PACEMAKER: ICD-10-CM

## 2024-07-01 DIAGNOSIS — I25.10 ATHEROSCLEROSIS OF NATIVE CORONARY ARTERY OF NATIVE HEART WITHOUT ANGINA PECTORIS: ICD-10-CM

## 2024-07-01 DIAGNOSIS — I10 ESSENTIAL HYPERTENSION: ICD-10-CM

## 2024-07-01 PROCEDURE — 25510000001 PERFLUTREN (DEFINITY) 8.476 MG IN SODIUM CHLORIDE (PF) 0.9 % 10 ML INJECTION: Performed by: INTERNAL MEDICINE

## 2024-07-01 PROCEDURE — 93306 TTE W/DOPPLER COMPLETE: CPT

## 2024-07-01 PROCEDURE — 93306 TTE W/DOPPLER COMPLETE: CPT | Performed by: INTERNAL MEDICINE

## 2024-07-01 RX ADMIN — SODIUM CHLORIDE 2 ML: 9 INJECTION INTRAMUSCULAR; INTRAVENOUS; SUBCUTANEOUS at 10:29

## 2024-07-02 LAB
AORTIC DIMENSIONLESS INDEX: 0.6 (DI)
BH CV ECHO MEAS - AO MAX PG: 6 MMHG
BH CV ECHO MEAS - AO MEAN PG: 3.8 MMHG
BH CV ECHO MEAS - AO ROOT DIAM: 3.9 CM
BH CV ECHO MEAS - AO V2 MAX: 122.8 CM/SEC
BH CV ECHO MEAS - AO V2 VTI: 30.9 CM
BH CV ECHO MEAS - AVA(I,D): 2.49 CM2
BH CV ECHO MEAS - EDV(CUBED): 193.4 ML
BH CV ECHO MEAS - EDV(MOD-SP2): 273 ML
BH CV ECHO MEAS - EDV(MOD-SP4): 277 ML
BH CV ECHO MEAS - EF(MOD-BP): 38 %
BH CV ECHO MEAS - EF(MOD-SP2): 31.9 %
BH CV ECHO MEAS - EF(MOD-SP4): 45.1 %
BH CV ECHO MEAS - ESV(CUBED): 92.4 ML
BH CV ECHO MEAS - ESV(MOD-SP2): 186 ML
BH CV ECHO MEAS - ESV(MOD-SP4): 152 ML
BH CV ECHO MEAS - FS: 21.8 %
BH CV ECHO MEAS - IVS/LVPW: 0.97 CM
BH CV ECHO MEAS - IVSD: 1.15 CM
BH CV ECHO MEAS - LAT PEAK E' VEL: 11.4 CM/SEC
BH CV ECHO MEAS - LV DIASTOLIC VOL/BSA (35-75): 127.1 CM2
BH CV ECHO MEAS - LV MASS(C)D: 284.3 GRAMS
BH CV ECHO MEAS - LV MAX PG: 3.7 MMHG
BH CV ECHO MEAS - LV MEAN PG: 1.8 MMHG
BH CV ECHO MEAS - LV SYSTOLIC VOL/BSA (12-30): 69.8 CM2
BH CV ECHO MEAS - LV V1 MAX: 96.8 CM/SEC
BH CV ECHO MEAS - LV V1 VTI: 20.1 CM
BH CV ECHO MEAS - LVIDD: 5.8 CM
BH CV ECHO MEAS - LVIDS: 4.5 CM
BH CV ECHO MEAS - LVOT AREA: 3.8 CM2
BH CV ECHO MEAS - LVOT DIAM: 2.2 CM
BH CV ECHO MEAS - LVPWD: 1.19 CM
BH CV ECHO MEAS - MED PEAK E' VEL: 9.4 CM/SEC
BH CV ECHO MEAS - MR MAX PG: 54.5 MMHG
BH CV ECHO MEAS - MR MAX VEL: 369.2 CM/SEC
BH CV ECHO MEAS - MV A DUR: 0.11 SEC
BH CV ECHO MEAS - MV A MAX VEL: 77.6 CM/SEC
BH CV ECHO MEAS - MV DEC SLOPE: 599 CM/SEC2
BH CV ECHO MEAS - MV DEC TIME: 195 SEC
BH CV ECHO MEAS - MV E MAX VEL: 84.9 CM/SEC
BH CV ECHO MEAS - MV E/A: 1.09
BH CV ECHO MEAS - MV MAX PG: 3.8 MMHG
BH CV ECHO MEAS - MV MEAN PG: 1.87 MMHG
BH CV ECHO MEAS - MV P1/2T: 50.2 MSEC
BH CV ECHO MEAS - MV V2 VTI: 23.1 CM
BH CV ECHO MEAS - MVA(P1/2T): 4.4 CM2
BH CV ECHO MEAS - MVA(VTI): 3.3 CM2
BH CV ECHO MEAS - PA ACC TIME: 0.13 SEC
BH CV ECHO MEAS - PA V2 MAX: 106.5 CM/SEC
BH CV ECHO MEAS - PI END-D VEL: 124.5 CM/SEC
BH CV ECHO MEAS - PULM A REVS DUR: 0.13 SEC
BH CV ECHO MEAS - PULM A REVS VEL: 51 CM/SEC
BH CV ECHO MEAS - PULM DIAS VEL: 83.1 CM/SEC
BH CV ECHO MEAS - PULM S/D: 0.81
BH CV ECHO MEAS - PULM SYS VEL: 67.7 CM/SEC
BH CV ECHO MEAS - RAP SYSTOLE: 3 MMHG
BH CV ECHO MEAS - RV MAX PG: 2.06 MMHG
BH CV ECHO MEAS - RV V1 MAX: 71.8 CM/SEC
BH CV ECHO MEAS - RV V1 VTI: 18.2 CM
BH CV ECHO MEAS - RVSP: 28.9 MMHG
BH CV ECHO MEAS - SV(LVOT): 76.8 ML
BH CV ECHO MEAS - SV(MOD-SP2): 87 ML
BH CV ECHO MEAS - SV(MOD-SP4): 125 ML
BH CV ECHO MEAS - SVI(LVOT): 35.2 ML/M2
BH CV ECHO MEAS - SVI(MOD-SP2): 39.9 ML/M2
BH CV ECHO MEAS - SVI(MOD-SP4): 57.4 ML/M2
BH CV ECHO MEAS - TAPSE (>1.6): 1.51 CM
BH CV ECHO MEAS - TR MAX PG: 25.9 MMHG
BH CV ECHO MEAS - TR MAX VEL: 254.7 CM/SEC
BH CV ECHO MEASUREMENTS AVERAGE E/E' RATIO: 8.16
BH CV XLRA - RV BASE: 3.8 CM
BH CV XLRA - TDI S': 10.1 CM/SEC
LEFT ATRIUM VOLUME INDEX: 35.4 ML/M2

## 2024-07-09 ENCOUNTER — OFFICE VISIT (OUTPATIENT)
Dept: CARDIOLOGY | Facility: CLINIC | Age: 74
End: 2024-07-09
Payer: MEDICARE

## 2024-07-09 VITALS
SYSTOLIC BLOOD PRESSURE: 97 MMHG | HEIGHT: 71 IN | HEART RATE: 69 BPM | DIASTOLIC BLOOD PRESSURE: 60 MMHG | WEIGHT: 214 LBS | BODY MASS INDEX: 29.96 KG/M2

## 2024-07-09 DIAGNOSIS — I10 ESSENTIAL HYPERTENSION: ICD-10-CM

## 2024-07-09 DIAGNOSIS — I36.1 NONRHEUMATIC TRICUSPID VALVE REGURGITATION: ICD-10-CM

## 2024-07-09 DIAGNOSIS — I25.10 ATHEROSCLEROSIS OF NATIVE CORONARY ARTERY OF NATIVE HEART WITHOUT ANGINA PECTORIS: ICD-10-CM

## 2024-07-09 DIAGNOSIS — Z79.899 ON AMIODARONE THERAPY: Primary | ICD-10-CM

## 2024-07-09 DIAGNOSIS — Z95.0 PRESENCE OF CARDIAC PACEMAKER: ICD-10-CM

## 2024-07-09 PROCEDURE — 3078F DIAST BP <80 MM HG: CPT | Performed by: INTERNAL MEDICINE

## 2024-07-09 PROCEDURE — 3074F SYST BP LT 130 MM HG: CPT | Performed by: INTERNAL MEDICINE

## 2024-07-09 PROCEDURE — 93000 ELECTROCARDIOGRAM COMPLETE: CPT | Performed by: INTERNAL MEDICINE

## 2024-07-09 RX ORDER — TIOTROPIUM BROMIDE INHALATION SPRAY 1.56 UG/1
1 SPRAY, METERED RESPIRATORY (INHALATION)
COMMUNITY

## 2024-07-09 RX ORDER — HYDROCODONE BITARTRATE AND ACETAMINOPHEN 5; 325 MG/1; MG/1
1 TABLET ORAL AS NEEDED
COMMUNITY

## 2024-07-09 NOTE — PROGRESS NOTES
3 MO FOLLOW UP   Subjective:        Yves Hastings is a 74 y.o. male who here for follow up    CC  Follow-up coronary artery disease hypertension pacemaker  HPI  74-year-old male with coronary artery disease hypertension pacemaker here for the follow-up with no chest pains or tightness in the chest     Problems Addressed this Visit          Cardiac and Vasculature    Essential hypertension    Atherosclerosis of native coronary artery of native heart without angina pectoris - Primary    Presence of cardiac pacemaker     Diagnoses         Codes Comments    Atherosclerosis of native coronary artery of native heart without angina pectoris    -  Primary ICD-10-CM: I25.10  ICD-9-CM: 414.01     Essential hypertension     ICD-10-CM: I10  ICD-9-CM: 401.9     Presence of cardiac pacemaker     ICD-10-CM: Z95.0  ICD-9-CM: V45.01           .    The following portions of the patient's history were reviewed and updated as appropriate: allergies, current medications, past family history, past medical history, past social history, past surgical history and problem list.    Past Medical History:   Diagnosis Date    COPD (chronic obstructive pulmonary disease)     Emphysema of lung     HLD (hyperlipidemia)     [place] (hard of hearing)     ears hearing aids    HTN (hypertension)     Myelodysplastic syndrome, unspecified 11/06/2023    Myocardial infarction     Short-term memory loss     Sleep apnea     no machine    Stroke     Ventral hernia     sched repair     reports that he has been smoking cigarettes. He started smoking about 61 years ago. He has a 15 pack-year smoking history. He has been exposed to tobacco smoke. He has never used smokeless tobacco. He reports that he does not currently use alcohol after a past usage of about 7.0 standard drinks of alcohol per week. He reports that he does not currently use drugs after having used the following drugs: Marijuana.   Family History   Problem Relation Age of Onset    Cancer Mother       "   breast    Heart disease Mother     Heart disease Father     Cancer Maternal Aunt     Heart disease Paternal Uncle     Malig Hyperthermia Neg Hx        Review of Systems  Constitutional: No wt loss, fever, fatigue  Gastrointestinal: No nausea, abdominal pain  Behavioral/Psych: No insomnia or anxiety   Cardiovascular no chest pains or tightness in the chest  Objective:       Physical Exam  BP 97/60   Pulse 69   Ht 180.3 cm (71\")   Wt 97.1 kg (214 lb)   BMI 29.85 kg/m²   General appearance: No acute changes   Neck: Trachea midline; NECK, supple, no thyromegaly or lymphadenopathy   Lungs: Normal size and shape, normal breath sounds, equal distribution of air, no rales and rhonchi   CV: S1-S2 regular, no murmurs, no rub, no gallop   Abdomen: Soft, nontender; no masses , no abnormal abdominal sounds   Extremities: No deformity , normal color , no peripheral edema   Skin: Normal temperature, turgor and texture; no rash, ulcers            ECG 12 Lead    Date/Time: 7/9/2024 2:08 PM  Performed by: Mika Carranza MD    Authorized by: Mika Carranza MD  Comparison: compared with previous ECG   Similar to previous ECG  Rhythm: sinus rhythm    Clinical impression: non-specific ECG            Echocardiogram:    Results for orders placed during the hospital encounter of 07/01/24    Adult Transthoracic Echo Complete W/ Cont if Necessary Per Protocol    Interpretation Summary    Left ventricular ejection fraction appears to be 36 - 40%.    The left ventricular cavity is dilated.    The following left ventricular wall segments are hypokinetic: mid inferoseptal and mid anteroseptal.    Estimated right ventricular systolic pressure from tricuspid regurgitation is normal (<35 mmHg).          Current Outpatient Medications:     albuterol (PROVENTIL HFA;VENTOLIN HFA) 108 (90 Base) MCG/ACT inhaler, Inhale 2 puffs Every 4 (Four) Hours As Needed for Wheezing., Disp: , Rfl:     amiodarone (PACERONE) 200 MG tablet, Take 1 " tablet by mouth Daily., Disp: , Rfl:     aspirin 81 MG EC tablet, aspirin 81 mg tablet,delayed release  TAKE 1 TABLET BY MOUTH DAILY, Disp: , Rfl:     atorvastatin (LIPITOR) 80 MG tablet, Take 1 tablet by mouth Daily., Disp: , Rfl:     azaCITIDine in sterile water (preservative free), Inject  under the skin into the appropriate area as directed 1 (One) Time., Disp: , Rfl:     carvedilol (COREG) 3.125 MG tablet, Take 1 tablet by mouth 2 (Two) Times a Day With Meals., Disp: , Rfl:     clopidogrel (PLAVIX) 75 MG tablet, clopidogrel 75 mg tablet, Disp: , Rfl:     cyanocobalamin (VITAMIN B-12) 1000 MCG tablet, Take 1 tablet by mouth Daily., Disp: , Rfl:     empagliflozin (Jardiance) 10 MG tablet tablet, Take 1 tablet by mouth Daily., Disp: , Rfl:     Ferrous Fumarate (Ferrocite) 324 (106 Fe) MG tablet, Take 1 tablet by mouth Every Other Day., Disp: , Rfl:     ferrous gluconate (FERGON) 324 MG tablet, Take 1 tablet by mouth., Disp: , Rfl:     FLUoxetine (PROzac) 20 MG capsule, Take 1 tablet by mouth Daily., Disp: , Rfl:     furosemide (LASIX) 40 MG tablet, Take 1 tablet by mouth Daily., Disp: 30 tablet, Rfl: 5    HYDROcodone-acetaminophen (NORCO) 5-325 MG per tablet, Take 1 tablet by mouth As Needed., Disp: , Rfl:     hydrOXYzine (ATARAX) 25 MG tablet, Take 1 tablet by mouth 3 (Three) Times a Day As Needed for Itching., Disp: , Rfl:     ipratropium-albuterol (DUO-NEB) 0.5-2.5 mg/3 ml nebulizer, ipratropium 0.5 mg-albuterol 3 mg (2.5 mg base)/3 mL nebulization soln, Disp: , Rfl:     losartan (COZAAR) 25 MG tablet, Take 1 tablet by mouth Daily., Disp: , Rfl:     pantoprazole (PROTONIX) 40 MG EC tablet, Take 1 tablet by mouth Daily., Disp: , Rfl:     rOPINIRole (REQUIP) 0.25 MG tablet, Take 1 tablet by mouth Every Night. Take 1 hour before bedtime., Disp: , Rfl:     sertraline (ZOLOFT) 50 MG tablet, Take 1 tablet by mouth Daily., Disp: , Rfl:     spironolactone (ALDACTONE) 25 MG tablet, Take 1 tablet by mouth Daily., Disp: ,  Rfl:     Tiotropium Bromide Monohydrate (Spiriva Respimat) 1.25 MCG/ACT aerosol solution inhaler, Inhale 1 puff., Disp: , Rfl:     Trelegy Ellipta 100-62.5-25 MCG/ACT inhaler, , Disp: , Rfl:    Assessment:                Plan:          ICD-10-CM ICD-9-CM   1. Atherosclerosis of native coronary artery of native heart without angina pectoris  I25.10 414.01   2. Essential hypertension  I10 401.9   3. Presence of cardiac pacemaker  Z95.0 V45.01     1. Atherosclerosis of native coronary artery of native heart without angina pectoris  No angina pectoris    2. Essential hypertension  Patient understands importance of blood pressure check at home which patient does regularly and the blood pressures are well under control to the level of less than 140/90      3. Presence of cardiac pacemaker  Pacemaker functioning normally      6 MONTHS WITH ECHO AND AMIO CHECK  COUNSELING:    Yves Briones was given to patient for the following topics: diagnostic results, risk factor reductions, impressions, risks and benefits of treatment options and importance of treatment compliance .       SMOKING COUNSELING:        Dictated using Dragon dictation

## 2024-10-14 ENCOUNTER — HOSPITAL ENCOUNTER (OUTPATIENT)
Dept: CARDIOLOGY | Facility: HOSPITAL | Age: 74
Discharge: HOME | End: 2024-10-14
Admitting: INTERNAL MEDICINE
Payer: MEDICARE

## 2024-10-14 VITALS
WEIGHT: 214 LBS | HEART RATE: 75 BPM | DIASTOLIC BLOOD PRESSURE: 67 MMHG | BODY MASS INDEX: 29.96 KG/M2 | SYSTOLIC BLOOD PRESSURE: 114 MMHG | HEIGHT: 71 IN

## 2024-10-14 DIAGNOSIS — I36.1 NONRHEUMATIC TRICUSPID VALVE REGURGITATION: ICD-10-CM

## 2024-10-14 PROCEDURE — 25510000001 PERFLUTREN 6.52 MG/ML SUSPENSION 2 ML VIAL: Performed by: INTERNAL MEDICINE

## 2024-10-14 PROCEDURE — 93321 DOPPLER ECHO F-UP/LMTD STD: CPT

## 2024-10-14 PROCEDURE — 93325 DOPPLER ECHO COLOR FLOW MAPG: CPT

## 2024-10-14 PROCEDURE — 93308 TTE F-UP OR LMTD: CPT

## 2024-10-14 RX ADMIN — SODIUM CHLORIDE 2 ML: 9 INJECTION INTRAMUSCULAR; INTRAVENOUS; SUBCUTANEOUS at 09:16

## 2024-10-16 LAB
BH CV ECHO MEAS - ACS: 2.24 CM
BH CV ECHO MEAS - AO ROOT DIAM: 4.1 CM
BH CV ECHO MEAS - EDV(CUBED): 166.4 ML
BH CV ECHO MEAS - EDV(MOD-SP2): 182 ML
BH CV ECHO MEAS - EDV(MOD-SP4): 175 ML
BH CV ECHO MEAS - EF(MOD-BP): 42.5 %
BH CV ECHO MEAS - EF(MOD-SP2): 42 %
BH CV ECHO MEAS - EF(MOD-SP4): 38.9 %
BH CV ECHO MEAS - ESV(CUBED): 93.8 ML
BH CV ECHO MEAS - ESV(MOD-SP2): 94 ML
BH CV ECHO MEAS - ESV(MOD-SP4): 107 ML
BH CV ECHO MEAS - FS: 17.4 %
BH CV ECHO MEAS - IVS/LVPW: 0.99 CM
BH CV ECHO MEAS - IVSD: 1 CM
BH CV ECHO MEAS - LA DIMENSION: 4.4 CM
BH CV ECHO MEAS - LAT PEAK E' VEL: 9.4 CM/SEC
BH CV ECHO MEAS - LV DIASTOLIC VOL/BSA (35-75): 80.6 CM2
BH CV ECHO MEAS - LV MASS(C)D: 214.8 GRAMS
BH CV ECHO MEAS - LV SYSTOLIC VOL/BSA (12-30): 49.3 CM2
BH CV ECHO MEAS - LVIDD: 5.5 CM
BH CV ECHO MEAS - LVIDS: 4.5 CM
BH CV ECHO MEAS - LVPWD: 1.01 CM
BH CV ECHO MEAS - MED PEAK E' VEL: 7.9 CM/SEC
BH CV ECHO MEAS - MR MAX PG: 51.3 MMHG
BH CV ECHO MEAS - MR MAX VEL: 358 CM/SEC
BH CV ECHO MEAS - MV A DUR: 0.1 SEC
BH CV ECHO MEAS - MV A MAX VEL: 97.5 CM/SEC
BH CV ECHO MEAS - MV DEC SLOPE: 550.2 CM/SEC2
BH CV ECHO MEAS - MV DEC TIME: 202 SEC
BH CV ECHO MEAS - MV E MAX VEL: 84.4 CM/SEC
BH CV ECHO MEAS - MV E/A: 0.87
BH CV ECHO MEAS - MV MAX PG: 4.1 MMHG
BH CV ECHO MEAS - MV MEAN PG: 2.09 MMHG
BH CV ECHO MEAS - MV P1/2T: 53.9 MSEC
BH CV ECHO MEAS - MV V2 VTI: 21.7 CM
BH CV ECHO MEAS - MVA(P1/2T): 4.1 CM2
BH CV ECHO MEAS - RAP SYSTOLE: 3 MMHG
BH CV ECHO MEAS - RVSP: 36.8 MMHG
BH CV ECHO MEAS - SV(MOD-SP2): 88 ML
BH CV ECHO MEAS - SV(MOD-SP4): 68 ML
BH CV ECHO MEAS - SVI(MOD-SP2): 40.5 ML/M2
BH CV ECHO MEAS - SVI(MOD-SP4): 31.3 ML/M2
BH CV ECHO MEAS - TR MAX PG: 33.8 MMHG
BH CV ECHO MEAS - TR MAX VEL: 290.9 CM/SEC
BH CV ECHO MEASUREMENTS AVERAGE E/E' RATIO: 9.76
BH CV XLRA - RV BASE: 3.8 CM
BH CV XLRA - RV LENGTH: 7 CM
BH CV XLRA - RV MID: 3.4 CM
BH CV XLRA - TDI S': 10.8 CM/SEC
LEFT ATRIUM VOLUME INDEX: 33.9 ML/M2

## 2024-11-05 ENCOUNTER — CLINICAL SUPPORT NO REQUIREMENTS (OUTPATIENT)
Dept: CARDIOLOGY | Facility: CLINIC | Age: 74
End: 2024-11-05
Payer: MEDICARE

## 2024-11-05 DIAGNOSIS — Z95.0 PRESENCE OF CARDIAC PACEMAKER: Primary | ICD-10-CM

## 2025-01-14 ENCOUNTER — OFFICE VISIT (OUTPATIENT)
Dept: CARDIOLOGY | Facility: CLINIC | Age: 75
End: 2025-01-14
Payer: MEDICARE

## 2025-01-14 VITALS
HEART RATE: 75 BPM | DIASTOLIC BLOOD PRESSURE: 65 MMHG | HEIGHT: 71 IN | WEIGHT: 217 LBS | SYSTOLIC BLOOD PRESSURE: 101 MMHG | BODY MASS INDEX: 30.38 KG/M2

## 2025-01-14 DIAGNOSIS — Z79.899 ON AMIODARONE THERAPY: ICD-10-CM

## 2025-01-14 DIAGNOSIS — I25.10 ATHEROSCLEROSIS OF NATIVE CORONARY ARTERY OF NATIVE HEART WITHOUT ANGINA PECTORIS: Primary | ICD-10-CM

## 2025-01-14 DIAGNOSIS — I10 ESSENTIAL HYPERTENSION: ICD-10-CM

## 2025-01-14 DIAGNOSIS — E78.5 HYPERLIPIDEMIA, UNSPECIFIED HYPERLIPIDEMIA TYPE: ICD-10-CM

## 2025-01-14 PROCEDURE — 93000 ELECTROCARDIOGRAM COMPLETE: CPT | Performed by: INTERNAL MEDICINE

## 2025-01-14 PROCEDURE — 99214 OFFICE O/P EST MOD 30 MIN: CPT | Performed by: INTERNAL MEDICINE

## 2025-01-14 PROCEDURE — 3074F SYST BP LT 130 MM HG: CPT | Performed by: INTERNAL MEDICINE

## 2025-01-14 PROCEDURE — 3078F DIAST BP <80 MM HG: CPT | Performed by: INTERNAL MEDICINE

## 2025-01-14 RX ORDER — ROPINIROLE 0.5 MG/1
0.5 TABLET, FILM COATED ORAL
COMMUNITY
Start: 2025-01-10

## 2025-01-14 NOTE — PROGRESS NOTES
6 MO FOLLOW UP WITH LILIYAO CHECK  FAMILY WANTS TO DISCUSS STOPPING PLAVIX OR ASA DUE TO LOW PLATELET COUNT.   Subjective:        Yves Hastings is a 74 y.o. male who here for follow up    CC  Follow-up coronary artery disease hypertension hyperlipidemia, amiodarone therapy  HPI  74-year-old male with cancer chemotherapy has low platelets shortness of breath but no chest pain     Problems Addressed this Visit          Cardiac and Vasculature    Essential hypertension    Hyperlipidemia    Atherosclerosis of native coronary artery of native heart without angina pectoris - Primary    On amiodarone therapy     Diagnoses         Codes Comments    Atherosclerosis of native coronary artery of native heart without angina pectoris    -  Primary ICD-10-CM: I25.10  ICD-9-CM: 414.01     Essential hypertension     ICD-10-CM: I10  ICD-9-CM: 401.9     On amiodarone therapy     ICD-10-CM: Z79.899  ICD-9-CM: V58.69     Hyperlipidemia, unspecified hyperlipidemia type     ICD-10-CM: E78.5  ICD-9-CM: 272.4           .    The following portions of the patient's history were reviewed and updated as appropriate: allergies, current medications, past family history, past medical history, past social history, past surgical history and problem list.    Past Medical History:   Diagnosis Date    COPD (chronic obstructive pulmonary disease)     Emphysema of lung     HLD (hyperlipidemia)     [place] (hard of hearing)     ears hearing aids    HTN (hypertension)     Myelodysplastic syndrome, unspecified 11/06/2023    Myocardial infarction     Short-term memory loss     Sleep apnea     no machine    Stroke     Ventral hernia     sched repair     reports that he has been smoking cigarettes. He started smoking about 62 years ago. He has a 15 pack-year smoking history. He has been exposed to tobacco smoke. He has never used smokeless tobacco. He reports that he does not currently use alcohol after a past usage of about 7.0 standard drinks of alcohol per  "week. He reports that he does not currently use drugs after having used the following drugs: Marijuana.   Family History   Problem Relation Age of Onset    Cancer Mother         breast    Heart disease Mother     Heart disease Father     Cancer Maternal Aunt     Heart disease Paternal Uncle     [place]ig Hyperthermia Neg Hx        Review of Systems  Constitutional: No wt loss, fever, fatigue  Gastrointestinal: No nausea, abdominal pain  Behavioral/Psych: No insomnia or anxiety   Cardiovascular shortness of breath  Objective:       Physical Exam  /65   Pulse 75   Ht 180.3 cm (71\")   Wt 98.4 kg (217 lb)   BMI 30.27 kg/m²   General appearance: No acute changes   Neck: Trachea midline; NECK, supple, no thyromegaly or lymphadenopathy   Lungs: Normal size and shape, normal breath sounds, equal distribution of air, no rales and rhonchi   CV: S1-S2 regular, no murmurs, no rub, no gallop   Abdomen: Soft, nontender; no masses , no abnormal abdominal sounds   Extremities: No deformity , normal color , no peripheral edema   Skin: Normal temperature, turgor and texture; no rash, ulcers            ECG 12 Lead    Date/Time: 1/14/2025 1:28 PM  Performed by: Mika Carranza MD    Authorized by: Mika Carranza MD  Comparison: compared with previous ECG   Similar to previous ECG  Rhythm: sinus rhythm  Ectopy: atrial premature contractions    Clinical impression: non-specific ECG            Echocardiogram:    Results for orders placed during the hospital encounter of 07/01/24    Adult Transthoracic Echo Complete W/ Cont if Necessary Per Protocol    Interpretation Summary    Left ventricular ejection fraction appears to be 36 - 40%.    The left ventricular cavity is dilated.    The following left ventricular wall segments are hypokinetic: mid inferoseptal and mid anteroseptal.    Estimated right ventricular systolic pressure from tricuspid regurgitation is normal (<35 mmHg).          Current Outpatient Medications: "     albuterol (PROVENTIL HFA;VENTOLIN HFA) 108 (90 Base) MCG/ACT inhaler, Inhale 2 puffs Every 4 (Four) Hours As Needed for Wheezing., Disp: , Rfl:     amiodarone (PACERONE) 200 MG tablet, Take 1 tablet by mouth Daily., Disp: , Rfl:     aspirin 81 MG EC tablet, aspirin 81 mg tablet,delayed release  TAKE 1 TABLET BY MOUTH DAILY, Disp: , Rfl:     atorvastatin (LIPITOR) 80 MG tablet, Take 1 tablet by mouth Daily., Disp: , Rfl:     azaCITIDine in sterile water (preservative free), Inject  under the skin into the appropriate area as directed 1 (One) Time., Disp: , Rfl:     carvedilol (COREG) 3.125 MG tablet, Take 1 tablet by mouth 2 (Two) Times a Day With Meals., Disp: , Rfl:     cyanocobalamin (VITAMIN B-12) 1000 MCG tablet, Take 1 tablet by mouth Daily., Disp: , Rfl:     Ferrous Fumarate (Ferrocite) 324 (106 Fe) MG tablet, Take 1 tablet by mouth Every Other Day., Disp: , Rfl:     ferrous gluconate (FERGON) 324 MG tablet, Take 1 tablet by mouth., Disp: , Rfl:     furosemide (LASIX) 40 MG tablet, Take 1 tablet by mouth Daily. (Patient taking differently: Take 0.5 tablets by mouth Daily.), Disp: 30 tablet, Rfl: 5    HYDROcodone-acetaminophen (NORCO) 5-325 MG per tablet, Take 1 tablet by mouth As Needed., Disp: , Rfl:     hydrOXYzine (ATARAX) 25 MG tablet, Take 1 tablet by mouth 3 (Three) Times a Day As Needed for Itching., Disp: , Rfl:     ipratropium-albuterol (DUO-NEB) 0.5-2.5 mg/3 ml nebulizer, ipratropium 0.5 mg-albuterol 3 mg (2.5 mg base)/3 mL nebulization soln, Disp: , Rfl:     losartan (COZAAR) 25 MG tablet, Take 1 tablet by mouth Daily., Disp: , Rfl:     pantoprazole (PROTONIX) 40 MG EC tablet, Take 1 tablet by mouth Daily., Disp: , Rfl:     rOPINIRole (REQUIP) 0.5 MG tablet, Take 1 tablet by mouth every night at bedtime., Disp: , Rfl:     sertraline (ZOLOFT) 50 MG tablet, Take 1 tablet by mouth Daily., Disp: , Rfl:     spironolactone (ALDACTONE) 25 MG tablet, Take 1 tablet by mouth Daily., Disp: , Rfl:      Trelegy Ellipta 100-62.5-25 MCG/ACT inhaler, , Disp: , Rfl:    Assessment:                Plan:          ICD-10-CM ICD-9-CM   1. Atherosclerosis of native coronary artery of native heart without angina pectoris  I25.10 414.01   2. Essential hypertension  I10 401.9   3. On amiodarone therapy  Z79.899 V58.69   4. Hyperlipidemia, unspecified hyperlipidemia type  E78.5 272.4     1. Atherosclerosis of native coronary artery of native heart without angina pectoris  No angina pectoris    2. Essential hypertension  Blood pressure well-controlled with current medication    3. On amiodarone therapy  Yves Hastings IS ON AMIODARONE,   Significant side effects associated with this medication has been explained     Pros and cons of the medications has been discussed, decision has been to continue the medication   6 months to yearly checkup for eye examination, thyroid function test, chest x-ray and liver function test has been advised    Patient understands well      4. Hyperlipidemia, unspecified hyperlipidemia type  Continue current treatment      DC PLAVIX DUE TO PLAT LETS DUE TO CHEMO    Pros and cons of this new medication / change medication has been explained to  the patient    Possible side effects has been explained    Associated need of the blood  Work has been explained    Need for the compliance of the medication has been explained      SEE IN 6 MONTHS WITH AMIO CHECK, AND ECHO      COUNSELING:    Yves Briones was given to patient for the following topics: diagnostic results, risk factor reductions, impressions, risks and benefits of treatment options and importance of treatment compliance .       SMOKING COUNSELING:        Dictated using Dragon dictation

## 2025-01-28 RX ORDER — LOSARTAN POTASSIUM 25 MG/1
25 TABLET ORAL DAILY
Qty: 30 TABLET | Refills: 2 | Status: SHIPPED | OUTPATIENT
Start: 2025-01-28

## 2025-02-23 ENCOUNTER — APPOINTMENT (OUTPATIENT)
Dept: GENERAL RADIOLOGY | Facility: HOSPITAL | Age: 75
End: 2025-02-23
Payer: MEDICARE

## 2025-02-23 ENCOUNTER — HOSPITAL ENCOUNTER (INPATIENT)
Facility: HOSPITAL | Age: 75
LOS: 3 days | Discharge: HOME | End: 2025-02-26
Attending: EMERGENCY MEDICINE | Admitting: INTERNAL MEDICINE
Payer: MEDICARE

## 2025-02-23 DIAGNOSIS — J44.1 COPD WITH ACUTE EXACERBATION: ICD-10-CM

## 2025-02-23 DIAGNOSIS — D64.9 ACUTE ON CHRONIC ANEMIA: Primary | ICD-10-CM

## 2025-02-23 DIAGNOSIS — J96.01 ACUTE HYPOXEMIC RESPIRATORY FAILURE: ICD-10-CM

## 2025-02-23 DIAGNOSIS — I50.9 ACUTE ON CHRONIC CONGESTIVE HEART FAILURE, UNSPECIFIED HEART FAILURE TYPE: ICD-10-CM

## 2025-02-23 LAB
ABO GROUP BLD: NORMAL
ALBUMIN SERPL-MCNC: 4.2 G/DL (ref 3.5–5.2)
ALBUMIN/GLOB SERPL: 1.4 G/DL
ALP SERPL-CCNC: 103 U/L (ref 39–117)
ALT SERPL W P-5'-P-CCNC: 10 U/L (ref 1–41)
ANION GAP SERPL CALCULATED.3IONS-SCNC: 9 MMOL/L (ref 5–15)
ARTERIAL PATENCY WRIST A: POSITIVE
AST SERPL-CCNC: 14 U/L (ref 1–40)
ATMOSPHERIC PRESS: 751.5 MMHG
B PARAPERT DNA SPEC QL NAA+PROBE: NOT DETECTED
B PERT DNA SPEC QL NAA+PROBE: NOT DETECTED
BASE EXCESS BLDA CALC-SCNC: -0.3 MMOL/L (ref 0–2)
BDY SITE: ABNORMAL
BILIRUB SERPL-MCNC: 0.7 MG/DL (ref 0–1.2)
BLD GP AB SCN SERPL QL: NEGATIVE
BUN SERPL-MCNC: 21 MG/DL (ref 8–23)
BUN/CREAT SERPL: 18.8 (ref 7–25)
C PNEUM DNA NPH QL NAA+NON-PROBE: NOT DETECTED
CALCIUM SPEC-SCNC: 9 MG/DL (ref 8.6–10.5)
CHLORIDE SERPL-SCNC: 101 MMOL/L (ref 98–107)
CO2 BLDA-SCNC: 24 MMOL/L (ref 23–27)
CO2 SERPL-SCNC: 23 MMOL/L (ref 22–29)
CREAT SERPL-MCNC: 1.12 MG/DL (ref 0.76–1.27)
D-LACTATE SERPL-SCNC: 1.1 MMOL/L (ref 0.5–2)
DEVICE COMMENT: ABNORMAL
DIMORPHIC RBC: PRESENT
EGFRCR SERPLBLD CKD-EPI 2021: 68.9 ML/MIN/1.73
EOSINOPHIL # BLD MANUAL: 0.09 10*3/MM3 (ref 0–0.4)
EOSINOPHIL NFR BLD MANUAL: 2 % (ref 0.3–6.2)
ERYTHROCYTE [DISTWIDTH] IN BLOOD BY AUTOMATED COUNT: ABNORMAL %
FLUAV SUBTYP SPEC NAA+PROBE: NOT DETECTED
FLUBV RNA ISLT QL NAA+PROBE: NOT DETECTED
GAS FLOW AIRWAY: 2 LPM
GEN 5 1HR TROPONIN T REFLEX: 19 NG/L
GLOBULIN UR ELPH-MCNC: 3.1 GM/DL
GLUCOSE SERPL-MCNC: 97 MG/DL (ref 65–99)
HADV DNA SPEC NAA+PROBE: NOT DETECTED
HCO3 BLDA-SCNC: 23 MMOL/L (ref 22–28)
HCOV 229E RNA SPEC QL NAA+PROBE: NOT DETECTED
HCOV HKU1 RNA SPEC QL NAA+PROBE: NOT DETECTED
HCOV NL63 RNA SPEC QL NAA+PROBE: NOT DETECTED
HCOV OC43 RNA SPEC QL NAA+PROBE: NOT DETECTED
HCT VFR BLD AUTO: 20.1 % (ref 37.5–51)
HEMODILUTION: NO
HGB BLD-MCNC: 6.8 G/DL (ref 13–17.7)
HMPV RNA NPH QL NAA+NON-PROBE: NOT DETECTED
HOLD SPECIMEN: NORMAL
HOLD SPECIMEN: NORMAL
HPIV1 RNA ISLT QL NAA+PROBE: NOT DETECTED
HPIV2 RNA SPEC QL NAA+PROBE: NOT DETECTED
HPIV3 RNA NPH QL NAA+PROBE: NOT DETECTED
HPIV4 P GENE NPH QL NAA+PROBE: NOT DETECTED
IRON 24H UR-MRATE: 29 MCG/DL (ref 59–158)
IRON SATN MFR SERPL: 9 % (ref 20–50)
LARGE PLATELETS: ABNORMAL
LYMPHOCYTES # BLD MANUAL: 1.03 10*3/MM3 (ref 0.7–3.1)
LYMPHOCYTES NFR BLD MANUAL: 13 % (ref 5–12)
M PNEUMO IGG SER IA-ACNC: NOT DETECTED
MCH RBC QN AUTO: 35.1 PG (ref 26.6–33)
MCHC RBC AUTO-ENTMCNC: 33.8 G/DL (ref 31.5–35.7)
MCV RBC AUTO: 103.6 FL (ref 79–97)
MODALITY: ABNORMAL
MONOCYTES # BLD: 0.56 10*3/MM3 (ref 0.1–0.9)
NEUTROPHILS # BLD AUTO: 2.62 10*3/MM3 (ref 1.7–7)
NEUTROPHILS NFR BLD MANUAL: 60 % (ref 42.7–76)
NEUTS BAND NFR BLD MANUAL: 1 % (ref 0–5)
NRBC SPEC MANUAL: 1 /100 WBC (ref 0–0.2)
NT-PROBNP SERPL-MCNC: 3590 PG/ML (ref 0–900)
PCO2 BLDA: 32.2 MM HG (ref 35–45)
PH BLDA: 7.46 PH UNITS (ref 7.35–7.45)
PLATELET # BLD AUTO: 115 10*3/MM3 (ref 140–450)
PMV BLD AUTO: 9.6 FL (ref 6–12)
PO2 BLDA: 94.7 MM HG (ref 80–100)
POLYCHROMASIA BLD QL SMEAR: ABNORMAL
POTASSIUM SERPL-SCNC: 4.5 MMOL/L (ref 3.5–5.2)
PROCALCITONIN SERPL-MCNC: 0.07 NG/ML (ref 0–0.25)
PROT SERPL-MCNC: 7.3 G/DL (ref 6–8.5)
QT INTERVAL: 414 MS
QTC INTERVAL: 457 MS
RBC # BLD AUTO: 1.94 10*6/MM3 (ref 4.14–5.8)
RH BLD: POSITIVE
RHINOVIRUS RNA SPEC NAA+PROBE: NOT DETECTED
RSV RNA NPH QL NAA+NON-PROBE: NOT DETECTED
SAO2 % BLDCOA: 97.9 % (ref 92–98.5)
SARS-COV-2 RNA NPH QL NAA+NON-PROBE: NOT DETECTED
SODIUM SERPL-SCNC: 133 MMOL/L (ref 136–145)
T&S EXPIRATION DATE: NORMAL
TIBC SERPL-MCNC: 317 MCG/DL (ref 298–536)
TOTAL RATE: 24 BREATHS/MINUTE
TRANSFERRIN SERPL-MCNC: 213 MG/DL (ref 200–360)
TROPONIN T NUMERIC DELTA: -1 NG/L
TROPONIN T SERPL HS-MCNC: 20 NG/L
VARIANT LYMPHS NFR BLD MANUAL: 24 % (ref 19.6–45.3)
WBC MORPH BLD: NORMAL
WBC NRBC COR # BLD AUTO: 4.29 10*3/MM3 (ref 3.4–10.8)
WHOLE BLOOD HOLD COAG: NORMAL
WHOLE BLOOD HOLD SPECIMEN: NORMAL

## 2025-02-23 PROCEDURE — 99285 EMERGENCY DEPT VISIT HI MDM: CPT

## 2025-02-23 PROCEDURE — 86923 COMPATIBILITY TEST ELECTRIC: CPT

## 2025-02-23 PROCEDURE — 83605 ASSAY OF LACTIC ACID: CPT | Performed by: EMERGENCY MEDICINE

## 2025-02-23 PROCEDURE — 86850 RBC ANTIBODY SCREEN: CPT | Performed by: EMERGENCY MEDICINE

## 2025-02-23 PROCEDURE — 85025 COMPLETE CBC W/AUTO DIFF WBC: CPT | Performed by: EMERGENCY MEDICINE

## 2025-02-23 PROCEDURE — 25010000002 METHYLPREDNISOLONE PER 125 MG: Performed by: EMERGENCY MEDICINE

## 2025-02-23 PROCEDURE — 82803 BLOOD GASES ANY COMBINATION: CPT | Performed by: EMERGENCY MEDICINE

## 2025-02-23 PROCEDURE — 94761 N-INVAS EAR/PLS OXIMETRY MLT: CPT

## 2025-02-23 PROCEDURE — 25010000002 FUROSEMIDE PER 20 MG: Performed by: EMERGENCY MEDICINE

## 2025-02-23 PROCEDURE — 80053 COMPREHEN METABOLIC PANEL: CPT | Performed by: EMERGENCY MEDICINE

## 2025-02-23 PROCEDURE — 83880 ASSAY OF NATRIURETIC PEPTIDE: CPT | Performed by: EMERGENCY MEDICINE

## 2025-02-23 PROCEDURE — 36600 WITHDRAWAL OF ARTERIAL BLOOD: CPT | Performed by: EMERGENCY MEDICINE

## 2025-02-23 PROCEDURE — 94640 AIRWAY INHALATION TREATMENT: CPT

## 2025-02-23 PROCEDURE — 85007 BL SMEAR W/DIFF WBC COUNT: CPT | Performed by: EMERGENCY MEDICINE

## 2025-02-23 PROCEDURE — 0202U NFCT DS 22 TRGT SARS-COV-2: CPT | Performed by: EMERGENCY MEDICINE

## 2025-02-23 PROCEDURE — 84484 ASSAY OF TROPONIN QUANT: CPT | Performed by: EMERGENCY MEDICINE

## 2025-02-23 PROCEDURE — 84145 PROCALCITONIN (PCT): CPT | Performed by: EMERGENCY MEDICINE

## 2025-02-23 PROCEDURE — 86900 BLOOD TYPING SEROLOGIC ABO: CPT | Performed by: EMERGENCY MEDICINE

## 2025-02-23 PROCEDURE — 86901 BLOOD TYPING SEROLOGIC RH(D): CPT | Performed by: EMERGENCY MEDICINE

## 2025-02-23 PROCEDURE — 93005 ELECTROCARDIOGRAM TRACING: CPT

## 2025-02-23 PROCEDURE — 36415 COLL VENOUS BLD VENIPUNCTURE: CPT

## 2025-02-23 PROCEDURE — 94799 UNLISTED PULMONARY SVC/PX: CPT

## 2025-02-23 PROCEDURE — 83540 ASSAY OF IRON: CPT | Performed by: EMERGENCY MEDICINE

## 2025-02-23 PROCEDURE — 84466 ASSAY OF TRANSFERRIN: CPT | Performed by: EMERGENCY MEDICINE

## 2025-02-23 PROCEDURE — 93005 ELECTROCARDIOGRAM TRACING: CPT | Performed by: EMERGENCY MEDICINE

## 2025-02-23 PROCEDURE — 94760 N-INVAS EAR/PLS OXIMETRY 1: CPT

## 2025-02-23 PROCEDURE — 71045 X-RAY EXAM CHEST 1 VIEW: CPT

## 2025-02-23 RX ORDER — NITROGLYCERIN 0.4 MG/1
0.4 TABLET SUBLINGUAL
COMMUNITY

## 2025-02-23 RX ORDER — BUDESONIDE 0.5 MG/2ML
0.5 INHALANT ORAL
Status: DISCONTINUED | OUTPATIENT
Start: 2025-02-24 | End: 2025-02-26 | Stop reason: HOSPADM

## 2025-02-23 RX ORDER — CARVEDILOL 3.12 MG/1
3.12 TABLET ORAL 2 TIMES DAILY WITH MEALS
Status: DISCONTINUED | OUTPATIENT
Start: 2025-02-24 | End: 2025-02-25

## 2025-02-23 RX ORDER — ATORVASTATIN CALCIUM 20 MG/1
80 TABLET, FILM COATED ORAL DAILY
Status: DISCONTINUED | OUTPATIENT
Start: 2025-02-24 | End: 2025-02-26 | Stop reason: HOSPADM

## 2025-02-23 RX ORDER — BISACODYL 5 MG/1
5 TABLET, DELAYED RELEASE ORAL DAILY PRN
Status: DISCONTINUED | OUTPATIENT
Start: 2025-02-23 | End: 2025-02-26 | Stop reason: HOSPADM

## 2025-02-23 RX ORDER — FUROSEMIDE 10 MG/ML
80 INJECTION INTRAMUSCULAR; INTRAVENOUS ONCE
Status: COMPLETED | OUTPATIENT
Start: 2025-02-23 | End: 2025-02-23

## 2025-02-23 RX ORDER — ONDANSETRON 2 MG/ML
4 INJECTION INTRAMUSCULAR; INTRAVENOUS EVERY 6 HOURS PRN
Status: DISCONTINUED | OUTPATIENT
Start: 2025-02-23 | End: 2025-02-26 | Stop reason: HOSPADM

## 2025-02-23 RX ORDER — ACETAMINOPHEN 650 MG/1
650 SUPPOSITORY RECTAL EVERY 4 HOURS PRN
Status: DISCONTINUED | OUTPATIENT
Start: 2025-02-23 | End: 2025-02-26 | Stop reason: HOSPADM

## 2025-02-23 RX ORDER — SPIRONOLACTONE 25 MG/1
25 TABLET ORAL DAILY
Status: DISCONTINUED | OUTPATIENT
Start: 2025-02-24 | End: 2025-02-26 | Stop reason: HOSPADM

## 2025-02-23 RX ORDER — LOSARTAN POTASSIUM 25 MG/1
25 TABLET ORAL DAILY
Status: DISCONTINUED | OUTPATIENT
Start: 2025-02-24 | End: 2025-02-26 | Stop reason: HOSPADM

## 2025-02-23 RX ORDER — ALBUTEROL SULFATE 0.83 MG/ML
2.5 SOLUTION RESPIRATORY (INHALATION) EVERY 6 HOURS PRN
Status: DISCONTINUED | OUTPATIENT
Start: 2025-02-23 | End: 2025-02-26 | Stop reason: HOSPADM

## 2025-02-23 RX ORDER — HYDROCODONE BITARTRATE AND ACETAMINOPHEN 5; 325 MG/1; MG/1
1 TABLET ORAL AS NEEDED
Status: DISCONTINUED | OUTPATIENT
Start: 2025-02-23 | End: 2025-02-24

## 2025-02-23 RX ORDER — FERROUS SULFATE 325(65) MG
325 TABLET ORAL
Status: DISCONTINUED | OUTPATIENT
Start: 2025-02-24 | End: 2025-02-26 | Stop reason: HOSPADM

## 2025-02-23 RX ORDER — IPRATROPIUM BROMIDE AND ALBUTEROL SULFATE 2.5; .5 MG/3ML; MG/3ML
3 SOLUTION RESPIRATORY (INHALATION) ONCE
Status: COMPLETED | OUTPATIENT
Start: 2025-02-23 | End: 2025-02-23

## 2025-02-23 RX ORDER — POLYETHYLENE GLYCOL 3350 17 G/17G
17 POWDER, FOR SOLUTION ORAL DAILY PRN
Status: DISCONTINUED | OUTPATIENT
Start: 2025-02-23 | End: 2025-02-26 | Stop reason: HOSPADM

## 2025-02-23 RX ORDER — AMIODARONE HYDROCHLORIDE 200 MG/1
200 TABLET ORAL DAILY
Status: DISCONTINUED | OUTPATIENT
Start: 2025-02-24 | End: 2025-02-26 | Stop reason: HOSPADM

## 2025-02-23 RX ORDER — HYDROXYZINE HYDROCHLORIDE 25 MG/1
25 TABLET, FILM COATED ORAL 3 TIMES DAILY PRN
Status: DISCONTINUED | OUTPATIENT
Start: 2025-02-23 | End: 2025-02-26 | Stop reason: HOSPADM

## 2025-02-23 RX ORDER — IPRATROPIUM BROMIDE AND ALBUTEROL SULFATE 2.5; .5 MG/3ML; MG/3ML
3 SOLUTION RESPIRATORY (INHALATION)
Status: DISCONTINUED | OUTPATIENT
Start: 2025-02-23 | End: 2025-02-26 | Stop reason: HOSPADM

## 2025-02-23 RX ORDER — FUROSEMIDE 10 MG/ML
40 INJECTION INTRAMUSCULAR; INTRAVENOUS EVERY 12 HOURS
Status: DISCONTINUED | OUTPATIENT
Start: 2025-02-23 | End: 2025-02-24

## 2025-02-23 RX ORDER — METHYLPREDNISOLONE SODIUM SUCCINATE 125 MG/2ML
125 INJECTION, POWDER, LYOPHILIZED, FOR SOLUTION INTRAMUSCULAR; INTRAVENOUS ONCE
Status: COMPLETED | OUTPATIENT
Start: 2025-02-23 | End: 2025-02-23

## 2025-02-23 RX ORDER — METHYLPREDNISOLONE SODIUM SUCCINATE 40 MG/ML
40 INJECTION, POWDER, LYOPHILIZED, FOR SOLUTION INTRAMUSCULAR; INTRAVENOUS EVERY 8 HOURS
Status: DISCONTINUED | OUTPATIENT
Start: 2025-02-23 | End: 2025-02-24

## 2025-02-23 RX ORDER — ONDANSETRON 4 MG/1
4 TABLET, ORALLY DISINTEGRATING ORAL EVERY 6 HOURS PRN
Status: DISCONTINUED | OUTPATIENT
Start: 2025-02-23 | End: 2025-02-26 | Stop reason: HOSPADM

## 2025-02-23 RX ORDER — ROPINIROLE 0.5 MG/1
0.5 TABLET, FILM COATED ORAL NIGHTLY
Status: DISCONTINUED | OUTPATIENT
Start: 2025-02-24 | End: 2025-02-24

## 2025-02-23 RX ORDER — POTASSIUM CHLORIDE 750 MG/1
20 TABLET, EXTENDED RELEASE ORAL DAILY
COMMUNITY

## 2025-02-23 RX ORDER — SODIUM CHLORIDE 0.9 % (FLUSH) 0.9 %
10 SYRINGE (ML) INJECTION AS NEEDED
Status: DISCONTINUED | OUTPATIENT
Start: 2025-02-23 | End: 2025-02-26 | Stop reason: HOSPADM

## 2025-02-23 RX ORDER — BISACODYL 10 MG
10 SUPPOSITORY, RECTAL RECTAL DAILY PRN
Status: DISCONTINUED | OUTPATIENT
Start: 2025-02-23 | End: 2025-02-26 | Stop reason: HOSPADM

## 2025-02-23 RX ORDER — PANTOPRAZOLE SODIUM 40 MG/1
40 TABLET, DELAYED RELEASE ORAL DAILY
Status: DISCONTINUED | OUTPATIENT
Start: 2025-02-24 | End: 2025-02-26 | Stop reason: HOSPADM

## 2025-02-23 RX ORDER — ACETAMINOPHEN 325 MG/1
650 TABLET ORAL EVERY 4 HOURS PRN
Status: DISCONTINUED | OUTPATIENT
Start: 2025-02-23 | End: 2025-02-26 | Stop reason: HOSPADM

## 2025-02-23 RX ORDER — MULTIVITAMIN WITH IRON
1000 TABLET ORAL DAILY
Status: DISCONTINUED | OUTPATIENT
Start: 2025-02-24 | End: 2025-02-26 | Stop reason: HOSPADM

## 2025-02-23 RX ORDER — ARFORMOTEROL TARTRATE 15 UG/2ML
15 SOLUTION RESPIRATORY (INHALATION)
Status: DISCONTINUED | OUTPATIENT
Start: 2025-02-24 | End: 2025-02-26 | Stop reason: HOSPADM

## 2025-02-23 RX ORDER — AMOXICILLIN 250 MG
2 CAPSULE ORAL 2 TIMES DAILY PRN
Status: DISCONTINUED | OUTPATIENT
Start: 2025-02-23 | End: 2025-02-26 | Stop reason: HOSPADM

## 2025-02-23 RX ORDER — ACETAMINOPHEN 160 MG/5ML
650 SOLUTION ORAL EVERY 4 HOURS PRN
Status: DISCONTINUED | OUTPATIENT
Start: 2025-02-23 | End: 2025-02-26 | Stop reason: HOSPADM

## 2025-02-23 RX ADMIN — FUROSEMIDE 80 MG: 10 INJECTION, SOLUTION INTRAMUSCULAR; INTRAVENOUS at 18:46

## 2025-02-23 RX ADMIN — IPRATROPIUM BROMIDE AND ALBUTEROL SULFATE 3 ML: .5; 3 SOLUTION RESPIRATORY (INHALATION) at 19:05

## 2025-02-23 RX ADMIN — METHYLPREDNISOLONE SODIUM SUCCINATE 125 MG: 125 INJECTION, POWDER, FOR SOLUTION INTRAMUSCULAR; INTRAVENOUS at 18:48

## 2025-02-23 NOTE — ED PROVIDER NOTES
EMERGENCY DEPARTMENT ENCOUNTER  Room Number:  13/13  PCP: Brenda Downs APRN  Independent Historians: Patient      HPI:  Chief Complaint: Cough, dyspnea, and chest tightness    A complete HPI/ROS/PMH/PSH/SH/FH are unobtainable due to: None    Chronic or social conditions impacting patient care (Social Determinants of Health): None      Context: Yves Hastings is a 75 y.o. male with a medical history of COPD, essential hypertension, hyperlipidemia, and dilated cardiomyopathy requiring pacemaker and amiodarone therapy who presents to the ED c/o acute progressive dyspnea with associated cough over the last 2 days.  No fever or chills reported.  Some chest tightness especially with coughing and breathing though reports this is not similar to prior chest discomfort that he was concerned BSR.  No nausea vomiting or diarrhea.      Review of prior external notes (non-ED) -and- Review of prior external test results outside of this encounter:  Hospital discharge summary 1/15/2024 reviewed: Patient admitted for evaluation of chest pain.  Cardiac cath during that stay revealed early atherosclerotic plaque with no obstructive disease.  Severe global hypokinesis was noted with EF 20-25%.      PAST MEDICAL HISTORY  Active Ambulatory Problems     Diagnosis Date Noted    Chronic obstructive lung disease 11/07/2018    Essential hypertension 11/07/2018    Hyperlipidemia 11/07/2018    Pulmonary emphysema 12/04/2017    Ventral hernia without obstruction or gangrene 11/08/2018    Tobacco abuse 12/11/2018    Class 1 obesity due to excess calories without serious comorbidity with body mass index (BMI) of 30.0 to 30.9 in adult 12/11/2018    Precordial pain 12/11/2018    Encounter for screening for malignant neoplasm of colon 05/18/2021    Perirectal abscess 05/28/2021    Atherosclerosis of native coronary artery of native heart without angina pectoris 10/17/2023    Abnormal electrocardiogram 11/15/2023    Abnormal cardiac function  test 01/09/2024    Chest pain 01/10/2024    Cardiomyopathy, dilated 01/11/2024    Moderate malnutrition 01/15/2024    Presence of cardiac pacemaker 01/30/2024    On amiodarone therapy 01/14/2025     Resolved Ambulatory Problems     Diagnosis Date Noted    No Resolved Ambulatory Problems     Past Medical History:   Diagnosis Date    COPD (chronic obstructive pulmonary disease)     Emphysema of lung     HLD (hyperlipidemia)     [place] (hard of hearing)     HTN (hypertension)     Myelodysplastic syndrome, unspecified 11/06/2023    Myocardial infarction     Short-term memory loss     Sleep apnea     Stroke     Ventral hernia          PAST SURGICAL HISTORY  Past Surgical History:   Procedure Laterality Date    CARDIAC CATHETERIZATION N/A 12/14/2018    Procedure: Left Heart Cath;  Surgeon: Mika Carranza MD;  Location[facility]ASIVE LOC[facility];  Service: Cardiology    CARDIAC CATHETERIZATION N/A 12/14/2018    Procedure: Left ventriculography;  Surgeon: Mika Carranza MD;  Location:  [facility] CATH INVASIVE LOCATION;  Service: Cardiology    CARDIAC CATHETERIZATION N/A 12/14/2018    Procedure: Coronary angiography;  Surgeon: Mika Carranza MD;  [facility] LOC[facility];  Service: Cardiology    CARDIAC CATHETERIZATION  12/14/2018    Procedure: Functional Flow [place];  Surgeon: Mika Carranza MD;  Location: [facility] CATH [facility]VAS[facility] LOCATION;  Service: Cardiology    CARDIAC CATHETERIZATION N/A 1/12/2024    Procedure: Left Heart Cath;  Surgeon: Mika Carranza MD;  Location: [facility] CATH [facility]VAS[facility] LOCATION;  Service: Cardiovascular;  Laterality: N/A;    CARDIAC CATHETERIZATION N/A 1/12/2024    Procedure: Left ventriculography;  Surgeon: Mika Carranza MD;  Location: [facility] CATH INVASIVE LOCATION;  Service: Cardiovascular;  Laterality: N/A;    CARDIAC CATHETERIZATION N/A 1/12/2024    Procedure: Coronary angiography;  Surgeon: Mika Carranza MD;  Location: [facility] CATH  INVASIVE LOCATION;  Service: Cardiovascular;  Laterality: N/A;    COLONOSCOPY W/ POLYPECTOMY N/A 11/10/2021    Procedure: COLONOSCOPY; POLYPECTOMY;  Surgeon: Alexander Dobbs MD;  Location: [facility] OR;  Service: Gastroenterology;  Laterality: N/A;  DIVERTICULOSIS  POLYP    CORONARY STENT PLACEMENT      CYST REMOVAL      tailbone    INCISION AND DRAINAGE PERIRECTAL ABSCESS N/A 2021    Procedure: INCISION AND DRAINAGE OF PERIRECTAL ABSCESS;  Surgeon: Reddy Johnson Jr., MD;  Location: [facility] OR;  Service: General;  Laterality: N/A;    UMBILICAL HERNIA REPAIR      DR. CASEY - YEARS AGO    VEIN LIGATION AND STRIPPING BILATERAL      VENTRAL/INCISIONAL HERNIA REPAIR N/A 2019    Procedure: VENTRAL/INCISIONAL HERNIA REPAIR LAPAROSCOPIC;  Surgeon: Adelfo Nieves MD;  Location: [facility] OR;  Service: General         FAMILY HISTORY  Family History   Problem Relation Age of Onset    Cancer Mother         breast    Heart disease Mother     Heart disease Father     Cancer Maternal Aunt     Heart disease Paternal Uncle     Malig Hyperthermia Neg Hx          SOCIAL HISTORY  Social History     Socioeconomic History    Marital status:    Tobacco Use    Smoking status: Some Days     Current packs/day: 0.00     Average packs/day: 0.3 packs/day for 60.0 years (15.0 ttl pk-yrs)     Types: Cigarettes     Start date:      Last attempt to quit: 2023     Years since quittin.1     Passive exposure: Past    Smokeless tobacco: Never    Tobacco comments:     Began smoking at age 13.  Smoked 1 ppd for 28 years and 2 ppd for 30 years for an 88 pack year history.     SMOKES 5 OR LESS CIGARETTES A DAY   Vaping Use    Vaping status: Former   Substance and Sexual Activity    Alcohol use: Not Currently     Alcohol/week: 7.0 standard drinks of alcohol     Types: 7 Shots of liquor per week     Comment: hx of daily    Drug use: Not Currently     Types: Marijuana     Comment: history of, doesn't anymore;  Reports using THC at bedtime.    Sexual activity: Defer         ALLERGIES  Patient has no known allergies.      REVIEW OF SYSTEMS  Review of Systems  Included in HPI  All systems reviewed and negative except for those discussed in HPI.      PHYSICAL EXAM    I have reviewed the triage vital signs and nursing notes.    ED Triage Vitals   Temp Heart Rate Resp BP SpO2   02/23/25 1726 02/23/25 1724 02/23/25 1724 -- 02/23/25 1724   (!) 101 °F (38.3 °C) 70 20  (!) 84 %      Temp src Heart Rate Source Patient Position BP Location FiO2 (%)   02/23/25 1726 02/23/25 1724 -- -- --   Tympanic Monitor          Physical Exam    Physical Exam   Constitutional: Mildly tachypneic especially with prolonged speaking with no accessory muscle use.  Frequent wet sounding cough.  HENT:  Head: Normocephalic and atraumatic.   Oropharynx: Mucous membranes are moist.   Eyes: . No scleral icterus. No conjunctival pallor.  Neck: Normal range of motion. Neck supple.   Cardiovascular: Pink warm and well perfused throughout.  Regular  Pulmonary/Chest: Mildly tachypneic especially with prolonged speaking with no accessory muscle use.  Diffuse wheezing.  Abdominal: Soft. There is no tenderness. There is no rebound and no guarding.   Musculoskeletal: Moves all extremities equally.  Perhaps trace pitting edema bilateral lower extremities no calf tenderness or erythema noted.  Neurological: Alert and oriented.  No acute focal deficit appreciated.  Skin: Skin is pink, warm, and dry.   Psychiatric: Mood and affect normal.   Nursing note and vitals reviewed.             LAB RESULTS  Recent Results (from the past 24 hours)   ECG 12 Lead ED Triage Standing Order; SOA    Collection Time: 02/23/25  5:30 PM   Result Value Ref Range    QT Interval 414 ms    QTC Interval 457 ms   Comprehensive Metabolic Panel    Collection Time: 02/23/25  5:54 PM    Specimen: Blood   Result Value Ref Range    Glucose 97 65 - 99 mg/dL    BUN 21 8 - 23 mg/dL    Creatinine 1.12 0.76  - 1.27 mg/dL    Sodium 133 (L) 136 - 145 mmol/L    Potassium 4.5 3.5 - 5.2 mmol/L    Chloride 101 98 - 107 mmol/L    CO2 23.0 22.0 - 29.0 mmol/L    Calcium 9.0 8.6 - 10.5 mg/dL    Total Protein 7.3 6.0 - 8.5 g/dL    Albumin 4.2 3.5 - 5.2 g/dL    ALT (SGPT) 10 1 - 41 U/L    AST (SGOT) 14 1 - 40 U/L    Alkaline Phosphatase 103 39 - 117 U/L    Total Bilirubin 0.7 0.0 - 1.2 mg/dL    Globulin 3.1 gm/dL    A/G Ratio 1.4 g/dL    BUN/Creatinine Ratio 18.8 7.0 - 25.0    Anion Gap 9.0 5.0 - 15.0 mmol/L    eGFR 68.9 >60.0 mL/min/1.73   BNP    Collection Time: 02/23/25  5:54 PM    Specimen: Blood   Result Value Ref Range    proBNP 3,590.0 (H) 0.0 - 900.0 pg/mL   High Sensitivity Troponin T    Collection Time: 02/23/25  5:54 PM    Specimen: Blood   Result Value Ref Range    HS Troponin T 20 <22 ng/L   Green Top (Gel)    Collection Time: 02/23/25  5:54 PM   Result Value Ref Range    Extra Tube Hold for add-ons.    Lavender Top    Collection Time: 02/23/25  5:54 PM   Result Value Ref Range    Extra Tube hold for add-on    Gold Top - SST    Collection Time: 02/23/25  5:54 PM   Result Value Ref Range    Extra Tube Hold for add-ons.    Light Blue Top    Collection Time: 02/23/25  5:54 PM   Result Value Ref Range    Extra Tube Hold for add-ons.    CBC Auto Differential    Collection Time: 02/23/25  5:54 PM    Specimen: Blood   Result Value Ref Range    WBC 4.29 3.40 - 10.80 10*3/mm3    RBC 1.94 (L) 4.14 - 5.80 10*6/mm3    Hemoglobin 6.8 (C) 13.0 - 17.7 g/dL    Hematocrit 20.1 (C) 37.5 - 51.0 %    .6 (H) 79.0 - 97.0 fL    MCH 35.1 (H) 26.6 - 33.0 pg    MCHC 33.8 31.5 - 35.7 g/dL    RDW      MPV 9.6 6.0 - 12.0 fL    Platelets 115 (L) 140 - 450 10*3/mm3   Procalcitonin    Collection Time: 02/23/25  5:54 PM    Specimen: Blood   Result Value Ref Range    Procalcitonin 0.07 0.00 - 0.25 ng/mL   Manual Differential    Collection Time: 02/23/25  5:54 PM    Specimen: Blood   Result Value Ref Range    Neutrophil % 60.0 42.7 - 76.0 %     Lymphocyte % 24.0 19.6 - 45.3 %    Monocyte % 13.0 (H) 5.0 - 12.0 %    Eosinophil % 2.0 0.3 - 6.2 %    Bands %  1.0 0.0 - 5.0 %    Neutrophils Absolute 2.62 1.70 - 7.00 10*3/mm3    Lymphocytes Absolute 1.03 0.70 - 3.10 10*3/mm3    Monocytes Absolute 0.56 0.10 - 0.90 10*3/mm3    Eosinophils Absolute 0.09 0.00 - 0.40 10*3/mm3    nRBC 1.0 (H) 0.0 - 0.2 /100 WBC    Dimorphic RBC Present None Seen    Polychromasia Mod/2+ None Seen    WBC Morphology Normal Normal    Large Platelets Slight/1+ None Seen         RADIOLOGY  XR Chest 1 View    Result Date: 2/23/2025  CXR ONE VIEW  HISTORY: SOA Triage Protocol; J96.01-Acute respiratory failure with hypoxia  COMPARISON: 1/10/2024  TECHNIQUE: single portable AP       The heart size is within normal limits. Prior sternotomy. Dual-lead left subclavian approach transvenous pacemaker device remains in place.  There is mild cardiomegaly.  There is bilateral vascular congestion, right slightly greater than left, and there is a small right pleural effusion.  There is no dense consolidation, and no pneumothorax.  This report was finalized on 2/23/2025 6:05 PM by Dr. Adelfo Sorenson M.D on Workstation: [id number]         MEDICATIONS GIVEN IN ER  Medications   sodium chloride 0.9 % flush 10 mL (has no administration in time range)   ipratropium-albuterol (DUO-NEB) nebulizer solution 3 mL (has no administration in time range)   acetaminophen (TYLENOL) tablet 650 mg (has no administration in time range)     Or   acetaminophen (TYLENOL) 160 MG/5ML oral solution 650 mg (has no administration in time range)     Or   acetaminophen (TYLENOL) suppository 650 mg (has no administration in time range)   ondansetron ODT (ZOFRAN-ODT) disintegrating tablet 4 mg (has no administration in time range)     Or   ondansetron (ZOFRAN) injection 4 mg (has no administration in time range)   melatonin tablet 2.5 mg (has no administration in time range)   sennosides-docusate (PERICOLACE) 8.6-50 MG per tablet 2  tablet (has no administration in time range)     And   polyethylene glycol (MIRALAX) packet 17 g (has no administration in time range)     And   bisacodyl (DULCOLAX) EC tablet 5 mg (has no administration in time range)     And   bisacodyl (DULCOLAX) suppository 10 mg (has no administration in time range)   methylPREDNISolone sodium succinate (SOLU-Medrol) injection 125 mg (125 mg Intravenous Given 2/23/25 1848)   furosemide (LASIX) injection 80 mg (80 mg Intravenous Given 2/23/25 1846)         ORDERS PLACED DURING THIS VISIT:  Orders Placed This Encounter   Procedures    Respiratory Panel PCR w/COVID-19(SARS-CoV-2) [facility] In-House, NP Swab in UTM/VTM, 2 HR TAT - Swab, Nasopharynx    XR Chest 1 View    [place] Draw    Comprehensive Metabolic Panel    BNP    High Sensitivity Troponin T    CBC Auto Differential    Blood Gas, Arterial -    Lactic Acid, Plasma    Procalcitonin    High Sensitivity Troponin T 1Hr    Manual Differential    Iron Profile    Basic Metabolic Panel    CBC (No Diff)    Diet: Cardiac; Healthy Heart (2-3 Na+); Fluid Consistency: Thin (IDDSI 0)    Undress & Gown    Continuous Pulse Oximetry    Vital Signs    Check Temperature    Vital Signs    Up with assistance    Daily Weights    Strict Intake & Output    Oral Care    Place Sequential Compression Device    Maintain Sequential Compression Device    Code Status and Medical Interventions: CPR (Attempt to Resuscitate); Full    [facility] (on-call MD unless specified) Details    Cardiology (on-call MD unless specified)    Oxygen Therapy- Nasal Cannula; Titrate 1-6 LPM Per SpO2; 90 - 95%    ECG 12 Lead ED Triage Standing Order; SOA    Type & Screen    Insert Peripheral IV    Inpatient Admission    CBC & Differential    Green Top (Gel)    Lavender Top    Gold Top - SST    Light Blue Top         OUTPATIENT MEDICATION MANAGEMENT:  Current Facility-Administered Medications Ordered in Epic   Medication Dose Route Frequency Provider Last Rate Last  Admin    acetaminophen (TYLENOL) tablet 650 mg  650 mg Oral Q4H PRN Naomi Rodriguez MD        Or    acetaminophen (TYLENOL) 160 MG/5ML oral solution 650 mg  650 mg Oral Q4H PRN Naomi Rodriguez MD        Or    acetaminophen (TYLENOL) suppository 650 mg  650 mg Rectal Q4H PRN Naomi Rodriguez MD        sennosides-docusate (PERICOLACE) 8.6-50 MG per tablet 2 tablet  2 tablet Oral BID PRN Jennifer, Naomi Pepper MD        And    polyethylene glycol (MIRALAX) packet 17 g  17 g Oral Daily PRN Jennifer, Naomi Pepper MD        And    bisacodyl (DULCOLAX) EC tablet 5 mg  5 mg Oral Daily PRN Jennifer, Naomi Pepper MD        And    bisacodyl (DULCOLAX) suppository 10 mg  10 mg Rectal Daily PRN Jennifer, Naomi Pepper MD        ipratropium-albuterol (DUO-NEB) nebulizer solution 3 mL  3 mL Nebulization Once Ignacio Washington MD        melatonin tablet 2.5 mg  2.5 mg Oral Nightly PRN Jennifer, Naomi Pepper MD        ondansetron ODT (ZOFRAN-ODT) disintegrating tablet 4 mg  4 mg Oral Q6H PRN Naomi Rodriguez MD        Or    ondansetron (ZOFRAN) injection 4 mg  4 mg Intravenous Q6H PRN Jennifer, Naomi Pepper MD        sodium chloride 0.9 % flush 10 mL  10 mL Intravenous PRN Ignacio Washington MD         Current Outpatient Medications Ordered in Epic   Medication Sig Dispense Refill    albuterol (PROVENTIL HFA;VENTOLIN HFA) 108 (90 Base) MCG/ACT inhaler Inhale 2 puffs Every 4 (Four) Hours As Needed for Wheezing.      amiodarone (PACERONE) 200 MG tablet Take 1 tablet by mouth Daily.      aspirin 81 MG EC tablet aspirin 81 mg tablet,delayed release   TAKE 1 TABLET BY MOUTH DAILY      atorvastatin (LIPITOR) 80 MG tablet Take 1 tablet by mouth Daily.      azaCITIDine in sterile water (preservative free) Inject  under the skin into the appropriate area as directed 1 (One) Time.      carvedilol (COREG) 3.125 MG tablet Take 1 tablet by mouth 2 (Two) Times a Day With Meals.      cyanocobalamin (VITAMIN B-12) 1000 MCG tablet  Take 1 tablet by mouth Daily.      Ferrous Fumarate (Ferrocite) 324 (106 Fe) MG tablet Take 1 tablet by mouth Every Other Day.      ferrous gluconate (FERGON) 324 MG tablet Take 1 tablet by mouth.      furosemide (LASIX) 40 MG tablet Take 1 tablet by mouth Daily. (Patient taking differently: Take 0.5 tablets by mouth Daily.) 30 tablet 5    HYDROcodone-acetaminophen (NORCO) 5-325 MG per tablet Take 1 tablet by mouth As Needed.      hydrOXYzine (ATARAX) 25 MG tablet Take 1 tablet by mouth 3 (Three) Times a Day As Needed for Itching.      ipratropium-albuterol (DUO-NEB) 0.5-2.5 mg/3 ml nebulizer ipratropium 0.5 mg-albuterol 3 mg (2.5 mg base)/3 mL nebulization soln      losartan (COZAAR) 25 MG tablet TAKE ONE TABLET BY MOUTH EVERY DAY 30 tablet 2    pantoprazole (PROTONIX) 40 MG EC tablet Take 1 tablet by mouth Daily.      rOPINIRole (REQUIP) 0.5 MG tablet Take 1 tablet by mouth every night at bedtime.      sertraline (ZOLOFT) 50 MG tablet Take 1 tablet by mouth Daily.      spironolactone (ALDACTONE) 25 MG tablet Take 1 tablet by mouth Daily.      Trelegy Ellipta 100-62.5-25 MCG/ACT inhaler            PROCEDURES  Procedures      Total critical care time: Approximately 35 minutes    Due to a high probability of clinically significant, life threatening deterioration, the patient required my highest level of preparedness to intervene emergently and I personally spent this critical care time directly and personally managing the patient. This critical care time included obtaining a history; examining the patient; vital sign monitoring; ordering and review of studies; arranging urgent treatment with development of a management plan; evaluation of patient's response to treatment; frequent reassessment; and, discussions with other providers.    This critical care time was performed to assess and manage the high probability of imminent, life-threatening deterioration that could result in multi-organ failure. It was exclusive of  separately billable procedures and treating other patients and teaching time.    Please see MDM section and the rest of the note for further information on patient assessment and treatment.        PROGRESS, DATA ANALYSIS, CONSULTS, AND MEDICAL DECISION MAKING  All labs have been independently interpreted by me.  All radiology studies have been reviewed by me. All EKGs have been independently viewed and interpreted by me.  Discussion below represents my analysis of pertinent findings related to patient's condition, differential diagnosis, treatment plan and final disposition.    Differential diagnosis:   My differential diagnosis for dyspnea includes but is not limited to:  Asthma, COPD, pneumonia, pulmonary embolus, acute respiratory distress syndrome, pneumothorax, pleural effusion, pulmonary fibrosis, congestive heart failure, myocardial infarction, DKA, uremia, acidosis, sepsis, anemia, drug related, hyperventilation, CNS disease      Clinical Scores:                  ED Course as of 02/23/25 1855   Sun Feb 23, 2025   1738 EKG           EKG time: 1730  Rhythm/Rate: AV paced, 70-75  P waves and WI: N/A  QRS, axis: IVCD  ST and T waves: QTc within normal    Interpreted Contemporaneously by me, independently viewed  Comparison: 1/9/2024-previously patient is   [RS]   1753 I checked an oral temperature and found the patient to be 98.2 on lying evaluation.  Patient is not diaphoretic and reports no fever or chills at home thus I suspect the tympanic temperature obtained at triage was inaccurate. [RS]   1755 RADIOLOGY      Study: Single view chest  Findings: Evidence of prior sternotomy.  Blunting right costophrenic angle more than left concerning for infiltrate versus congestive heart failure.  I independently viewed and interpreted these images contemporaneously with treatment.    [RS]   1847 proBNP(!): 3,590.0 [RS]   1847 Glucose: 97 [RS]   1847 BUN: 21 [RS]   1847 Creatinine: 1.12 [RS]   1847 Sodium(!): 133 [RS]    1847 Potassium: 4.5 [RS]   1847 WBC: 4.29 [RS]   1847 Hemoglobin(!!): 6.8 [RS]   1847 Platelets(!): 115 [RS]   1849 Patient and family updated with findings [RS]   1852 CONSULT        Provider: Dr. [facility]    Discussion: Reviewed patient history, ED presentation and evaluation.  Agreeable to accept patient for admission.    Agreeable c treatment and planned disposition.         [RS]   1854 CONSULT        Provider: Dr. Randle - [place]    Discussion: Reviewed patient history, ED presentation and evaluation.  Agreeable to consult.    Agreeable c treatment and planned disposition.         [RS]      ED Course User Index  [RS] Ignacio Washington MD         Prescription drug monitoring program review:     AS OF 18:55 EST VITALS:    BP - 119/60  HR - 70  TEMP - 98.9 °F (37.2 °C) (Oral)  O2 SATS - 100%    COMPLEXITY OF CARE  The patient requires admission.      DIAGNOSIS  Final diagnoses:   Acute hypoxemic respiratory failure   COPD with acute exacerbation   Acute on chronic congestive heart failure, unspecified heart failure type   Acute on chronic anemia         DISPOSITION  ED Disposition       ED Disposition   Decision to Admit    Condition   --    Comment   Level of Care: Telemetry [5]   Diagnosis: Acute hypoxemic respiratory failure [[id number]]   Certification: I Certify That Inpatient Hospital Services Are Medically Necessary For Greater Than 2 Midnights                    ADMISSION    Discussed treatment plan and reason for admission with pt/family and admitting physician.  Pt/family voiced understanding of the plan for admission for further testing/treatment as needed.       Please note that portions of this document were completed with a voice recognition program.    Note Disclaimer: At [facility], we believe that sharing information builds trust and better relationships. You are receiving this note because you recently visited [facility]. It is possible you will see health information before a provider  has talked with you about it. This kind of information can be easy to misunderstand. To help you fully understand what it means for your health, we urge you to discuss this note with your provider.         Ignacio Washington MD  02/23/25 [id number]       Ignacio Washington MD  02/23/25 0775

## 2025-02-24 PROBLEM — I50.33 ACUTE ON CHRONIC HEART FAILURE WITH PRESERVED EJECTION FRACTION (HFPEF): Status: ACTIVE | Noted: 2025-02-24

## 2025-02-24 PROBLEM — D64.9 ACUTE ANEMIA: Status: ACTIVE | Noted: 2025-02-24

## 2025-02-24 PROBLEM — G25.81 RLS (RESTLESS LEGS SYNDROME): Status: ACTIVE | Noted: 2025-02-24

## 2025-02-24 PROBLEM — D64.9 ACUTE ON CHRONIC ANEMIA: Status: ACTIVE | Noted: 2025-02-23

## 2025-02-24 LAB
ANION GAP SERPL CALCULATED.3IONS-SCNC: 12.3 MMOL/L (ref 5–15)
BUN SERPL-MCNC: 30 MG/DL (ref 8–23)
BUN/CREAT SERPL: 24 (ref 7–25)
CALCIUM SPEC-SCNC: 8.9 MG/DL (ref 8.6–10.5)
CHLORIDE SERPL-SCNC: 97 MMOL/L (ref 98–107)
CO2 SERPL-SCNC: 19.7 MMOL/L (ref 22–29)
CREAT SERPL-MCNC: 1.25 MG/DL (ref 0.76–1.27)
EGFRCR SERPLBLD CKD-EPI 2021: 60.4 ML/MIN/1.73
GLUCOSE SERPL-MCNC: 201 MG/DL (ref 65–99)
HAPTOGLOB SERPL-MCNC: 220 MG/DL (ref 30–200)
HCT VFR BLD AUTO: 18.4 % (ref 37.5–51)
HCT VFR BLD AUTO: 20.1 % (ref 37.5–51)
HGB BLD-MCNC: 6.1 G/DL (ref 13–17.7)
HGB BLD-MCNC: 6.8 G/DL (ref 13–17.7)
LDH SERPL-CCNC: 187 U/L (ref 135–225)
MAGNESIUM SERPL-MCNC: 2.2 MG/DL (ref 1.6–2.4)
MCH RBC QN AUTO: 33.9 PG (ref 26.6–33)
MCHC RBC AUTO-ENTMCNC: 33.2 G/DL (ref 31.5–35.7)
MCV RBC AUTO: 102.2 FL (ref 79–97)
PLATELET # BLD AUTO: 105 10*3/MM3 (ref 140–450)
PMV BLD AUTO: 9.9 FL (ref 6–12)
POTASSIUM SERPL-SCNC: 4.1 MMOL/L (ref 3.5–5.2)
QT INTERVAL: 444 MS
QTC INTERVAL: 499 MS
RBC # BLD AUTO: 1.8 10*6/MM3 (ref 4.14–5.8)
SODIUM SERPL-SCNC: 129 MMOL/L (ref 136–145)
TROPONIN T SERPL HS-MCNC: 12 NG/L
WBC NRBC COR # BLD AUTO: 3.46 10*3/MM3 (ref 3.4–10.8)

## 2025-02-24 PROCEDURE — P9016 RBC LEUKOCYTES REDUCED: HCPCS

## 2025-02-24 PROCEDURE — 93005 ELECTROCARDIOGRAM TRACING: CPT

## 2025-02-24 PROCEDURE — 36415 COLL VENOUS BLD VENIPUNCTURE: CPT | Performed by: INTERNAL MEDICINE

## 2025-02-24 PROCEDURE — 83615 LACTATE (LD) (LDH) ENZYME: CPT | Performed by: NURSE PRACTITIONER

## 2025-02-24 PROCEDURE — 99222 1ST HOSP IP/OBS MODERATE 55: CPT | Performed by: INTERNAL MEDICINE

## 2025-02-24 PROCEDURE — 94761 N-INVAS EAR/PLS OXIMETRY MLT: CPT

## 2025-02-24 PROCEDURE — 94799 UNLISTED PULMONARY SVC/PX: CPT

## 2025-02-24 PROCEDURE — 25010000002 FUROSEMIDE PER 20 MG

## 2025-02-24 PROCEDURE — 63710000001 REVEFENACIN 175 MCG/3ML SOLUTION: Performed by: INTERNAL MEDICINE

## 2025-02-24 PROCEDURE — 94664 DEMO&/EVAL PT USE INHALER: CPT

## 2025-02-24 PROCEDURE — 85014 HEMATOCRIT: CPT | Performed by: NURSE PRACTITIONER

## 2025-02-24 PROCEDURE — 83010 ASSAY OF HAPTOGLOBIN QUANT: CPT | Performed by: NURSE PRACTITIONER

## 2025-02-24 PROCEDURE — 36430 TRANSFUSION BLD/BLD COMPNT: CPT

## 2025-02-24 PROCEDURE — 86900 BLOOD TYPING SEROLOGIC ABO: CPT

## 2025-02-24 PROCEDURE — 80048 BASIC METABOLIC PNL TOTAL CA: CPT | Performed by: INTERNAL MEDICINE

## 2025-02-24 PROCEDURE — 25010000002 FUROSEMIDE PER 20 MG: Performed by: NURSE PRACTITIONER

## 2025-02-24 PROCEDURE — 85027 COMPLETE CBC AUTOMATED: CPT | Performed by: INTERNAL MEDICINE

## 2025-02-24 PROCEDURE — 84484 ASSAY OF TROPONIN QUANT: CPT

## 2025-02-24 PROCEDURE — 83735 ASSAY OF MAGNESIUM: CPT | Performed by: INTERNAL MEDICINE

## 2025-02-24 PROCEDURE — 25010000002 METHYLPREDNISOLONE PER 40 MG: Performed by: NURSE PRACTITIONER

## 2025-02-24 PROCEDURE — 25010000002 THIAMINE PER 100 MG: Performed by: NURSE PRACTITIONER

## 2025-02-24 PROCEDURE — 85018 HEMOGLOBIN: CPT | Performed by: NURSE PRACTITIONER

## 2025-02-24 RX ORDER — LORAZEPAM 1 MG/1
1 TABLET ORAL
Status: DISCONTINUED | OUTPATIENT
Start: 2025-02-24 | End: 2025-02-26 | Stop reason: HOSPADM

## 2025-02-24 RX ORDER — ROPINIROLE 0.5 MG/1
1 TABLET, FILM COATED ORAL EVERY 12 HOURS SCHEDULED
Status: DISCONTINUED | OUTPATIENT
Start: 2025-02-24 | End: 2025-02-26 | Stop reason: HOSPADM

## 2025-02-24 RX ORDER — THIAMINE HYDROCHLORIDE 100 MG/ML
200 INJECTION, SOLUTION INTRAMUSCULAR; INTRAVENOUS EVERY 8 HOURS SCHEDULED
Status: DISCONTINUED | OUTPATIENT
Start: 2025-02-24 | End: 2025-02-26 | Stop reason: HOSPADM

## 2025-02-24 RX ORDER — HYDROCODONE BITARTRATE AND ACETAMINOPHEN 5; 325 MG/1; MG/1
1 TABLET ORAL EVERY 4 HOURS PRN
Status: DISCONTINUED | OUTPATIENT
Start: 2025-02-24 | End: 2025-02-26 | Stop reason: HOSPADM

## 2025-02-24 RX ORDER — FOLIC ACID 1 MG/1
1 TABLET ORAL DAILY
Status: DISCONTINUED | OUTPATIENT
Start: 2025-02-24 | End: 2025-02-26 | Stop reason: HOSPADM

## 2025-02-24 RX ORDER — LORAZEPAM 2 MG/ML
2 INJECTION INTRAMUSCULAR
Status: DISCONTINUED | OUTPATIENT
Start: 2025-02-24 | End: 2025-02-26 | Stop reason: HOSPADM

## 2025-02-24 RX ORDER — CLONIDINE HYDROCHLORIDE 0.1 MG/1
0.1 TABLET ORAL EVERY 4 HOURS PRN
Status: DISCONTINUED | OUTPATIENT
Start: 2025-02-24 | End: 2025-02-26 | Stop reason: HOSPADM

## 2025-02-24 RX ORDER — METHYLPREDNISOLONE SODIUM SUCCINATE 40 MG/ML
40 INJECTION, POWDER, LYOPHILIZED, FOR SOLUTION INTRAMUSCULAR; INTRAVENOUS EVERY 8 HOURS
Status: DISCONTINUED | OUTPATIENT
Start: 2025-02-24 | End: 2025-02-26 | Stop reason: HOSPADM

## 2025-02-24 RX ORDER — LORAZEPAM 1 MG/1
2 TABLET ORAL
Status: DISCONTINUED | OUTPATIENT
Start: 2025-02-24 | End: 2025-02-26 | Stop reason: HOSPADM

## 2025-02-24 RX ORDER — FUROSEMIDE 10 MG/ML
40 INJECTION INTRAMUSCULAR; INTRAVENOUS DAILY
Status: DISCONTINUED | OUTPATIENT
Start: 2025-02-25 | End: 2025-02-26

## 2025-02-24 RX ORDER — FUROSEMIDE 10 MG/ML
20 INJECTION INTRAMUSCULAR; INTRAVENOUS ONCE
Status: COMPLETED | OUTPATIENT
Start: 2025-02-24 | End: 2025-02-24

## 2025-02-24 RX ORDER — FUROSEMIDE 10 MG/ML
40 INJECTION INTRAMUSCULAR; INTRAVENOUS EVERY 12 HOURS
Status: DISCONTINUED | OUTPATIENT
Start: 2025-02-24 | End: 2025-02-24

## 2025-02-24 RX ORDER — NICOTINE 21 MG/24HR
1 PATCH, TRANSDERMAL 24 HOURS TRANSDERMAL
Status: DISCONTINUED | OUTPATIENT
Start: 2025-02-24 | End: 2025-02-26 | Stop reason: HOSPADM

## 2025-02-24 RX ORDER — LORAZEPAM 2 MG/ML
1 INJECTION INTRAMUSCULAR
Status: DISCONTINUED | OUTPATIENT
Start: 2025-02-24 | End: 2025-02-26 | Stop reason: HOSPADM

## 2025-02-24 RX ADMIN — ARFORMOTEROL TARTRATE 15 MCG: 15 SOLUTION RESPIRATORY (INHALATION) at 22:39

## 2025-02-24 RX ADMIN — IPRATROPIUM BROMIDE AND ALBUTEROL SULFATE 3 ML: .5; 3 SOLUTION RESPIRATORY (INHALATION) at 15:39

## 2025-02-24 RX ADMIN — CARVEDILOL 3.12 MG: 3.12 TABLET, FILM COATED ORAL at 18:14

## 2025-02-24 RX ADMIN — NICOTINE 1 PATCH: 21 PATCH TRANSDERMAL at 12:36

## 2025-02-24 RX ADMIN — THIAMINE HYDROCHLORIDE 200 MG: 100 INJECTION, SOLUTION INTRAMUSCULAR; INTRAVENOUS at 06:47

## 2025-02-24 RX ADMIN — CARVEDILOL 3.12 MG: 3.12 TABLET, FILM COATED ORAL at 09:18

## 2025-02-24 RX ADMIN — ALBUTEROL SULFATE 2.5 MG: 2.5 SOLUTION RESPIRATORY (INHALATION) at 07:05

## 2025-02-24 RX ADMIN — IPRATROPIUM BROMIDE AND ALBUTEROL SULFATE 3 ML: .5; 3 SOLUTION RESPIRATORY (INHALATION) at 22:39

## 2025-02-24 RX ADMIN — SPIRONOLACTONE 25 MG: 25 TABLET ORAL at 09:18

## 2025-02-24 RX ADMIN — BUDESONIDE 0.5 MG: 0.5 INHALANT RESPIRATORY (INHALATION) at 22:39

## 2025-02-24 RX ADMIN — REVEFENACIN 175 MCG: 175 SOLUTION RESPIRATORY (INHALATION) at 07:04

## 2025-02-24 RX ADMIN — FUROSEMIDE 40 MG: 10 INJECTION, SOLUTION INTRAMUSCULAR; INTRAVENOUS at 09:19

## 2025-02-24 RX ADMIN — AMIODARONE HYDROCHLORIDE 200 MG: 200 TABLET ORAL at 09:18

## 2025-02-24 RX ADMIN — LOSARTAN POTASSIUM 25 MG: 25 TABLET, FILM COATED ORAL at 09:19

## 2025-02-24 RX ADMIN — FOLIC ACID 1 MG: 1 TABLET ORAL at 09:18

## 2025-02-24 RX ADMIN — Medication 10 ML: at 20:27

## 2025-02-24 RX ADMIN — IPRATROPIUM BROMIDE AND ALBUTEROL SULFATE 3 ML: .5; 3 SOLUTION RESPIRATORY (INHALATION) at 07:01

## 2025-02-24 RX ADMIN — ROPINIROLE HYDROCHLORIDE 1 MG: 0.5 TABLET, FILM COATED ORAL at 10:48

## 2025-02-24 RX ADMIN — SERTRALINE HYDROCHLORIDE 50 MG: 50 TABLET, FILM COATED ORAL at 09:18

## 2025-02-24 RX ADMIN — ARFORMOTEROL TARTRATE 15 MCG: 15 SOLUTION RESPIRATORY (INHALATION) at 07:06

## 2025-02-24 RX ADMIN — THIAMINE HYDROCHLORIDE 200 MG: 100 INJECTION, SOLUTION INTRAMUSCULAR; INTRAVENOUS at 22:32

## 2025-02-24 RX ADMIN — HYDROCODONE BITARTRATE AND ACETAMINOPHEN 1 TABLET: 5; 325 TABLET ORAL at 20:26

## 2025-02-24 RX ADMIN — FERROUS SULFATE TAB 325 MG (65 MG ELEMENTAL FE) 325 MG: 325 (65 FE) TAB at 09:19

## 2025-02-24 RX ADMIN — ROPINIROLE HYDROCHLORIDE 1 MG: 0.5 TABLET, FILM COATED ORAL at 20:26

## 2025-02-24 RX ADMIN — METHYLPREDNISOLONE SODIUM SUCCINATE 40 MG: 40 INJECTION, POWDER, FOR SOLUTION INTRAMUSCULAR; INTRAVENOUS at 16:06

## 2025-02-24 RX ADMIN — PANTOPRAZOLE SODIUM 40 MG: 40 TABLET, DELAYED RELEASE ORAL at 09:19

## 2025-02-24 RX ADMIN — METHYLPREDNISOLONE SODIUM SUCCINATE 40 MG: 40 INJECTION, POWDER, FOR SOLUTION INTRAMUSCULAR; INTRAVENOUS at 06:44

## 2025-02-24 RX ADMIN — ATORVASTATIN CALCIUM 80 MG: 20 TABLET, FILM COATED ORAL at 09:19

## 2025-02-24 RX ADMIN — FUROSEMIDE 20 MG: 10 INJECTION, SOLUTION INTRAMUSCULAR; INTRAVENOUS at 16:05

## 2025-02-24 RX ADMIN — Medication 1000 MCG: at 09:18

## 2025-02-24 RX ADMIN — HYDROCODONE BITARTRATE AND ACETAMINOPHEN 1 TABLET: 5; 325 TABLET ORAL at 12:34

## 2025-02-24 RX ADMIN — HYDROXYZINE HYDROCHLORIDE 25 MG: 25 TABLET, FILM COATED ORAL at 22:32

## 2025-02-24 RX ADMIN — THIAMINE HYDROCHLORIDE 200 MG: 100 INJECTION, SOLUTION INTRAMUSCULAR; INTRAVENOUS at 16:06

## 2025-02-24 RX ADMIN — METHYLPREDNISOLONE SODIUM SUCCINATE 40 MG: 40 INJECTION, POWDER, FOR SOLUTION INTRAMUSCULAR; INTRAVENOUS at 22:32

## 2025-02-24 NOTE — CONSULTS
[facility]  Consult Note    GI Provider:  Amilcar Lozada M.D.    ASSESSMENT/PLAN:    Patient is a 75-year-old gentleman with a significant cardiovascular and respiratory comorbidity and myelodysplastic syndrome who was admitted because of shortness of breath and chest pain.  Both have since considerably improved.  Does have a past history of COPD hyperlipidemia coronary artery disease with MI and myelodysplastic syndrome.    Upon admission found to have a hemoglobin of 6.8, there being no history of overt GI bleed in the form of hematemesis melena or hematochezia.  Baseline hemoglobin has varied between 8-10.  Red cell indices are macrocytic and patient also has thrombocytopenia.  Once the patient is stable from cardiorespiratory standpoint, it would be reasonable to do an endoscopy to look for any potential source of blood loss in the upper GI tract.  Most likely etiology of anemia is indeed underlying MDS.      Reason for Consultation:  I have been asked by Dr. Phelps to see Natasha Hastings, in consultation for evaluation of anemia.        History of Present Illness:  Natasha Hastings is a 75-year-old gentleman who has been transferred from [facility].  He apparently had significant anterior substernal chest pain along with shortness of breath and wheezing.  Patient says he is feeling substantially better since after being admitted and being given nebulizer treatments and blood transfusion.  He does have a significant background history of cardiorespiratory comorbidities in the form of COPD, coronary artery disease with a hyper lipidemia and history of MI as well as myelodysplastic syndrome.  Patient also smokes a pack of cigarette daily and drinks on a daily basis 1-2 shots a day.  He denies any history of overt GI bleed in the form of hematemesis melena or hematochezia.  Admission hemoglobin was 6.8 and baseline hemoglobin has varied between 8-10.      Past Medical History:    Past  Medical History:   Diagnosis Date    COPD (chronic obstructive pulmonary disease)     Emphysema of lung     HLD (hyperlipidemia)     [place] (hard of hearing)     ears hearing aids    HTN (hypertension)     Myelodysplastic syndrome, unspecified 11/06/2023    Myocardial infarction     Short-term memory loss     Sleep apnea     no machine    Stroke     Ventral hernia     sched repair       Past Surgical History:    Past Surgical History:   Procedure Laterality Date    CARDIAC CATHETERIZATION N/A 12/14/2018    Procedure: Left Heart Cath;  Surgeon: Mika Carranza MD;  Location: [facility] CATH INVASIVE LOCATION;  Service: Cardiology    CARDIAC CATHETERIZATION N/A 12/14/2018    Procedure: Left ventriculography;  Surgeon: Mika Carranza MD;  Location: [facility] CATH INVASIVE LOCATION;  Service: Cardiology    CARDIAC CATHETERIZATION N/A 12/14/2018    Procedure: Coronary angiography;  Surgeon: Mika Carranza MD;  Location: [facility] CATH INVASIVE LOCATION;  Service: Cardiology    CARDIAC CATHETERIZATION  12/14/2018    Procedure: Functional Flow [place];  Surgeon: Mika Carranza MD;  Location: [facility] CATH INVASIVE LOCATION;  Service: Cardiology    CARDIAC CATHETERIZATION N/A 1/12/2024    Procedure: Left Heart Cath;  Surgeon: Mika Carranza MD;  Location: [facility] CATH INVASIVE LOCATION;  Service: Cardiovascular;  Laterality: N/A;    CARDIAC CATHETERIZATION N/A 1/12/2024    Procedure: Left ventriculography;  Surgeon: Mika Carranza MD;  Location: [facility] CATH INVASIVE LOCATION;  Service: Cardiovascular;  Laterality: N/A;    CARDIAC CATHETERIZATION N/A 1/12/2024    Procedure: Coronary angiography;  Surgeon: Mika Carranza MD;  Location: [facility] CATH INVASIVE LOCATION;  Service: Cardiovascular;  Laterality: N/A;    COLONOSCOPY W/ POLYPECTOMY N/A 11/10/2021    Procedure: COLONOSCOPY; POLYPECTOMY;  Surgeon: Alexander Dobbs MD;  Location: [facility];  Service: Gastroenterology;   Laterality: N/A;  DIVERTICULOSIS  POLYP    CORONARY STENT PLACEMENT      CYST REMOVAL      tailbone    INCISION AND DRAINAGE PERIRECTAL ABSCESS N/A 05/29/2021    Procedure: INCISION AND DRAINAGE OF PERIRECTAL ABSCESS;  Surgeon: Reddy Johnson Jr., MD;  Location: [facility] OR;  Service: General;  Laterality: N/A;    UMBILICAL HERNIA REPAIR      DR. CASEY - YEARS AGO    VEIN LIGATION AND STRIPPING BILATERAL      VENTRAL/INCISIONAL HERNIA REPAIR N/A 02/04/2019    Procedure: VENTRAL/INCISIONAL HERNIA REPAIR LAPAROSCOPIC;  Surgeon: Adelfo Nieves MD;  Location: [facility] OR;  Service: General       Social History:    Social History     Socioeconomic History    Marital status:    Tobacco Use    Smoking status: Some Days     Current packs/day: 1.00     Average packs/day: 1 pack/day for 62.1 years (62.1 ttl pk-yrs)     Types: Cigarettes     Start date: 1963     Passive exposure: Past    Smokeless tobacco: Never    Tobacco comments:     Began smoking at age 13.  Smoked 1 ppd for 28 years and 2 ppd for 30 years for an 88 pack year history.     SMOKES 5 OR LESS CIGARETTES A DAY   Vaping Use    Vaping status: Former   Substance and Sexual Activity    Alcohol use: Yes     Alcohol/week: 7.0 standard drinks of alcohol     Types: 7 Shots of liquor per week     Comment: hx of daily    Drug use: Yes     Types: Marijuana     Comment: history of, doesn't anymore; Reports using THC at bedtime.    Sexual activity: Defer       Family History:  family history includes Cancer in his maternal aunt and mother; Heart disease in his father, mother, and paternal uncle.    Allergies:  has No Known Allergies.    Prior to Admission Medications:    Medications Prior to Admission   Medication Sig Dispense Refill Last Dose/Taking    aspirin 81 MG EC tablet aspirin 81 mg tablet,delayed release   TAKE 1 TABLET BY MOUTH DAILY   2/23/2025    atorvastatin (LIPITOR) 80 MG tablet Take 1 tablet by mouth Daily.   2/22/2025    carvedilol (COREG)  3.125 MG tablet Take 1 tablet by mouth 2 (Two) Times a Day With Meals.   2/23/2025    cyanocobalamin (VITAMIN B-12) 1000 MCG tablet Take 1 tablet by mouth Daily.   Taking    Ferrous Fumarate (Ferrocite) 324 (106 Fe) MG tablet Take 1 tablet by mouth Every Other Day.   Taking    ferrous gluconate (FERGON) 324 MG tablet Take 1 tablet by mouth.   Taking    ipratropium-albuterol (DUO-NEB) 0.5-2.5 mg/3 ml nebulizer ipratropium 0.5 mg-albuterol 3 mg (2.5 mg base)/3 mL nebulization soln   Taking    pantoprazole (PROTONIX) 40 MG EC tablet Take 1 tablet by mouth Daily. (Patient taking differently: Take 20 mg by mouth Daily.)   2/23/2025    rOPINIRole (REQUIP) 0.5 MG tablet Take 1 tablet by mouth every night at bedtime. (Patient taking differently: Take 2 tablets by mouth Daily.)   2/23/2025    sertraline (ZOLOFT) 50 MG tablet Take 1 tablet by mouth Daily.   2/23/2025    spironolactone (ALDACTONE) 25 MG tablet Take 1 tablet by mouth Daily.   2/23/2025    Trelegy Ellipta 100-62.5-25 MCG/ACT inhaler    2/23/2025    albuterol (PROVENTIL HFA;VENTOLIN HFA) 108 (90 Base) MCG/ACT inhaler Inhale 2 puffs Every 4 (Four) Hours As Needed for Wheezing.       furosemide (LASIX) 40 MG tablet Take 1 tablet by mouth Daily. (Patient taking differently: Take 0.5 tablets by mouth Daily.) 30 tablet 5     HYDROcodone-acetaminophen (NORCO) 5-325 MG per tablet Take 1 tablet by mouth As Needed.       nitroglycerin (NITROSTAT) 0.4 MG SL tablet Place 1 tablet under the tongue Every 5 (Five) Minutes As Needed for Chest Pain. Take no more than 3 doses in 15 minutes.       potassium chloride 10 MEQ CR tablet Take 2 tablets by mouth Daily.          Current Inpatient Medications:    Current Facility-Administered Medications   Medication Dose Route Frequency Provider Last Rate Last Admin    acetaminophen (TYLENOL) tablet 650 mg  650 mg Oral Q4H PRN Naomi Rodriguez MD        Or    acetaminophen (TYLENOL) 160 MG/5ML oral solution 650 mg  650 mg Oral Q4H  PRN Naomi Rodriguez MD        Or    acetaminophen (TYLENOL) suppository 650 mg  650 mg Rectal Q4H PRN Naomi Rodriguez MD        albuterol (PROVENTIL) nebulizer solution 0.083% 2.5 mg/3mL  2.5 mg Nebulization Q6H PRN Naomi Rodriguez MD   2.5 mg at 02/24/25 0705    amiodarone (PACERONE) tablet 200 mg  200 mg Oral Daily Naomi Rodriguez MD   200 mg at 02/24/25 0918    arformoterol (BROVANA) nebulizer solution 15 mcg  15 mcg Nebulization BID - RT Naomi Rodriguez MD   15 mcg at 02/24/25 0706    And    budesonide (PULMICORT) nebulizer solution 0.5 mg  0.5 mg Nebulization BID - RT Naomi Rodriguez MD        And    revefenacin (YUPELRI) nebulizer solution 175 mcg  175 mcg Nebulization Daily - RT Naomi Rodriguez MD   175 mcg at 02/24/25 0704    atorvastatin (LIPITOR) tablet 80 mg  80 mg Oral Daily Naomi Rodriguez MD   80 mg at 02/24/25 0919    sennosides-docusate (PERICOLACE) 8.6-50 MG per tablet 2 tablet  2 tablet Oral BID PRN Naomi Rodriguez MD        And    polyethylene glycol (MIRALAX) packet 17 g  17 g Oral Daily PRN Naomi Rodriguez MD        And    bisacodyl (DULCOLAX) EC tablet 5 mg  5 mg Oral Daily PRN Naomi Rodriguez MD        And    bisacodyl (DULCOLAX) suppository 10 mg  10 mg Rectal Daily PRN Naomi Rodriguez MD        carvedilol (COREG) tablet 3.125 mg  3.125 mg Oral BID With Meals Naomi Rodriguez MD   3.125 mg at 02/24/25 0918    cloNIDine (CATAPRES) tablet 0.1 mg  0.1 mg Oral Q4H PRN Kyree Davis, WOO        ferrous sulfate tablet 325 mg  325 mg Oral Daily With Breakfast Naomi Rodriguez MD   325 mg at 02/24/25 0919    folic acid (FOLVITE) tablet 1 mg  1 mg Oral Daily Kyree Davis APRN   1 mg at 02/24/25 0918    [START ON 2/25/2025] furosemide (LASIX) injection 40 mg  40 mg Intravenous Daily Es Daugherty APRN        HYDROcodone-acetaminophen (NORCO) 5-325 MG per tablet 1 tablet  1 tablet Oral Q4H  PRN Kyree Davis APRN   1 tablet at 02/24/25 1234    hydrOXYzine (ATARAX) tablet 25 mg  25 mg Oral TID PRN Naomi Rodriguez MD        ipratropium-albuterol (DUO-NEB) nebulizer solution 3 mL  3 mL Nebulization Q6H While Awake - RT Naomi Rodriguez MD   3 mL at 02/24/25 0701    LORazepam (ATIVAN) tablet 1 mg  1 mg Oral Q1H PRN Kyree Davis APRN        Or    LORazepam (ATIVAN) injection 1 mg  1 mg Intravenous Q1H PRN Kyree Davis APRN        Or    LORazepam (ATIVAN) tablet 2 mg  2 mg Oral Q1H PRN Kyree Davis APRN        Or    LORazepam (ATIVAN) injection 2 mg  2 mg Intravenous Q1H PRN Kyree Davis APRN        Or    LORazepam (ATIVAN) injection 2 mg  2 mg Intravenous Q15 Min PRN Kyree Davis APRN        Or    LORazepam (ATIVAN) injection 2 mg  2 mg Intramuscular Q15 Min PRN Kyree Davis APRN        losartan (COZAAR) tablet 25 mg  25 mg Oral Daily Naomi Rodriguez MD   25 mg at 02/24/25 0919    Magnesium Standard Dose Replacement - Follow Nurse / BPA Driven Protocol   Not Applicable PRN Kyree Davis APRN        melatonin tablet 2.5 mg  2.5 mg Oral Nightly PRN Naomi Rodriguez MD        methylPREDNISolone sodium succinate (SOLU-Medrol) injection 40 mg  40 mg Intravenous Q8H Kyree Davis APRN   40 mg at 02/24/25 0644    nicotine (NICODERM CQ) 21 MG/24HR patch 1 patch  1 patch Transdermal Q24H Lb Rdz APRN   1 patch at 02/24/25 1236    ondansetron ODT (ZOFRAN-ODT) disintegrating tablet 4 mg  4 mg Oral Q6H PRN Naomi Rodriguez MD        Or    ondansetron (ZOFRAN) injection 4 mg  4 mg Intravenous Q6H PRN Naomi Rodriguez MD        pantoprazole (PROTONIX) EC tablet 40 mg  40 mg Oral Daily Naomi Rodriguez MD   40 mg at 02/24/25 0919    rOPINIRole (REQUIP) tablet 1 mg  1 mg Oral Q12H Lb Rdz APRN   1 mg at 02/24/25 1048    sertraline (ZOLOFT) tablet 50 mg  50 mg Oral Daily Naomi Rodriguez,  MD   50 mg at 02/24/25 0918    sodium chloride 0.9 % flush 10 mL  10 mL Intravenous PRN Ignacio Washington MD        spironolactone (ALDACTONE) tablet 25 mg  25 mg Oral Daily Naomi Rodriguez MD   25 mg at 02/24/25 0918    thiamine (B-1) injection 200 mg  200 mg Intravenous Q8H Kyree Davis APRN   200 mg at 02/24/25 0647    Followed by    [START ON 3/1/2025] thiamine (VITAMIN B-1) tablet 100 mg  100 mg Oral Daily Kyree Davis APRN        vitamin B-12 (CYANOCOBALAMIN) tablet 1,000 mcg  1,000 mcg Oral Daily Naomi Rodriguez MD   1,000 mcg at 02/24/25 0918     Anti-infectives:[unfilled]    Review of Systems:  CONSTITUTIONAL: (None) weight loss, (none) fever, (-) chills (extreme) fatigue   EYES: (-) new visual loss /changes   ENTM: (-) earache,  (-) sorethroat, (-) dysphagia (-) odynophagia (-) oral lesions  PULMONARY: (-) SOB, (-) cough, (-) hemoptysis   CARDIOVASCULAR: (-) chest pain , (-) orthopnea, (-) palpitation (-) leg swelling   GI: (None) abdominal pain (none) melena (none) hematochezia (none) hematemesis (none) nausea (none) vomiting   : (-) dysuria (-) hematuria (-) frequency (-) urgency (-) incontinence   HEMATOLOGY: (-) easy bruisibility (-) bleeding nose (-) blood transfusions  SKIN: (-) rash (-) non-healing ulcers (-) jaundice  NEUROLOGICAL: (-) paralysis (-) loss of consciousness (-) falls/fainting  PSYCHIATRIC: (-) hallucination (-) mood disturbances (-) confusion      Vitals 24 Hours ([Min - Max] (Last Recorded Value)):     [unfilled]  Wt Readings from Last 3 Encounters:   02/23/25 101 kg (223 lb 6.4 oz)   01/14/25 98.4 kg (217 lb)   10/14/24 97.1 kg (214 lb)       PHYSICAL EXAM:  GENERAL APPEARANCE: Alert, cooperative, in no acute distress  Eyes: EOMI.  Pupils equal bilat.  No scleral icterus  Neck: supple, trachea midline, no anterior/posterior LAD  Lungs/Pulmonary: Wheezing along with accessory muscles of respiration working  Heart/Chest: Normal heart sounds no  murmurs nontender chest wall  GI: Soft obese nontender liver and spleen are not palpable  Extremities: Extremities normal, atraumatic, no cyanosis or edema   Skin: No rash.  No jaundice.  Neuro/Psych:  A&O, normal mood, able to move all 4 ext, No asterixis       LABS:  Results from last 7 days   Lab Units 02/24/25  0752 02/23/25  1754   WBC 10*3/mm3 3.46 4.29   HEMOGLOBIN g/dL 6.1* 6.8*   HEMATOCRIT % 18.4* 20.1*   MCV fL 102.2* 103.6*   PLATELETS 10*3/mm3 105* 115*   AST (SGOT) U/L  --  14   ALT (SGPT) U/L  --  10   ALK PHOS U/L  --  103   BILIRUBIN mg/dL  --  0.7   POTASSIUM mmol/L 4.1 4.5   BUN mg/dL 30* 21   CREATININE mg/dL 1.25 1.12       IMAGING:  [unfilled]

## 2025-02-24 NOTE — PLAN OF CARE
Goal Outcome Evaluation:  Plan of Care Reviewed With: patient        Progress: no change  Outcome Evaluation: vitals stable.  pt denied pain.  meds given per orders.  CIWA scores noted.  GI consult.  will continue to monitor.

## 2025-02-24 NOTE — PROGRESS NOTES
Name: Yves Hastings ADMIT: 2025   : 1950  PCP: Brenda Downs, WOO    MRN: [id number] LOS: 1 days   AGE/SEX: 75 y.o. male  ROOM: [facility]     Subjective   Subjective   Patient appears generally weak, relatively comfortable, and in no apparent distress.  Family at bedside serves as primary historian.  Patient hard of hearing complicating communication.    Denies chest pain or shortness of breath.       Objective   Objective   Vital Signs  Temp:  [97.5 °F (36.4 °C)-101 °F (38.3 °C)] 97.7 °F (36.5 °C)  Heart Rate:  [70-89] 78  Resp:  [18-24] 20  BP: (100-139)/(51-93) 126/86  SpO2:  [84 %-100 %] 96 %  on  Flow (L/min) (Oxygen Therapy):  [2] 2;   Device (Oxygen Therapy): nasal cannula  Body mass index is 31.16 kg/m².    Physical Exam  Constitutional:       General: He is not in acute distress.     Appearance: He is ill-appearing. He is not toxic-appearing.   Cardiovascular:      Rate and Rhythm: Normal rate and regular rhythm.   Pulmonary:      Effort: Pulmonary effort is normal.      Breath sounds: Wheezing present.   Abdominal:      General: Bowel sounds are normal.      Palpations: Abdomen is soft.   Musculoskeletal:      Right lower leg: No edema.      Left lower leg: No edema.   Skin:     General: Skin is warm.      Coloration: Skin is jaundiced.   Neurological:      Mental Status: He is alert and oriented to person, place, and time.   Psychiatric:         Behavior: Behavior normal.         Thought Content: Thought content normal.       Results Review     I reviewed the patient's new clinical results.  Results from last 7 days   Lab Units 25  0752 25  1754   WBC 10*3/mm3 3.46 4.29   HEMOGLOBIN g/dL 6.1* 6.8*   PLATELETS 10*3/mm3 105* 115*     Results from last 7 days   Lab Units 25  0752 25  1754   SODIUM mmol/L 129* 133*   POTASSIUM mmol/L 4.1 4.5   CHLORIDE mmol/L 97* 101   CO2 mmol/L 19.7* 23.0   BUN mg/dL 30* 21   CREATININE mg/dL 1.25 1.12   GLUCOSE mg/dL 201* 97  "  EGFR mL/min/1.73 60.4 68.9     Results from last 7 days   Lab Units 02/23/25  1754   ALBUMIN g/dL 4.2   BILIRUBIN mg/dL 0.7   ALK PHOS U/L 103   AST (SGOT) U/L 14   ALT (SGPT) U/L 10     Results from last 7 days   Lab Units 02/24/25  0752 02/23/25  1754   CALCIUM mg/dL 8.9 9.0   ALBUMIN g/dL  --  4.2   MAGNESIUM mg/dL 2.2  --      Results from last 7 days   Lab Units 02/23/25  1854 02/23/25  1754   PROCALCITONIN ng/mL  --  0.07   LACTATE mmol/L 1.1  --      No results found for: \"HGBA1C\", \"POCGLU\"    XR Chest 1 View    Result Date: 2/23/2025   The heart size is within normal limits. Prior sternotomy. Dual-lead left subclavian approach transvenous pacemaker device remains in place.  There is mild cardiomegaly.  There is bilateral vascular congestion, right slightly greater than left, and there is a small right pleural effusion.  There is no dense consolidation, and no pneumothorax.  This report was finalized on 2/23/2025 6:05 PM by Dr. Adelfo Sorenson M.D on Workstation: [id number]       I have personally reviewed all medications:  Scheduled Medications  amiodarone, 200 mg, Oral, Daily  arformoterol, 15 mcg, Nebulization, BID - RT   And  budesonide, 0.5 mg, Nebulization, BID - RT   And  revefenacin, 175 mcg, Nebulization, Daily - RT  atorvastatin, 80 mg, Oral, Daily  carvedilol, 3.125 mg, Oral, BID With Meals  ferrous sulfate, 325 mg, Oral, Daily With Breakfast  folic acid, 1 mg, Oral, Daily  [START ON 2/25/2025] furosemide, 40 mg, Intravenous, Daily  ipratropium-albuterol, 3 mL, Nebulization, Q6H While Awake - RT  losartan, 25 mg, Oral, Daily  methylPREDNISolone sodium succinate, 40 mg, Intravenous, Q8H  nicotine, 1 patch, Transdermal, Q24H  pantoprazole, 40 mg, Oral, Daily  rOPINIRole, 1 mg, Oral, Q12H  sertraline, 50 mg, Oral, Daily  spironolactone, 25 mg, Oral, Daily  thiamine (B-1) IV, 200 mg, Intravenous, Q8H   Followed by  [START ON 3/1/2025] thiamine, 100 mg, Oral, Daily  cyanocobalamin, 1,000 mcg, Oral, " Daily    Infusions   Diet  Diet: Cardiac; Healthy Heart (2-3 Na+); Fluid Consistency: Thin (IDDSI 0)  NPO Diet NPO Type: Strict NPO    I have personally reviewed:  [x]  Laboratory   [x]  Microbiology   [x]  Radiology   [x]  EKG/Telemetry  [x]  Cardiology/Vascular   []  Pathology    []  Records       Assessment/Plan     Active Hospital Problems    Diagnosis  POA    **Acute hypoxemic respiratory failure [J96.01]  Yes    Acute anemia [D64.9]  Yes    Acute on chronic heart failure with preserved ejection fraction (HFpEF) [I50.33]  Yes    RLS (restless legs syndrome) [G25.81]  Yes    Acute on chronic anemia [D64.9]  Yes    Essential hypertension [I10]  Yes    Chronic obstructive lung disease [J44.9]  Yes      Resolved Hospital Problems   No resolved problems to display.       75 y.o. male admitted with Acute hypoxemic respiratory failure.      Acute hypoxemic respiratory failure  -Oxygen saturation 84% on room air improved with supplemental oxygen  -Respiratory viral panel negative  -Suspect multifactorial due to acute anemia in the setting of COPD and acute on chronic HFpEF      Essential hypertension  -BP acceptable acutely; continue same home meds for now      Acute on chronic anemia  -Followed by heme-onc--Dr. Adame--for MDS  -Family states patient requires blood transfusions every couple weeks  -Plan to transfuse serum hemoglobin less than 7 here      Acute on chronic heart failure with preserved ejection fraction (HFpEF)  -Most recent LVEF 41 to 45%   -cardiology following and managing diuretics      RLS (restless legs syndrome)  -Escalate strength and frequency of Requip given increase symptoms of RLS here  -monitor effects of Requip      Chronic alcohol dependence  -Family states last intake of alcohol on 2/20/2025  -Monitor for withdrawal symptoms for now & reevaluate need for CIWA tomorrow      COPD  -provide scheduled & as needed breathing treatments  -wean oxygen as tolerated    SCDs for DVT  prophylaxis.  Full code.  Discussed with patient and nursing staff.  Anticipate discharge  TBD  timing yet to be determined.  Expected discharge date/ time has not been documented.      WOO Das  [facility]  02/24/25  15:20 EST

## 2025-02-24 NOTE — H&P
HISTORY AND PHYSICAL   [facility]        Date of Admission: 2025  Patient Identification:  Name: Yves Hastings  Age: 75 y.o.  Sex: male  :  1950  MRN: [id number]                     Primary Care Physician: Brenda Downs APRN    Chief Complaint:  75 year old gentleman presented to the ED with shortness of breath, cough and tightness of his chest for the last two days; he feels worse with exertion; tightness is worse with a deep breath and cough; no fever or chills; he is followed by dr carranza    History of Present Illness:   As above    Past Medical History:  Past Medical History:   Diagnosis Date    COPD (chronic obstructive pulmonary disease)     Emphysema of lung     HLD (hyperlipidemia)     [place] (hard of hearing)     ears hearing aids    HTN (hypertension)     Myelodysplastic syndrome, unspecified 2023    Myocardial infarction     Short-term memory loss     Sleep apnea     no machine    Stroke     Ventral hernia     sched repair     Past Surgical History:  Past Surgical History:   Procedure Laterality Date    CARDIAC CATHETERIZATION N/A 2018    Procedure: Left Heart Cath;  Surgeon: Mika Carranza MD;  Location: [facility] CATH INVASIVE LOCATION;  Service: Cardiology    CARDIAC CATHETERIZATION N/A 2018    Procedure: Left ventriculography;  Surgeon: Mika Carranza MD;  [facility];  Service: Cardiology    CARDIAC CATHETERIZATION N/A 2018    Procedure: Coronary angiography;  Surgeon: Mika Carranza MD;  Location: [facility] CATH INVASIVE LOCATION;  Service: Cardiology    CARDIAC CATHETERIZATION  2018    Procedure: Functional Flow [place];  Surgeon: Mika Carranza MD;  Location: [facility] CATH INVASIVE LOCATION;  Service: Cardiology    CARDIAC CATHETERIZATION N/A 2024    Procedure: Left Heart Cath;  Surgeon: Mika Carranza MD;  Location: [facility] CATH INVASIVE LOCATION;  Service:  Cardiovascular;  Laterality: N/A;    CARDIAC CATHETERIZATION N/A 1/12/2024    Procedure: Left ventriculography;  Surgeon: Mika Carranza MD;  Location:  [facility] CATH INVASIVE LOCATION;  Service: Cardiovascular;  Laterality: N/A;    CARDIAC CATHETERIZATION N/A 1/12/2024    Procedure: Coronary angiography;  Surgeon: Mika Carranza MD;  Location:  [facility] CATH INVASIVE LOCATION;  Service: Cardiovascular;  Laterality: N/A;    COLONOSCOPY W/ POLYPECTOMY N/A 11/10/2021    Procedure: COLONOSCOPY; POLYPECTOMY;  Surgeon: Alexander Dobbs MD;  Location:  [facility] OR;  Service: Gastroenterology;  Laterality: N/A;  DIVERTICULOSIS  POLYP    CORONARY STENT PLACEMENT      CYST REMOVAL      tailbone    INCISION AND DRAINAGE PERIRECTAL ABSCESS N/A 05/29/2021    Procedure: INCISION AND DRAINAGE OF PERIRECTAL ABSCESS;  Surgeon: Reddy Johnson Jr., MD;  Location: [facility] MAIN OR;  Service: General;  Laterality: N/A;    UMBILICAL HERNIA REPAIR      DR. CASEY - YEARS AGO    VEIN LIGATION AND STRIPPING BILATERAL      VENTRAL/INCISIONAL HERNIA REPAIR N/A 02/04/2019    Procedure: VENTRAL/INCISIONAL HERNIA REPAIR LAPAROSCOPIC;  Surgeon: Adelfo Nieves MD;  Location: [facility] OR;  Service: General      Home Meds:  (Not in a hospital admission)      Allergies:  No Known Allergies  Immunizations:  Immunization History   Administered Date(s) Administered    COVID-19 (MODERNA) 1st,2nd,3rd Dose Monovalent 02/23/2021, 03/23/2021, 08/17/2021    COVID-19 (MODERNA) BIVALENT 12+YRS 11/04/2022    Fluzone Quad >6mos (Multi-dose) 10/31/2016    Pneumococcal, Unspecified 02/18/2016    Td, Not Adsorbed 02/18/2016     Social History:   Social History     Social History Narrative    Not on file     Social History     Socioeconomic History    Marital status:    Tobacco Use    Smoking status: Some Days     Current packs/day: 0.00     Average packs/day: 0.3 packs/day for 60.0 years (15.0 ttl pk-yrs)     Types: Cigarettes     Start  date:      Last attempt to quit: 2023     Years since quittin.1     Passive exposure: Past    Smokeless tobacco: Never    Tobacco comments:     Began smoking at age 13.  Smoked 1 ppd for 28 years and 2 ppd for 30 years for an 88 pack year history.     SMOKES 5 OR LESS CIGARETTES A DAY   Vaping Use    Vaping status: Former   Substance and Sexual Activity    Alcohol use: Not Currently     Alcohol/week: 7.0 standard drinks of alcohol     Types: 7 Shots of liquor per week     Comment: hx of daily    Drug use: Not Currently     Types: Marijuana     Comment: history of, doesn't anymore; Reports using THC at bedtime.    Sexual activity: Defer       Family History:  Family History   Problem Relation Age of Onset    Cancer Mother         breast    Heart disease Mother     Heart disease Father     Cancer Maternal Aunt     Heart disease Paternal Uncle     Malig Hyperthermia Neg Hx         Review of Systems  See history of present illness and past medical history.  Patient denies headache, dizziness, syncope, falls, trauma, change in vision, change in hearing, change in taste, changes in weight, changes in appetite, focal weakness, numbness, or paresthesia.  Patient denies chest pain, palpitations, dyspnea, orthopnea, PND, cough, sinus pressure, rhinorrhea, epistaxis, hemoptysis, nausea, vomiting,hematemesis, diarrhea, constipation or hematochezia.  Denies cold or heat intolerance, polydipsia, polyuria, polyphagia. Denies hematuria, pyuria, dysuria, hesitancy, frequency or urgency. Denies consumption of raw and under cooked meats foods or change in water source.  Denies fever, chills, sweats, night sweats.  Denies missing any routine medications. Remainder of ROS is negative.    Objective:  T Max 24 hrs: Temp (24hrs), Av °F (37.8 °C), Min:98.9 °F (37.2 °C), Max:101 °F (38.3 °C)    Vitals Ranges:   Temp:  [98.9 °F (37.2 °C)-101 °F (38.3 °C)] 98.9 °F (37.2 °C)  Heart Rate:  [70] 70  Resp:  [20-24] 24  BP:  (119-139)/(60-73) 119/60      Exam:  /60   Pulse 70   Temp 98.9 °F (37.2 °C) (Oral)   Resp 24   SpO2 100%     General Appearance:    Alert, cooperative, no distress, appears stated age' chronically ill appearing   Head:    Normocephalic, without obvious abnormality, atraumatic   Eyes:    PERRL, conjunctivae/corneas clear, EOM's intact, both eyes   Ears:    Normal external ear canals, both ears   Nose:   Nares normal, septum midline, mucosa normal, no drainage    or sinus tenderness   Throat:   Lips, mucosa, and tongue normal   Neck:   Supple, symmetrical, trachea midline, no adenopathy;     thyroid:  no enlargement/tenderness/nodules; no carotid    bruit or JVD   Back:     Symmetric, no curvature, ROM normal, no CVA tenderness   Lungs:     Decreased breath sounds bilaterally, respirations unlabored   Chest Wall:    No tenderness or deformity    Heart:    Regular rate and rhythm, S1 and S2 normal, no murmur, rub   or gallop   Abdomen:     Soft, nontender, bowel sounds active all four quadrants,     no masses, no hepatomegaly, no splenomegaly   Extremities:   Extremities normal, atraumatic, no cyanosis or edema                       .    Data Review:  Labs in chart were reviewed.  WBC   Date Value Ref Range Status   02/23/2025 4.29 3.40 - 10.80 10*3/mm3 Final     Hemoglobin   Date Value Ref Range Status   02/23/2025 6.8 (C) 13.0 - 17.7 g/dL Final     Hematocrit   Date Value Ref Range Status   02/23/2025 20.1 (C) 37.5 - 51.0 % Final     Platelets   Date Value Ref Range Status   02/23/2025 115 (L) 140 - 450 10*3/mm3 Final     Sodium   Date Value Ref Range Status   02/23/2025 133 (L) 136 - 145 mmol/L Final     Potassium   Date Value Ref Range Status   02/23/2025 4.5 3.5 - 5.2 mmol/L Final     Chloride   Date Value Ref Range Status   02/23/2025 101 98 - 107 mmol/L Final     CO2   Date Value Ref Range Status   02/23/2025 23.0 22.0 - 29.0 mmol/L Final     BUN   Date Value Ref Range Status   02/23/2025 21 8 - 23  mg/dL Final     Creatinine   Date Value Ref Range Status   02/23/2025 1.12 0.76 - 1.27 mg/dL Final     Glucose   Date Value Ref Range Status   02/23/2025 97 65 - 99 mg/dL Final     Calcium   Date Value Ref Range Status   02/23/2025 9.0 8.6 - 10.5 mg/dL Final                Imaging Results (All)       Procedure Component Value Units Date/Time    XR Chest 1 View [[id number]] Collected: 02/23/25 1800     Updated: 02/23/25 1808    Narrative:      CXR ONE VIEW      HISTORY: [facility] Triage Protocol; J96.01-Acute respiratory failure with  hypoxia     COMPARISON: 1/10/2024     TECHNIQUE: single portable AP       Impression:         The heart size is within normal limits. Prior sternotomy. Dual-lead left  subclavian approach transvenous pacemaker device remains in place.     There is mild cardiomegaly.     There is bilateral vascular congestion, right slightly greater than  left, and there is a small right pleural effusion.     There is no dense consolidation, and no pneumothorax.     This report was finalized on 2/23/2025 6:05 PM by Dr. Adelfo Sorenson M.D on Workstation: [id number]                 Assessment:  Active Hospital Problems    Diagnosis  POA    **Acute hypoxemic respiratory failure [J96.01]  Yes      Resolved Hospital Problems   No resolved problems to display.   Copd  Obesity  Acute on chronic systolic chf  Hypertension  Mds  anemia    Plan:  Will diurese  Continue steroids, mininebs for copd  Cardiology to see  Ask gi to see regarding anemia  Monitor on telemetry  Transfusion ordered by ED  Dw patient, family  Patient is full code and spouse is notianna Salgado Evgeny Rodriguez MD  2/23/2025  19:24 EST

## 2025-02-24 NOTE — PAYOR COMM NOTE
"Yves Orpoeza (75 y.o. Male)     ATTN: NURSE REVIEWER  RE: INITIAL INPT AUTH CLINICALS   REF# [id number]  PLS REPLY TO KHRIS KELLY [phone] OR FAX# [phone]      Date of Birth   1950    Social Security Number       Address   [address] [place] [address]    Home Phone   [phone]    MRN   [id number]       Synagogue   None    Marital Status                               Admission Date   2/23/25    Admission Type   Emergency    Admitting Provider   Naomi Rodriguez MD    Attending Provider   Jackson Phelps MD    Department, Room/Bed   [facility] POD F, F48/1       Discharge Date       Discharge Disposition       Discharge Destination                                 Attending Provider: Jackson Phelps MD    Allergies: No Known Allergies    Isolation: None   Infection: None   Code Status: CPR    Ht: 180.3 cm (71\")   Wt: 101 kg (223 lb 6.4 oz)    Admission Cmt: None   Principal Problem: Acute hypoxemic respiratory failure [J96.01]                   Active Insurance as of 2/23/2025       Primary Coverage       Payor Plan Insurance Group Employer/Plan Group    HUMANA MEDICARE REPLACEMENT HUMANA MEDICARE ADVANTAGE PPO [id number]       Payor Plan Address Payor Plan Phone Number Payor Plan Fax Number Effective Dates    [address] [address] [phone]  2/1/2025 - None Entered    [facility] [id number]         Subscriber Name Subscriber Birth Date Member ID       YVES OROPEZA SOHEILA 1950 [id number]               Secondary Coverage       Payor Plan Insurance Group Employer/Plan Group     FOR LIFE  FOR LIFE MC SUP         Payor Plan Address Payor Plan Phone Number Payor Plan Fax Number Effective Dates    [address] [phone]  11/7/2018 - None Entered    [facility] [address]         Subscriber Name Subscriber Birth Date Member ID       JHONNAYVES ZUNIGA SOHEILA 1950 [id number]                     Emergency Contacts        (Rel.) Home Phone " Work Phone Mobile Phone    Margarita Mari (Daughter) [phone] -- [phone]    Doreen López (Daughter) [phone] -- [phone]    Sonia Hastings (Spouse) [phone] -- [phone]                 History & Physical        Stingl, Naomi Pepper MD at 26          HISTORY AND PHYSICAL   [facility]        Date of Admission: 2025  Patient Identification:  Name: Yves Hastings  Age: 75 y.o.  Sex: male  :  1950  MRN: [id number]                     Primary Care Physician: Brenda Downs APRN    Chief Complaint:  75 year old gentleman presented to the ED with shortness of breath, cough and tightness of his chest for the last two days; he feels worse with exertion; tightness is worse with a deep breath and cough; no fever or chills; he is followed by dr armenta    History of Present Illness:   As above    Past Medical History:  Past Medical History:   Diagnosis Date    COPD (chronic obstructive pulmonary disease)     Emphysema of lung     HLD (hyperlipidemia)     [place] (hard of hearing)     ears hearing aids    HTN (hypertension)     Myelodysplastic syndrome, unspecified 2023    Myocardial infarction     Short-term memory loss     Sleep apnea     no machine    Stroke     Ventral hernia     sched repair     Past Surgical History:  Past Surgical History:   Procedure Laterality Date    CARDIAC CATHETERIZATION N/A 2018    Procedure: Left Heart Cath;  Surgeon: Mika Armenta MD;  Location: [facility] CATH INVASIVE LOCATION;  Service: Cardiology    CARDIAC CATHETERIZATION N/A 2018    Procedure: Left ventriculography;  Surgeon: Mika Armenta MD;  Location: [facility] CATH INVASIVE LOCATION;  Service: Cardiology    CARDIAC CATHETERIZATION N/A 2018    Procedure: Coronary angiography;  Surgeon: Mika Armenta MD;  [facility];  Service: Cardiology    CARDIAC CATHETERIZATION  2018    Procedure:  Functional Flow [place];  Surgeon: Mika Carranza MD;  [facility];  Service: Cardiology    CARDIAC CATHETERIZATION N/A 1/12/2024    Procedure: Left Heart Cath;  Surgeon: Mika Carranza MD;  [facility];  Service: Cardiovascular;  Laterality: N/A;    CARDIAC CATHETERIZATION N/A 1/12/2024    Procedure: Left ventriculography;  Surgeon: Mika Carranza MD;  [facility];  Service: Cardiovascular;  Laterality: N/A;    CARDIAC CATHETERIZATION N/A 1/12/2024    Procedure: Coronary angiography;  Surgeon: Mika Carranza MD;  [facility];  Service: Cardiovascular;  Laterality: N/A;    COLONOSCOPY W/ POLYPECTOMY N/A 11/10/2021    Procedure: COLONOSCOPY; POLYPECTOMY;  Surgeon: Alexander Dobbs MD;  Location: [facility] OR;  Service: Gastroenterology;  Laterality: N/A;  DIVERTICULOSIS  POLYP    CORONARY STENT PLACEMENT      CYST REMOVAL      tailbone    INCISION AND DRAINAGE PERIRECTAL ABSCESS N/A 05/29/2021    Procedure: INCISION AND DRAINAGE OF PERIRECTAL ABSCESS;  Surgeon: Reddy Johnson Jr., MD;  Location: [facility] OR;  Service: General;  Laterality: N/A;    UMBILICAL HERNIA REPAIR      DR. CASEY - YEARS AGO    VEIN LIGATION AND STRIPPING BILATERAL      VENTRAL/INCISIONAL HERNIA REPAIR N/A 02/04/2019    Procedure: VENTRAL/INCISIONAL HERNIA REPAIR LAPAROSCOPIC;  Surgeon: Adelfo Nieves MD;  Location: [facility] OR;  Service: General      Home Meds:  (Not in a hospital admission)      Allergies:  No Known Allergies  Immunizations:  Immunization History   Administered Date(s) Administered    COVID-19 (MODERNA) 1st,2nd,3rd Dose Monovalent 02/23/2021, 03/23/2021, 08/17/2021    COVID-19 (MODERNA) BIVALENT 12+YRS 11/04/2022    Fluzone Quad >6mos (Multi-dose) 10/31/2016    Pneumococcal, Unspecified 02/18/2016    Td, Not Adsorbed 02/18/2016     Social History:   Social History      Social History Narrative    Not on file     Social History     Socioeconomic History    Marital status:    Tobacco Use    Smoking status: Some Days     Current packs/day: 0.00     Average packs/day: 0.3 packs/day for 60.0 years (15.0 ttl pk-yrs)     Types: Cigarettes     Start date:      Last attempt to quit: 2023     Years since quittin.1     Passive exposure: Past    Smokeless tobacco: Never    Tobacco comments:     Began smoking at age 13.  Smoked 1 ppd for 28 years and 2 ppd for 30 years for an 88 pack year history.     SMOKES 5 OR LESS CIGARETTES A DAY   Vaping Use    Vaping status: Former   Substance and Sexual Activity    Alcohol use: Not Currently     Alcohol/week: 7.0 standard drinks of alcohol     Types: 7 Shots of liquor per week     Comment: hx of daily    Drug use: Not Currently     Types: Marijuana     Comment: history of, doesn't anymore; Reports using THC at bedtime.    Sexual activity: Defer       Family History:  Family History   Problem Relation Age of Onset    Cancer Mother         breast    Heart disease Mother     Heart disease Father     Cancer Maternal Aunt     Heart disease Paternal Uncle     Malig Hyperthermia Neg Hx         Review of Systems  See history of present illness and past medical history.  Patient denies headache, dizziness, syncope, falls, trauma, change in vision, change in hearing, change in taste, changes in weight, changes in appetite, focal weakness, numbness, or paresthesia.  Patient denies chest pain, palpitations, dyspnea, orthopnea, PND, cough, sinus pressure, rhinorrhea, epistaxis, hemoptysis, nausea, vomiting,hematemesis, diarrhea, constipation or hematochezia.  Denies cold or heat intolerance, polydipsia, polyuria, polyphagia. Denies hematuria, pyuria, dysuria, hesitancy, frequency or urgency. Denies consumption of raw and under cooked meats foods or change in water source.  Denies fever, chills, sweats, night sweats.  Denies missing any routine  medications. Remainder of ROS is negative.    Objective:  T Max 24 hrs: Temp (24hrs), Av °F (37.8 °C), Min:98.9 °F (37.2 °C), Max:101 °F (38.3 °C)    Vitals Ranges:   Temp:  [98.9 °F (37.2 °C)-101 °F (38.3 °C)] 98.9 °F (37.2 °C)  Heart Rate:  [70] 70  Resp:  [20-24] 24  BP: (119-139)/(60-73) 119/60      Exam:  /60   Pulse 70   Temp 98.9 °F (37.2 °C) (Oral)   Resp 24   SpO2 100%     General Appearance:    Alert, cooperative, no distress, appears stated age' chronically ill appearing   Head:    Normocephalic, without obvious abnormality, atraumatic   Eyes:    PERRL, conjunctivae/corneas clear, EOM's intact, both eyes   Ears:    Normal external ear canals, both ears   Nose:   Nares normal, septum midline, mucosa normal, no drainage    or sinus tenderness   Throat:   Lips, mucosa, and tongue normal   Neck:   Supple, symmetrical, trachea midline, no adenopathy;     thyroid:  no enlargement/tenderness/nodules; no carotid    bruit or JVD   Back:     Symmetric, no curvature, ROM normal, no CVA tenderness   Lungs:     Decreased breath sounds bilaterally, respirations unlabored   Chest Wall:    No tenderness or deformity    Heart:    Regular rate and rhythm, S1 and S2 normal, no murmur, rub   or gallop   Abdomen:     Soft, nontender, bowel sounds active all four quadrants,     no masses, no hepatomegaly, no splenomegaly   Extremities:   Extremities normal, atraumatic, no cyanosis or edema                       .    Data Review:  Labs in chart were reviewed.  WBC   Date Value Ref Range Status   2025 4.29 3.40 - 10.80 10*3/mm3 Final     Hemoglobin   Date Value Ref Range Status   2025 6.8 (C) 13.0 - 17.7 g/dL Final     Hematocrit   Date Value Ref Range Status   2025 20.1 (C) 37.5 - 51.0 % Final     Platelets   Date Value Ref Range Status   2025 115 (L) 140 - 450 10*3/mm3 Final     Sodium   Date Value Ref Range Status   2025 133 (L) 136 - 145 mmol/L Final     Potassium   Date Value Ref  Range Status   02/23/2025 4.5 3.5 - 5.2 mmol/L Final     Chloride   Date Value Ref Range Status   02/23/2025 101 98 - 107 mmol/L Final     CO2   Date Value Ref Range Status   02/23/2025 23.0 22.0 - 29.0 mmol/L Final     BUN   Date Value Ref Range Status   02/23/2025 21 8 - 23 mg/dL Final     Creatinine   Date Value Ref Range Status   02/23/2025 1.12 0.76 - 1.27 mg/dL Final     Glucose   Date Value Ref Range Status   02/23/2025 97 65 - 99 mg/dL Final     Calcium   Date Value Ref Range Status   02/23/2025 9.0 8.6 - 10.5 mg/dL Final                Imaging Results (All)       Procedure Component Value Units Date/Time    XR Chest 1 View [[id number]] Collected: 02/23/25 1800     Updated: 02/23/25 1808    Narrative:      CXR ONE VIEW      HISTORY: SOA Triage Protocol; J96.01-Acute respiratory failure with  hypoxia     COMPARISON: 1/10/2024     TECHNIQUE: single portable AP       Impression:         The heart size is within normal limits. Prior sternotomy. Dual-lead left  subclavian approach transvenous pacemaker device remains in place.     There is mild cardiomegaly.     There is bilateral vascular congestion, right slightly greater than  left, and there is a small right pleural effusion.     There is no dense consolidation, and no pneumothorax.     This report was finalized on 2/23/2025 6:05 PM by Dr. Adelfo Sorenson M.D on Workstation: [id number]                 Assessment:  Active Hospital Problems    Diagnosis  POA    **Acute hypoxemic respiratory failure [J96.01]  Yes      Resolved Hospital Problems   No resolved problems to display.   Copd  Obesity  Acute on chronic systolic chf  Hypertension  Mds  anemia    Plan:  Will diurese  Continue steroids, mininebs for copd  Cardiology to see  Ask gi to see regarding anemia  Monitor on telemetry  Transfusion ordered by ED  Dw patient, family  Patient is full code and spouse is nok  Naomi Evgeny Rodriguez MD  2/23/2025  19:24 EST      Electronically signed by Naomi Rodriguez  MD Evgeny at 02/23/25 2146       Facility-Administered Medications as of 2/24/2025   Medication Dose Route Frequency Provider Last Rate Last Admin    acetaminophen (TYLENOL) tablet 650 mg  650 mg Oral Q4H PRN Naomi Rodriguez MD        Or    acetaminophen (TYLENOL) 160 MG/5ML oral solution 650 mg  650 mg Oral Q4H PRN Naomi Rodriguez MD        Or    acetaminophen (TYLENOL) suppository 650 mg  650 mg Rectal Q4H PRN Naomi Rodriguez MD        albuterol (PROVENTIL) nebulizer solution 0.083% 2.5 mg/3mL  2.5 mg Nebulization Q6H PRN Naomi Rodriguez MD   2.5 mg at 02/24/25 0705    amiodarone (PACERONE) tablet 200 mg  200 mg Oral Daily Naomi Rodriguez MD   200 mg at 02/24/25 0918    arformoterol (BROVANA) nebulizer solution 15 mcg  15 mcg Nebulization BID - RT Naomi Rodriguez MD   15 mcg at 02/24/25 0706    And    budesonide (PULMICORT) nebulizer solution 0.5 mg  0.5 mg Nebulization BID - RT Naomi Rodriguez MD        And    revefenacin (YUPELRI) nebulizer solution 175 mcg  175 mcg Nebulization Daily - RT Naomi Rodriguez MD   175 mcg at 02/24/25 0704    atorvastatin (LIPITOR) tablet 80 mg  80 mg Oral Daily Naomi Rodriguez MD   80 mg at 02/24/25 0919    sennosides-docusate (PERICOLACE) 8.6-50 MG per tablet 2 tablet  2 tablet Oral BID PRN Naomi Rodriguez MD        And    polyethylene glycol (MIRALAX) packet 17 g  17 g Oral Daily PRN Naomi Rodriguez MD        And    bisacodyl (DULCOLAX) EC tablet 5 mg  5 mg Oral Daily PRN Naomi Rodriguez MD        And    bisacodyl (DULCOLAX) suppository 10 mg  10 mg Rectal Daily PRN Naomi Rodriguez MD        carvedilol (COREG) tablet 3.125 mg  3.125 mg Oral BID With Meals Naomi Rordiguez MD   3.125 mg at 02/24/25 0918    cloNIDine (CATAPRES) tablet 0.1 mg  0.1 mg Oral Q4H PRN Kyree Davis, WOO        ferrous sulfate tablet 325 mg  325 mg Oral Daily With Breakfast Stingl, Naomi Pepper MD    325 mg at 02/24/25 0919    folic acid (FOLVITE) tablet 1 mg  1 mg Oral Daily Kryee Davis APRN   1 mg at 02/24/25 0918    [START ON 2/25/2025] furosemide (LASIX) injection 40 mg  40 mg Intravenous Daily Es Daugherty APRN        [COMPLETED] furosemide (LASIX) injection 80 mg  80 mg Intravenous Once Ignacio Washington MD   80 mg at 02/23/25 1846    HYDROcodone-acetaminophen (NORCO) 5-325 MG per tablet 1 tablet  1 tablet Oral Q4H PRN Kyree Davis APRN   1 tablet at 02/24/25 1234    hydrOXYzine (ATARAX) tablet 25 mg  25 mg Oral TID PRN Naomi Rodriguez MD        [COMPLETED] ipratropium-albuterol (DUO-NEB) nebulizer solution 3 mL  3 mL Nebulization Once Ignacio Washington MD   3 mL at 02/23/25 1905    ipratropium-albuterol (DUO-NEB) nebulizer solution 3 mL  3 mL Nebulization Q6H While Awake - RT Naomi Rodriguez MD   3 mL at 02/24/25 0701    LORazepam (ATIVAN) tablet 1 mg  1 mg Oral Q1H PRN Kyree Davis APRN        Or    LORazepam (ATIVAN) injection 1 mg  1 mg Intravenous Q1H PRN Kyree Davis APRN        Or    LORazepam (ATIVAN) tablet 2 mg  2 mg Oral Q1H PRN Kyree Davis APRN        Or    LORazepam (ATIVAN) injection 2 mg  2 mg Intravenous Q1H PRN Kyree Davis APRN        Or    LORazepam (ATIVAN) injection 2 mg  2 mg Intravenous Q15 Min PRN Kyree Davis APRN        Or    LORazepam (ATIVAN) injection 2 mg  2 mg Intramuscular Q15 Min PRN Kyree Davis APRN        losartan (COZAAR) tablet 25 mg  25 mg Oral Daily Naomi Rodriguez MD   25 mg at 02/24/25 0919    Magnesium Standard Dose Replacement - Follow Nurse / BPA Driven Protocol   Not Applicable PRN Kyree Davis APRN        melatonin tablet 2.5 mg  2.5 mg Oral Nightly PRN Stingl, Naomi Pepper MD        [COMPLETED] methylPREDNISolone sodium succinate (SOLU-Medrol) injection 125 mg  125 mg Intravenous Once Ignacio Washington MD   125 mg at 02/23/25 1848    methylPREDNISolone sodium  succinate (SOLU-Medrol) injection 40 mg  40 mg Intravenous Q8H Kyree Davis APRN   40 mg at 02/24/25 0644    nicotine (NICODERM CQ) 21 MG/24HR patch 1 patch  1 patch Transdermal Q24H Lb Rdz APRN   1 patch at 02/24/25 1236    ondansetron ODT (ZOFRAN-ODT) disintegrating tablet 4 mg  4 mg Oral Q6H PRN Naomi Rodriguez MD        Or    ondansetron (ZOFRAN) injection 4 mg  4 mg Intravenous Q6H PRN Naomi Rodriguez MD        pantoprazole (PROTONIX) EC tablet 40 mg  40 mg Oral Daily Naomi Rodriguez MD   40 mg at 02/24/25 0919    rOPINIRole (REQUIP) tablet 1 mg  1 mg Oral Q12H Lb Rdz APRN   1 mg at 02/24/25 1048    sertraline (ZOLOFT) tablet 50 mg  50 mg Oral Daily Naomi Rodriguez MD   50 mg at 02/24/25 0918    sodium chloride 0.9 % flush 10 mL  10 mL Intravenous PRN Ignacio Washington MD        spironolactone (ALDACTONE) tablet 25 mg  25 mg Oral Daily Naomi Rodriguez MD   25 mg at 02/24/25 0918    thiamine (B-1) injection 200 mg  200 mg Intravenous Q8H Kyree Davis APRN   200 mg at 02/24/25 0647    Followed by    [START ON 3/1/2025] thiamine (VITAMIN B-1) tablet 100 mg  100 mg Oral Daily Kyree Davis APRN        vitamin B-12 (CYANOCOBALAMIN) tablet 1,000 mcg  1,000 mcg Oral Daily Naomi Rodriguez MD   1,000 mcg at 02/24/25 0918     Lab Results (last 24 hours)       Procedure Component Value Units Date/Time    Haptoglobin [[id number]]  (Abnormal) Collected: 02/24/25 0752    Specimen: Blood Updated: 02/24/25 1136     Haptoglobin 220 mg/dL     Lactate Dehydrogenase [[id number]]  (Normal) Collected: 02/24/25 0752    Specimen: Blood Updated: 02/24/25 1136      U/L     High Sensitivity Troponin T [[id number]]  (Normal) Collected: 02/24/25 0752    Specimen: Blood Updated: 02/24/25 1136     HS Troponin T 12 ng/L     Narrative:      High Sensitive Troponin T Reference Range:  <14.0 ng/L- Negative Female for AMI  <22.0 ng/L- Negative Male for AMI  >=14  - Abnormal Female indicating possible myocardial injury.  >=22 - Abnormal Male indicating possible myocardial injury.   Clinicians would have to utilize clinical acumen, EKG, Troponin, and serial changes to determine if it is an Acute Myocardial Infarction or myocardial injury due to an underlying chronic condition.         Basic Metabolic Panel [[id number]]  (Abnormal) Collected: 02/24/25 0752    Specimen: Blood Updated: 02/24/25 0843     Glucose 201 mg/dL      BUN 30 mg/dL      Creatinine 1.25 mg/dL      Sodium 129 mmol/L      Potassium 4.1 mmol/L      Chloride 97 mmol/L      CO2 19.7 mmol/L      Calcium 8.9 mg/dL      BUN/Creatinine Ratio 24.0     Anion Gap 12.3 mmol/L      eGFR 60.4 mL/min/1.73     Narrative:      GFR Categories in Chronic Kidney Disease (CKD)      GFR Category          GFR (mL/min/1.73)    Interpretation  G1                     90 or greater         Normal or high (1)  G2                      60-89                Mild decrease (1)  G3a                   45-59                Mild to moderate decrease  G3b                   30-44                Moderate to severe decrease  G4                    15-29                Severe decrease  G5                    14 or less           Kidney failure          (1)In the absence of evidence of kidney disease, neither GFR category G1 or G2 fulfill the criteria for CKD.    eGFR calculation 2021 CKD-EPI creatinine equation, which does not include race as a factor    Magnesium [[id number]]  (Normal) Collected: 02/24/25 0752    Specimen: Blood Updated: 02/24/25 0843     Magnesium 2.2 mg/dL     CBC (No Diff) [[id number]]  (Abnormal) Collected: 02/24/25 0752    Specimen: Blood Updated: 02/24/25 0829     WBC 3.46 10*3/mm3      RBC 1.80 10*6/mm3      Hemoglobin 6.1 g/dL      Hematocrit 18.4 %      .2 fL      MCH 33.9 pg      MCHC 33.2 g/dL      MPV 9.9 fL      Platelets 105 10*3/mm3     High Sensitivity Troponin T 1Hr [[id number]]  (Normal) Collected: 02/23/25 7978     Specimen: Blood Updated: 02/23/25 1946     HS Troponin T 19 ng/L      Troponin T Numeric Delta -1 ng/L     Narrative:      High Sensitive Troponin T Reference Range:  <14.0 ng/L- Negative Female for AMI  <22.0 ng/L- Negative Male for AMI  >=14 - Abnormal Female indicating possible myocardial injury.  >=22 - Abnormal Male indicating possible myocardial injury.   Clinicians would have to utilize clinical acumen, EKG, Troponin, and serial changes to determine if it is an Acute Myocardial Infarction or myocardial injury due to an underlying chronic condition.         Blood Gas, Arterial - [[id number]]  (Abnormal) Collected: 02/23/25 1921    Specimen: Arterial Blood Updated: 02/23/25 1923     Site Left Radial     George's Test Positive     pH, Arterial 7.462 pH units      pCO2, Arterial 32.2 mm Hg      pO2, Arterial 94.7 mm Hg      HCO3, Arterial 23.0 mmol/L      Base Excess, Arterial -0.3 mmol/L      Comment: Serial Number: [id number]:  [id number]        O2 Saturation, Arterial 97.9 %      CO2 Content 24.0 mmol/L      Barometric Pressure for Blood Gas 751.5000 mmHg      Modality Cannula     Flow Rate 2.0000 lpm      Rate 24 Breaths/minute      Hemodilution No     Device Comment SAT 96%    Lactic Acid, Plasma [[id number]]  (Normal) Collected: 02/23/25 1854    Specimen: Blood Updated: 02/23/25 1919     Lactate 1.1 mmol/L     Iron Profile [[id number]]  (Abnormal) Collected: 02/23/25 1754    Specimen: Blood Updated: 02/23/25 1908     Iron 29 mcg/dL      Iron Saturation (TSAT) 9 %      Transferrin 213 mg/dL      TIBC 317 mcg/dL     Respiratory Panel PCR w/COVID-19(SARS-CoV-2) ALANNA/KAITLIN/SIMON/PAD/COR/SHELDON In-House, NP Swab in UTM/VTM, 2 HR TAT - Swab, Nasopharynx [[id number]]  (Normal) Collected: 02/23/25 1756    Specimen: Swab from Nasopharynx Updated: 02/23/25 1906     ADENOVIRUS, PCR Not Detected     Coronavirus 229E Not Detected     Coronavirus HKU1 Not Detected     Coronavirus NL63 Not Detected     Coronavirus OC43 Not Detected      COVID19 Not Detected     Human Metapneumovirus Not Detected     Human Rhinovirus/Enterovirus Not Detected     Influenza A PCR Not Detected     Influenza B PCR Not Detected     Parainfluenza Virus 1 Not Detected     Parainfluenza Virus 2 Not Detected     Parainfluenza Virus 3 Not Detected     Parainfluenza Virus 4 Not Detected     RSV, PCR Not Detected     Bordetella pertussis pcr Not Detected     Bordetella parapertussis PCR Not Detected     Chlamydophila pneumoniae PCR Not Detected     Mycoplasma pneumo by PCR Not Detected    Narrative:      In the setting of a positive respiratory panel with a viral infection PLUS a negative procalcitonin without other underlying concern for bacterial infection, consider observing off antibiotics or discontinuation of antibiotics and continue supportive care. If the respiratory panel is positive for atypical bacterial infection (Bordetella pertussis, Chlamydophila pneumoniae, or Mycoplasma pneumoniae), consider antibiotic de-escalation to target atypical bacterial infection.    Manual Differential [[id number]]  (Abnormal) Collected: 02/23/25 1754    Specimen: Blood Updated: 02/23/25 1843     Neutrophil % 60.0 %      Lymphocyte % 24.0 %      Monocyte % 13.0 %      Eosinophil % 2.0 %      Bands %  1.0 %      Neutrophils Absolute 2.62 10*3/mm3      Lymphocytes Absolute 1.03 10*3/mm3      Monocytes Absolute 0.56 10*3/mm3      Eosinophils Absolute 0.09 10*3/mm3      nRBC 1.0 /100 WBC      Dimorphic RBC Present     Polychromasia Mod/2+     WBC Morphology Normal     Large Platelets Slight/1+    BNP [[id number]]  (Abnormal) Collected: 02/23/25 1754    Specimen: Blood Updated: 02/23/25 1830     proBNP 3,590.0 pg/mL     Narrative:      This assay is used as an aid in the diagnosis of individuals suspected of having heart failure. It can be used as an aid in the diagnosis of acute decompensated heart failure (ADHF) in patients presenting with signs and symptoms of ADHF to the emergency  "department (ED). In addition, NT-proBNP of <300 pg/mL indicates ADHF is not likely.    Age Range Result Interpretation  NT-proBNP Concentration (pg/mL:      <50             Positive            >450                   Gray                 300-450                    Negative             <300    50-75           Positive            >900                  Gray                300-900                  Negative            <300      >75             Positive            >1800                  Gray                300-1800                  Negative            <300    High Sensitivity Troponin T [[id number]]  (Normal) Collected: 02/23/25 1754    Specimen: Blood Updated: 02/23/25 1830     HS Troponin T 20 ng/L     Narrative:      High Sensitive Troponin T Reference Range:  <14.0 ng/L- Negative Female for AMI  <22.0 ng/L- Negative Male for AMI  >=14 - Abnormal Female indicating possible myocardial injury.  >=22 - Abnormal Male indicating possible myocardial injury.   Clinicians would have to utilize clinical acumen, EKG, Troponin, and serial changes to determine if it is an Acute Myocardial Infarction or myocardial injury due to an underlying chronic condition.         Procalcitonin [[id number]]  (Normal) Collected: 02/23/25 1754    Specimen: Blood Updated: 02/23/25 1830     Procalcitonin 0.07 ng/mL     Narrative:      As a Marker for Sepsis (Non-Neonates):    1. <0.5 ng/mL represents a low risk of severe sepsis and/or septic shock.  2. >2 ng/mL represents a high risk of severe sepsis and/or septic shock.    As a Marker for Lower Respiratory Tract Infections that require antibiotic therapy:    PCT on Admission    Antibiotic Therapy       6-12 Hrs later    >0.5                Strongly Recommended  >0.25 - <0.5        Recommended   0.1 - 0.25          Discouraged              Remeasure/reassess PCT  <0.1                Strongly Discouraged     Remeasure/reassess PCT    As 28 day mortality risk marker: \"Change in Procalcitonin Result\" " (>80% or <=80%) if Day 0 (or Day 1) and Day 4 values are available. Refer to [email or web address]    Change in PCT <=80%  A decrease of PCT levels below or equal to 80% defines a positive change in PCT test result representing a higher risk for 28-day all-cause mortality of patients diagnosed with severe sepsis for septic shock.    Change in PCT >80%  A decrease of PCT levels of more than 80% defines a negative change in PCT result representing a lower risk for 28-day all-cause mortality of patients diagnosed with severe sepsis or septic shock.       Comprehensive Metabolic Panel [[id number]]  (Abnormal) Collected: 02/23/25 1754    Specimen: Blood Updated: 02/23/25 1824     Glucose 97 mg/dL      BUN 21 mg/dL      Creatinine 1.12 mg/dL      Sodium 133 mmol/L      Potassium 4.5 mmol/L      Chloride 101 mmol/L      CO2 23.0 mmol/L      Calcium 9.0 mg/dL      Total Protein 7.3 g/dL      Albumin 4.2 g/dL      ALT (SGPT) 10 U/L      AST (SGOT) 14 U/L      Alkaline Phosphatase 103 U/L      Total Bilirubin 0.7 mg/dL      Globulin 3.1 gm/dL      A/G Ratio 1.4 g/dL      BUN/Creatinine Ratio 18.8     Anion Gap 9.0 mmol/L      eGFR 68.9 mL/min/1.73     Narrative:      GFR Categories in Chronic Kidney Disease (CKD)      GFR Category          GFR (mL/min/1.73)    Interpretation  G1                     90 or greater         Normal or high (1)  G2                      60-89                Mild decrease (1)  G3a                   45-59                Mild to moderate decrease  G3b                   30-44                Moderate to severe decrease  G4                    15-29                Severe decrease  G5                    14 or less           Kidney failure          (1)In the absence of evidence of kidney disease, neither GFR category G1 or G2 fulfill the criteria for CKD.    eGFR calculation 2021 CKD-EPI creatinine equation, which does not include race as a factor    CBC & Differential [[id number]]  (Abnormal)  Collected: 02/23/25 1754    Specimen: Blood Updated: 02/23/25 1813    Narrative:      The following orders were created for panel order CBC & Differential.  Procedure                               Abnormality         Status                     ---------                               -----------         ------                     CBC Auto Differential[[id number]]        Abnormal            Final result                 Please view results for these tests on the individual orders.    CBC Auto Differential [[id number]]  (Abnormal) Collected: 02/23/25 1754    Specimen: Blood Updated: 02/23/25 1813     WBC 4.29 10*3/mm3      RBC 1.94 10*6/mm3      Hemoglobin 6.8 g/dL      Hematocrit 20.1 %      .6 fL      MCH 35.1 pg      MCHC 33.8 g/dL      RDW --     Comment: Unable to calculate        MPV 9.6 fL      Platelets 115 10*3/mm3     [place] Draw [[id number]] Collected: 02/23/25 1754    Specimen: Blood Updated: 02/23/25 1800    Narrative:      The following orders were created for panel order [place] Draw.  Procedure                               Abnormality         Status                     ---------                               -----------         ------                     Green Top (Gel)[[id number]]                                  Final result               Lavender Top[[id number]]                                     Final result               Gold Top - SST[[id number]]                                   Final result               Light Blue Top[[id number]]                                   Final result                 Please view results for these tests on the individual orders.    Green Top (Gel) [[id number]] Collected: 02/23/25 1754    Specimen: Blood Updated: 02/23/25 1800     Extra Tube Hold for add-ons.     Comment: Auto resulted.       Lavender Top [[id number]] Collected: 02/23/25 1754    Specimen: Blood Updated: 02/23/25 1800     Extra Tube hold for add-on     Comment: Auto resulted       Gold Top - SST  [[id number]] Collected: 02/23/25 1754    Specimen: Blood Updated: 02/23/25 1800     Extra Tube Hold for add-ons.     Comment: Auto resulted.       Light Blue Top [[id number]] Collected: 02/23/25 1754    Specimen: Blood Updated: 02/23/25 1800     Extra Tube Hold for add-ons.     Comment: Auto resulted             Imaging Results (Last 24 Hours)       Procedure Component Value Units Date/Time    XR Chest 1 View [[id number]] Collected: 02/23/25 1800     Updated: 02/23/25 1808    Narrative:      CXR ONE VIEW      HISTORY: SOA Triage Protocol; J96.01-Acute respiratory failure with  hypoxia     COMPARISON: 1/10/2024     TECHNIQUE: single portable AP       Impression:         The heart size is within normal limits. Prior sternotomy. Dual-lead left  subclavian approach transvenous pacemaker device remains in place.     There is mild cardiomegaly.     There is bilateral vascular congestion, right slightly greater than  left, and there is a small right pleural effusion.     There is no dense consolidation, and no pneumothorax.     This report was finalized on 2/23/2025 6:05 PM by Dr. Adelfo Sorenson M.D on Workstation: [id number]             ECG/EMG Results (last 24 hours)       Procedure Component Value Units Date/Time    ECG 12 Lead ED Triage Standing Order; SOA [[id number]] Collected: 02/23/25 1730     Updated: 02/23/25 2016     QT Interval 414 ms      QTC Interval 457 ms     Narrative:      HEART RATE=73  bpm  RR Lwvfltuk=444  ms  VT Xakzzcgb=946  ms  P Horizontal Axis=-63  deg  P Front Axis=  deg  QRSD Hyfxwnus=525  ms  QT Jcypursy=236  ms  RNzL=925  ms  QRS Axis=-67  deg  T Wave Axis=105  deg  - ABNORMAL ECG -  Atrial-paced complexes  Ventricular premature complex  Borderline  prolonged VT interval  Nonspecific  IVCD with LAD  Left ventricular hypertrophy  Nonspecific  T abnormalities, lateral leads  No change from prior tracing  Electronically Signed By: Calvin Downey ([facility]) 2025-02-23 20:16:38  Date and Time of  "Study:2025-02-23 17:30:30    ECG 12 Lead Chest Pain [[id number]] Collected: 02/24/25 1116     Updated: 02/24/25 1118     QT Interval 444 ms      QTC Interval 499 ms     Narrative:      HEART RATE=82  bpm  RR Ahtyjdch=923  ms  MA Eomwkdwc=151  ms  P Horizontal Axis=-66  deg  P Front Axis=-24  deg  QRSD Wbwfacyq=084  ms  QT Ikepxews=607  ms  UPqZ=475  ms  QRS Axis=-55  deg  T Wave Axis=99  deg  - ABNORMAL ECG -  Sinus rhythm  Atrial premature complexes in couplets  Left bundle branch block  Date and Time of Study:2025-02-24 11:16:05          Orders (last 24 hrs)        Start     Ordered    03/01/25 0900  thiamine (VITAMIN B-1) tablet 100 mg  Daily        Placed in \"Followed by\" Linked Group    02/24/25 0008    02/25/25 0900  furosemide (LASIX) injection 40 mg  Daily         02/24/25 1047    02/25/25 0600  High Sensitivity Troponin T  Morning Draw         02/24/25 1104    02/25/25 0600  CBC (No Diff)  Morning Draw         02/24/25 1521    02/25/25 0600  Basic Metabolic Panel  Morning Draw         02/24/25 1521    02/25/25 0500  ECG 12 Lead Chest Pain  Tomorrow AM         02/24/25 1104    02/25/25 0001  NPO Diet NPO Type: Strict NPO  Diet Effective Midnight         02/24/25 1330    02/24/25 2100  rOPINIRole (REQUIP) tablet 0.5 mg  Nightly,   Status:  Discontinued         02/23/25 2315    02/24/25 1522  Hemoglobin & Hematocrit, Blood  Once         02/24/25 1521    02/24/25 1330  nicotine (NICODERM CQ) 21 MG/24HR patch 1 patch  Every 24 Hours Scheduled         02/24/25 1230    02/24/25 1329  Case Request  Once         02/24/25 1330    02/24/25 1135  CIWA Q12 Hours X3  Every Shift       02/23/25 2334    02/24/25 1104  ECG 12 Lead Chest Pain  Once         02/24/25 1104    02/24/25 1104  High Sensitivity Troponin T  Once         02/24/25 1104    02/24/25 1030  rOPINIRole (REQUIP) tablet 1 mg  Every 12 Hours Scheduled         02/24/25 0935    02/24/25 0940  Occult Blood X 1, Stool - Stool, Per Rectum  Once,   Status:  Canceled " "        02/24/25 0939    02/24/25 0938  Lactate Dehydrogenase  Once         02/24/25 0937    02/24/25 0937  Haptoglobin  Once         02/24/25 0937    02/24/25 0933  Prepare RBC, 1 Units  Blood - Once         02/24/25 0932    02/24/25 0932  Transfuse RBC Infuse Each Unit Over: 3.5H  Transfusion         02/24/25 0932    02/24/25 0932  Verify Informed Consent for Blood Product Administration  Once         02/24/25 0932    02/24/25 0930  revefenacin (YUPELRI) nebulizer solution 175 mcg  Daily - RT        Placed in \"And\" Linked Group    02/23/25 2315 02/24/25 0900  amiodarone (PACERONE) tablet 200 mg  Daily         02/23/25 2315 02/24/25 0900  atorvastatin (LIPITOR) tablet 80 mg  Daily         02/23/25 2315 02/24/25 0900  vitamin B-12 (CYANOCOBALAMIN) tablet 1,000 mcg  Daily         02/23/25 2315 02/24/25 0900  losartan (COZAAR) tablet 25 mg  Daily         02/23/25 2315 02/24/25 0900  pantoprazole (PROTONIX) EC tablet 40 mg  Daily         02/23/25 2315 02/24/25 0900  sertraline (ZOLOFT) tablet 50 mg  Daily         02/23/25 2315 02/24/25 0900  spironolactone (ALDACTONE) tablet 25 mg  Daily         02/23/25 2315 02/24/25 0900  furosemide (LASIX) injection 40 mg  Every 12 Hours,   Status:  Discontinued         02/24/25 0006 02/24/25 0900  folic acid (FOLVITE) tablet 1 mg  Daily         02/24/25 0008    02/24/25 0800  Oral Care  2 Times Daily       02/23/25 1854 02/24/25 0800  carvedilol (COREG) tablet 3.125 mg  2 Times Daily With Meals         02/23/25 2315 02/24/25 0800  ferrous sulfate tablet 325 mg  Daily With Breakfast         02/23/25 2315 02/24/25 0600  Basic Metabolic Panel  Morning Draw         02/23/25 1854 02/24/25 0600  CBC (No Diff)  Morning Draw         02/23/25 1854 02/24/25 0600  methylPREDNISolone sodium succinate (SOLU-Medrol) injection 40 mg  Every 8 Hours         02/24/25 0006    02/24/25 0600  thiamine (B-1) injection 200 mg  Every 8 Hours Scheduled      " "  Placed in \"Followed by\" Linked Group    02/24/25 0008 02/24/25 0600  Magnesium  Morning Draw         02/24/25 0015 02/24/25 0015  arformoterol (BROVANA) nebulizer solution 15 mcg  2 Times Daily - RT        Placed in \"And\" Linked Group    02/23/25 2315 02/24/25 0015  budesonide (PULMICORT) nebulizer solution 0.5 mg  2 Times Daily - RT        Placed in \"And\" Linked Group    02/23/25 2315 02/24/25 0008  cloNIDine (CATAPRES) tablet 0.1 mg  Every 4 Hours PRN         02/24/25 0008 02/24/25 0008  Initiate Alcohol Withdrawal Protocol  Once         02/24/25 0008 02/24/25 0008  Vital Signs  Per Hospital Policy/Protocol         02/24/25 0008 02/24/25 0008  Continuous Pulse Oximetry  Continuous         02/24/25 0008 02/24/25 0008  Obtain Baseline Clinical Harris Withdrawal Assessment - Ar (CIWA-Ar), Sedation Scale & Vital Signs  Once        Comments: Follow CIWA Scoring Reference Guide    02/24/25 0008 02/24/25 0008  Clinical Harris Withdrawal Assessment (CIWA-Ar)  Per Hospital Policy/Protocol         02/24/25 0008 02/24/25 0008  If CIWA-Ar Score Less Than 8 For 3 Consecutive Assessments, Monitor Every 4 Hours & Discontinue Assessment When CIWA-Ar Less Than 8 for 24 Hours  Per Hospital Policy/Protocol        Comments: Contact Provider to Restart Protocol if Any Symptoms Reappear    02/24/25 0008 02/24/25 0008  Seizure Precautions  Continuous         02/24/25 0008 02/24/25 0008  Notify Provider - Withdrawal  Continuous        Comments: Open Order Report to View Parameters Requiring Provider Notification    02/24/25 0008 02/24/25 0008  Notify Provider of Abnormal Lab Results  Continuous        Comments: Open Order Report to View Parameters Requiring Provider Notification    02/24/25 0008 02/24/25 0008  Notify Provider - Vitals  Continuous        Comments: Open Order Report to View Parameters Requiring Provider Notification    02/24/25 0008 02/24/25 0008  Safety Precautions  " "Continuous         02/24/25 0008    02/24/25 0007  LORazepam (ATIVAN) tablet 1 mg  Every 1 Hour PRN        Placed in \"Or\" Linked Group    02/24/25 0008    02/24/25 0007  LORazepam (ATIVAN) injection 1 mg  Every 1 Hour PRN        Placed in \"Or\" Linked Group    02/24/25 0008    02/24/25 0007  LORazepam (ATIVAN) tablet 2 mg  Every 1 Hour PRN        Placed in \"Or\" Linked Group    02/24/25 0008    02/24/25 0007  LORazepam (ATIVAN) injection 2 mg  Every 1 Hour PRN        Placed in \"Or\" Linked Group    02/24/25 0008    02/24/25 0007  LORazepam (ATIVAN) injection 2 mg  Every 15 Minutes PRN        Placed in \"Or\" Linked Group    02/24/25 0008    02/24/25 0007  LORazepam (ATIVAN) injection 2 mg  Every 15 Minutes PRN        Placed in \"Or\" Linked Group    02/24/25 0008    02/24/25 0007  Magnesium Standard Dose Replacement - Follow Nurse / BPA Driven Protocol  As Needed         02/24/25 0008 02/24/25 0007  HYDROcodone-acetaminophen (NORCO) 5-325 MG per tablet 1 tablet  Every 4 Hours PRN         02/24/25 0007 02/23/25 2335  CIWA Q4 Hours X3  Every 4 Hours       02/23/25 2334 02/23/25 2315  HYDROcodone-acetaminophen (NORCO) 5-325 MG per tablet 1 tablet  As Needed,   Status:  Discontinued         02/23/25 2315 02/23/25 2315  hydrOXYzine (ATARAX) tablet 25 mg  3 Times Daily PRN         02/23/25 2315 02/23/25 2155  Inpatient Gastroenterology Consult  Once        Specialty:  Gastroenterology  Provider:  Brittny Ricks MD    02/23/25 2154 02/23/25 2000  Vital Signs  Every 4 Hours      Comments: Per per hospital policy    02/23/25 1854 02/23/25 1940  methylPREDNISolone sodium succinate (SOLU-Medrol) injection 40 mg  Every 8 Hours,   Status:  Discontinued         02/23/25 1924 02/23/25 1940  ipratropium-albuterol (DUO-NEB) nebulizer solution 3 mL  Every 6 Hours While Awake - RT         02/23/25 1924 02/23/25 1940  furosemide (LASIX) injection 40 mg  Every 12 Hours,   Status:  Discontinued         02/23/25 " "1924 02/23/25 1923  albuterol (PROVENTIL) nebulizer solution 0.083% 2.5 mg/3mL  Every 6 Hours PRN         02/23/25 1924 02/23/25 1855  Code Status and Medical Interventions: CPR (Attempt to Resuscitate); Full  Continuous         02/23/25 1854 02/23/25 1855  Diet: Cardiac; Healthy Heart (2-3 Na+); Fluid Consistency: Thin (IDDSI 0)  Diet Effective Now         02/23/25 1854 02/23/25 1855  Daily Weights  Daily       02/23/25 1854 02/23/25 1855  Strict Intake & Output  Every Shift       02/23/25 1854 02/23/25 1855  Place Sequential Compression Device  Once         02/23/25 1854 02/23/25 1855  Maintain Sequential Compression Device  Continuous         02/23/25 1854 02/23/25 1854  melatonin tablet 2.5 mg  Nightly PRN         02/23/25 1854 02/23/25 1854  sennosides-docusate (PERICOLACE) 8.6-50 MG per tablet 2 tablet  2 Times Daily PRN        Placed in \"And\" Linked Group    02/23/25 1854 02/23/25 1854  polyethylene glycol (MIRALAX) packet 17 g  Daily PRN        Placed in \"And\" Linked Group    02/23/25 1854 02/23/25 1854  bisacodyl (DULCOLAX) EC tablet 5 mg  Daily PRN        Placed in \"And\" Linked Group    02/23/25 1854 02/23/25 1854  bisacodyl (DULCOLAX) suppository 10 mg  Daily PRN        Placed in \"And\" Linked Group    02/23/25 1854 02/23/25 1854  acetaminophen (TYLENOL) tablet 650 mg  Every 4 Hours PRN        Placed in \"Or\" Linked Group    02/23/25 1854 02/23/25 1854  acetaminophen (TYLENOL) 160 MG/5ML oral solution 650 mg  Every 4 Hours PRN        Placed in \"Or\" Linked Group    02/23/25 1854 02/23/25 1854  acetaminophen (TYLENOL) suppository 650 mg  Every 4 Hours PRN        Placed in \"Or\" Linked Group    02/23/25 1854 02/23/25 1854  ondansetron ODT (ZOFRAN-ODT) disintegrating tablet 4 mg  Every 6 Hours PRN        Placed in \"Or\" Linked Group    02/23/25 1854 02/23/25 1854  ondansetron (ZOFRAN) injection 4 mg  Every 6 Hours PRN        Placed in \"Or\" Linked Group    " 02/23/25 1854    02/23/25 1854  High Sensitivity Troponin T 1Hr  PROCEDURE ONCE         02/23/25 1830    02/23/25 1853  Inpatient Admission  Once         02/23/25 1852    02/23/25 1850  Iron Profile  Once         02/23/25 1849    02/23/25 1849  [facility] (on-call MD unless specified) Details  Once        Specialty:  Hospitalist  Provider:  (Not yet assigned)    02/23/25 1848    02/23/25 1849  Cardiology (on-call MD unless specified)  Once        Specialty:  Cardiology  Provider:  Mika Carranza MD    02/23/25 1848    02/23/25 1848  Type & Screen  STAT         02/23/25 1847    02/23/25 1834  Manual Differential  Once         02/23/25 1833    02/23/25 1813  furosemide (LASIX) injection 80 mg  Once         02/23/25 1757    02/23/25 1809  methylPREDNISolone sodium succinate (SOLU-Medrol) injection 125 mg  Once         02/23/25 1753    02/23/25 1809  ipratropium-albuterol (DUO-NEB) nebulizer solution 3 mL  Once         02/23/25 1753    02/23/25 1804  Manual Differential  Once,   Status:  Canceled         02/23/25 1803    02/23/25 1757  Lactic Acid, Plasma  Once         02/23/25 1756    02/23/25 1757  Procalcitonin  Once         02/23/25 1756    02/23/25 1754  Blood Gas, Arterial -  STAT         02/23/25 1753    02/23/25 1738  Check Temperature  Once        Comments: Oral    02/23/25 1737    02/23/25 1738  Respiratory Panel PCR w/COVID-19(SARS-CoV-2) ALANNA/KAITLIN/SIMON/PAD/COR/SHELDON In-House, NP Swab in UTM/VTM, 2 HR TAT - Swab, Nasopharynx  STAT         02/23/25 1738    02/23/25 1726  NPO Diet NPO Type: Strict NPO  Diet Effective Now,   Status:  Canceled         02/23/25 1725    02/23/25 1726  Undress & Gown  Once         02/23/25 1725    02/23/25 1726  Cardiac Monitoring  Continuous        Comments: Follow Standing Orders As Outlined in Process Instructions (Open Order Report to View Full Instructions)    02/23/25 1725    02/23/25 1727  Continuous Pulse Oximetry  Continuous,   Status:  Canceled         02/23/25 1729     25 1726  Vital Signs  Per Hospital Policy/Protocol         25 1725    25 1726  ECG 12 Lead ED Triage Standing Order; SOA  Once         25 1725    25 1726  XR Chest 1 View  1 Time Imaging         25 1725    25 1726  Insert Peripheral IV  Once         25 1725    25 1726  Mobile Draw  Once         25 1725    25 1726  CBC & Differential  Once         25 1725    25 1726  Comprehensive Metabolic Panel  Once         25 1725    25 1726  BNP  Once         25 1725    25 1726  High Sensitivity Troponin T  Once         25 1725    25 1726  Green Top (Gel)  PROCEDURE ONCE         25 1725    25 1726  Lavender Top  PROCEDURE ONCE         25 1725    25 1726  Gold Top - SST  PROCEDURE ONCE         25 1725    25 1726  Light Blue Top  PROCEDURE ONCE         25 1725    25 1726  CBC Auto Differential  PROCEDURE ONCE         25 1725    25 1725  sodium chloride 0.9 % flush 10 mL  As Needed         25 1725    Unscheduled  Oxygen Therapy- Nasal Cannula; Titrate 1-6 LPM Per SpO2; 90 - 95%  Continuous PRN       25 1725    Unscheduled  Up with assistance  As Needed       25 1854    Unscheduled  Obtain Pre & Post Sedation Scores With Every Sedative Dose - Hold For POSS Greater Than 2 or RASS of -3 or Less  As Needed       25 0008    --  nitroglycerin (NITROSTAT) 0.4 MG SL tablet  Every 5 Minutes PRN         25 221    --  potassium chloride 10 MEQ CR tablet  Daily         25 2217                  Operative/Procedure Notes (last 24 hours)  Notes from 25 1525 through 25 1525   No notes of this type exist for this encounter.          Physician Progress Notes (last 24 hours)        Lb Rdz APRN at 25 0993              Name: Yves Hastings ADMIT: 2025   : 1950  PCP: Brenda Downs APRN    MRN:  [id number] LOS: 1 days   AGE/SEX: 75 y.o. male  ROOM: [facility]     Subjective   Subjective   Patient appears generally weak, relatively comfortable, and in no apparent distress.  Family at bedside serves as primary historian.  Patient hard of hearing complicating communication.    Denies chest pain or shortness of breath.      Objective   Objective   Vital Signs  Temp:  [97.5 °F (36.4 °C)-101 °F (38.3 °C)] 97.7 °F (36.5 °C)  Heart Rate:  [70-89] 78  Resp:  [18-24] 20  BP: (100-139)/(51-93) 126/86  SpO2:  [84 %-100 %] 96 %  on  Flow (L/min) (Oxygen Therapy):  [2] 2;   Device (Oxygen Therapy): nasal cannula  Body mass index is 31.16 kg/m².    Physical Exam  Constitutional:       General: He is not in acute distress.     Appearance: He is ill-appearing. He is not toxic-appearing.   Cardiovascular:      Rate and Rhythm: Normal rate and regular rhythm.   Pulmonary:      Effort: Pulmonary effort is normal.      Breath sounds: Wheezing present.   Abdominal:      General: Bowel sounds are normal.      Palpations: Abdomen is soft.   Musculoskeletal:      Right lower leg: No edema.      Left lower leg: No edema.   Skin:     General: Skin is warm.      Coloration: Skin is jaundiced.   Neurological:      Mental Status: He is alert and oriented to person, place, and time.   Psychiatric:         Behavior: Behavior normal.         Thought Content: Thought content normal.       Results Review     I reviewed the patient's new clinical results.  Results from last 7 days   Lab Units 02/24/25  0752 02/23/25  1754   WBC 10*3/mm3 3.46 4.29   HEMOGLOBIN g/dL 6.1* 6.8*   PLATELETS 10*3/mm3 105* 115*     Results from last 7 days   Lab Units 02/24/25  0752 02/23/25  1754   SODIUM mmol/L 129* 133*   POTASSIUM mmol/L 4.1 4.5   CHLORIDE mmol/L 97* 101   CO2 mmol/L 19.7* 23.0   BUN mg/dL 30* 21   CREATININE mg/dL 1.25 1.12   GLUCOSE mg/dL 201* 97   EGFR mL/min/1.73 60.4 68.9     Results from last 7 days   Lab Units 02/23/25  1754   ALBUMIN g/dL 4.2  "  BILIRUBIN mg/dL 0.7   ALK PHOS U/L 103   AST (SGOT) U/L 14   ALT (SGPT) U/L 10     Results from last 7 days   Lab Units 02/24/25  0752 02/23/25  1754   CALCIUM mg/dL 8.9 9.0   ALBUMIN g/dL  --  4.2   MAGNESIUM mg/dL 2.2  --      Results from last 7 days   Lab Units 02/23/25  1854 02/23/25  1754   PROCALCITONIN ng/mL  --  0.07   LACTATE mmol/L 1.1  --      No results found for: \"HGBA1C\", \"POCGLU\"    XR Chest 1 View    Result Date: 2/23/2025   The heart size is within normal limits. Prior sternotomy. Dual-lead left subclavian approach transvenous pacemaker device remains in place.  There is mild cardiomegaly.  There is bilateral vascular congestion, right slightly greater than left, and there is a small right pleural effusion.  There is no dense consolidation, and no pneumothorax.  This report was finalized on 2/23/2025 6:05 PM by Dr. Adelfo Sorenson M.D on Workstation: [id number]       I have personally reviewed all medications:  Scheduled Medications  amiodarone, 200 mg, Oral, Daily  arformoterol, 15 mcg, Nebulization, BID - RT   And  budesonide, 0.5 mg, Nebulization, BID - RT   And  revefenacin, 175 mcg, Nebulization, Daily - RT  atorvastatin, 80 mg, Oral, Daily  carvedilol, 3.125 mg, Oral, BID With Meals  ferrous sulfate, 325 mg, Oral, Daily With Breakfast  folic acid, 1 mg, Oral, Daily  [START ON 2/25/2025] furosemide, 40 mg, Intravenous, Daily  ipratropium-albuterol, 3 mL, Nebulization, Q6H While Awake - RT  losartan, 25 mg, Oral, Daily  methylPREDNISolone sodium succinate, 40 mg, Intravenous, Q8H  nicotine, 1 patch, Transdermal, Q24H  pantoprazole, 40 mg, Oral, Daily  rOPINIRole, 1 mg, Oral, Q12H  sertraline, 50 mg, Oral, Daily  spironolactone, 25 mg, Oral, Daily  thiamine (B-1) IV, 200 mg, Intravenous, Q8H   Followed by  [START ON 3/1/2025] thiamine, 100 mg, Oral, Daily  cyanocobalamin, 1,000 mcg, Oral, Daily    Infusions   Diet  Diet: Cardiac; Healthy Heart (2-3 Na+); Fluid Consistency: Thin (IDDSI " 0)  NPO Diet NPO Type: Strict NPO    I have personally reviewed:  [x]  Laboratory   [x]  Microbiology   [x]  Radiology   [x]  EKG/Telemetry  [x]  Cardiology/Vascular   []  Pathology    []  Records      Assessment/Plan     Active Hospital Problems    Diagnosis  POA    **Acute hypoxemic respiratory failure [J96.01]  Yes    Acute anemia [D64.9]  Yes    Acute on chronic heart failure with preserved ejection fraction (HFpEF) [I50.33]  Yes    RLS (restless legs syndrome) [G25.81]  Yes    Acute on chronic anemia [D64.9]  Yes    Essential hypertension [I10]  Yes    Chronic obstructive lung disease [J44.9]  Yes      Resolved Hospital Problems   No resolved problems to display.       75 y.o. male admitted with Acute hypoxemic respiratory failure.      Acute hypoxemic respiratory failure  -Oxygen saturation 84% on room air improved with supplemental oxygen  -Respiratory viral panel negative  -Suspect multifactorial due to acute anemia in the setting of COPD and acute on chronic HFpEF      Essential hypertension  -BP acceptable acutely; continue same home meds for now      Acute on chronic anemia  -Followed by heme-onc--Dr. Adame--for MDS  -Family states patient requires blood transfusions every couple weeks  -Plan to transfuse serum hemoglobin less than 7 here      Acute on chronic heart failure with preserved ejection fraction (HFpEF)  -Most recent LVEF 41 to 45%   -cardiology following and managing diuretics      RLS (restless legs syndrome)  -Escalate strength and frequency of Requip given increase symptoms of RLS here  -monitor effects of Requip      Chronic alcohol dependence  -Family states last intake of alcohol on 2/20/2025  -Monitor for withdrawal symptoms for now & reevaluate need for CIWA tomorrow      COPD  -provide scheduled & as needed breathing treatments  -wean oxygen as tolerated    SCDs for DVT prophylaxis.  Full code.  Discussed with patient and nursing staff.  Anticipate discharge  TBD  timing yet to be  determined.  Expected discharge date/ time has not been documented.      WOO Das  [facility]  02/24/25  15:20 EST      Electronically signed by Lb Rdz APRN at 02/24/25 1520          Consult Notes (last 24 hours)        Amilcar Lozada MD at 02/24/25 1318        Consult Orders    1. Inpatient Gastroenterology Consult [[id number]] ordered by Naomi Rodriguez MD at 02/23/25 8397                  [facility]  Consult Note    GI Provider:  Amilcar Lozada M.D.    ASSESSMENT/PLAN:    Patient is a 75-year-old gentleman with a significant cardiovascular and respiratory comorbidity and myelodysplastic syndrome who was admitted because of shortness of breath and chest pain.  Both have since considerably improved.  Does have a past history of COPD hyperlipidemia coronary artery disease with MI and myelodysplastic syndrome.    Upon admission found to have a hemoglobin of 6.8, there being no history of overt GI bleed in the form of hematemesis melena or hematochezia.  Baseline hemoglobin has varied between 8-10.  Red cell indices are macrocytic and patient also has thrombocytopenia.  Once the patient is stable from cardiorespiratory standpoint, it would be reasonable to do an endoscopy to look for any potential source of blood loss in the upper GI tract.  Most likely etiology of anemia is indeed underlying MDS.      Reason for Consultation:  I have been asked by Dr. Phelps to see Natasha Hastings, in consultation for evaluation of anemia.        History of Present Illness:  Natasha Hastings is a 75-year-old gentleman who has been transferred from [facility].  He apparently had significant anterior substernal chest pain along with shortness of breath and wheezing.  Patient says he is feeling substantially better since after being admitted and being given nebulizer treatments and blood transfusion.  He does have a significant background history of  cardiorespiratory comorbidities in the form of COPD, coronary artery disease with a hyper lipidemia and history of MI as well as myelodysplastic syndrome.  Patient also smokes a pack of cigarette daily and drinks on a daily basis 1-2 shots a day.  He denies any history of overt GI bleed in the form of hematemesis melena or hematochezia.  Admission hemoglobin was 6.8 and baseline hemoglobin has varied between 8-10.      Past Medical History:    Past Medical History:   Diagnosis Date    COPD (chronic obstructive pulmonary disease)     Emphysema of lung     HLD (hyperlipidemia)     [place] (hard of hearing)     ears hearing aids    HTN (hypertension)     Myelodysplastic syndrome, unspecified 11/06/2023    Myocardial infarction     Short-term memory loss     Sleep apnea     no machine    Stroke     Ventral hernia     sched repair       Past Surgical History:    Past Surgical History:   Procedure Laterality Date    CARDIAC CATHETERIZATION N/A 12/14/2018    Procedure: Left Heart Cath;  Surgeon: Mika Carranza MD;  Location: [facility] CATH INVASIVE LOCATION;  Service: Cardiology    CARDIAC CATHETERIZATION N/A 12/14/2018    Procedure: Left ventriculography;  Surgeon: Mika Carranza MD;  Location: [facility] CATH INVASIVE LOCATION;  Service: Cardiology    CARDIAC CATHETERIZATION N/A 12/14/2018    Procedure: Coronary angiography;  Surgeon: Mika Carranza MD;  Location: [facility] CATH INVASIVE LOCATION;  Service: Cardiology    CARDIAC CATHETERIZATION  12/14/2018    Procedure: Functional Flow [place];  Surgeon: Mika Carranza MD;  Location: [facility] CATH INVASIVE LOCATION;  Service: Cardiology    CARDIAC CATHETERIZATION N/A 1/12/2024    Procedure: Left Heart Cath;  Surgeon: Mika Carranza MD;  Location: [facility] CATH INVASIVE LOCATION;  Service: Cardiovascular;  Laterality: N/A;    CARDIAC CATHETERIZATION N/A 1/12/2024    Procedure: Left ventriculography;  Surgeon: Mika Carranza MD;  Location:   [facility] CATH INVASIVE LOCATION;  Service: Cardiovascular;  Laterality: N/A;    CARDIAC CATHETERIZATION N/A 1/12/2024    Procedure: Coronary angiography;  Surgeon: Mika Carranza MD;  Location:  [facility] CATH INVASIVE LOCATION;  Service: Cardiovascular;  Laterality: N/A;    COLONOSCOPY W/ POLYPECTOMY N/A 11/10/2021    Procedure: COLONOSCOPY; POLYPECTOMY;  Surgeon: Alexander Dobbs MD;  Location: [facility] OR;  Service: Gastroenterology;  Laterality: N/A;  DIVERTICULOSIS  POLYP    CORONARY STENT PLACEMENT      CYST REMOVAL      tailbone    INCISION AND DRAINAGE PERIRECTAL ABSCESS N/A 05/29/2021    Procedure: INCISION AND DRAINAGE OF PERIRECTAL ABSCESS;  Surgeon: Reddy Johnson Jr., MD;  Location: [facility] MAIN OR;  Service: General;  Laterality: N/A;    UMBILICAL HERNIA REPAIR      DR. CASEY - YEARS AGO    VEIN LIGATION AND STRIPPING BILATERAL      VENTRAL/INCISIONAL HERNIA REPAIR N/A 02/04/2019    Procedure: VENTRAL/INCISIONAL HERNIA REPAIR LAPAROSCOPIC;  Surgeon: Adelfo Nieves MD;  Location: [facility] OR;  Service: General       Social History:    Social History     Socioeconomic History    Marital status:    Tobacco Use    Smoking status: Some Days     Current packs/day: 1.00     Average packs/day: 1 pack/day for 62.1 years (62.1 ttl pk-yrs)     Types: Cigarettes     Start date: 1963     Passive exposure: Past    Smokeless tobacco: Never    Tobacco comments:     Began smoking at age 13.  Smoked 1 ppd for 28 years and 2 ppd for 30 years for an 88 pack year history.     SMOKES 5 OR LESS CIGARETTES A DAY   Vaping Use    Vaping status: Former   Substance and Sexual Activity    Alcohol use: Yes     Alcohol/week: 7.0 standard drinks of alcohol     Types: 7 Shots of liquor per week     Comment: hx of daily    Drug use: Yes     Types: Marijuana     Comment: history of, doesn't anymore; Reports using THC at bedtime.    Sexual activity: Defer       Family History:  family history includes Cancer in his  maternal aunt and mother; Heart disease in his father, mother, and paternal uncle.    Allergies:  has No Known Allergies.    Prior to Admission Medications:    Medications Prior to Admission   Medication Sig Dispense Refill Last Dose/Taking    aspirin 81 MG EC tablet aspirin 81 mg tablet,delayed release   TAKE 1 TABLET BY MOUTH DAILY   2/23/2025    atorvastatin (LIPITOR) 80 MG tablet Take 1 tablet by mouth Daily.   2/22/2025    carvedilol (COREG) 3.125 MG tablet Take 1 tablet by mouth 2 (Two) Times a Day With Meals.   2/23/2025    cyanocobalamin (VITAMIN B-12) 1000 MCG tablet Take 1 tablet by mouth Daily.   Taking    Ferrous Fumarate (Ferrocite) 324 (106 Fe) MG tablet Take 1 tablet by mouth Every Other Day.   Taking    ferrous gluconate (FERGON) 324 MG tablet Take 1 tablet by mouth.   Taking    ipratropium-albuterol (DUO-NEB) 0.5-2.5 mg/3 ml nebulizer ipratropium 0.5 mg-albuterol 3 mg (2.5 mg base)/3 mL nebulization soln   Taking    pantoprazole (PROTONIX) 40 MG EC tablet Take 1 tablet by mouth Daily. (Patient taking differently: Take 20 mg by mouth Daily.)   2/23/2025    rOPINIRole (REQUIP) 0.5 MG tablet Take 1 tablet by mouth every night at bedtime. (Patient taking differently: Take 2 tablets by mouth Daily.)   2/23/2025    sertraline (ZOLOFT) 50 MG tablet Take 1 tablet by mouth Daily.   2/23/2025    spironolactone (ALDACTONE) 25 MG tablet Take 1 tablet by mouth Daily.   2/23/2025    Trelegy Ellipta 100-62.5-25 MCG/ACT inhaler    2/23/2025    albuterol (PROVENTIL HFA;VENTOLIN HFA) 108 (90 Base) MCG/ACT inhaler Inhale 2 puffs Every 4 (Four) Hours As Needed for Wheezing.       furosemide (LASIX) 40 MG tablet Take 1 tablet by mouth Daily. (Patient taking differently: Take 0.5 tablets by mouth Daily.) 30 tablet 5     HYDROcodone-acetaminophen (NORCO) 5-325 MG per tablet Take 1 tablet by mouth As Needed.       nitroglycerin (NITROSTAT) 0.4 MG SL tablet Place 1 tablet under the tongue Every 5 (Five) Minutes As Needed  for Chest Pain. Take no more than 3 doses in 15 minutes.       potassium chloride 10 MEQ CR tablet Take 2 tablets by mouth Daily.          Current Inpatient Medications:    Current Facility-Administered Medications   Medication Dose Route Frequency Provider Last Rate Last Admin    acetaminophen (TYLENOL) tablet 650 mg  650 mg Oral Q4H PRN Naomi Rodriguez MD        Or    acetaminophen (TYLENOL) 160 MG/5ML oral solution 650 mg  650 mg Oral Q4H PRN Naomi Rodriguez MD        Or    acetaminophen (TYLENOL) suppository 650 mg  650 mg Rectal Q4H PRN Naomi Rodriguez MD        albuterol (PROVENTIL) nebulizer solution 0.083% 2.5 mg/3mL  2.5 mg Nebulization Q6H PRN Naomi Rodriguez MD   2.5 mg at 02/24/25 0705    amiodarone (PACERONE) tablet 200 mg  200 mg Oral Daily Naomi Rodriguez MD   200 mg at 02/24/25 0918    arformoterol (BROVANA) nebulizer solution 15 mcg  15 mcg Nebulization BID - RT Naomi Rodriguez MD   15 mcg at 02/24/25 0706    And    budesonide (PULMICORT) nebulizer solution 0.5 mg  0.5 mg Nebulization BID - RT Naomi Rodriguez MD        And    revefenacin (YUPELRI) nebulizer solution 175 mcg  175 mcg Nebulization Daily - RT Naomi Rodriguez MD   175 mcg at 02/24/25 0704    atorvastatin (LIPITOR) tablet 80 mg  80 mg Oral Daily Naomi Rodriguez MD   80 mg at 02/24/25 0919    sennosides-docusate (PERICOLACE) 8.6-50 MG per tablet 2 tablet  2 tablet Oral BID PRN Naomi Rodriguez MD        And    polyethylene glycol (MIRALAX) packet 17 g  17 g Oral Daily PRN Naomi Rodriguez MD        And    bisacodyl (DULCOLAX) EC tablet 5 mg  5 mg Oral Daily PRN Naomi Rodriguez MD        And    bisacodyl (DULCOLAX) suppository 10 mg  10 mg Rectal Daily PRN Naomi Rodriguez MD        carvedilol (COREG) tablet 3.125 mg  3.125 mg Oral BID With Meals Naomi Rodriguez MD   3.125 mg at 02/24/25 0918    cloNIDine (CATAPRES) tablet 0.1 mg  0.1 mg Oral Q4H  PRN Kyree Davis APRN        ferrous sulfate tablet 325 mg  325 mg Oral Daily With Breakfast Naomi Rodriguez MD   325 mg at 02/24/25 0919    folic acid (FOLVITE) tablet 1 mg  1 mg Oral Daily Kyree Davis APRN   1 mg at 02/24/25 0918    [START ON 2/25/2025] furosemide (LASIX) injection 40 mg  40 mg Intravenous Daily Es Daugherty APRN        HYDROcodone-acetaminophen (NORCO) 5-325 MG per tablet 1 tablet  1 tablet Oral Q4H PRN Kyree Davis APRN   1 tablet at 02/24/25 1234    hydrOXYzine (ATARAX) tablet 25 mg  25 mg Oral TID PRN Naomi Rodriguez MD        ipratropium-albuterol (DUO-NEB) nebulizer solution 3 mL  3 mL Nebulization Q6H While Awake - RT Naomi Rodriguez MD   3 mL at 02/24/25 0701    LORazepam (ATIVAN) tablet 1 mg  1 mg Oral Q1H PRN Kyree Davis APRN        Or    LORazepam (ATIVAN) injection 1 mg  1 mg Intravenous Q1H PRN Kyree Davis APRN        Or    LORazepam (ATIVAN) tablet 2 mg  2 mg Oral Q1H PRN Kyree Davis APRN        Or    LORazepam (ATIVAN) injection 2 mg  2 mg Intravenous Q1H PRN Kyree Davis APRN        Or    LORazepam (ATIVAN) injection 2 mg  2 mg Intravenous Q15 Min PRN Kyree Davis APRN        Or    LORazepam (ATIVAN) injection 2 mg  2 mg Intramuscular Q15 Min PRN Kyree Davis APRN        losartan (COZAAR) tablet 25 mg  25 mg Oral Daily Naomi Rodriguez MD   25 mg at 02/24/25 0919    Magnesium Standard Dose Replacement - Follow Nurse / BPA Driven Protocol   Not Applicable PRN Kyree Davis APRN        melatonin tablet 2.5 mg  2.5 mg Oral Nightly PRN Naomi Rodriguez MD        methylPREDNISolone sodium succinate (SOLU-Medrol) injection 40 mg  40 mg Intravenous Q8H Kyree Davis APRN   40 mg at 02/24/25 0644    nicotine (NICODERM CQ) 21 MG/24HR patch 1 patch  1 patch Transdermal Q24H Lb Rdz APRN   1 patch at 02/24/25 1236    ondansetron ODT (ZOFRAN-ODT)  disintegrating tablet 4 mg  4 mg Oral Q6H PRN Naomi Rodriguez MD        Or    ondansetron (ZOFRAN) injection 4 mg  4 mg Intravenous Q6H PRN Naomi Rodriguez MD        pantoprazole (PROTONIX) EC tablet 40 mg  40 mg Oral Daily Naomi Rodriguez MD   40 mg at 02/24/25 0919    rOPINIRole (REQUIP) tablet 1 mg  1 mg Oral Q12H Lb Rdz APRN   1 mg at 02/24/25 1048    sertraline (ZOLOFT) tablet 50 mg  50 mg Oral Daily Naomi Rodriguez MD   50 mg at 02/24/25 0918    sodium chloride 0.9 % flush 10 mL  10 mL Intravenous PRN Ignacio Washington MD        spironolactone (ALDACTONE) tablet 25 mg  25 mg Oral Daily Naomi Rodriguez MD   25 mg at 02/24/25 0918    thiamine (B-1) injection 200 mg  200 mg Intravenous Q8H Kyree Davis APRN   200 mg at 02/24/25 0647    Followed by    [START ON 3/1/2025] thiamine (VITAMIN B-1) tablet 100 mg  100 mg Oral Daily Kyree Davis APRN        vitamin B-12 (CYANOCOBALAMIN) tablet 1,000 mcg  1,000 mcg Oral Daily Naomi Rodriguez MD   1,000 mcg at 02/24/25 0918     Anti-infectives:[unfilled]    Review of Systems:  CONSTITUTIONAL: (None) weight loss, (none) fever, (-) chills (extreme) fatigue   EYES: (-) new visual loss /changes   ENTM: (-) earache,  (-) sorethroat, (-) dysphagia (-) odynophagia (-) oral lesions  PULMONARY: (-) SOB, (-) cough, (-) hemoptysis   CARDIOVASCULAR: (-) chest pain , (-) orthopnea, (-) palpitation (-) leg swelling   GI: (None) abdominal pain (none) melena (none) hematochezia (none) hematemesis (none) nausea (none) vomiting   : (-) dysuria (-) hematuria (-) frequency (-) urgency (-) incontinence   HEMATOLOGY: (-) easy bruisibility (-) bleeding nose (-) blood transfusions  SKIN: (-) rash (-) non-healing ulcers (-) jaundice  NEUROLOGICAL: (-) paralysis (-) loss of consciousness (-) falls/fainting  PSYCHIATRIC: (-) hallucination (-) mood disturbances (-) confusion      Vitals 24 Hours ([Min - Max] (Last Recorded  Value)):     [unfilled]  Wt Readings from Last 3 Encounters:   02/23/25 101 kg (223 lb 6.4 oz)   01/14/25 98.4 kg (217 lb)   10/14/24 97.1 kg (214 lb)       PHYSICAL EXAM:  GENERAL APPEARANCE: Alert, cooperative, in no acute distress  Eyes: EOMI.  Pupils equal bilat.  No scleral icterus  Neck: supple, trachea midline, no anterior/posterior LAD  Lungs/Pulmonary: Wheezing along with accessory muscles of respiration working  Heart/Chest: Normal heart sounds no murmurs nontender chest wall  GI: Soft obese nontender liver and spleen are not palpable  Extremities: Extremities normal, atraumatic, no cyanosis or edema   Skin: No rash.  No jaundice.  Neuro/Psych:  A&O, normal mood, able to move all 4 ext, No asterixis       LABS:  Results from last 7 days   Lab Units 02/24/25  0752 02/23/25  1754   WBC 10*3/mm3 3.46 4.29   HEMOGLOBIN g/dL 6.1* 6.8*   HEMATOCRIT % 18.4* 20.1*   MCV fL 102.2* 103.6*   PLATELETS 10*3/mm3 105* 115*   AST (SGOT) U/L  --  14   ALT (SGPT) U/L  --  10   ALK PHOS U/L  --  103   BILIRUBIN mg/dL  --  0.7   POTASSIUM mmol/L 4.1 4.5   BUN mg/dL 30* 21   CREATININE mg/dL 1.25 1.12       IMAGING:  [unfilled]         Electronically signed by Amilcar Lozada MD at 02/24/25 1899

## 2025-02-24 NOTE — CONSULTS
[facility]  Cardiology Consult Note    Patient Identification:  Name: Yves Hastings  Age: 75 y.o.  Sex: male  :  1950  MRN: [id number]             Requesting Physician: Dr Rodriguez    Reason for Consultation / Chief Complaint: CHF    History of Present Illness:   This is a 75 year old male who is known with our service with coronary artery disease hx CABG, atrial fibrillation, hyperlipidemia, hypertension, sleep apnea, COPD, cardiomyopathy, HFrEF,  sss s/p pacemaker, myelodysplastic syndrome .      HS troponin 20, repeat 19. BNP 3590. CMP relatively unremarkable other than Na 133. Procal and lactate wnl. Hgb 6.8, plt 115. Chest xr with  mild cardiomegaly, bilateral vascular congestion, small right pleural effusion. ECG atrial paced, IVCD. He was treated with lasix, solumedrol and transfused with 1unit PRBC and admitted for further management.     Echo 10/14/24 EF 41-45%, dilated LVC, LV wma.     Stress test 10/2023 moderate sized infarct anterior, inferior and apex, with no significant ischemia.     Cath 2024 early atherosclerotic plaque otherwise normal coronaries. Severe LV dysfunction.     Comorbid cardiac risk factors: cad, hld, age    Past Medical History:  Past Medical History:   Diagnosis Date    COPD (chronic obstructive pulmonary disease)     Emphysema of lung     HLD (hyperlipidemia)     [place] (hard of hearing)     ears hearing aids    HTN (hypertension)     Myelodysplastic syndrome, unspecified 2023    Myocardial infarction     Short-term memory loss     Sleep apnea     no machine    Stroke     Ventral hernia     sched repair     Past Surgical History:  Past Surgical History:   Procedure Laterality Date    CARDIAC CATHETERIZATION N/A 2018    Procedure: Left Heart Cath;  Surgeon: Mika Carranza MD;  Location: [facility] CATH INVASIVE LOCATION;  Service: Cardiology    CARDIAC CATHETERIZATION N/A 2018    Procedure: Left ventriculography;  Surgeon: Tere  MD Mika;  Location: [facility] CATH INVASIVE LOCATION;  Service: Cardiology    CARDIAC CATHETERIZATION N/A 12/14/2018    Procedure: Coronary angiography;  Surgeon: Mika Carranza MD;  Location: [facility] CATH INVASIVE LOCATION;  Service: Cardiology    CARDIAC CATHETERIZATION  12/14/2018    Procedure: Functional Flow [place];  Surgeon: Mika Carranza MD;  Location: [facility] CATH INVASIVE LOCATION;  Service: Cardiology    CARDIAC CATHETERIZATION N/A 1/12/2024    Procedure: Left Heart Cath;  Surgeon: Mika Carranza MD;  Location: [facility] CATH INVASIVE LOCATION;  Service: Cardiovascular;  Laterality: N/A;    CARDIAC CATHETERIZATION N/A 1/12/2024    Procedure: Left ventriculography;  Surgeon: Mika Carranza MD;  Location: [facility] CATH INVASIVE LOCATION;  Service: Cardiovascular;  Laterality: N/A;    CARDIAC CATHETERIZATION N/A 1/12/2024    Procedure: Coronary angiography;  Surgeon: Mika Carranza MD;  Location: [facility] CATH INVASIVE LOCATION;  Service: Cardiovascular;  Laterality: N/A;    COLONOSCOPY W/ POLYPECTOMY N/A 11/10/2021    Procedure: COLONOSCOPY; POLYPECTOMY;  Surgeon: Alexander Dobbs MD;  Location: [facility] OR;  Service: Gastroenterology;  Laterality: N/A;  DIVERTICULOSIS  POLYP    CORONARY STENT PLACEMENT      CYST REMOVAL      tailbone    INCISION AND DRAINAGE PERIRECTAL ABSCESS N/A 05/29/2021    Procedure: INCISION AND DRAINAGE OF PERIRECTAL ABSCESS;  Surgeon: Reddy Johnson Jr., MD;  Location: [facility] OR;  Service: General;  Laterality: N/A;    UMBILICAL HERNIA REPAIR      DR. CASEY - YEARS AGO    VEIN LIGATION AND STRIPPING BILATERAL      VENTRAL/INCISIONAL HERNIA REPAIR N/A 02/04/2019    Procedure: VENTRAL/INCISIONAL HERNIA REPAIR LAPAROSCOPIC;  Surgeon: Adelfo Nieves MD;  Location: [facility] OR;  Service: General      Allergies:  No Known Allergies  Home Meds:  Medications Prior to Admission   Medication Sig Dispense Refill Last Dose/Taking    aspirin  81 MG EC tablet aspirin 81 mg tablet,delayed release   TAKE 1 TABLET BY MOUTH DAILY   2/23/2025    atorvastatin (LIPITOR) 80 MG tablet Take 1 tablet by mouth Daily.   2/22/2025    carvedilol (COREG) 3.125 MG tablet Take 1 tablet by mouth 2 (Two) Times a Day With Meals.   2/23/2025    cyanocobalamin (VITAMIN B-12) 1000 MCG tablet Take 1 tablet by mouth Daily.   Taking    Ferrous Fumarate (Ferrocite) 324 (106 Fe) MG tablet Take 1 tablet by mouth Every Other Day.   Taking    ferrous gluconate (FERGON) 324 MG tablet Take 1 tablet by mouth.   Taking    ipratropium-albuterol (DUO-NEB) 0.5-2.5 mg/3 ml nebulizer ipratropium 0.5 mg-albuterol 3 mg (2.5 mg base)/3 mL nebulization soln   Taking    pantoprazole (PROTONIX) 40 MG EC tablet Take 1 tablet by mouth Daily. (Patient taking differently: Take 20 mg by mouth Daily.)   2/23/2025    rOPINIRole (REQUIP) 0.5 MG tablet Take 1 tablet by mouth every night at bedtime. (Patient taking differently: Take 2 tablets by mouth Daily.)   2/23/2025    sertraline (ZOLOFT) 50 MG tablet Take 1 tablet by mouth Daily.   2/23/2025    spironolactone (ALDACTONE) 25 MG tablet Take 1 tablet by mouth Daily.   2/23/2025    Trelegy Ellipta 100-62.5-25 MCG/ACT inhaler    2/23/2025    albuterol (PROVENTIL HFA;VENTOLIN HFA) 108 (90 Base) MCG/ACT inhaler Inhale 2 puffs Every 4 (Four) Hours As Needed for Wheezing.       furosemide (LASIX) 40 MG tablet Take 1 tablet by mouth Daily. (Patient taking differently: Take 0.5 tablets by mouth Daily.) 30 tablet 5     HYDROcodone-acetaminophen (NORCO) 5-325 MG per tablet Take 1 tablet by mouth As Needed.       nitroglycerin (NITROSTAT) 0.4 MG SL tablet Place 1 tablet under the tongue Every 5 (Five) Minutes As Needed for Chest Pain. Take no more than 3 doses in 15 minutes.       potassium chloride 10 MEQ CR tablet Take 2 tablets by mouth Daily.        Current Meds:   [unfilled]  Social History:   Social History     Tobacco Use    Smoking status: Some Days     Current  "packs/day: 1.00     Average packs/day: 1 pack/day for 62.1 years (62.1 ttl pk-yrs)     Types: Cigarettes     Start date: 1963     Passive exposure: Past    Smokeless tobacco: Never    Tobacco comments:     Began smoking at age 13.  Smoked 1 ppd for 28 years and 2 ppd for 30 years for an 88 pack year history.     SMOKES 5 OR LESS CIGARETTES A DAY   Substance Use Topics    Alcohol use: Yes     Alcohol/week: 7.0 standard drinks of alcohol     Types: 7 Shots of liquor per week     Comment: hx of daily      Family History:  Family History   Problem Relation Age of Onset    Cancer Mother         breast    Heart disease Mother     Heart disease Father     Cancer Maternal Aunt     Heart disease Paternal Uncle     Malig Hyperthermia Neg Hx         Review of Systems  Constitutional: No wt loss, fever   Gastrointestinal: No nausea , abdominal pain  Behavioral/Psych: No insomnia or anxiety   Cardiovascular ----positive for shortness of breath. All other systems reviewed and are negative            /86   Pulse 82   Temp 97.7 °F (36.5 °C) (Oral)   Resp 18   Ht 180.3 cm (71\")   Wt 101 kg (223 lb 6.4 oz)   SpO2 97%   BMI 31.16 kg/m²   General appearance: No acute changes   Neck: Trachea midline; NECK, supple, no thyromegaly or lymphadenopathy   Lungs: Normal size and shape, normal breath sounds, equal distribution of air, no rales and rhonchi   CV: S1-S2 regular, no murmurs, no rub, no gallop   Abdomen: Soft, nontender; no masses , no abnormal abdominal sounds   Extremities: No deformity , normal color , no peripheral edema   Skin: Normal temperature, turgor and texture; no rash, ulcers                Cardiographics  ECG:     Telemetry:    Echocardiogram:     Imaging  Chest X-ray:     Lab Review   Results from last 7 days   Lab Units 02/23/25  1854 02/23/25  1754   HSTROP T ng/L 19 20         Results from last 7 days   Lab Units 02/23/25  1754   SODIUM mmol/L 133*   POTASSIUM mmol/L 4.5   BUN mg/dL 21   CREATININE " mg/dL 1.12   CALCIUM mg/dL 9.0     @[id number]@  Results from last 7 days   Lab Units 02/23/25  1754   WBC 10*3/mm3 4.29   HEMOGLOBIN g/dL 6.8*   HEMATOCRIT % 20.1*   PLATELETS 10*3/mm3 115*             Assessment:  Coronary artery disease  Pacemaker  Cardiomyopathy  COPD  Severe anemia  Myelodysplastic syndrome    Recommendations / Plan:     75-year-old male very well-known to us with known history of hypertension hyperlipidemia  admitted with worsening of shortness of breath was found to have hemoglobin of 6.8    Patient's heart catheter showed no significant coronary artery disease    Patient will be treated conservatively patient has a low ejection fraction if need Dobutrex will be started    Continue the diuretics    Replenish the blood try to keep hemoglobin close to 10    Mika Carranza MD  2/24/2025, 08:20 EST      LISBETH Ng/Transcription:   Dictated utilizing Dragon dictation

## 2025-02-25 ENCOUNTER — ANESTHESIA (OUTPATIENT)
Dept: GASTROENTEROLOGY | Facility: HOSPITAL | Age: 75
End: 2025-02-25
Payer: MEDICARE

## 2025-02-25 ENCOUNTER — ANESTHESIA EVENT (OUTPATIENT)
Dept: GASTROENTEROLOGY | Facility: HOSPITAL | Age: 75
End: 2025-02-25
Payer: MEDICARE

## 2025-02-25 LAB
ANION GAP SERPL CALCULATED.3IONS-SCNC: 12 MMOL/L (ref 5–15)
BH BB BLOOD EXPIRATION DATE: NORMAL
BH BB BLOOD EXPIRATION DATE: NORMAL
BH BB BLOOD TYPE BARCODE: 5100
BH BB BLOOD TYPE BARCODE: 5100
BH BB DISPENSE STATUS: NORMAL
BH BB DISPENSE STATUS: NORMAL
BH BB PRODUCT CODE: NORMAL
BH BB PRODUCT CODE: NORMAL
BH BB UNIT NUMBER: NORMAL
BH BB UNIT NUMBER: NORMAL
BUN SERPL-MCNC: 34 MG/DL (ref 8–23)
BUN/CREAT SERPL: 29.3 (ref 7–25)
CALCIUM SPEC-SCNC: 8.2 MG/DL (ref 8.6–10.5)
CHLORIDE SERPL-SCNC: 105 MMOL/L (ref 98–107)
CO2 SERPL-SCNC: 21 MMOL/L (ref 22–29)
CREAT SERPL-MCNC: 1.16 MG/DL (ref 0.76–1.27)
CROSSMATCH INTERPRETATION: NORMAL
CROSSMATCH INTERPRETATION: NORMAL
DEPRECATED RDW RBC AUTO: 78.2 FL (ref 37–54)
EGFRCR SERPLBLD CKD-EPI 2021: 66.1 ML/MIN/1.73
ERYTHROCYTE [DISTWIDTH] IN BLOOD BY AUTOMATED COUNT: 23.8 % (ref 12.3–15.4)
GEN 5 1HR TROPONIN T REFLEX: 17 NG/L
GLUCOSE SERPL-MCNC: 175 MG/DL (ref 65–99)
HCT VFR BLD AUTO: 20.8 % (ref 37.5–51)
HCT VFR BLD AUTO: 26.3 % (ref 37.5–51)
HGB BLD-MCNC: 7.2 G/DL (ref 13–17.7)
HGB BLD-MCNC: 8.9 G/DL (ref 13–17.7)
MCH RBC QN AUTO: 33.6 PG (ref 26.6–33)
MCHC RBC AUTO-ENTMCNC: 34.6 G/DL (ref 31.5–35.7)
MCV RBC AUTO: 97.2 FL (ref 79–97)
PLATELET # BLD AUTO: 97 10*3/MM3 (ref 140–450)
PMV BLD AUTO: 9.7 FL (ref 6–12)
POTASSIUM SERPL-SCNC: 4.4 MMOL/L (ref 3.5–5.2)
QT INTERVAL: 445 MS
QTC INTERVAL: 499 MS
RBC # BLD AUTO: 2.14 10*6/MM3 (ref 4.14–5.8)
SODIUM SERPL-SCNC: 138 MMOL/L (ref 136–145)
TROPONIN T NUMERIC DELTA: -1 NG/L
TROPONIN T SERPL HS-MCNC: 18 NG/L
UNIT  ABO: NORMAL
UNIT  ABO: NORMAL
UNIT  RH: NORMAL
UNIT  RH: NORMAL
WBC NRBC COR # BLD AUTO: 5.58 10*3/MM3 (ref 3.4–10.8)

## 2025-02-25 PROCEDURE — 25010000002 THIAMINE PER 100 MG: Performed by: NURSE PRACTITIONER

## 2025-02-25 PROCEDURE — 85018 HEMOGLOBIN: CPT | Performed by: STUDENT IN AN ORGANIZED HEALTH CARE EDUCATION/TRAINING PROGRAM

## 2025-02-25 PROCEDURE — 86900 BLOOD TYPING SEROLOGIC ABO: CPT

## 2025-02-25 PROCEDURE — 85014 HEMATOCRIT: CPT | Performed by: STUDENT IN AN ORGANIZED HEALTH CARE EDUCATION/TRAINING PROGRAM

## 2025-02-25 PROCEDURE — 80048 BASIC METABOLIC PNL TOTAL CA: CPT | Performed by: NURSE PRACTITIONER

## 2025-02-25 PROCEDURE — 36430 TRANSFUSION BLD/BLD COMPNT: CPT

## 2025-02-25 PROCEDURE — 25010000002 PROPOFOL 1000 MG/100ML EMULSION

## 2025-02-25 PROCEDURE — 25010000002 PROPOFOL 200 MG/20ML EMULSION

## 2025-02-25 PROCEDURE — 25010000002 METHYLPREDNISOLONE PER 40 MG: Performed by: NURSE PRACTITIONER

## 2025-02-25 PROCEDURE — 43239 EGD BIOPSY SINGLE/MULTIPLE: CPT | Performed by: INTERNAL MEDICINE

## 2025-02-25 PROCEDURE — 99232 SBSQ HOSP IP/OBS MODERATE 35: CPT | Performed by: INTERNAL MEDICINE

## 2025-02-25 PROCEDURE — 94799 UNLISTED PULMONARY SVC/PX: CPT

## 2025-02-25 PROCEDURE — 25010000002 GLYCOPYRROLATE 0.2 MG/ML SOLUTION

## 2025-02-25 PROCEDURE — P9016 RBC LEUKOCYTES REDUCED: HCPCS

## 2025-02-25 PROCEDURE — 25010000002 FUROSEMIDE PER 20 MG

## 2025-02-25 PROCEDURE — 94761 N-INVAS EAR/PLS OXIMETRY MLT: CPT

## 2025-02-25 PROCEDURE — 25010000002 THIAMINE HCL 200 MG/2ML SOLUTION: Performed by: NURSE PRACTITIONER

## 2025-02-25 PROCEDURE — 84484 ASSAY OF TROPONIN QUANT: CPT

## 2025-02-25 PROCEDURE — 93005 ELECTROCARDIOGRAM TRACING: CPT

## 2025-02-25 PROCEDURE — 25010000002 LIDOCAINE 2% SOLUTION

## 2025-02-25 PROCEDURE — 85027 COMPLETE CBC AUTOMATED: CPT | Performed by: NURSE PRACTITIONER

## 2025-02-25 PROCEDURE — 25810000003 SODIUM CHLORIDE 0.9 % SOLUTION: Performed by: INTERNAL MEDICINE

## 2025-02-25 PROCEDURE — 88305 TISSUE EXAM BY PATHOLOGIST: CPT | Performed by: INTERNAL MEDICINE

## 2025-02-25 PROCEDURE — 0DB68ZX EXCISION OF STOMACH, VIA NATURAL OR ARTIFICIAL OPENING ENDOSCOPIC, DIAGNOSTIC: ICD-10-PCS | Performed by: INTERNAL MEDICINE

## 2025-02-25 PROCEDURE — 63710000001 REVEFENACIN 175 MCG/3ML SOLUTION: Performed by: INTERNAL MEDICINE

## 2025-02-25 PROCEDURE — 94664 DEMO&/EVAL PT USE INHALER: CPT

## 2025-02-25 RX ORDER — GLYCOPYRROLATE 0.2 MG/ML
INJECTION INTRAMUSCULAR; INTRAVENOUS AS NEEDED
Status: DISCONTINUED | OUTPATIENT
Start: 2025-02-25 | End: 2025-02-25 | Stop reason: SURG

## 2025-02-25 RX ORDER — SODIUM CHLORIDE 0.9 % (FLUSH) 0.9 %
10 SYRINGE (ML) INJECTION EVERY 12 HOURS SCHEDULED
Status: DISCONTINUED | OUTPATIENT
Start: 2025-02-25 | End: 2025-02-26 | Stop reason: HOSPADM

## 2025-02-25 RX ORDER — LIDOCAINE HYDROCHLORIDE 20 MG/ML
INJECTION, SOLUTION INFILTRATION; PERINEURAL AS NEEDED
Status: DISCONTINUED | OUTPATIENT
Start: 2025-02-25 | End: 2025-02-25 | Stop reason: SURG

## 2025-02-25 RX ORDER — PROPOFOL 10 MG/ML
INJECTION, EMULSION INTRAVENOUS CONTINUOUS PRN
Status: DISCONTINUED | OUTPATIENT
Start: 2025-02-25 | End: 2025-02-25 | Stop reason: SURG

## 2025-02-25 RX ORDER — PROPOFOL 10 MG/ML
INJECTION, EMULSION INTRAVENOUS AS NEEDED
Status: DISCONTINUED | OUTPATIENT
Start: 2025-02-25 | End: 2025-02-25 | Stop reason: SURG

## 2025-02-25 RX ORDER — NITROGLYCERIN 0.4 MG/1
0.4 TABLET SUBLINGUAL
Status: DISCONTINUED | OUTPATIENT
Start: 2025-02-25 | End: 2025-02-26 | Stop reason: HOSPADM

## 2025-02-25 RX ORDER — METOPROLOL SUCCINATE 25 MG/1
25 TABLET, EXTENDED RELEASE ORAL
Status: DISCONTINUED | OUTPATIENT
Start: 2025-02-26 | End: 2025-02-26 | Stop reason: HOSPADM

## 2025-02-25 RX ORDER — SODIUM CHLORIDE 9 MG/ML
30 INJECTION, SOLUTION INTRAVENOUS CONTINUOUS PRN
Status: ACTIVE | OUTPATIENT
Start: 2025-02-25 | End: 2025-02-25

## 2025-02-25 RX ADMIN — METHYLPREDNISOLONE SODIUM SUCCINATE 40 MG: 40 INJECTION, POWDER, FOR SOLUTION INTRAMUSCULAR; INTRAVENOUS at 05:59

## 2025-02-25 RX ADMIN — REVEFENACIN 175 MCG: 175 SOLUTION RESPIRATORY (INHALATION) at 06:44

## 2025-02-25 RX ADMIN — SERTRALINE HYDROCHLORIDE 50 MG: 50 TABLET, FILM COATED ORAL at 08:54

## 2025-02-25 RX ADMIN — PROPOFOL 180 MCG/KG/MIN: 10 INJECTION, EMULSION INTRAVENOUS at 13:18

## 2025-02-25 RX ADMIN — Medication 1000 MCG: at 08:54

## 2025-02-25 RX ADMIN — LIDOCAINE HYDROCHLORIDE 100 MG: 20 INJECTION, SOLUTION INFILTRATION; PERINEURAL at 13:16

## 2025-02-25 RX ADMIN — ARFORMOTEROL TARTRATE 15 MCG: 15 SOLUTION RESPIRATORY (INHALATION) at 06:44

## 2025-02-25 RX ADMIN — ROPINIROLE HYDROCHLORIDE 1 MG: 0.5 TABLET, FILM COATED ORAL at 08:54

## 2025-02-25 RX ADMIN — FERROUS SULFATE TAB 325 MG (65 MG ELEMENTAL FE) 325 MG: 325 (65 FE) TAB at 08:54

## 2025-02-25 RX ADMIN — AMIODARONE HYDROCHLORIDE 200 MG: 200 TABLET ORAL at 08:54

## 2025-02-25 RX ADMIN — EMPAGLIFLOZIN 10 MG: 10 TABLET, FILM COATED ORAL at 14:35

## 2025-02-25 RX ADMIN — Medication 10 ML: at 21:04

## 2025-02-25 RX ADMIN — HYDROCODONE BITARTRATE AND ACETAMINOPHEN 1 TABLET: 5; 325 TABLET ORAL at 21:04

## 2025-02-25 RX ADMIN — Medication 10 ML: at 14:36

## 2025-02-25 RX ADMIN — PROPOFOL INJECTABLE EMULSION 50 MG: 10 INJECTION, EMULSION INTRAVENOUS at 13:16

## 2025-02-25 RX ADMIN — BUDESONIDE 0.5 MG: 0.5 INHALANT RESPIRATORY (INHALATION) at 20:16

## 2025-02-25 RX ADMIN — ARFORMOTEROL TARTRATE 15 MCG: 15 SOLUTION RESPIRATORY (INHALATION) at 20:16

## 2025-02-25 RX ADMIN — ROPINIROLE HYDROCHLORIDE 1 MG: 0.5 TABLET, FILM COATED ORAL at 21:04

## 2025-02-25 RX ADMIN — THIAMINE HYDROCHLORIDE 200 MG: 100 INJECTION, SOLUTION INTRAMUSCULAR; INTRAVENOUS at 14:36

## 2025-02-25 RX ADMIN — SODIUM CHLORIDE 30 ML/HR: 9 INJECTION, SOLUTION INTRAVENOUS at 13:10

## 2025-02-25 RX ADMIN — SPIRONOLACTONE 25 MG: 25 TABLET ORAL at 08:55

## 2025-02-25 RX ADMIN — ATORVASTATIN CALCIUM 80 MG: 20 TABLET, FILM COATED ORAL at 08:54

## 2025-02-25 RX ADMIN — IPRATROPIUM BROMIDE AND ALBUTEROL SULFATE 3 ML: .5; 3 SOLUTION RESPIRATORY (INHALATION) at 06:43

## 2025-02-25 RX ADMIN — LOSARTAN POTASSIUM 25 MG: 25 TABLET, FILM COATED ORAL at 08:54

## 2025-02-25 RX ADMIN — PANTOPRAZOLE SODIUM 40 MG: 40 TABLET, DELAYED RELEASE ORAL at 08:55

## 2025-02-25 RX ADMIN — METHYLPREDNISOLONE SODIUM SUCCINATE 40 MG: 40 INJECTION, POWDER, FOR SOLUTION INTRAMUSCULAR; INTRAVENOUS at 14:35

## 2025-02-25 RX ADMIN — FUROSEMIDE 40 MG: 10 INJECTION, SOLUTION INTRAMUSCULAR; INTRAVENOUS at 08:55

## 2025-02-25 RX ADMIN — CARVEDILOL 3.12 MG: 3.12 TABLET, FILM COATED ORAL at 08:54

## 2025-02-25 RX ADMIN — IPRATROPIUM BROMIDE AND ALBUTEROL SULFATE 3 ML: .5; 3 SOLUTION RESPIRATORY (INHALATION) at 20:19

## 2025-02-25 RX ADMIN — FOLIC ACID 1 MG: 1 TABLET ORAL at 08:55

## 2025-02-25 RX ADMIN — METHYLPREDNISOLONE SODIUM SUCCINATE 40 MG: 40 INJECTION, POWDER, FOR SOLUTION INTRAMUSCULAR; INTRAVENOUS at 21:04

## 2025-02-25 RX ADMIN — NICOTINE 1 PATCH: 21 PATCH TRANSDERMAL at 08:55

## 2025-02-25 RX ADMIN — THIAMINE HYDROCHLORIDE 200 MG: 100 INJECTION, SOLUTION INTRAMUSCULAR; INTRAVENOUS at 05:59

## 2025-02-25 RX ADMIN — HYDROXYZINE HYDROCHLORIDE 25 MG: 25 TABLET, FILM COATED ORAL at 21:51

## 2025-02-25 RX ADMIN — THIAMINE HYDROCHLORIDE 200 MG: 100 INJECTION, SOLUTION INTRAMUSCULAR; INTRAVENOUS at 21:04

## 2025-02-25 RX ADMIN — BUDESONIDE 0.5 MG: 0.5 INHALANT RESPIRATORY (INHALATION) at 06:44

## 2025-02-25 RX ADMIN — IPRATROPIUM BROMIDE AND ALBUTEROL SULFATE 3 ML: .5; 3 SOLUTION RESPIRATORY (INHALATION) at 16:00

## 2025-02-25 RX ADMIN — GLYCOPYRROLATE 0.1 MG: 0.2 INJECTION INTRAMUSCULAR; INTRAVENOUS at 13:16

## 2025-02-25 NOTE — PROGRESS NOTES
Name: Yves Hastings ADMIT: 2025   : 1950  PCP: Brenda Downs, WOO    MRN: [id number] LOS: 2 days   AGE/SEX: 75 y.o. male  ROOM: ENDO/ENDO     Subjective   Subjective   Sitting up in bed, denies new problems today.    Objective   Objective   Vital Signs  Temp:  [97.6 °F (36.4 °C)-98.2 °F (36.8 °C)] 97.7 °F (36.5 °C)  Heart Rate:  [67-90] 72  Resp:  [16-20] 20  BP: (108-132)/(55-69) 116/63  SpO2:  [94 %-100 %] 100 %  on  Flow (L/min) (Oxygen Therapy):  [2-4] 4;   Device (Oxygen Therapy): simple face mask  Body mass index is 31.16 kg/m².  Physical Exam  Constitutional:       Appearance: He is ill-appearing.   Pulmonary:      Effort: Pulmonary effort is normal. No respiratory distress.      Breath sounds: No stridor.   Skin:     Coloration: Skin is not pale.   Neurological:      Mental Status: He is alert and oriented to person, place, and time.         Results Review     I reviewed the patient's new clinical results.  Results from last 7 days   Lab Units 25  0646 25  1616 25  07525  1754   WBC 10*3/mm3 5.58  --  3.46 4.29   HEMOGLOBIN g/dL 7.2* 6.8* 6.1* 6.8*   PLATELETS 10*3/mm3 97*  --  105* 115*     Results from last 7 days   Lab Units 25  0646 25  0752 25  1754   SODIUM mmol/L 138 129* 133*   POTASSIUM mmol/L 4.4 4.1 4.5   CHLORIDE mmol/L 105 97* 101   CO2 mmol/L 21.0* 19.7* 23.0   BUN mg/dL 34* 30* 21   CREATININE mg/dL 1.16 1.25 1.12   GLUCOSE mg/dL 175* 201* 97   EGFR mL/min/1.73 66.1 60.4 68.9     Results from last 7 days   Lab Units 25  1754   ALBUMIN g/dL 4.2   BILIRUBIN mg/dL 0.7   ALK PHOS U/L 103   AST (SGOT) U/L 14   ALT (SGPT) U/L 10     Results from last 7 days   Lab Units 25  0646 25  0752 25  1754   CALCIUM mg/dL 8.2* 8.9 9.0   ALBUMIN g/dL  --   --  4.2   MAGNESIUM mg/dL  --  2.2  --      Results from last 7 days   Lab Units 25  1854 25  1754   PROCALCITONIN ng/mL  --  0.07   LACTATE mmol/L 1.1   "--      No results found for: \"HGBA1C\", \"POCGLU\"    XR Chest 1 View    Result Date: 2/23/2025   The heart size is within normal limits. Prior sternotomy. Dual-lead left subclavian approach transvenous pacemaker device remains in place.  There is mild cardiomegaly.  There is bilateral vascular congestion, right slightly greater than left, and there is a small right pleural effusion.  There is no dense consolidation, and no pneumothorax.  This report was finalized on 2/23/2025 6:05 PM by Dr. Adelfo Sorenson M.D on Workstation: [id number]     Scheduled Medications  amiodarone, 200 mg, Oral, Daily  arformoterol, 15 mcg, Nebulization, BID - RT   And  budesonide, 0.5 mg, Nebulization, BID - RT   And  revefenacin, 175 mcg, Nebulization, Daily - RT  atorvastatin, 80 mg, Oral, Daily  empagliflozin, 10 mg, Oral, Daily  ferrous sulfate, 325 mg, Oral, Daily With Breakfast  folic acid, 1 mg, Oral, Daily  furosemide, 40 mg, Intravenous, Daily  ipratropium-albuterol, 3 mL, Nebulization, Q6H While Awake - RT  losartan, 25 mg, Oral, Daily  methylPREDNISolone sodium succinate, 40 mg, Intravenous, Q8H  [START ON 2/26/2025] metoprolol succinate XL, 25 mg, Oral, Q24H  nicotine, 1 patch, Transdermal, Q24H  pantoprazole, 40 mg, Oral, Daily  rOPINIRole, 1 mg, Oral, Q12H  sertraline, 50 mg, Oral, Daily  sodium chloride, 10 mL, Intravenous, Q12H  spironolactone, 25 mg, Oral, Daily  thiamine (B-1) IV, 200 mg, Intravenous, Q8H   Followed by  [START ON 3/1/2025] thiamine, 100 mg, Oral, Daily  cyanocobalamin, 1,000 mcg, Oral, Daily    Infusions  sodium chloride, 30 mL/hr, Last Rate: 30 mL/hr (02/25/25 1313)    Diet  Diet: Regular/House; Fluid Consistency: Thin (IDDSI 0)       Assessment/Plan     Active Hospital Problems    Diagnosis  POA    **Acute hypoxemic respiratory failure [J96.01]  Yes    Acute anemia [D64.9]  Yes    Acute on chronic heart failure with preserved ejection fraction (HFpEF) [I50.33]  Yes    RLS (restless legs syndrome) " [G25.81]  Yes    Acute on chronic anemia [D64.9]  Yes    Essential hypertension [I10]  Yes    Chronic obstructive lung disease [J44.9]  Yes      Resolved Hospital Problems   No resolved problems to display.       75 y.o. male admitted with Acute hypoxemic respiratory failure.      02/25/25  Transfuse 1 unit of blood.  EGD today.    Acute hypoxemic respiratory failure  -Oxygen saturation 84% on room air improved with supplemental oxygen  -Respiratory viral panel negative  -Suspect multifactorial due to acute anemia in the setting of COPD and acute on chronic HFpEF     Essential hypertension  -Losartan, metoprolol, spironolactone     Acute on chronic anemia  -Followed by heme-onc--Dr. Adame--for MDS  -Family states patient requires blood transfusions every couple weeks  -SUNSHINE pennington'figueroa  -EGD 2/25       Acute on chronic heart failure with preserved ejection fraction (HFpEF)  -Most recent LVEF 41 to 45%   -cardiology following and managing diuretics      RLS (restless legs syndrome)  -Escalate strength and frequency of Requip given increase symptoms of RLS here  -monitor effects of Requip      Chronic alcohol dependence  -Family states last intake of alcohol on 2/20/2025      COPD  -provide scheduled & as needed breathing treatments    Flow (L/min) (Oxygen Therapy):  [2-4] 4    DVT prophylaxis: SCDs  Discussed with patient and nursing staff.  Anticipated discharge home, when cleared by consultants            Ignacio Anderson MD  [facility]  02/25/25  14:05 EST

## 2025-02-25 NOTE — ANESTHESIA POSTPROCEDURE EVALUATION
Patient: Yves Hastings    Procedure Summary       Date: 02/25/25 Room / Location: [facility] ENDOSCOPY 10 / [facility] ENDOSCOPY    Anesthesia Start: 1313 Anesthesia Stop: 1327    Procedure: ESOPHAGOGASTRODUODENOSCOPY WITH COLD BIOPSY (Esophagus) Diagnosis:       Acute on chronic anemia      (Acute on chronic anemia [D64.9])    Surgeons: Amilcar oLzada MD Provider: Calvin Nguyen MD    Anesthesia Type: MAC ASA Status: 4            Anesthesia Type: MAC    Vitals  Vitals Value Taken Time   /67 02/25/25 1336   Temp     Pulse 72 02/25/25 1345   Resp 16 02/25/25 1326   SpO2 100 % 02/25/25 1345   Vitals shown include unfiled device data.        Post Anesthesia Care and Evaluation    Patient location during evaluation: PACU  Patient participation: complete - patient participated  Level of consciousness: awake and alert  Pain management: adequate    Airway patency: patent  Anesthetic complications: No anesthetic complications    Cardiovascular status: acceptable  Respiratory status: acceptable  Hydration status: acceptable    Comments: --------------------            02/25/25               1335     --------------------   BP:       108/67     Pulse:      73       Resp:                Temp:                SpO2:      100%     --------------------

## 2025-02-25 NOTE — PLAN OF CARE
Goal Outcome Evaluation:  Plan of Care Reviewed With: patient        Progress: improving  Outcome Evaluation: VSS overnight, pt states feeling much better. Received 2 U PRBC overnight. Awaiting am lab results. Will continue to monitor and await further orders.

## 2025-02-25 NOTE — PLAN OF CARE
Goal Outcome Evaluation:  VSS, RA, A&Ox4, up ad rubén. Continue to diurese via IV diuretic. EGD completed. 1 unit PRBC given with recheck pending. Pt educated on diet with CHF as it pertains to salt intake and fluid intake. Possible D/C tomorrow. CTM

## 2025-02-25 NOTE — SIGNIFICANT NOTE
02/25/25 0942   OTHER   Discipline physical therapist   Therapy Assessment/Plan (PT)   Criteria for Skilled Interventions Met (PT) no problems identified which require skilled intervention

## 2025-02-25 NOTE — PROGRESS NOTES
[facility]  Inpatient Progress Note    Reason for Follow Up: Anemia most likely from MDS    Subjective     Interval History:   Patient is a 75-year-old gentleman who lives in [place] and has significant cardiovascular and respiratory comorbidities along with myelodysplastic syndrome.  He was admitted with chest pain and shortness of breath he does have a history of COPD coronary artery disease previous MI hyperlipidemia and myelodysplastic syndrome.    We were asked to see him because of anemia and hemoglobin of 6.8.  Repeat hemoglobin posttransfusion is 7.2.  Patient has had no overt GI bleed in the form of hematemesis melena hematochezia.  His respiratory symptoms have also resolved after therapy.    Current Facility-Administered Medications:     acetaminophen (TYLENOL) tablet 650 mg, 650 mg, Oral, Q4H PRN **OR** acetaminophen (TYLENOL) 160 MG/5ML oral solution 650 mg, 650 mg, Oral, Q4H PRN **OR** acetaminophen (TYLENOL) suppository 650 mg, 650 mg, Rectal, Q4H PRN, Naomi Rodriguez MD    albuterol (PROVENTIL) nebulizer solution 0.083% 2.5 mg/3mL, 2.5 mg, Nebulization, Q6H PRN, Naomi Rodriguez MD, 2.5 mg at 02/24/25 0705    amiodarone (PACERONE) tablet 200 mg, 200 mg, Oral, Daily, Naomi Rodriguez MD, 200 mg at 02/25/25 0854    arformoterol (BROVANA) nebulizer solution 15 mcg, 15 mcg, Nebulization, BID - RT, 15 mcg at 02/25/25 0644 **AND** budesonide (PULMICORT) nebulizer solution 0.5 mg, 0.5 mg, Nebulization, BID - RT, 0.5 mg at 02/25/25 0644 **AND** revefenacin (YUPELRI) nebulizer solution 175 mcg, 175 mcg, Nebulization, Daily - RT, Naomi Rodriguez MD, 175 mcg at 02/25/25 0644    atorvastatin (LIPITOR) tablet 80 mg, 80 mg, Oral, Daily, Naomi Rodriguez MD, 80 mg at 02/25/25 0854    sennosides-docusate (PERICOLACE) 8.6-50 MG per tablet 2 tablet, 2 tablet, Oral, BID PRN **AND** polyethylene glycol (MIRALAX) packet 17 g, 17 g, Oral, Daily PRN **AND**  bisacodyl (DULCOLAX) EC tablet 5 mg, 5 mg, Oral, Daily PRN **AND** bisacodyl (DULCOLAX) suppository 10 mg, 10 mg, Rectal, Daily PRN, Naomi Rodriguez MD    carvedilol (COREG) tablet 3.125 mg, 3.125 mg, Oral, BID With Meals, Naomi Rodriguez MD, 3.125 mg at 02/25/25 0854    cloNIDine (CATAPRES) tablet 0.1 mg, 0.1 mg, Oral, Q4H PRN, Kyree Davis APRN    ferrous sulfate tablet 325 mg, 325 mg, Oral, Daily With Breakfast, Naomi Rodriguez MD, 325 mg at 02/25/25 0854    folic acid (FOLVITE) tablet 1 mg, 1 mg, Oral, Daily, Kyree Davis APRN, 1 mg at 02/25/25 0855    furosemide (LASIX) injection 40 mg, 40 mg, Intravenous, Daily, Es Daugherty APRN, 40 mg at 02/25/25 0855    HYDROcodone-acetaminophen (NORCO) 5-325 MG per tablet 1 tablet, 1 tablet, Oral, Q4H PRN, Kyree Davis APRN, 1 tablet at 02/24/25 2026    hydrOXYzine (ATARAX) tablet 25 mg, 25 mg, Oral, TID PRN, Naomi Rodriguez MD, 25 mg at 02/24/25 2232    ipratropium-albuterol (DUO-NEB) nebulizer solution 3 mL, 3 mL, Nebulization, Q6H While Awake - RT, Naomi Rodriguez MD, 3 mL at 02/25/25 0643    LORazepam (ATIVAN) tablet 1 mg, 1 mg, Oral, Q1H PRN **OR** LORazepam (ATIVAN) injection 1 mg, 1 mg, Intravenous, Q1H PRN **OR** LORazepam (ATIVAN) tablet 2 mg, 2 mg, Oral, Q1H PRN **OR** LORazepam (ATIVAN) injection 2 mg, 2 mg, Intravenous, Q1H PRN **OR** LORazepam (ATIVAN) injection 2 mg, 2 mg, Intravenous, Q15 Min PRN **OR** LORazepam (ATIVAN) injection 2 mg, 2 mg, Intramuscular, Q15 Min PRN, Kyree Davis APRN    losartan (COZAAR) tablet 25 mg, 25 mg, Oral, Daily, Naomi Rodriguez MD, 25 mg at 02/25/25 0854    Magnesium Standard Dose Replacement - Follow Nurse / BPA Driven Protocol, , Not Applicable, PRN, Kyree Davis APRN    melatonin tablet 2.5 mg, 2.5 mg, Oral, Nightly PRN, Naomi Rodriguez MD    methylPREDNISolone sodium succinate (SOLU-Medrol) injection 40 mg, 40 mg, Intravenous, Q8H,  Kyree Davis APRN, 40 mg at 02/25/25 0559    nicotine (NICODERM CQ) 21 MG/24HR patch 1 patch, 1 patch, Transdermal, Q24H, Lb Rdz APRN, 1 patch at 02/25/25 0855    nitroglycerin (NITROSTAT) SL tablet 0.4 mg, 0.4 mg, Sublingual, Q5 Min PRN, Ignacio Anderson MD    ondansetron ODT (ZOFRAN-ODT) disintegrating tablet 4 mg, 4 mg, Oral, Q6H PRN **OR** ondansetron (ZOFRAN) injection 4 mg, 4 mg, Intravenous, Q6H PRN, Naomi Rodriguez MD    pantoprazole (PROTONIX) EC tablet 40 mg, 40 mg, Oral, Daily, Naomi Rodriguez MD, 40 mg at 02/25/25 0855    rOPINIRole (REQUIP) tablet 1 mg, 1 mg, Oral, Q12H, Lb Rdz APRN, 1 mg at 02/25/25 0854    sertraline (ZOLOFT) tablet 50 mg, 50 mg, Oral, Daily, Naomi Rodriguez MD, 50 mg at 02/25/25 0854    sodium chloride 0.9 % flush 10 mL, 10 mL, Intravenous, PRN, Ignacio Washington MD, 10 mL at 02/24/25 2027    spironolactone (ALDACTONE) tablet 25 mg, 25 mg, Oral, Daily, Naomi Rodriguez MD, 25 mg at 02/25/25 0855    thiamine (B-1) injection 200 mg, 200 mg, Intravenous, Q8H, 200 mg at 02/25/25 0559 **FOLLOWED BY** [START ON 3/1/2025] thiamine (VITAMIN B-1) tablet 100 mg, 100 mg, Oral, Daily, Kyree Davis APRN    vitamin B-12 (CYANOCOBALAMIN) tablet 1,000 mcg, 1,000 mcg, Oral, Daily, Naomi Rodriguez MD, 1,000 mcg at 02/25/25 0854  Review of Systems:   Patient feels substantially better.  There is no overt GI bleeding, hematemesis melena hematochezia.  He denies any chest pain or shortness of breath now after transfusions.     Objective     Vital Signs  Temp:  [97.6 °F (36.4 °C)-98.2 °F (36.8 °C)] 97.8 °F (36.6 °C)  Heart Rate:  [75-90] 80  Resp:  [18-22] 18  BP: (110-132)/(55-86) 114/63  Body mass index is 31.16 kg/m².    Intake/Output Summary (Last 24 hours) at 2/25/2025 0934  Last data filed at 2/24/2025 2258  Gross per 24 hour   Intake 886.25 ml   Output 1420 ml   Net -533.75 ml     No intake/output data recorded.     Physical  "Exam:   General: patient awake, alert and cooperative   Eyes: Normal lids and lashes, no scleral icterus   Neck: supple, normal ROM   Skin: warm and dry, not jaundiced   Cardiovascular: regular rhythm and rate, no murmurs auscultated   Pulm: clear to auscultation bilaterally, regular and unlabored   Abdomen: soft, nontender, nondistended; normal bowel sounds   Extremities: no rash or edema   Psychiatric: Normal mood and behavior; memory intact     Results Review:     I reviewed the patient's new clinical results.    Results from last 7 days   Lab Units 02/25/25  0646 02/24/25  1616 02/24/25  0752 02/23/25  1754   WBC 10*3/mm3 5.58  --  3.46 4.29   HEMOGLOBIN g/dL 7.2* 6.8* 6.1* 6.8*   HEMATOCRIT % 20.8* 20.1* 18.4* 20.1*   PLATELETS 10*3/mm3 97*  --  105* 115*     Results from last 7 days   Lab Units 02/25/25  0646 02/24/25  0752 02/23/25  1754   SODIUM mmol/L 138 129* 133*   POTASSIUM mmol/L 4.4 4.1 4.5   CHLORIDE mmol/L 105 97* 101   CO2 mmol/L 21.0* 19.7* 23.0   BUN mg/dL 34* 30* 21   CREATININE mg/dL 1.16 1.25 1.12   CALCIUM mg/dL 8.2* 8.9 9.0   BILIRUBIN mg/dL  --   --  0.7   ALK PHOS U/L  --   --  103   ALT (SGPT) U/L  --   --  10   AST (SGOT) U/L  --   --  14   GLUCOSE mg/dL 175* 201* 97         No results found for: \"LIPASE\"    Radiology:  XR Chest 1 View   Final Result       The heart size is within normal limits. Prior sternotomy. Dual-lead left   subclavian approach transvenous pacemaker device remains in place.       There is mild cardiomegaly.       There is bilateral vascular congestion, right slightly greater than   left, and there is a small right pleural effusion.       There is no dense consolidation, and no pneumothorax.       This report was finalized on 2/23/2025 6:05 PM by Dr. Adelfo Sorenson M.D on Workstation: [id number]              Assessment & Plan     Active Hospital Problems    Diagnosis     **Acute hypoxemic respiratory failure     Acute anemia     Acute on chronic heart failure " with preserved ejection fraction (HFpEF)     RLS (restless legs syndrome)     Acute on chronic anemia     Essential hypertension     Chronic obstructive lung disease        Assessment:  I think most of the anemia is a consequence of myelodysplastic syndrome rather than overt GI bleed.  Patient has significant cardiovascular and respiratory comorbidity and low ejection fraction.  Therefore any intervention or evaluation for occult GI blood loss is high risk.      Plan:  Suggested cautious upper GI endoscopy later today, if the latter is normal no further evaluation is suggested.  I discussed the patients findings and my recommendations with patient and nursing staff.            Amilcar Lozada M.D.  [facility]  [phone]

## 2025-02-25 NOTE — ANESTHESIA PREPROCEDURE EVALUATION
Anesthesia Evaluation     Patient summary reviewed and Nursing notes reviewed                Airway   Mallampati: II  Dental    (+) edentulous    Pulmonary    (+) a smoker (heavy past) Current, COPD,sleep apnea  Cardiovascular     ECG reviewed  Patient on routine beta blocker  Rate: normal    (+) pacemaker pacemaker, hypertension, past MI  >12 months, CAD, cardiac stents more than 12 months ago , CHF , hyperlipidemia      Neuro/Psych  (+) CVA  GI/Hepatic/Renal/Endo    (+) obesity    Musculoskeletal (-) negative ROS    Abdominal    Substance History   (+) alcohol use (heavy past annd still has 7 drinks/week night currently)     OB/GYN negative ob/gyn ROS         Other   blood dyscrasia (MPD) anemia,   history of cancer                  Anesthesia Plan    ASA 4     MAC     (Pacemaker  PRBC transfusions on this admission  Still smoking and drinking ETOH  )  intravenous induction     Anesthetic plan, risks, benefits, and alternatives have been provided, discussed and informed consent has been obtained with: patient.    CODE STATUS:    Code Status (Patient has no pulse and is not breathing): CPR (Attempt to Resuscitate)  Medical Interventions (Patient has pulse or is breathing): Full

## 2025-02-25 NOTE — BRIEF OP NOTE
ESOPHAGOGASTRODUODENOSCOPY  Progress Note    Yves Hastings  2/25/2025    Pre-op Diagnosis:   Acute on chronic anemia [D64.9]       Post-Op Diagnosis Codes:     * Acute on chronic anemia [D64.9]    Procedure(s):      Procedure(s):  ESOPHAGOGASTRODUODENOSCOPY WITH COLD BIOPSY              Surgeon(s):  Amilcar Lozada MD    Anesthesia: Monitored Anesthesia Care    Staff:   Endo Technician: Edel Trujillo  Endo Nurse: Evgeny Quezada RN       Estimated Blood Loss: none    Urine Voided: * No values recorded between 2/25/2025  1:13 PM and 2/25/2025  1:24 PM *    Specimens:                Specimens       ID Source Type Tests Collected By Collected At Frozen?    A Gastric, Antrum Tissue TISSUE PATHOLOGY EXAM   Amilcar Lozada MD 2/25/25 6110     Description: GASTRIC ANTRUM BIOPSIES              Drains: * No LDAs found *    Findings:     Mild focal patchy duodenitis involving the duodenal bulb  Mild localized distal body gastritis in the form of erythema and erosions.  A biopsy was obtained from the antrum for H. pylori testing  Rest of the examination up to second part of duodenum was normal      Complications: none    Recommendations:    Anemia is a result of myelodysplasia rather than GI bleed please manage with packed cell transfusions as needed and hematology consult if felt appropriate.  Regular diet  Patient can be discharged from gastrointestinal standpoint.    GI service will sign off.  Please call if needed.    Above findings and plan was communicated to patient's daughter Doreen [phone]            Amilcar Lozada MD     Date: 2/25/2025  Time: 13:29 EST

## 2025-02-25 NOTE — CASE MANAGEMENT/SOCIAL WORK
Discharge Planning Assessment  [facility]     Patient Name: Yves Hastings  MRN: [id number]  Today's Date: 2/25/2025    Admit Date: 2/23/2025    Plan: Home with spouse   Discharge Needs Assessment       Row Name 02/25/25 1132       Living Environment    People in Home spouse    Current Living Arrangements home    Potentially Unsafe Housing Conditions none    Primary Care Provided by self    Provides Primary Care For no one    Family Caregiver if Needed spouse    Quality of Family Relationships involved;helpful;supportive    Able to Return to Prior Arrangements yes       Resource/Environmental Concerns    Resource/Environmental Concerns none    Transportation Concerns none       Transition Planning    Patient/Family Anticipates Transition to home with family    Patient/Family Anticipated Services at Transition none    Transportation Anticipated family or friend will provide       Discharge Needs Assessment    Readmission Within the Last 30 Days no previous admission in last 30 days    Equipment Currently Used at Home nebulizer;oxygen    Concerns to be Addressed no discharge needs identified;denies needs/concerns at this time    Anticipated Changes Related to Illness none    Equipment Needed After Discharge none                   Discharge Plan       Row Name 02/25/25 1133       Plan    Plan Home with spouse    Plan Comments CCP met with patient at bedside. CCP role explained and discharge planning discussed. Face sheet verified and IMM noted. Patient's PCP is Dr. Encinas. Patient lives with his wife. Patient has two steps to the entrance of his home. Patient's bedroom and bathroom are on the main level. Patient is independent with his ADLs and does not use DME. Patient has a nebulizer and oxygen concentrator at home but does not use it. Patient has used home health but was unsure of the agency. Patient denies any SNF. Patient does not anticipate needing HH/DME at this time and plans to return home. Patient  currently on room air. CCP will follow for any needs to arise. Jacinda ALMEIDA                  Continued Care and Services - Admitted Since 2/23/2025    No active coordination exists for this encounter.          Demographic Summary       Row Name 02/25/25 1132       General Information    Admission Type inpatient    Arrived From emergency department    Required Notices Provided Important Message from Medicare    Referral Source admission list    Reason for Consult discharge planning    Preferred Language English                   Functional Status       Row Name 02/25/25 1132       Functional Status    Usual Activity Tolerance good    Current Activity Tolerance good       Functional Status, IADL    Medications independent    Meal Preparation independent    Housekeeping independent    Laundry independent    Shopping independent       Mental Status Summary    Recent Changes in Mental Status/Cognitive Functioning no changes                   Psychosocial    No documentation.                  Abuse/Neglect    No documentation.                  Legal    No documentation.                  Substance Abuse    No documentation.                  Patient Forms    No documentation.                     JUAN PABLO Long

## 2025-02-25 NOTE — PROGRESS NOTES
[facility]  Cardiology Progress Note    Patient Identification:  Name: Yves Hastings  Age: 75 y.o.  Sex: male  :  1950  MRN: [id number]                 Follow Up / Chief Complaint: chf    Interval History: Resting in bed.  Shortness of breath improving.  No chest pain.         Objective:    Past Medical History:  Past Medical History:   Diagnosis Date    COPD (chronic obstructive pulmonary disease)     Emphysema of lung     HLD (hyperlipidemia)     [place] (hard of hearing)     ears hearing aids    HTN (hypertension)     Myelodysplastic syndrome, unspecified 2023    Myocardial infarction     Short-term memory loss     Sleep apnea     no machine    Stroke     Ventral hernia     sched repair     Past Surgical History:  Past Surgical History:   Procedure Laterality Date    CARDIAC CATHETERIZATION N/A 2018    Procedure: Left Heart Cath;  Surgeon: Mika Carranza MD;  [facility];  Service: Cardiology    CARDIAC CATHETERIZATION N/A 2018    Procedure: Left ventriculography;  Surgeon: Mika Carranza MD;  [facility];  Service: Cardiology    CARDIAC CATHETERIZATION N/A 2018    Procedure: Coronary angiography;  Surgeon: Mika Carranza MD;  Location:  [facility] CATH INVASIVE LOCATION;  Service: Cardiology    CARDIAC CATHETERIZATION  2018    Procedure: Functional Flow [place];  Surgeon: Mika Carranza MD;  Location:  [facility] CATH INVASIVE LOCATION;  Service: Cardiology    CARDIAC CATHETERIZATION N/A 2024    Procedure: Left Heart Cath;  Surgeon: Mika Carranza MD;  [facility];  Service: Cardiovascular;  Laterality: N/A;    CARDIAC CATHETERIZATION N/A 2024    Procedure: Left ventriculography;  Surgeon: Mika Carranza MD;  [facility];  Service: Cardiovascular;  Laterality: N/A;    CARDIAC CATHETERIZATION N/A 2024     Procedure: Coronary angiography;  Surgeon: Mika Carranza MD;  Location: [facility] CATH INVASIVE LOCATION;  Service: Cardiovascular;  Laterality: N/A;    COLONOSCOPY W/ POLYPECTOMY N/A 11/10/2021    Procedure: COLONOSCOPY; POLYPECTOMY;  Surgeon: Alexander Dobbs MD;  Location: [facility] OR;  Service: Gastroenterology;  Laterality: N/A;  DIVERTICULOSIS  POLYP    CORONARY STENT PLACEMENT      CYST REMOVAL      tailbone    INCISION AND DRAINAGE PERIRECTAL ABSCESS N/A 05/29/2021    Procedure: INCISION AND DRAINAGE OF PERIRECTAL ABSCESS;  Surgeon: Reddy Johnson Jr., MD;  Location: [facility] MAIN OR;  Service: General;  Laterality: N/A;    UMBILICAL HERNIA REPAIR      DR. CASEY - YEARS AGO    VEIN LIGATION AND STRIPPING BILATERAL      VENTRAL/INCISIONAL HERNIA REPAIR N/A 02/04/2019    Procedure: VENTRAL/INCISIONAL HERNIA REPAIR LAPAROSCOPIC;  Surgeon: Adelfo Nieves MD;  Location: [facility] OR;  Service: General        Social History:   Social History     Tobacco Use    Smoking status: Some Days     Current packs/day: 1.00     Average packs/day: 1 pack/day for 62.2 years (62.2 ttl pk-yrs)     Types: Cigarettes     Start date: 1963     Passive exposure: Past    Smokeless tobacco: Never    Tobacco comments:     Began smoking at age 13.  Smoked 1 ppd for 28 years and 2 ppd for 30 years for an 88 pack year history.     SMOKES 5 OR LESS CIGARETTES A DAY   Substance Use Topics    Alcohol use: Yes     Alcohol/week: 7.0 standard drinks of alcohol     Types: 7 Shots of liquor per week     Comment: hx of daily      Family History:  Family History   Problem Relation Age of Onset    Cancer Mother         breast    Heart disease Mother     Heart disease Father     Cancer Maternal Aunt     Heart disease Paternal Uncle     Malig Hyperthermia Neg Hx           Allergies:  No Known Allergies  Scheduled Meds:  amiodarone, 200 mg, Daily  arformoterol, 15 mcg, BID - RT   And  budesonide, 0.5 mg, BID - RT   And  revefenacin,  175 mcg, Daily - RT  atorvastatin, 80 mg, Daily  carvedilol, 3.125 mg, BID With Meals  ferrous sulfate, 325 mg, Daily With Breakfast  folic acid, 1 mg, Daily  furosemide, 40 mg, Daily  ipratropium-albuterol, 3 mL, Q6H While Awake - RT  losartan, 25 mg, Daily  methylPREDNISolone sodium succinate, 40 mg, Q8H  nicotine, 1 patch, Q24H  pantoprazole, 40 mg, Daily  rOPINIRole, 1 mg, Q12H  sertraline, 50 mg, Daily  spironolactone, 25 mg, Daily  thiamine (B-1) IV, 200 mg, Q8H   Followed by  [START ON 3/1/2025] thiamine, 100 mg, Daily  cyanocobalamin, 1,000 mcg, Daily            INTAKE AND OUTPUT:    Intake/Output Summary (Last 24 hours) at 2/25/2025 0857  Last data filed at 2/24/2025 2258  Gross per 24 hour   Intake 1136.25 ml   Output 1770 ml   Net -633.75 ml       Review of Systems:     GI: no n/v  Cardiac: no cp  Pulmonary: improving shob    Constitutional:  Temp:  [97.6 °F (36.4 °C)-98.2 °F (36.8 °C)] 97.8 °F (36.6 °C)  Heart Rate:  [75-90] 80  Resp:  [18-22] 18  BP: (110-132)/(55-86) 114/63      Physical Exam:  General:  Alert, cooperative, appears in no acute distress  Respiratory: Clear to auscultation.  Normal respiratory effort and rate.  Cardiovascular: S1S2 Regular rate and rhythm. No murmur, rub or gallop.   Gastrointestinal: soft, non tender. Bowel sounds present.   Extremities: OBREGON x4. No pretibial pitting edema. Adequate musculoskeletal strength.   Neuro: AAO x3 CN II-XII grossly intact        Cardiographics       ECG: SR with LBBB    SR with PAC's and LBBB      Echocardiogram:   10/14/24  Interpretation Summary         Left ventricular ejection fraction appears to be 41 - 45%.    The left ventricular cavity is dilated.    The following left ventricular wall segments are hypokinetic: mid anterior, apical anterior, apical lateral, apical inferior, apical septal, basal inferoseptal, mid inferoseptal, apex hypokinetic, mid anteroseptal and basal inferoseptal.    Left ventricular diastolic function was normal.     "The right ventricular cavity is borderline dilated.    The left atrial cavity is mildly dilated.    The right atrial cavity is borderline dilated.    There is calcification of the aortic valve.    Estimated right ventricular systolic pressure from tricuspid regurgitation is mildly elevated (35-45 mmHg).         1/2024    Conclusion         Successful right and left coronary angiogram and LV gram    Early atherosclerotic plaque otherwise normal coronary artery    Severe LV dysfunction EF 20 to 25%       Lab Review   Results from last 7 days   Lab Units 02/25/25  0646 02/24/25  0752 02/23/25  1854   HSTROP T ng/L 18 12 19     Results from last 7 days   Lab Units 02/24/25  0752   MAGNESIUM mg/dL 2.2     Results from last 7 days   Lab Units 02/25/25  0646   SODIUM mmol/L 138   POTASSIUM mmol/L 4.4   BUN mg/dL 34*   CREATININE mg/dL 1.16   CALCIUM mg/dL 8.2*     @[id number]@  Results from last 7 days   Lab Units 02/25/25  0646 02/24/25  1616 02/24/25  0752 02/23/25  1754   WBC 10*3/mm3 5.58  --  3.46 4.29   HEMOGLOBIN g/dL 7.2* 6.8* 6.1* 6.8*   HEMATOCRIT % 20.8* 20.1* 18.4* 20.1*   PLATELETS 10*3/mm3 97*  --  105* 115*           Intake/Output Summary (Last 24 hours) at 2/25/2025 0857  Last data filed at 2/24/2025 2258  Gross per 24 hour   Intake 1136.25 ml   Output 1770 ml   Net -633.75 ml         Assessment/Plan:  1.  CAD with history of cabg 1/2023 at [facility]: no cp.  Cath in 1/2024 with early plaque otherwise normal cors.  2.  Pacemaker  3.  Cardiomyopathy: discussed in detail regarding low sodium/fluid diet. Conservative management.  4.  COPD  5.  Severe anemia: hgb yesterday 6.8 now 7.2. would like hgb to be close to 10.  6.  Myelodysplastic syndrome  7. Hypertension: stable   8. Tobacco use: He states he quit smoking approx. 9 days ago. He states his is working with his physician.    Give one unit of blood then give lasix.    )2/25/2025  WOO Saldana/Transcription:   \"Dictated " "utilizing Dragon dictation\".     "

## 2025-02-25 NOTE — SIGNIFICANT NOTE
02/25/25 0942   OTHER   Discipline physical therapist   Therapy Assessment/Plan (PT)   Criteria for Skilled Interventions Met (PT) no problems identified which require skilled intervention     76 yo admitted with hypoxia/anemia . Per adult pt profile, pt with no mobility issues PTA. Per adult PCS, pt is up indep and with ampac score of 21. No indication for acute PT. Continue to facilitate HLM per ampac (10 steps or more)

## 2025-02-26 ENCOUNTER — READMISSION MANAGEMENT (OUTPATIENT)
Dept: CALL CENTER | Facility: HOSPITAL | Age: 75
End: 2025-02-26
Payer: OTHER GOVERNMENT

## 2025-02-26 VITALS
RESPIRATION RATE: 18 BRPM | OXYGEN SATURATION: 97 % | HEART RATE: 82 BPM | DIASTOLIC BLOOD PRESSURE: 69 MMHG | BODY MASS INDEX: 31.27 KG/M2 | HEIGHT: 71 IN | WEIGHT: 223.4 LBS | TEMPERATURE: 97.5 F | SYSTOLIC BLOOD PRESSURE: 124 MMHG

## 2025-02-26 PROBLEM — J96.01 ACUTE HYPOXEMIC RESPIRATORY FAILURE: Status: RESOLVED | Noted: 2025-02-23 | Resolved: 2025-02-26

## 2025-02-26 LAB
ANION GAP SERPL CALCULATED.3IONS-SCNC: 14.5 MMOL/L (ref 5–15)
BASOPHILS # BLD AUTO: 0.01 10*3/MM3 (ref 0–0.2)
BASOPHILS NFR BLD AUTO: 0.1 % (ref 0–1.5)
BH BB BLOOD EXPIRATION DATE: NORMAL
BH BB BLOOD TYPE BARCODE: 5100
BH BB DISPENSE STATUS: NORMAL
BH BB PRODUCT CODE: NORMAL
BH BB UNIT NUMBER: NORMAL
BUN SERPL-MCNC: 33 MG/DL (ref 8–23)
BUN/CREAT SERPL: 26 (ref 7–25)
CALCIUM SPEC-SCNC: 8.5 MG/DL (ref 8.6–10.5)
CHLORIDE SERPL-SCNC: 105 MMOL/L (ref 98–107)
CO2 SERPL-SCNC: 20.5 MMOL/L (ref 22–29)
CREAT SERPL-MCNC: 1.27 MG/DL (ref 0.76–1.27)
CROSSMATCH INTERPRETATION: NORMAL
CYTO UR: NORMAL
DEPRECATED RDW RBC AUTO: 80.6 FL (ref 37–54)
EGFRCR SERPLBLD CKD-EPI 2021: 59.3 ML/MIN/1.73
EOSINOPHIL # BLD AUTO: 0 10*3/MM3 (ref 0–0.4)
EOSINOPHIL NFR BLD AUTO: 0 % (ref 0.3–6.2)
ERYTHROCYTE [DISTWIDTH] IN BLOOD BY AUTOMATED COUNT: 23.7 % (ref 12.3–15.4)
GLUCOSE SERPL-MCNC: 183 MG/DL (ref 65–99)
HCT VFR BLD AUTO: 27 % (ref 37.5–51)
HGB BLD-MCNC: 9 G/DL (ref 13–17.7)
IMM GRANULOCYTES # BLD AUTO: 0.37 10*3/MM3 (ref 0–0.05)
IMM GRANULOCYTES NFR BLD AUTO: 4.8 % (ref 0–0.5)
LAB AP CASE REPORT: NORMAL
LYMPHOCYTES # BLD AUTO: 0.64 10*3/MM3 (ref 0.7–3.1)
LYMPHOCYTES NFR BLD AUTO: 8.3 % (ref 19.6–45.3)
MCH RBC QN AUTO: 33.2 PG (ref 26.6–33)
MCHC RBC AUTO-ENTMCNC: 33.3 G/DL (ref 31.5–35.7)
MCV RBC AUTO: 99.6 FL (ref 79–97)
MONOCYTES # BLD AUTO: 0.22 10*3/MM3 (ref 0.1–0.9)
MONOCYTES NFR BLD AUTO: 2.9 % (ref 5–12)
NEUTROPHILS NFR BLD AUTO: 6.47 10*3/MM3 (ref 1.7–7)
NEUTROPHILS NFR BLD AUTO: 83.9 % (ref 42.7–76)
PATH REPORT.FINAL DX SPEC: NORMAL
PATH REPORT.GROSS SPEC: NORMAL
PLATELET # BLD AUTO: 123 10*3/MM3 (ref 140–450)
PMV BLD AUTO: 11.2 FL (ref 6–12)
POTASSIUM SERPL-SCNC: 4.4 MMOL/L (ref 3.5–5.2)
RBC # BLD AUTO: 2.71 10*6/MM3 (ref 4.14–5.8)
SODIUM SERPL-SCNC: 140 MMOL/L (ref 136–145)
UNIT  ABO: NORMAL
UNIT  RH: NORMAL
WBC NRBC COR # BLD AUTO: 7.71 10*3/MM3 (ref 3.4–10.8)

## 2025-02-26 PROCEDURE — 25010000002 THIAMINE HCL 200 MG/2ML SOLUTION: Performed by: NURSE PRACTITIONER

## 2025-02-26 PROCEDURE — 25010000002 METHYLPREDNISOLONE PER 40 MG: Performed by: NURSE PRACTITIONER

## 2025-02-26 PROCEDURE — 63710000001 REVEFENACIN 175 MCG/3ML SOLUTION: Performed by: INTERNAL MEDICINE

## 2025-02-26 PROCEDURE — 80048 BASIC METABOLIC PNL TOTAL CA: CPT | Performed by: STUDENT IN AN ORGANIZED HEALTH CARE EDUCATION/TRAINING PROGRAM

## 2025-02-26 PROCEDURE — 85025 COMPLETE CBC W/AUTO DIFF WBC: CPT | Performed by: STUDENT IN AN ORGANIZED HEALTH CARE EDUCATION/TRAINING PROGRAM

## 2025-02-26 PROCEDURE — 94799 UNLISTED PULMONARY SVC/PX: CPT

## 2025-02-26 PROCEDURE — 25010000002 FUROSEMIDE PER 20 MG

## 2025-02-26 RX ORDER — METOPROLOL SUCCINATE 25 MG/1
25 TABLET, EXTENDED RELEASE ORAL
Qty: 30 TABLET | Refills: 1 | Status: SHIPPED | OUTPATIENT
Start: 2025-02-27

## 2025-02-26 RX ORDER — HYDROXYZINE HYDROCHLORIDE 25 MG/1
25 TABLET, FILM COATED ORAL 3 TIMES DAILY PRN
Qty: 30 TABLET | Refills: 0 | Status: SHIPPED | OUTPATIENT
Start: 2025-02-26

## 2025-02-26 RX ORDER — LANOLIN ALCOHOL/MO/W.PET/CERES
100 CREAM (GRAM) TOPICAL DAILY
Qty: 30 TABLET | Refills: 1 | Status: SHIPPED | OUTPATIENT
Start: 2025-03-01

## 2025-02-26 RX ORDER — AMIODARONE HYDROCHLORIDE 200 MG/1
200 TABLET ORAL DAILY
Qty: 30 TABLET | Refills: 0 | Status: SHIPPED | OUTPATIENT
Start: 2025-02-26

## 2025-02-26 RX ORDER — ROPINIROLE 0.5 MG/1
1 TABLET, FILM COATED ORAL DAILY
Start: 2025-02-26

## 2025-02-26 RX ORDER — FUROSEMIDE 40 MG/1
40 TABLET ORAL DAILY
Qty: 30 TABLET | Refills: 0
Start: 2025-02-26

## 2025-02-26 RX ORDER — PANTOPRAZOLE SODIUM 20 MG/1
20 TABLET, DELAYED RELEASE ORAL DAILY
Start: 2025-02-26

## 2025-02-26 RX ORDER — FUROSEMIDE 40 MG/1
40 TABLET ORAL DAILY
Status: DISCONTINUED | OUTPATIENT
Start: 2025-02-26 | End: 2025-02-26 | Stop reason: HOSPADM

## 2025-02-26 RX ORDER — FOLIC ACID 1 MG/1
1 TABLET ORAL DAILY
Qty: 30 TABLET | Refills: 0 | Status: SHIPPED | OUTPATIENT
Start: 2025-02-27

## 2025-02-26 RX ORDER — FUROSEMIDE 40 MG/1
20 TABLET ORAL DAILY
Start: 2025-02-26 | End: 2025-02-26

## 2025-02-26 RX ORDER — LOSARTAN POTASSIUM 25 MG/1
25 TABLET ORAL DAILY
Qty: 30 TABLET | Refills: 1 | Status: SHIPPED | OUTPATIENT
Start: 2025-02-26

## 2025-02-26 RX ORDER — PREDNISONE 20 MG/1
40 TABLET ORAL DAILY
Qty: 6 TABLET | Refills: 0 | Status: SHIPPED | OUTPATIENT
Start: 2025-02-27 | End: 2025-03-02

## 2025-02-26 RX ADMIN — PANTOPRAZOLE SODIUM 40 MG: 40 TABLET, DELAYED RELEASE ORAL at 08:18

## 2025-02-26 RX ADMIN — FOLIC ACID 1 MG: 1 TABLET ORAL at 08:18

## 2025-02-26 RX ADMIN — BUDESONIDE 0.5 MG: 0.5 INHALANT RESPIRATORY (INHALATION) at 06:51

## 2025-02-26 RX ADMIN — SPIRONOLACTONE 25 MG: 25 TABLET ORAL at 08:18

## 2025-02-26 RX ADMIN — Medication 1000 MCG: at 08:18

## 2025-02-26 RX ADMIN — REVEFENACIN 175 MCG: 175 SOLUTION RESPIRATORY (INHALATION) at 06:52

## 2025-02-26 RX ADMIN — NICOTINE 1 PATCH: 21 PATCH TRANSDERMAL at 08:19

## 2025-02-26 RX ADMIN — AMIODARONE HYDROCHLORIDE 200 MG: 200 TABLET ORAL at 08:18

## 2025-02-26 RX ADMIN — METOPROLOL SUCCINATE 25 MG: 25 TABLET, EXTENDED RELEASE ORAL at 08:18

## 2025-02-26 RX ADMIN — FUROSEMIDE 40 MG: 40 TABLET ORAL at 11:33

## 2025-02-26 RX ADMIN — ROPINIROLE HYDROCHLORIDE 1 MG: 0.5 TABLET, FILM COATED ORAL at 08:19

## 2025-02-26 RX ADMIN — FERROUS SULFATE TAB 325 MG (65 MG ELEMENTAL FE) 325 MG: 325 (65 FE) TAB at 08:18

## 2025-02-26 RX ADMIN — SERTRALINE HYDROCHLORIDE 50 MG: 50 TABLET, FILM COATED ORAL at 08:18

## 2025-02-26 RX ADMIN — THIAMINE HYDROCHLORIDE 200 MG: 100 INJECTION, SOLUTION INTRAMUSCULAR; INTRAVENOUS at 06:19

## 2025-02-26 RX ADMIN — IPRATROPIUM BROMIDE AND ALBUTEROL SULFATE 3 ML: .5; 3 SOLUTION RESPIRATORY (INHALATION) at 06:51

## 2025-02-26 RX ADMIN — METHYLPREDNISOLONE SODIUM SUCCINATE 40 MG: 40 INJECTION, POWDER, FOR SOLUTION INTRAMUSCULAR; INTRAVENOUS at 06:19

## 2025-02-26 RX ADMIN — EMPAGLIFLOZIN 10 MG: 10 TABLET, FILM COATED ORAL at 08:18

## 2025-02-26 RX ADMIN — ATORVASTATIN CALCIUM 80 MG: 20 TABLET, FILM COATED ORAL at 08:18

## 2025-02-26 RX ADMIN — Medication 10 ML: at 08:19

## 2025-02-26 NOTE — PROGRESS NOTES
Enter Query Response Below      Query Response: Acute on chronic systolic heart failure              If applicable, please update the problem list.

## 2025-02-26 NOTE — PROGRESS NOTES
Enter Query Response Below      Query Response: COPD without exacerbation              If applicable, please update the problem list.

## 2025-02-26 NOTE — DISCHARGE SUMMARY
"    Patient Name: Yves Hastings  : 1950  MRN: [id number]    Date of Admission: 2025  Date of Discharge:  2025  Primary Care Physician: Brenda Downs APRN      Chief Complaint:   Shortness of Breath and Chest Pain      Discharge Diagnoses     Active Hospital Problems    Diagnosis  POA    Acute anemia [D64.9]  Yes    Acute on chronic heart failure with preserved ejection fraction (HFpEF) [I50.33]  Yes    RLS (restless legs syndrome) [G25.81]  Yes    Acute on chronic anemia [D64.9]  Yes    Essential hypertension [I10]  Yes    Chronic obstructive lung disease [J44.9]  Yes      Resolved Hospital Problems    Diagnosis Date Resolved POA    **Acute hypoxemic respiratory failure [J96.01] 2025 Yes        Admitting HPI     \"75 year old gentleman presented to the ED with shortness of breath, cough and tightness of his chest for the last two days; he feels worse with exertion; tightness is worse with a deep breath and cough; no fever or chills; he is followed by dr armenta \"    Hospital Course     Pt admitted for dyspnea, seen in consultation with cardiology and GI.  Found to be anemic on admission suspected due to MDS, received 2 units of PRBC with improvement in his hemoglobin.  Will need follow-up with regular hematologist  postdischarge.  Underwent EGD which was unremarkable and did not show any source of bleeding.  Was treated for volume overload as well with IV diuretics with vast improvement in his symptoms.  He will follow-up in a couple weeks with cardiology.  This point he is symptomatically improved, breathing comfortably on room air and stable for discharge home.    Discharge Plan     Acute hypoxemic respiratory failure  -Oxygen saturation 84% on room air improved with supplemental oxygen  -Respiratory viral panel negative  -multifactorial due to acute anemia in the setting of COPD and acute on chronic HFpEF     Acute on chronic heart failure with preserved ejection " fraction (HFpEF)  Essential hypertension  -Most recent LVEF 41 to 45%   -cardiology followed  -Losartan, metoprolol, spironolactone, lasix     Acute on chronic anemia  -Followed by heme-onc--Dr. Adame--for MDS  -Family states patient requires blood transfusions every couple weeks  -GI eval'd    -EGD 2/25 no source bleeding      RLS (restless legs syndrome)  --Requip      Chronic alcohol dependence  -Family states last intake of alcohol on 2/20/2025      COPD  -provide scheduled & as needed breathing treatments    Day of Discharge     Physical Exam:  Temp:  [97.5 °F (36.4 °C)-98.1 °F (36.7 °C)] 97.5 °F (36.4 °C)  Heart Rate:  [67-82] 82  Resp:  [16-20] 18  BP: (108-124)/(63-70) 124/69  Body mass index is 31.16 kg/m².  Physical Exam  Constitutional:       Appearance: He is ill-appearing.   Pulmonary:      Effort: Pulmonary effort is normal. No respiratory distress.      Breath sounds: No stridor.   Skin:     Coloration: Skin is not pale.   Neurological:      Mental Status: He is alert and oriented to person, place, and time.     Consultants     Consult Orders (all) (From admission, onward)       Start     Ordered    02/23/25 2155  Inpatient Gastroenterology Consult  Once        Specialty:  Gastroenterology  Provider:  Brittny Ricks MD    02/23/25 2154 02/23/25 1849  [facility] (on-call MD unless specified) Details  Once        Specialty:  Hospitalist  Provider:  (Not yet assigned)    02/23/25 1848 02/23/25 1849  Cardiology (on-call MD unless specified)  Once        Specialty:  Cardiology  Provider:  Mika Carranza MD    02/23/25 1848                  Procedures     ESOPHAGOGASTRODUODENOSCOPY WITH COLD BIOPSY      Imaging Results (All)       Procedure Component Value Units Date/Time    XR Chest 1 View [[id number]] Collected: 02/23/25 1800     Updated: 02/23/25 1808    Narrative:      CXR ONE VIEW      HISTORY: SOA Triage Protocol; J96.01-Acute respiratory failure with  hypoxia     COMPARISON: 1/10/2024      TECHNIQUE: single portable AP       Impression:         The heart size is within normal limits. Prior sternotomy. Dual-lead left  subclavian approach transvenous pacemaker device remains in place.     There is mild cardiomegaly.     There is bilateral vascular congestion, right slightly greater than  left, and there is a small right pleural effusion.     There is no dense consolidation, and no pneumothorax.     This report was finalized on 2/23/2025 6:05 PM by Dr. Adelfo Sorenson M.D on Workstation: [id number]               Results for orders placed during the hospital encounter of 10/14/24    Adult Transthoracic Echo Limited W/ Cont if Necessary Per Protocol    Interpretation Summary    Left ventricular ejection fraction appears to be 41 - 45%.    The left ventricular cavity is dilated.    The following left ventricular wall segments are hypokinetic: mid anterior, apical anterior, apical lateral, apical inferior, apical septal, basal inferoseptal, mid inferoseptal, apex hypokinetic, mid anteroseptal and basal inferoseptal.    Left ventricular diastolic function was normal.    The right ventricular cavity is borderline dilated.    The left atrial cavity is mildly dilated.    The right atrial cavity is borderline dilated.    There is calcification of the aortic valve.    Estimated right ventricular systolic pressure from tricuspid regurgitation is mildly elevated (35-45 mmHg).    Pertinent Labs     Results from last 7 days   Lab Units 02/26/25  0933 02/25/25  1910 02/25/25  0646 02/24/25  1616 02/24/25  0752 02/23/25  1754   WBC 10*3/mm3 7.71  --  5.58  --  3.46 4.29   HEMOGLOBIN g/dL 9.0* 8.9* 7.2* 6.8* 6.1* 6.8*   PLATELETS 10*3/mm3 123*  --  97*  --  105* 115*     Results from last 7 days   Lab Units 02/26/25  0933 02/25/25  0646 02/24/25  0752 02/23/25  1754   SODIUM mmol/L 140 138 129* 133*   POTASSIUM mmol/L 4.4 4.4 4.1 4.5   CHLORIDE mmol/L 105 105 97* 101   CO2 mmol/L 20.5* 21.0* 19.7* 23.0   BUN mg/dL  "33* 34* 30* 21   CREATININE mg/dL 1.27 1.16 1.25 1.12   GLUCOSE mg/dL 183* 175* 201* 97   EGFR mL/min/1.73 59.3* 66.1 60.4 68.9     Results from last 7 days   Lab Units 02/23/25  1754   ALBUMIN g/dL 4.2   BILIRUBIN mg/dL 0.7   ALK PHOS U/L 103   AST (SGOT) U/L 14   ALT (SGPT) U/L 10     Results from last 7 days   Lab Units 02/26/25  0933 02/25/25  0646 02/24/25  0752 02/23/25  1754   CALCIUM mg/dL 8.5* 8.2* 8.9 9.0   ALBUMIN g/dL  --   --   --  4.2   MAGNESIUM mg/dL  --   --  2.2  --        Results from last 7 days   Lab Units 02/25/25  0849 02/25/25  0646 02/24/25  0752 02/23/25  1854 02/23/25  1754   HSTROP T ng/L 17 18 12 19 20   PROBNP pg/mL  --   --   --   --  3,590.0*           Invalid input(s): \"LDLCALC\"      Results from last 7 days   Lab Units 02/23/25  1756   COVID19  Not Detected       Test Results Pending at Discharge     Pending Labs       Order Current Status    Tissue Pathology Exam In process            Discharge Details        Discharge Medications        New Medications        Instructions Start Date   empagliflozin 10 MG tablet tablet  Commonly known as: JARDIANCE   10 mg, Oral, Daily   Start Date: February 27, 2025     folic acid 1 MG tablet  Commonly known as: FOLVITE   1 mg, Oral, Daily   Start Date: February 27, 2025     metoprolol succinate XL 25 MG 24 hr tablet  Commonly known as: TOPROL-XL   25 mg, Oral, Every 24 Hours Scheduled   Start Date: February 27, 2025     predniSONE 20 MG tablet  Commonly known as: DELTASONE   40 mg, Oral, Daily   Start Date: February 27, 2025     thiamine 100 MG tablet  Commonly known as: VITAMIN B1   100 mg, Oral, Daily   Start Date: March 1, 2025            Changes to Medications        Instructions Start Date   furosemide 40 MG tablet  Commonly known as: LASIX  What changed: how much to take   40 mg, Oral, Daily      pantoprazole 20 MG EC tablet  Commonly known as: PROTONIX  What changed:   medication strength  how much to take   20 mg, Oral, Daily         "     Continue These Medications        Instructions Start Date   albuterol sulfate  (90 Base) MCG/ACT inhaler  Commonly known as: PROVENTIL HFA;VENTOLIN HFA;PROAIR HFA   2 puffs, Every 4 Hours PRN      amiodarone 200 MG tablet  Commonly known as: PACERONE   200 mg, Oral, Daily      aspirin 81 MG EC tablet   aspirin 81 mg tablet,delayed release   TAKE 1 TABLET BY MOUTH DAILY      atorvastatin 80 MG tablet  Commonly known as: LIPITOR   1 tablet, Daily      cyanocobalamin 1000 MCG tablet  Commonly known as: VITAMIN B-12   1,000 mcg, Daily      Ferrocite 324 (106 Fe) MG tablet  Generic drug: Ferrous Fumarate   1 tablet, Every Other Day      ferrous gluconate 324 MG tablet  Commonly known as: FERGON   1 tablet      HYDROcodone-acetaminophen 5-325 MG per tablet  Commonly known as: NORCO   1 tablet, As Needed      hydrOXYzine 25 MG tablet  Commonly known as: ATARAX   25 mg, Oral, 3 Times Daily PRN      ipratropium-albuterol 0.5-2.5 mg/3 ml nebulizer  Commonly known as: DUO-NEB   ipratropium 0.5 mg-albuterol 3 mg (2.5 mg base)/3 mL nebulization soln      losartan 25 MG tablet  Commonly known as: COZAAR   25 mg, Oral, Daily      nitroglycerin 0.4 MG SL tablet  Commonly known as: NITROSTAT   0.4 mg, Sublingual, Every 5 Minutes PRN, Take no more than 3 doses in 15 minutes.      potassium chloride 10 MEQ CR tablet   20 mEq, Oral, Daily      rOPINIRole 0.5 MG tablet  Commonly known as: REQUIP   1 mg, Oral, Daily      sertraline 50 MG tablet  Commonly known as: ZOLOFT   1 tablet, Daily      spironolactone 25 MG tablet  Commonly known as: ALDACTONE   25 mg, Daily      Trelegy Ellipta 100-62.5-25 MCG/ACT inhaler  Generic drug: Fluticasone-Umeclidin-Vilant              Stop These Medications      carvedilol 3.125 MG tablet  Commonly known as: COREG              No Known Allergies    Discharge Disposition:  Home or Self Care      Discharge Diet:  Diet Order   Procedures    Diet: Regular/House; Fluid Consistency: Thin (IDDSI 0)        Discharge Activity:   Activity Instructions       Activity as Tolerated              CODE STATUS:    Code Status and Medical Interventions: CPR (Attempt to Resuscitate); Full   Ordered at: 02/23/25 9737     Code Status (Patient has no pulse and is not breathing):    CPR (Attempt to Resuscitate)     Medical Interventions (Patient has pulse or is breathing):    Full       Future Appointments   Date Time Provider Department Center   3/11/2025 12:45 PM Mika Carranza MD [facility]   5/6/2025 11:30 AM [facility] DEVICE CHECK [facility]      Follow-up Information       Brenda Downs, APRN .    Specialty: Family Medicine  Contact information:  [address] [id number] [phone]                             Time Spent on Discharge:  Greater than 30 minutes spent on discharge management including final examination, discussion of hospital stay and patient education, preparation of records, medication reconciliation, follow up planning      Ignacio Anderson MD  [facility]  02/26/25  11:24 EST

## 2025-02-26 NOTE — PROGRESS NOTES
[facility]  Cardiology Progress Note    Patient Identification:  Name: Yves Hastings  Age: 75 y.o.  Sex: male  :  1950  MRN: [id number]                 Follow Up / Chief Complaint: chf    Interval History: sitting up in chair, on room air, no shortness of breath or chest pain.          Objective:    Past Medical History:  Past Medical History:   Diagnosis Date    COPD (chronic obstructive pulmonary disease)     Emphysema of lung     HLD (hyperlipidemia)     [place]izen [place] (hard of hearing)     ears hearing aids    HTN (hypertension)     Myelodysplastic syndrome, unspecified 2023    Myocardial infarction     Short-term memory loss     Sleep apnea     no machine    Stroke     Ventral hernia     sched repair     Past Surgical History:  Past Surgical History:   Procedure Laterality Date    CARDIAC CATHETERIZATION N/A 2018    Procedure: Left Heart Cath;  Surgeon: Mika Carranza MD;  Location[facility] C[facility];  Service: Cardiology    CARDIAC CATHETERIZATION N/A 2018    Procedure: Left ventriculography;  Surgeon: Mika Carranza MD;  [facility];  Service: Cardiology    CARDIAC CATHETERIZATION N/A 2018    Procedure: Coronary angiography;  Surgeon: Mika Carranza MD;  Location:  [facility] CATH INVASIVE LOCATION;  Service: Cardiology    CARDIAC CATHETERIZATION  2018    Procedure: Functional Flow [place];  Surgeon: Mika Carranza MD;  [facility];  Service: Cardiology    CARDIAC CATHETERIZATION N/A 2024    Procedure: Left Heart Cath;  Surgeon: Mika Carranza MD;  [facility];  Service: Cardiovascular;  Laterality: N/A;    CARDIAC CATHETERIZATION N/A 2024    Procedure: Left ventriculography;  Surgeon: Mika Carranza MD;  Location:  [facility] CATH INVASIVE LOCATION;  Service: Cardiovascular;  Laterality: N/A;    CARDIAC CATHETERIZATION N/A  1/12/2024    Procedure: Coronary angiography;  Surgeon: Mika Carranza MD;  Location: [facility] CATH INVASIVE LOCATION;  Service: Cardiovascular;  Laterality: N/A;    COLONOSCOPY W/ POLYPECTOMY N/A 11/10/2021    Procedure: COLONOSCOPY; POLYPECTOMY;  Surgeon: Alexander Dobbs MD;  Location: [facility] OR;  Service: Gastroenterology;  Laterality: N/A;  DIVERTICULOSIS  POLYP    CORONARY STENT PLACEMENT      CYST REMOVAL      tailbone    ENDOSCOPY N/A 2/25/2025    Procedure: ESOPHAGOGASTRODUODENOSCOPY WITH COLD BIOPSY;  Surgeon: Amilcar Lozada MD;  Location: [facility] ENDOSCOPY;  Service: Gastroenterology;  Laterality: N/A;  PRE: UPPER GI BLEED RULEOUT  POST: DUODENITIS    INCISION AND DRAINAGE PERIRECTAL ABSCESS N/A 05/29/2021    Procedure: INCISION AND DRAINAGE OF PERIRECTAL ABSCESS;  Surgeon: Reddy Johnson Jr., MD;  Location: [facility] MAIN OR;  Service: General;  Laterality: N/A;    UMBILICAL HERNIA REPAIR      DR. CASEY - YEARS AGO    VEIN LIGATION AND STRIPPING BILATERAL      VENTRAL/INCISIONAL HERNIA REPAIR N/A 02/04/2019    Procedure: VENTRAL/INCISIONAL HERNIA REPAIR LAPAROSCOPIC;  Surgeon: Adelfo Nieves MD;  Location: [facility] OR;  Service: General        Social History:   Social History     Tobacco Use    Smoking status: Some Days     Current packs/day: 1.00     Average packs/day: 1 pack/day for 62.2 years (62.2 ttl pk-yrs)     Types: Cigarettes     Start date: 1963     Passive exposure: Past    Smokeless tobacco: Never    Tobacco comments:     Began smoking at age 13.  Smoked 1 ppd for 28 years and 2 ppd for 30 years for an 88 pack year history.     SMOKES 5 OR LESS CIGARETTES A DAY   Substance Use Topics    Alcohol use: Yes     Alcohol/week: 7.0 standard drinks of alcohol     Types: 7 Shots of liquor per week     Comment: hx of daily      Family History:  Family History   Problem Relation Age of Onset    Cancer Mother         breast    Heart disease Mother     Heart disease Father     Cancer  Maternal Aunt     Heart disease Paternal Uncle     Malig Hyperthermia Neg Hx           Allergies:  No Known Allergies  Scheduled Meds:  amiodarone, 200 mg, Daily  arformoterol, 15 mcg, BID - RT   And  budesonide, 0.5 mg, BID - RT   And  revefenacin, 175 mcg, Daily - RT  atorvastatin, 80 mg, Daily  empagliflozin, 10 mg, Daily  ferrous sulfate, 325 mg, Daily With Breakfast  folic acid, 1 mg, Daily  furosemide, 40 mg, Daily  ipratropium-albuterol, 3 mL, Q6H While Awake - RT  losartan, 25 mg, Daily  methylPREDNISolone sodium succinate, 40 mg, Q8H  metoprolol succinate XL, 25 mg, Q24H  nicotine, 1 patch, Q24H  pantoprazole, 40 mg, Daily  rOPINIRole, 1 mg, Q12H  sertraline, 50 mg, Daily  sodium chloride, 10 mL, Q12H  spironolactone, 25 mg, Daily  thiamine (B-1) IV, 200 mg, Q8H   Followed by  [START ON 3/1/2025] thiamine, 100 mg, Daily  cyanocobalamin, 1,000 mcg, Daily            INTAKE AND OUTPUT:    Intake/Output Summary (Last 24 hours) at 2/26/2025 0949  Last data filed at 2/26/2025 0622  Gross per 24 hour   Intake 100 ml   Output 2025 ml   Net -1925 ml       Review of Systems:   GI: no n/v or abd pain  Cardiac: no chest pain or palpitations  Pulmonary: no shortness of breath or cough      Constitutional:  Temp:  [97.5 °F (36.4 °C)-98.1 °F (36.7 °C)] 97.5 °F (36.4 °C)  Heart Rate:  [67-82] 82  Resp:  [16-20] 18  BP: (108-124)/(63-70) 124/69    Physical Exam:  General:  Alert, cooperative, appears in no acute distress  Respiratory: Clear to auscultation.  Normal respiratory effort and rate.  Cardiovascular: S1S2 Regular rate and rhythm. No murmur, rub or gallop.   Gastrointestinal: soft, non tender. Bowel sounds present.   Extremities: OBREGON x4. No pretibial pitting edema. Adequate musculoskeletal strength.   Neuro: AAO x3 CN II-XII grossly intact                Cardiographics     Echocardiogram:   10/14/24  Interpretation Summary         Left ventricular ejection fraction appears to be 41 - 45%.    The left ventricular  cavity is dilated.    The following left ventricular wall segments are hypokinetic: mid anterior, apical anterior, apical lateral, apical inferior, apical septal, basal inferoseptal, mid inferoseptal, apex hypokinetic, mid anteroseptal and basal inferoseptal.    Left ventricular diastolic function was normal.    The right ventricular cavity is borderline dilated.    The left atrial cavity is mildly dilated.    The right atrial cavity is borderline dilated.    There is calcification of the aortic valve.    Estimated right ventricular systolic pressure from tricuspid regurgitation is mildly elevated (35-45 mmHg).         Lab Review   Results from last 7 days   Lab Units 02/25/25  0849 02/25/25  0646 02/24/25  0752   HSTROP T ng/L 17 18 12     Results from last 7 days   Lab Units 02/24/25  0752   MAGNESIUM mg/dL 2.2     Results from last 7 days   Lab Units 02/25/25  0646   SODIUM mmol/L 138   POTASSIUM mmol/L 4.4   BUN mg/dL 34*   CREATININE mg/dL 1.16   CALCIUM mg/dL 8.2*     @LABRCNTIPbnp@  Results from last 7 days   Lab Units 02/25/25  1910 02/25/25  0646 02/24/25  1616 02/24/25  0752 02/23/25  1754   WBC 10*3/mm3  --  5.58  --  3.46 4.29   HEMOGLOBIN g/dL 8.9* 7.2* 6.8* 6.1* 6.8*   HEMATOCRIT % 26.3* 20.8* 20.1* 18.4* 20.1*   PLATELETS 10*3/mm3  --  97*  --  105* 115*           Intake/Output Summary (Last 24 hours) at 2/26/2025 0949  Last data filed at 2/26/2025 0622  Gross per 24 hour   Intake 100 ml   Output 2025 ml   Net -1925 ml         Assessment/Plan:  1.  CAD with history of cabg 1/2023 at [facility]: cath in Jan 2024 early plaque otherwise normal cors. no chest pain, trop negative. Asa on hold due to anemia, continue bb, statin  2.  Pacemaker  3.  Cardiomyopathy: continue gdmt as able, continue toprol, losartan, jardiance, sprinolactone. On lasix 40mg IV daily, cr stable, k wnl.    4.  COPD  5.  Severe anemia: hgb 8.9, plt 97  6.  Myelodysplastic syndrome  7. Hypertension: bp stable    Volume stable on exam,  "switched to po lasix  No shortness of breath or chest pain  Ok for discharge from cardiac standpoint. Follow up March 11 in [place]    )2/26/2025  WOO [place]/Transcription:   \"Dictated utilizing Dragon dictation\".     "

## 2025-02-26 NOTE — PLAN OF CARE
Goal Outcome Evaluation:  Plan of Care Reviewed With: patient        Progress: improving  Outcome Evaluation: VSS overnight, HGB improved to 8.9 overnight. Ambulating independently. C/o pain BLE associated with restless leg syndrome. Anticipating discharge today. Will continue to monitor patient and await further orders.

## 2025-02-26 NOTE — SIGNIFICANT NOTE
Case Management Discharge Note      Final Note: (P) Home         Selected Continued Care - Discharged on 2/26/2025 Admission date: 2/23/2025 - Discharge disposition: Home or Self Care      Destination    No services have been selected for the patient.                Durable Medical Equipment    No services have been selected for the patient.                Dialysis/Infusion    No services have been selected for the patient.                Home Medical Care    No services have been selected for the patient.                Therapy    No services have been selected for the patient.                Community Resources    No services have been selected for the patient.                Community & DME    No services have been selected for the patient.                         Final Discharge Disposition Code: (P) 01 - home or self-care

## 2025-02-27 ENCOUNTER — TELEPHONE (OUTPATIENT)
Dept: GASTROENTEROLOGY | Facility: CLINIC | Age: 75
End: 2025-02-27
Payer: OTHER GOVERNMENT

## 2025-02-27 NOTE — TELEPHONE ENCOUNTER
----- Message from Amilcar Lozada sent at 2/27/2025  7:30 AM EST -----  Reviewed  No action needed

## 2025-02-27 NOTE — OUTREACH NOTE
Prep Survey      Flowsheet Row Responses   [facility] facility patient discharged from? [place]   Is LACE score < 7 ? No   Eligibility Readm Mgmt   Discharge diagnosis Acute hypoxemic resp failure-EGD this visit   Does the patient have one of the following disease processes/diagnoses(primary or secondary)? Other   Does the patient have Home health ordered? No   Is there a DME ordered? No   Prep survey completed? Yes            MONET RODRIGUEZ - Registered Nurse

## 2025-03-03 ENCOUNTER — READMISSION MANAGEMENT (OUTPATIENT)
Dept: CALL CENTER | Facility: HOSPITAL | Age: 75
End: 2025-03-03
Payer: OTHER GOVERNMENT

## 2025-03-03 NOTE — OUTREACH NOTE
Medical Week 1 Survey      Flowsheet Row Responses   [facility] patient discharged from? [place]   Does the patient have one of the following disease processes/diagnoses(primary or secondary)? Other   Week 1 attempt successful? No   Unsuccessful attempts Attempt 1            Alla Moura Registered Nurse

## 2025-03-07 ENCOUNTER — READMISSION MANAGEMENT (OUTPATIENT)
Dept: CALL CENTER | Facility: HOSPITAL | Age: 75
End: 2025-03-07
Payer: OTHER GOVERNMENT

## 2025-03-07 NOTE — OUTREACH NOTE
Medical Week 1 Survey      Flowsheet Row Responses   [facility] patient discharged from? [place]   Does the patient have one of the following disease processes/diagnoses(primary or secondary)? Other   Week 1 attempt successful? Yes   Call start time 1138   Call end time 1143   Discharge diagnosis Acute hypoxemic resp failure-EGD this visit   Person spoke with today (if not patient) and relationship rudi Avila   Meds reviewed with patient/caregiver? Yes   Is the patient having any side effects they believe may be caused by any medication additions or changes? No   Does the patient have all medications ordered at discharge? Yes   Is the patient taking all medications as directed (includes completed medication regime)? Yes   Does the patient have a primary care provider?  Yes   Does the patient have an appointment with their PCP within 7 days of discharge? Yes   Has the patient kept scheduled appointments due by today? Yes   Psychosocial issues? No   Did the patient receive a copy of their discharge instructions? Yes   Nursing interventions Reviewed instructions with patient   What is the patient's perception of their health status since discharge? Improving   Is the patient/caregiver able to teach back signs and symptoms related to disease process for when to call PCP? Yes   Is the patient/caregiver able to teach back signs and symptoms related to disease process for when to call 911? Yes   Is the patient/caregiver able to teach back the hierarchy of who to call/visit for symptoms/problems? PCP, Specialist, Home health nurse, Urgent Care, ED, 911 Yes   If the patient is a current smoker, are they able to teach back resources for cessation? Not a smoker   Week 1 call completed? Yes   Would this patient benefit from a Referral to Amb Social Work? No   Is the patient interested in additional calls from an ambulatory ? No   Wrap up additional comments rudi Avila states pt is doing better, and  denies pt has SOB/chest pain. Reviewed AVS/meds with dtr. Dtr shares pt had PCP fu appt, and seen Oncology. Dtr verified cardiology fu appt.   Call end time 1143            Melodie HILL - Registered Nurse

## 2025-03-11 ENCOUNTER — OFFICE VISIT (OUTPATIENT)
Dept: CARDIOLOGY | Facility: CLINIC | Age: 75
End: 2025-03-11
Payer: MEDICARE

## 2025-03-11 VITALS
WEIGHT: 227 LBS | SYSTOLIC BLOOD PRESSURE: 87 MMHG | HEIGHT: 71 IN | DIASTOLIC BLOOD PRESSURE: 53 MMHG | HEART RATE: 76 BPM | BODY MASS INDEX: 31.78 KG/M2

## 2025-03-11 DIAGNOSIS — E78.5 HYPERLIPIDEMIA, UNSPECIFIED HYPERLIPIDEMIA TYPE: ICD-10-CM

## 2025-03-11 DIAGNOSIS — Z79.899 ON AMIODARONE THERAPY: ICD-10-CM

## 2025-03-11 DIAGNOSIS — I25.10 ATHEROSCLEROSIS OF NATIVE CORONARY ARTERY OF NATIVE HEART WITHOUT ANGINA PECTORIS: ICD-10-CM

## 2025-03-11 DIAGNOSIS — I10 ESSENTIAL HYPERTENSION: Primary | ICD-10-CM

## 2025-03-11 NOTE — PROGRESS NOTES
Hospital follow up CHF   Subjective:        Yves Hastings is a 75 y.o. male who here for follow up    CC  Follow-up hypertension hyperlipidemia coronary artery disease  HPI  75-year-old male with atherosclerosis hypertension hyperlipidemia on amiodarone therapy here for the follow-up denies any chest pains or tightness in the chest patient was recently admitted to the hospital with congestive heart failure     Problems Addressed this Visit          Cardiac and Vasculature    Essential hypertension - Primary    Hyperlipidemia    Atherosclerosis of native coronary artery of native heart without angina pectoris    On amiodarone therapy     Diagnoses         Codes Comments      Essential hypertension    -  Primary ICD-10-CM: I10  ICD-9-CM: 401.9       On amiodarone therapy     ICD-10-CM: Z79.899  ICD-9-CM: V58.69       Hyperlipidemia, unspecified hyperlipidemia type     ICD-10-CM: E78.5  ICD-9-CM: 272.4       Atherosclerosis of native coronary artery of native heart without angina pectoris     ICD-10-CM: I25.10  ICD-9-CM: 414.01           .    The following portions of the patient's history were reviewed and updated as appropriate: allergies, current medications, past family history, past medical history, past social history, past surgical history and problem list.    Past Medical History:   Diagnosis Date    CHF (congestive heart failure)     COPD (chronic obstructive pulmonary disease)     Emphysema of lung     HLD (hyperlipidemia)     [place] (hard of hearing)     ears hearing aids    HTN (hypertension)     Myelodysplastic syndrome, unspecified 11/06/2023    Myocardial infarction     Short-term memory loss     Sleep apnea     no machine    Stroke     Ventral hernia     sched repair     reports that he has been smoking cigarettes. He started smoking about 62 years ago. He has a 62.2 pack-year smoking history. He has been exposed to tobacco smoke. He has never used smokeless tobacco. He reports current alcohol use of  "about 7.0 standard drinks of alcohol per week. He reports current drug use. Drug: Marijuana.   Family History   Problem Relation Age of Onset    Cancer Mother         breast    Heart disease Mother     Heart disease Father     Cancer Maternal Aunt     Heart disease Paternal Uncle     Malig Hyperthermia Neg Hx        Review of Systems  Constitutional: No wt loss, fever, fatigue  Gastrointestinal: No nausea, abdominal pain  Behavioral/Psych: No insomnia or anxiety   Cardiovascular shortness of breath  Objective:       Physical Exam  BP (!) 87/53   Pulse 76   Ht 180.3 cm (70.98\")   Wt 103 kg (227 lb)   BMI 31.67 kg/m²   General appearance: No acute changes   Neck: Trachea midline; NECK, supple, no thyromegaly or lymphadenopathy   Lungs: Normal size and shape, normal breath sounds, equal distribution of air, no rales and rhonchi   CV: S1-S2 regular, no murmurs, no rub, no gallop   Abdomen: Soft, nontender; no masses , no abnormal abdominal sounds   Extremities: No deformity , normal color , no peripheral edema   Skin: Normal temperature, turgor and texture; no rash, ulcers          Procedures      Echocardiogram:    Results for orders placed during the hospital encounter of 07/01/24    Adult Transthoracic Echo Complete W/ Cont if Necessary Per Protocol    Interpretation Summary    Left ventricular ejection fraction appears to be 36 - 40%.    The left ventricular cavity is dilated.    The following left ventricular wall segments are hypokinetic: mid inferoseptal and mid anteroseptal.    Estimated right ventricular systolic pressure from tricuspid regurgitation is normal (<35 mmHg).          Current Outpatient Medications:     albuterol (PROVENTIL HFA;VENTOLIN HFA) 108 (90 Base) MCG/ACT inhaler, Inhale 2 puffs Every 4 (Four) Hours As Needed for Wheezing., Disp: , Rfl:     amiodarone (PACERONE) 200 MG tablet, Take 1 tablet by mouth Daily., Disp: 30 tablet, Rfl: 0    aspirin 81 MG EC tablet, aspirin 81 mg tablet,delayed " release  TAKE 1 TABLET BY MOUTH DAILY, Disp: , Rfl:     atorvastatin (LIPITOR) 80 MG tablet, Take 1 tablet by mouth Daily., Disp: , Rfl:     cyanocobalamin (VITAMIN B-12) 1000 MCG tablet, Take 1 tablet by mouth Daily., Disp: , Rfl:     empagliflozin (JARDIANCE) 10 MG tablet tablet, Take 1 tablet by mouth Daily., Disp: 30 tablet, Rfl: 1    Ferrous Fumarate (Ferrocite) 324 (106 Fe) MG tablet, Take 1 tablet by mouth Every Other Day., Disp: , Rfl:     ferrous gluconate (FERGON) 324 MG tablet, Take 1 tablet by mouth., Disp: , Rfl:     folic acid (FOLVITE) 1 MG tablet, Take 1 tablet by mouth Daily., Disp: 30 tablet, Rfl: 0    furosemide (LASIX) 40 MG tablet, Take 1 tablet by mouth Daily., Disp: 30 tablet, Rfl: 0    HYDROcodone-acetaminophen (NORCO) 5-325 MG per tablet, Take 1 tablet by mouth As Needed., Disp: , Rfl:     hydrOXYzine (ATARAX) 25 MG tablet, Take 1 tablet by mouth 3 (Three) Times a Day As Needed for Itching., Disp: 30 tablet, Rfl: 0    ipratropium-albuterol (DUO-NEB) 0.5-2.5 mg/3 ml nebulizer, ipratropium 0.5 mg-albuterol 3 mg (2.5 mg base)/3 mL nebulization soln, Disp: , Rfl:     losartan (COZAAR) 25 MG tablet, Take 1 tablet by mouth Daily., Disp: 30 tablet, Rfl: 1    metoprolol succinate XL (TOPROL-XL) 25 MG 24 hr tablet, Take 1 tablet by mouth Daily., Disp: 30 tablet, Rfl: 1    nitroglycerin (NITROSTAT) 0.4 MG SL tablet, Place 1 tablet under the tongue Every 5 (Five) Minutes As Needed for Chest Pain. Take no more than 3 doses in 15 minutes., Disp: , Rfl:     pantoprazole (PROTONIX) 20 MG EC tablet, Take 1 tablet by mouth Daily., Disp: , Rfl:     potassium chloride 10 MEQ CR tablet, Take 2 tablets by mouth Daily., Disp: , Rfl:     rOPINIRole (REQUIP) 0.5 MG tablet, Take 2 tablets by mouth Daily., Disp: , Rfl:     sertraline (ZOLOFT) 50 MG tablet, Take 1 tablet by mouth Daily., Disp: , Rfl:     spironolactone (ALDACTONE) 25 MG tablet, Take 1 tablet by mouth Daily., Disp: , Rfl:     thiamine (VITAMIN B1) 100  MG tablet, Take 1 tablet by mouth Daily., Disp: 30 tablet, Rfl: 1    Trelegy Ellipta 100-62.5-25 MCG/ACT inhaler, , Disp: , Rfl:    Assessment:                Plan:          ICD-10-CM ICD-9-CM   1. Essential hypertension  I10 401.9   2. On amiodarone therapy  Z79.899 V58.69   3. Hyperlipidemia, unspecified hyperlipidemia type  E78.5 272.4   4. Atherosclerosis of native coronary artery of native heart without angina pectoris  I25.10 414.01     1. Essential hypertension  Patient understands importance of blood pressure check at home which patient does regularly and the blood pressures are well under control to the level of less than 140/90      2. On amiodarone therapy  Yves Hastings IS ON AMIODARONE,   Significant side effects associated with this medication has been explained     Pros and cons of the medications has been discussed, decision has been to continue the medication   6 months to yearly checkup for eye examination, thyroid function test, chest x-ray and liver function test has been advised    Patient understands well      3. Hyperlipidemia, unspecified hyperlipidemia type  Continue current treatment    4. Atherosclerosis of native coronary artery of native heart without angina pectoris  No angina pectoris      6 months  COUNSELING:    Yves Briones was given to patient for the following topics: diagnostic results, risk factor reductions, impressions, risks and benefits of treatment options and importance of treatment compliance .       SMOKING COUNSELING:        Dictated using Dragon dictation

## 2025-03-18 ENCOUNTER — READMISSION MANAGEMENT (OUTPATIENT)
Dept: CALL CENTER | Facility: HOSPITAL | Age: 75
End: 2025-03-18
Payer: OTHER GOVERNMENT

## 2025-03-18 NOTE — OUTREACH NOTE
Medical Week 2 Survey      Flowsheet Row Responses   [facility] patient discharged from? [place]   Does the patient have one of the following disease processes/diagnoses(primary or secondary)? Other   Week 2 attempt successful? Yes   Call start time 1124   Discharge diagnosis Acute hypoxemic resp failure-EGD this visit   Call end time 1127   Person spoke with today (if not patient) and relationship rudi Avila   Did the patient receive a copy of their discharge instructions? Yes   Nursing interventions Reviewed instructions with patient   What is the patient's perception of their health status since discharge? Improving   Is the patient/caregiver able to teach back signs and symptoms related to disease process for when to call PCP? Yes   Is the patient/caregiver able to teach back signs and symptoms related to disease process for when to call 911? Yes   Is the patient/caregiver able to teach back the hierarchy of who to call/visit for symptoms/problems? PCP, Specialist, Home health nurse, Urgent Care, ED, 911 Yes   Week 2 Call Completed? Yes   Graduated Yes   Wrap up additional comments Margarita states patient is doing very well. No concerns or questions noted.   Call end time 1127            Coco CELAYA - Registered Nurse

## 2025-03-24 LAB
QT INTERVAL: 445 MS
QTC INTERVAL: 499 MS

## 2025-06-03 ENCOUNTER — CLINICAL SUPPORT NO REQUIREMENTS (OUTPATIENT)
Dept: CARDIOLOGY | Facility: CLINIC | Age: 75
End: 2025-06-03
Payer: MEDICARE

## 2025-06-03 DIAGNOSIS — Z95.0 PRESENCE OF CARDIAC PACEMAKER: Primary | ICD-10-CM

## 2025-07-07 ENCOUNTER — HOSPITAL ENCOUNTER (INPATIENT)
Facility: HOSPITAL | Age: 75
LOS: 1 days | Discharge: HOME | End: 2025-07-09
Attending: EMERGENCY MEDICINE | Admitting: INTERNAL MEDICINE
Payer: MEDICARE

## 2025-07-07 ENCOUNTER — APPOINTMENT (OUTPATIENT)
Dept: GENERAL RADIOLOGY | Facility: HOSPITAL | Age: 75
End: 2025-07-07
Payer: MEDICARE

## 2025-07-07 DIAGNOSIS — D61.818 PANCYTOPENIA: ICD-10-CM

## 2025-07-07 DIAGNOSIS — R73.9 HYPERGLYCEMIA: ICD-10-CM

## 2025-07-07 DIAGNOSIS — D64.9 ANEMIA REQUIRING TRANSFUSIONS: Primary | ICD-10-CM

## 2025-07-07 DIAGNOSIS — R09.89 PULMONARY VASCULAR CONGESTION: ICD-10-CM

## 2025-07-07 DIAGNOSIS — R79.89 ELEVATED SERUM CREATININE: ICD-10-CM

## 2025-07-07 DIAGNOSIS — J44.1 COPD WITH ACUTE EXACERBATION: ICD-10-CM

## 2025-07-07 LAB
ABO GROUP BLD: NORMAL
ALBUMIN SERPL-MCNC: 4 G/DL (ref 3.5–5.2)
ALBUMIN/GLOB SERPL: 1.4 G/DL
ALP SERPL-CCNC: 100 U/L (ref 39–117)
ALT SERPL W P-5'-P-CCNC: 13 U/L (ref 1–41)
ANION GAP SERPL CALCULATED.3IONS-SCNC: 12 MMOL/L (ref 5–15)
AST SERPL-CCNC: 21 U/L (ref 1–40)
B PARAPERT DNA SPEC QL NAA+PROBE: NOT DETECTED
B PERT DNA SPEC QL NAA+PROBE: NOT DETECTED
BASOPHILS # BLD AUTO: 0.01 10*3/MM3 (ref 0–0.2)
BASOPHILS NFR BLD AUTO: 0.3 % (ref 0–1.5)
BILIRUB SERPL-MCNC: 1.1 MG/DL (ref 0–1.2)
BLD GP AB SCN SERPL QL: NEGATIVE
BUN SERPL-MCNC: 30 MG/DL (ref 8–23)
BUN/CREAT SERPL: 16.5 (ref 7–25)
C PNEUM DNA NPH QL NAA+NON-PROBE: NOT DETECTED
CALCIUM SPEC-SCNC: 8.7 MG/DL (ref 8.6–10.5)
CHLORIDE SERPL-SCNC: 101 MMOL/L (ref 98–107)
CO2 SERPL-SCNC: 20 MMOL/L (ref 22–29)
CREAT SERPL-MCNC: 1.82 MG/DL (ref 0.76–1.27)
DEPRECATED RDW RBC AUTO: 80.5 FL (ref 37–54)
EGFRCR SERPLBLD CKD-EPI 2021: 38.5 ML/MIN/1.73
EOSINOPHIL # BLD AUTO: 0.01 10*3/MM3 (ref 0–0.4)
EOSINOPHIL NFR BLD AUTO: 0.3 % (ref 0.3–6.2)
ERYTHROCYTE [DISTWIDTH] IN BLOOD BY AUTOMATED COUNT: 23 % (ref 12.3–15.4)
FLUAV SUBTYP SPEC NAA+PROBE: NOT DETECTED
FLUBV RNA NPH QL NAA+NON-PROBE: NOT DETECTED
GEN 5 1HR TROPONIN T REFLEX: 17 NG/L
GLOBULIN UR ELPH-MCNC: 2.9 GM/DL
GLUCOSE SERPL-MCNC: 118 MG/DL (ref 65–99)
HADV DNA SPEC NAA+PROBE: NOT DETECTED
HCOV 229E RNA SPEC QL NAA+PROBE: NOT DETECTED
HCOV HKU1 RNA SPEC QL NAA+PROBE: NOT DETECTED
HCOV NL63 RNA SPEC QL NAA+PROBE: NOT DETECTED
HCOV OC43 RNA SPEC QL NAA+PROBE: NOT DETECTED
HCT VFR BLD AUTO: 14.9 % (ref 37.5–51)
HGB BLD-MCNC: 4.9 G/DL (ref 13–17.7)
HMPV RNA NPH QL NAA+NON-PROBE: NOT DETECTED
HPIV1 RNA ISLT QL NAA+PROBE: NOT DETECTED
HPIV2 RNA SPEC QL NAA+PROBE: NOT DETECTED
HPIV3 RNA NPH QL NAA+PROBE: NOT DETECTED
HPIV4 P GENE NPH QL NAA+PROBE: NOT DETECTED
IMM GRANULOCYTES # BLD AUTO: 0.06 10*3/MM3 (ref 0–0.05)
IMM GRANULOCYTES NFR BLD AUTO: 2 % (ref 0–0.5)
LYMPHOCYTES # BLD AUTO: 0.72 10*3/MM3 (ref 0.7–3.1)
LYMPHOCYTES NFR BLD AUTO: 23.9 % (ref 19.6–45.3)
M PNEUMO IGG SER IA-ACNC: NOT DETECTED
MAGNESIUM SERPL-MCNC: 2.2 MG/DL (ref 1.6–2.4)
MCH RBC QN AUTO: 33.6 PG (ref 26.6–33)
MCHC RBC AUTO-ENTMCNC: 32.9 G/DL (ref 31.5–35.7)
MCV RBC AUTO: 102.1 FL (ref 79–97)
MONOCYTES # BLD AUTO: 0.59 10*3/MM3 (ref 0.1–0.9)
MONOCYTES NFR BLD AUTO: 19.6 % (ref 5–12)
NEUTROPHILS NFR BLD AUTO: 1.62 10*3/MM3 (ref 1.7–7)
NEUTROPHILS NFR BLD AUTO: 53.9 % (ref 42.7–76)
NT-PROBNP SERPL-MCNC: 8062 PG/ML (ref 0–900)
PLATELET # BLD AUTO: 97 10*3/MM3 (ref 140–450)
PMV BLD AUTO: 9.7 FL (ref 6–12)
POTASSIUM SERPL-SCNC: 4 MMOL/L (ref 3.5–5.2)
PROCALCITONIN SERPL-MCNC: 0.11 NG/ML (ref 0–0.25)
PROT SERPL-MCNC: 6.9 G/DL (ref 6–8.5)
QT INTERVAL: 456 MS
QTC INTERVAL: 500 MS
RBC # BLD AUTO: 1.46 10*6/MM3 (ref 4.14–5.8)
RH BLD: POSITIVE
RHINOVIRUS RNA SPEC NAA+PROBE: NOT DETECTED
RSV RNA NPH QL NAA+NON-PROBE: NOT DETECTED
SARS-COV-2 RNA NPH QL NAA+NON-PROBE: NOT DETECTED
SODIUM SERPL-SCNC: 133 MMOL/L (ref 136–145)
T&S EXPIRATION DATE: NORMAL
TROPONIN T NUMERIC DELTA: -2 NG/L
TROPONIN T SERPL HS-MCNC: 19 NG/L
WBC NRBC COR # BLD AUTO: 3.01 10*3/MM3 (ref 3.4–10.8)

## 2025-07-07 PROCEDURE — 99291 CRITICAL CARE FIRST HOUR: CPT

## 2025-07-07 PROCEDURE — 86900 BLOOD TYPING SEROLOGIC ABO: CPT | Performed by: EMERGENCY MEDICINE

## 2025-07-07 PROCEDURE — 25010000002 FUROSEMIDE PER 20 MG: Performed by: EMERGENCY MEDICINE

## 2025-07-07 PROCEDURE — 63710000001 PREDNISONE PER 5 MG: Performed by: EMERGENCY MEDICINE

## 2025-07-07 PROCEDURE — 93010 ELECTROCARDIOGRAM REPORT: CPT | Performed by: INTERNAL MEDICINE

## 2025-07-07 PROCEDURE — 94799 UNLISTED PULMONARY SVC/PX: CPT

## 2025-07-07 PROCEDURE — G0378 HOSPITAL OBSERVATION PER HR: HCPCS

## 2025-07-07 PROCEDURE — 94640 AIRWAY INHALATION TREATMENT: CPT

## 2025-07-07 PROCEDURE — 94760 N-INVAS EAR/PLS OXIMETRY 1: CPT

## 2025-07-07 PROCEDURE — 86923 COMPATIBILITY TEST ELECTRIC: CPT

## 2025-07-07 PROCEDURE — P9016 RBC LEUKOCYTES REDUCED: HCPCS

## 2025-07-07 PROCEDURE — 25010000002 MAGNESIUM SULFATE 2 GM/50ML SOLUTION: Performed by: EMERGENCY MEDICINE

## 2025-07-07 PROCEDURE — 85025 COMPLETE CBC W/AUTO DIFF WBC: CPT | Performed by: EMERGENCY MEDICINE

## 2025-07-07 PROCEDURE — 86850 RBC ANTIBODY SCREEN: CPT | Performed by: EMERGENCY MEDICINE

## 2025-07-07 PROCEDURE — 36415 COLL VENOUS BLD VENIPUNCTURE: CPT | Performed by: EMERGENCY MEDICINE

## 2025-07-07 PROCEDURE — 80053 COMPREHEN METABOLIC PANEL: CPT | Performed by: EMERGENCY MEDICINE

## 2025-07-07 PROCEDURE — 36430 TRANSFUSION BLD/BLD COMPNT: CPT

## 2025-07-07 PROCEDURE — 84145 PROCALCITONIN (PCT): CPT | Performed by: EMERGENCY MEDICINE

## 2025-07-07 PROCEDURE — 94761 N-INVAS EAR/PLS OXIMETRY MLT: CPT

## 2025-07-07 PROCEDURE — 84484 ASSAY OF TROPONIN QUANT: CPT | Performed by: EMERGENCY MEDICINE

## 2025-07-07 PROCEDURE — 71045 X-RAY EXAM CHEST 1 VIEW: CPT

## 2025-07-07 PROCEDURE — 0202U NFCT DS 22 TRGT SARS-COV-2: CPT | Performed by: EMERGENCY MEDICINE

## 2025-07-07 PROCEDURE — 83735 ASSAY OF MAGNESIUM: CPT | Performed by: EMERGENCY MEDICINE

## 2025-07-07 PROCEDURE — 86900 BLOOD TYPING SEROLOGIC ABO: CPT

## 2025-07-07 PROCEDURE — 63710000001 PREDNISONE PER 1 MG: Performed by: EMERGENCY MEDICINE

## 2025-07-07 PROCEDURE — 93005 ELECTROCARDIOGRAM TRACING: CPT | Performed by: EMERGENCY MEDICINE

## 2025-07-07 PROCEDURE — 86901 BLOOD TYPING SEROLOGIC RH(D): CPT | Performed by: EMERGENCY MEDICINE

## 2025-07-07 PROCEDURE — 83880 ASSAY OF NATRIURETIC PEPTIDE: CPT | Performed by: EMERGENCY MEDICINE

## 2025-07-07 RX ORDER — PREDNISONE 20 MG/1
20 TABLET ORAL DAILY
Status: DISCONTINUED | OUTPATIENT
Start: 2025-07-08 | End: 2025-07-09 | Stop reason: HOSPADM

## 2025-07-07 RX ORDER — BUDESONIDE AND FORMOTEROL FUMARATE DIHYDRATE 160; 4.5 UG/1; UG/1
2 AEROSOL RESPIRATORY (INHALATION)
Status: DISCONTINUED | OUTPATIENT
Start: 2025-07-07 | End: 2025-07-09 | Stop reason: HOSPADM

## 2025-07-07 RX ORDER — PANTOPRAZOLE SODIUM 40 MG/1
40 TABLET, DELAYED RELEASE ORAL
Status: DISCONTINUED | OUTPATIENT
Start: 2025-07-08 | End: 2025-07-09 | Stop reason: HOSPADM

## 2025-07-07 RX ORDER — LOSARTAN POTASSIUM 25 MG/1
25 TABLET ORAL DAILY
Status: DISCONTINUED | OUTPATIENT
Start: 2025-07-08 | End: 2025-07-09 | Stop reason: HOSPADM

## 2025-07-07 RX ORDER — MULTIVITAMIN WITH IRON
1000 TABLET ORAL DAILY
Status: DISCONTINUED | OUTPATIENT
Start: 2025-07-08 | End: 2025-07-09 | Stop reason: HOSPADM

## 2025-07-07 RX ORDER — ACETAMINOPHEN 160 MG/5ML
650 SOLUTION ORAL EVERY 4 HOURS PRN
Status: DISCONTINUED | OUTPATIENT
Start: 2025-07-07 | End: 2025-07-09 | Stop reason: HOSPADM

## 2025-07-07 RX ORDER — ATORVASTATIN CALCIUM 80 MG/1
80 TABLET, FILM COATED ORAL DAILY
Status: DISCONTINUED | OUTPATIENT
Start: 2025-07-08 | End: 2025-07-09 | Stop reason: HOSPADM

## 2025-07-07 RX ORDER — POTASSIUM CHLORIDE 1500 MG/1
20 TABLET, EXTENDED RELEASE ORAL DAILY
Status: DISCONTINUED | OUTPATIENT
Start: 2025-07-08 | End: 2025-07-09 | Stop reason: HOSPADM

## 2025-07-07 RX ORDER — MAGNESIUM SULFATE HEPTAHYDRATE 40 MG/ML
2 INJECTION, SOLUTION INTRAVENOUS ONCE
Status: COMPLETED | OUTPATIENT
Start: 2025-07-07 | End: 2025-07-07

## 2025-07-07 RX ORDER — HYDROXYZINE HYDROCHLORIDE 25 MG/1
25 TABLET, FILM COATED ORAL 3 TIMES DAILY PRN
Status: DISCONTINUED | OUTPATIENT
Start: 2025-07-07 | End: 2025-07-09 | Stop reason: HOSPADM

## 2025-07-07 RX ORDER — AMOXICILLIN 250 MG
2 CAPSULE ORAL 2 TIMES DAILY PRN
Status: DISCONTINUED | OUTPATIENT
Start: 2025-07-07 | End: 2025-07-09 | Stop reason: HOSPADM

## 2025-07-07 RX ORDER — ONDANSETRON 2 MG/ML
4 INJECTION INTRAMUSCULAR; INTRAVENOUS EVERY 6 HOURS PRN
Status: DISCONTINUED | OUTPATIENT
Start: 2025-07-07 | End: 2025-07-09 | Stop reason: HOSPADM

## 2025-07-07 RX ORDER — SPIRONOLACTONE 25 MG/1
25 TABLET ORAL DAILY
Status: DISCONTINUED | OUTPATIENT
Start: 2025-07-08 | End: 2025-07-09 | Stop reason: HOSPADM

## 2025-07-07 RX ORDER — FERROUS SULFATE 325(65) MG
325 TABLET ORAL
Status: DISCONTINUED | OUTPATIENT
Start: 2025-07-08 | End: 2025-07-09

## 2025-07-07 RX ORDER — POLYETHYLENE GLYCOL 3350 17 G/17G
17 POWDER, FOR SOLUTION ORAL DAILY PRN
Status: DISCONTINUED | OUTPATIENT
Start: 2025-07-07 | End: 2025-07-09 | Stop reason: HOSPADM

## 2025-07-07 RX ORDER — IPRATROPIUM BROMIDE AND ALBUTEROL SULFATE 2.5; .5 MG/3ML; MG/3ML
3 SOLUTION RESPIRATORY (INHALATION) ONCE
Status: COMPLETED | OUTPATIENT
Start: 2025-07-07 | End: 2025-07-07

## 2025-07-07 RX ORDER — ROPINIROLE 1 MG/1
1 TABLET, FILM COATED ORAL DAILY
Status: DISCONTINUED | OUTPATIENT
Start: 2025-07-07 | End: 2025-07-09 | Stop reason: HOSPADM

## 2025-07-07 RX ORDER — AMIODARONE HYDROCHLORIDE 200 MG/1
200 TABLET ORAL DAILY
Status: DISCONTINUED | OUTPATIENT
Start: 2025-07-08 | End: 2025-07-09 | Stop reason: HOSPADM

## 2025-07-07 RX ORDER — FUROSEMIDE 40 MG/1
40 TABLET ORAL DAILY
Status: DISCONTINUED | OUTPATIENT
Start: 2025-07-08 | End: 2025-07-08

## 2025-07-07 RX ORDER — ACETAMINOPHEN 325 MG/1
650 TABLET ORAL EVERY 4 HOURS PRN
Status: DISCONTINUED | OUTPATIENT
Start: 2025-07-07 | End: 2025-07-09 | Stop reason: HOSPADM

## 2025-07-07 RX ORDER — BISACODYL 10 MG
10 SUPPOSITORY, RECTAL RECTAL DAILY PRN
Status: DISCONTINUED | OUTPATIENT
Start: 2025-07-07 | End: 2025-07-09 | Stop reason: HOSPADM

## 2025-07-07 RX ORDER — PREDNISONE 20 MG/1
20 TABLET ORAL DAILY
COMMUNITY

## 2025-07-07 RX ORDER — FUROSEMIDE 10 MG/ML
80 INJECTION INTRAMUSCULAR; INTRAVENOUS ONCE
Status: COMPLETED | OUTPATIENT
Start: 2025-07-07 | End: 2025-07-07

## 2025-07-07 RX ORDER — METOPROLOL SUCCINATE 25 MG/1
25 TABLET, EXTENDED RELEASE ORAL
Status: DISCONTINUED | OUTPATIENT
Start: 2025-07-08 | End: 2025-07-09 | Stop reason: HOSPADM

## 2025-07-07 RX ORDER — BISACODYL 5 MG/1
5 TABLET, DELAYED RELEASE ORAL DAILY PRN
Status: DISCONTINUED | OUTPATIENT
Start: 2025-07-07 | End: 2025-07-09 | Stop reason: HOSPADM

## 2025-07-07 RX ORDER — ALBUTEROL SULFATE 0.83 MG/ML
2.5 SOLUTION RESPIRATORY (INHALATION)
Status: COMPLETED | OUTPATIENT
Start: 2025-07-07 | End: 2025-07-07

## 2025-07-07 RX ORDER — ACETAMINOPHEN 650 MG/1
650 SUPPOSITORY RECTAL EVERY 4 HOURS PRN
Status: DISCONTINUED | OUTPATIENT
Start: 2025-07-07 | End: 2025-07-09 | Stop reason: HOSPADM

## 2025-07-07 RX ORDER — ONDANSETRON 4 MG/1
4 TABLET, ORALLY DISINTEGRATING ORAL EVERY 6 HOURS PRN
Status: DISCONTINUED | OUTPATIENT
Start: 2025-07-07 | End: 2025-07-09 | Stop reason: HOSPADM

## 2025-07-07 RX ADMIN — ROPINIROLE 1 MG: 1 TABLET, FILM COATED ORAL at 23:01

## 2025-07-07 RX ADMIN — Medication 2.5 MG: at 23:01

## 2025-07-07 RX ADMIN — FUROSEMIDE 80 MG: 10 INJECTION, SOLUTION INTRAMUSCULAR; INTRAVENOUS at 14:56

## 2025-07-07 RX ADMIN — ALBUTEROL SULFATE 2.5 MG: 2.5 SOLUTION RESPIRATORY (INHALATION) at 12:51

## 2025-07-07 RX ADMIN — MAGNESIUM SULFATE HEPTAHYDRATE 2 G: 40 INJECTION, SOLUTION INTRAVENOUS at 12:46

## 2025-07-07 RX ADMIN — IPRATROPIUM BROMIDE AND ALBUTEROL SULFATE 3 ML: .5; 3 SOLUTION RESPIRATORY (INHALATION) at 12:52

## 2025-07-07 RX ADMIN — ALBUTEROL SULFATE 2.5 MG: 2.5 SOLUTION RESPIRATORY (INHALATION) at 12:52

## 2025-07-07 RX ADMIN — BUDESONIDE AND FORMOTEROL FUMARATE DIHYDRATE 2 PUFF: 160; 4.5 AEROSOL RESPIRATORY (INHALATION) at 21:30

## 2025-07-07 RX ADMIN — PREDNISONE 50 MG: 10 TABLET ORAL at 12:38

## 2025-07-07 NOTE — ED PROVIDER NOTES
EMERGENCY DEPARTMENT ENCOUNTER  Room Number:  38/38  Date of encounter:  7/7/2025  PCP: Isael Chopra MD  Patient Care Team:  Isael Chopra MD as PCP - General (Internal Medicine)  Mika Carranza MD as Consulting Physician (Cardiology)     HPI:  Context: Yves Hastings is a 75 y.o. male who presents to the ED c/o chief complaint of shortness of breath and low hemoglobin.  Patient reports history of COPD, not on oxygen, history of myelodysplastic syndrome with anemia.  Patient reports he has been short of breath since Thursday.  Patient complains of dyspnea at rest, worse with exertion.  Patient denies any chest pain, has had some swelling his lower extremities.  Patient has had cough, cough is nonproductive.  No vomiting or diarrhea, no fever shakes chills or night sweats.  Patient was seen by his hematologist earlier today and his hemoglobin was under 7, sent to the emergency department for evaluation.  Patient denies any recent blood loss, no hematemesis, no coffee-ground emesis, no hematuria, no blood in stool, no dark tarry stools.    MEDICAL HISTORY REVIEW  Reviewed in EPIC    PAST MEDICAL HISTORY  Active Ambulatory Problems     Diagnosis Date Noted    Chronic obstructive lung disease 11/07/2018    Essential hypertension 11/07/2018    Hyperlipidemia 11/07/2018    Pulmonary emphysema 12/04/2017    Ventral hernia without obstruction or gangrene 11/08/2018    Tobacco abuse 12/11/2018    Class 1 obesity due to excess calories without serious comorbidity with body mass index (BMI) of 30.0 to 30.9 in adult 12/11/2018    Precordial pain 12/11/2018    Encounter for screening for malignant neoplasm of colon 05/18/2021    Perirectal abscess 05/28/2021    Atherosclerosis of native coronary artery of native heart without angina pectoris 10/17/2023    Abnormal electrocardiogram 11/15/2023    Abnormal cardiac function test 01/09/2024    Chest pain 01/10/2024    Cardiomyopathy, dilated 01/11/2024     Moderate malnutrition 01/15/2024    Presence of cardiac pacemaker 01/30/2024    On amiodarone therapy 01/14/2025    Acute anemia 02/24/2025    Acute on chronic heart failure with preserved ejection fraction (HFpEF) 02/24/2025    RLS (restless legs syndrome) 02/24/2025    Acute on chronic anemia 02/23/2025     Resolved Ambulatory Problems     Diagnosis Date Noted    Acute hypoxemic respiratory failure 02/23/2025     Past Medical History:   Diagnosis Date    CHF (congestive heart failure)     COPD (chronic obstructive pulmonary disease)     Emphysema of lung     HLD (hyperlipidemia)     Ione (hard of hearing)     HTN (hypertension)     Myelodysplastic syndrome, unspecified 11/06/2023    Myocardial infarction     Short-term memory loss     Sleep apnea     Stroke     Ventral hernia        PAST SURGICAL HISTORY  Past Surgical History:   Procedure Laterality Date    CARDIAC CATHETERIZATION N/A 12/14/2018    Procedure: Left Heart Cath;  Surgeon: Mika Carranza MD;  Location: [facility] CATH INVASIVE LOCATION;  Service: Cardiology    CARDIAC CATHETERIZATION N/A 12/14/2018    Procedure: Left ventriculography;  Surgeon: Mika Carranza MD;  Location: [facility] CATH INVASIVE LOCATION;  Service: Cardiology    CARDIAC CATHETERIZATION N/A 12/14/2018    Procedure: Coronary angiography;  Surgeon: Mika Carranza MD;  [facility];  Service: Cardiology    CARDIAC CATHETERIZATION  12/14/2018    Procedure: Functional Flow [place];  Surgeon: Mika Carranza MD;  [facility];  Service: Cardiology    CARDIAC CATHETERIZATION N/A 1/12/2024    Procedure: Left Heart Cath;  Surgeon: Mika Carranza MD;  Location: [facility] CATH INVASIVE LOCATION;  Service: Cardiovascular;  Laterality: N/A;    CARDIAC CATHETERIZATION N/A 1/12/2024    Procedure: Left ventriculography;  Surgeon: Mika Carranza MD;  [facility];  Service:  Cardiovascular;  Laterality: N/A;    CARDIAC CATHETERIZATION N/A 1/12/2024    Procedure: Coronary angiography;  Surgeon: Mika Carranza MD;  Location: [facility] CATH INVASIVE LOCATION;  Service: Cardiovascular;  Laterality: N/A;    COLONOSCOPY W/ POLYPECTOMY N/A 11/10/2021    Procedure: COLONOSCOPY; POLYPECTOMY;  Surgeon: Alexander Dobbs MD;  Location: [facility] OR;  Service: Gastroenterology;  Laterality: N/A;  DIVERTICULOSIS  POLYP    CORONARY STENT PLACEMENT      CYST REMOVAL      tailbone    ENDOSCOPY N/A 2/25/2025    Procedure: ESOPHAGOGASTRODUODENOSCOPY WITH COLD BIOPSY;  Surgeon: Amilcar Lozada MD;  Location: [facility] ENDOSCOPY;  Service: Gastroenterology;  Laterality: N/A;  PRE: UPPER GI BLEED RULEOUT  POST: DUODENITIS    INCISION AND DRAINAGE PERIRECTAL ABSCESS N/A 05/29/2021    Procedure: INCISION AND DRAINAGE OF PERIRECTAL ABSCESS;  Surgeon: Reddy Johnson Jr., MD;  Location: [facility] MAIN OR;  Service: General;  Laterality: N/A;    UMBILICAL HERNIA REPAIR      DR. CASEY - YEARS AGO    VEIN LIGATION AND STRIPPING BILATERAL      VENTRAL/INCISIONAL HERNIA REPAIR N/A 02/04/2019    Procedure: VENTRAL/INCISIONAL HERNIA REPAIR LAPAROSCOPIC;  Surgeon: Adelfo Nieves MD;  Location: [facility] OR;  Service: General       FAMILY HISTORY  Family History   Problem Relation Age of Onset    Cancer Mother         breast    Heart disease Mother     Heart disease Father     Cancer Maternal Aunt     Heart disease Paternal Uncle     Malig Hyperthermia Neg Hx        SOCIAL HISTORY  Social History     Socioeconomic History    Marital status:    Tobacco Use    Smoking status: Some Days     Current packs/day: 1.00     Average packs/day: 1 pack/day for 62.5 years (62.5 ttl pk-yrs)     Types: Cigarettes     Start date: 1963     Passive exposure: Past    Smokeless tobacco: Never    Tobacco comments:     Began smoking at age 13.  Smoked 1 ppd for 28 years and 2 ppd for 30 years for an 88 pack year history.      SMOKES 5 OR LESS CIGARETTES A DAY   Vaping Use    Vaping status: Former   Substance and Sexual Activity    Alcohol use: Yes     Alcohol/week: 7.0 standard drinks of alcohol     Types: 7 Shots of liquor per week     Comment: hx of daily    Drug use: Yes     Types: Marijuana     Comment: history of, doesn't anymore; Reports using THC at bedtime.    Sexual activity: Defer       ALLERGIES  Patient has no known allergies.    The patient's allergies have been reviewed    PHYSICAL EXAM  I have reviewed the triage vital signs and nursing notes.  ED Triage Vitals   Temp Pulse Resp BP SpO2   -- -- -- -- --      Temp src Heart Rate Source Patient Position BP Location FiO2 (%)   -- -- -- -- --       General: No acute distress.  HENT: NCAT, PERRL, Nares patent.  Eyes: no scleral icterus.  Neck: trachea midline, no ROM limitations.  CV: regular rhythm, regular rate.  Respiratory: normal effort, CTAB.  Abdomen: soft, nondistended, NTTP, no rebound tenderness, no guarding or rigidity.  Musculoskeletal: no deformity.  Neuro: alert, moves all extremities, follows commands.  Skin: warm, dry.  1+ pitting edema bilateral lower extremities.    LAB RESULTS  Recent Results (from the past 24 hours)   ECG 12 Lead Dyspnea    Collection Time: 07/07/25 12:17 PM   Result Value Ref Range    QT Interval 456 ms    QTC Interval 500 ms   Comprehensive Metabolic Panel    Collection Time: 07/07/25 12:20 PM    Specimen: Arm, Right; Blood   Result Value Ref Range    Glucose 118 (H) 65 - 99 mg/dL    BUN 30.0 (H) 8.0 - 23.0 mg/dL    Creatinine 1.82 (H) 0.76 - 1.27 mg/dL    Sodium 133 (L) 136 - 145 mmol/L    Potassium 4.0 3.5 - 5.2 mmol/L    Chloride 101 98 - 107 mmol/L    CO2 20.0 (L) 22.0 - 29.0 mmol/L    Calcium 8.7 8.6 - 10.5 mg/dL    Total Protein 6.9 6.0 - 8.5 g/dL    Albumin 4.0 3.5 - 5.2 g/dL    ALT (SGPT) 13 1 - 41 U/L    AST (SGOT) 21 1 - 40 U/L    Alkaline Phosphatase 100 39 - 117 U/L    Total Bilirubin 1.1 0.0 - 1.2 mg/dL    Globulin 2.9 gm/dL     A/G Ratio 1.4 g/dL    BUN/Creatinine Ratio 16.5 7.0 - 25.0    Anion Gap 12.0 5.0 - 15.0 mmol/L    eGFR 38.5 (L) >60.0 mL/min/1.73   High Sensitivity Troponin T    Collection Time: 07/07/25 12:20 PM    Specimen: Arm, Right; Blood   Result Value Ref Range    HS Troponin T 19 <22 ng/L   BNP    Collection Time: 07/07/25 12:20 PM    Specimen: Arm, Right; Blood   Result Value Ref Range    proBNP 8,062.0 (H) 0.0 - 900.0 pg/mL   Magnesium    Collection Time: 07/07/25 12:20 PM    Specimen: Arm, Right; Blood   Result Value Ref Range    Magnesium 2.2 1.6 - 2.4 mg/dL   Procalcitonin    Collection Time: 07/07/25 12:20 PM    Specimen: Arm, Right; Blood   Result Value Ref Range    Procalcitonin 0.11 0.00 - 0.25 ng/mL   CBC Auto Differential    Collection Time: 07/07/25 12:20 PM    Specimen: Arm, Right; Blood   Result Value Ref Range    WBC 3.01 (L) 3.40 - 10.80 10*3/mm3    RBC 1.46 (L) 4.14 - 5.80 10*6/mm3    Hemoglobin 4.9 (C) 13.0 - 17.7 g/dL    Hematocrit 14.9 (C) 37.5 - 51.0 %    .1 (H) 79.0 - 97.0 fL    MCH 33.6 (H) 26.6 - 33.0 pg    MCHC 32.9 31.5 - 35.7 g/dL    RDW 23.0 (H) 12.3 - 15.4 %    RDW-SD 80.5 (H) 37.0 - 54.0 fl    MPV 9.7 6.0 - 12.0 fL    Platelets 97 (L) 140 - 450 10*3/mm3    Neutrophil % 53.9 42.7 - 76.0 %    Lymphocyte % 23.9 19.6 - 45.3 %    Monocyte % 19.6 (H) 5.0 - 12.0 %    Eosinophil % 0.3 0.3 - 6.2 %    Basophil % 0.3 0.0 - 1.5 %    Immature Grans % 2.0 (H) 0.0 - 0.5 %    Neutrophils, Absolute 1.62 (L) 1.70 - 7.00 10*3/mm3    Lymphocytes, Absolute 0.72 0.70 - 3.10 10*3/mm3    Monocytes, Absolute 0.59 0.10 - 0.90 10*3/mm3    Eosinophils, Absolute 0.01 0.00 - 0.40 10*3/mm3    Basophils, Absolute 0.01 0.00 - 0.20 10*3/mm3    Immature Grans, Absolute 0.06 (H) 0.00 - 0.05 10*3/mm3   Type & Screen    Collection Time: 07/07/25 12:20 PM    Specimen: Arm, Right; Blood   Result Value Ref Range    ABO Type O     RH type Positive     Antibody Screen Negative     T&S Expiration Date 7/10/2025 11:59:59 PM     Respiratory Panel PCR w/COVID-19(SARS-CoV-2) ALANNA/KAITLIN/SIMON/PAD/COR/SHELDON In-House, NP Swab in UTM/VTM, 2 HR TAT - Swab, Nasopharynx    Collection Time: 07/07/25 12:50 PM    Specimen: Nasopharynx; Swab   Result Value Ref Range    ADENOVIRUS, PCR Not Detected Not Detected    Coronavirus 229E Not Detected Not Detected    Coronavirus HKU1 Not Detected Not Detected    Coronavirus NL63 Not Detected Not Detected    Coronavirus OC43 Not Detected Not Detected    COVID19 Not Detected Not Detected - Ref. Range    Human Metapneumovirus Not Detected Not Detected    Human Rhinovirus/Enterovirus Not Detected Not Detected    Influenza A PCR Not Detected Not Detected    Influenza B PCR Not Detected Not Detected    Parainfluenza Virus 1 Not Detected Not Detected    Parainfluenza Virus 2 Not Detected Not Detected    Parainfluenza Virus 3 Not Detected Not Detected    Parainfluenza Virus 4 Not Detected Not Detected    RSV, PCR Not Detected Not Detected    Bordetella pertussis pcr Not Detected Not Detected    Bordetella parapertussis PCR Not Detected Not Detected    Chlamydophila pneumoniae PCR Not Detected Not Detected    Mycoplasma pneumo by PCR Not Detected Not Detected   High Sensitivity Troponin T 1Hr    Collection Time: 07/07/25  1:35 PM    Specimen: Arm, Left; Blood   Result Value Ref Range    HS Troponin T 17 <22 ng/L    Troponin T Numeric Delta -2 Abnormal if >/=3 ng/L   Prepare RBC, 2 Units    Collection Time: 07/07/25  2:40 PM   Result Value Ref Range    Product Code K7933H81     Unit Number [id number]     UNIT  ABO O     UNIT  RH POS     Crossmatch Interpretation Compatible     Dispense Status XM     Blood Expiration Date 202508062359     Blood Type Barcode 5100     Product Code J4613R21     Unit Number [id number]     UNIT  ABO O     UNIT  RH POS     Crossmatch Interpretation Compatible     Dispense Status XM     Blood Expiration Date 202508062359     Blood Type Barcode 5100        I ordered the above labs and  reviewed the results.    RADIOLOGY  XR Chest 1 View  Result Date: 7/7/2025  XR CHEST 1 VW-  HISTORY: Male who is 75 years-old, short of breath  TECHNIQUE: Frontal view of the chest  COMPARISON: 2/23/2025  FINDINGS: The heart is enlarged. Pulmonary vasculature is mildly congested. Left-sided pacemaker, cardiac leads, sternotomy changes are noted. Minimal right pleural effusion is suggested. No focal pulmonary consolidation or pneumothorax. No acute osseous process.      As described.  This report was finalized on 7/7/2025 12:59 PM by Dr. Alex Deleon M.D on Workstation: [id number]        I ordered the above noted radiological studies. I reviewed the images and results. I agree with the radiologist interpretation.    PROCEDURES  Procedures    MEDICATIONS GIVEN IN ER  Medications   predniSONE (DELTASONE) tablet 50 mg (50 mg Oral Given 7/7/25 1238)   magnesium sulfate 2g/50 mL (PREMIX) infusion (0 g Intravenous Stopped 7/7/25 1309)   ipratropium-albuterol (DUO-NEB) nebulizer solution 3 mL (3 mL Nebulization Given 7/7/25 1252)   albuterol (PROVENTIL) nebulizer solution 0.083% 2.5 mg/3mL (2.5 mg Nebulization Given 7/7/25 1252)   furosemide (LASIX) injection 80 mg (80 mg Intravenous Given 7/7/25 1456)       PROGRESS, DATA ANALYSIS, CONSULTS, AND MEDICAL DECISION MAKING  A complete history and physical exam have been performed.  All available laboratory and imaging results have been reviewed by myself prior to disposition.    MDM    After the initial H&P, I discussed pertinent information from history and physical exam with patient/family.  Discussed differential diagnosis.  Discussed plan for ED evaluation/workup/treatment.  All questions answered.  Patient/family is agreeable with plan.  ED Course as of 07/07/25 1459   Mon Jul 07, 2025   1212 My differential diagnosis for dyspnea includes but is not limited to:  Asthma, COPD, pneumonia, pulmonary embolus, acute respiratory distress syndrome, pneumothorax, pleural  effusion, pulmonary fibrosis, congestive heart failure, myocardial infarction, DKA, uremia, acidosis, sepsis, anemia, drug related, hyperventilation, CNS disease     [JG]   1215 Review of prior external notes (non-ED) -and- Review of prior external test results outside of this encounter:   I reviewed the patient's oncology note from the 23rd of last month, patient with history of myelodysplastic syndrome with refractory anemia.  I reviewed last hemoglobin available in epic from 2/26/2025, hemoglobin 9 at that time. [JG]   1230 EKG independently viewed and contemporaneously interpreted by ED physician. Time: 12:17 PM.  Rate 72.  Interpretation: Normal sinus rhythm, normal axis, first-degree AV block, left bundle branch block, appropriate discordant ST changes, Sgarbossa criteria negative.  Occasional PVC, no contiguous PVCs, PVCs are unifocal, single fusion beat. [JG]   1428 Hemoglobin 4.9.  Ordering 2 units for transfusion. [JG]   1429 I reviewed chest x-ray in PACS, pulmonary vascular congestion per my read. [JG]   1429 Patient with pulmonary vascular congestion as well as elevated BNP, history of heart failure, transfusing 2 units, at risk for transfusion associated cardiac overload, giving Lasix, transfusing blood slowly. [JG]   1435 Patient reassessed.  Discussed ED findings, differential diagnosis, and the need for admission for evaluation/treatment.  They are agreeable to admission and all questions were answered.     [JG]   1458 Phone call with Dr. Rodriguez [facility].  Discussed the patient, relevant history, exam, diagnostics, ED findings/progress, and concerns. They agree to admit the patient to telemetry observation. Care assumed by the admitting physician at this time.     [JG]      ED Course User Index  [JG] Jeremie Hussein MD       AS OF 14:59 EDT VITALS:    BP - 107/65  HR - 71  TEMP - 98.1 °F (36.7 °C) (Oral)  O2 SATS - 99%    DIAGNOSIS  Final diagnoses:   Anemia requiring transfusions   Pancytopenia    COPD with acute exacerbation   Pulmonary vascular congestion   Elevated serum creatinine   Hyperglycemia     Critical care:  Total critical care time of 51 minutes is exclusive of any other billable procedures and includes time spent with direct patient care and observation, retrospective chart review, management of acute condition, and consultation with other physicians.      DISPOSITION  ADMISSION    Discussed treatment plan and reason for admission with pt/family and admitting physician.  Pt/family voiced understanding of the plan for admission for further testing/treatment as needed.        Jeremie Hussein MD  07/07/25 1500

## 2025-07-07 NOTE — ED NOTES
Nursing report ED to floor  Yves Hastings  75 y.o.  male    HPI :  HPI  Stated Reason for Visit: Pt presents to ED via private vehicle sent by cancer doctors office for low hemoglobin level. Also having soa since Thursday of last week. States hemoglobin is 4.9. Hx of MDS cancer. States came here because his heart doctor is here  History Obtained From: patient, family  Precipitating Event(s): unknown  Onset of Symptoms: worsening  Duration (Days): 4    Chief Complaint  Chief Complaint   Patient presents with    Shortness of Breath    Abnormal Lab       Admitting doctor:   Naomi Rodriguez MD    Admitting diagnosis:   The primary encounter diagnosis was Anemia requiring transfusions. Diagnoses of Pancytopenia, COPD with acute exacerbation, Pulmonary vascular congestion, Elevated serum creatinine, and Hyperglycemia were also pertinent to this visit.    Code status:   Current Code Status       Date Active Code Status Order ID Comments User Context       7/7/2025 1609 CPR (Attempt to Resuscitate) [id number]  Naomi Rodriguez MD ED        Question Answer    Code Status (Patient has no pulse and is not breathing) CPR (Attempt to Resuscitate)    Medical Interventions (Patient has pulse or is breathing) Full                    Allergies:   Patient has no known allergies.    Isolation:   No active isolations    Intake and Output  No intake or output data in the 24 hours ending 07/07/25 1659    Weight:   There were no vitals filed for this visit.    Most recent vitals:   Vitals:    07/07/25 1452 07/07/25 1452 07/07/25 1520 07/07/25 1536   BP: 107/65  103/64 108/68   BP Location:       Patient Position:       Pulse: 71  73 75   Resp: 22  22 22   Temp:  98.1 °F (36.7 °C) 98.1 °F (36.7 °C) 97.8 °F (36.6 °C)   TempSrc:  Oral Oral    SpO2: 99%  100% 99%       Active LDAs/IV Access:   Lines, Drains & Airways       Active LDAs       Name Placement date Placement time Site Days    Peripheral IV 07/07/25 1242 20 G  Right;Lateral Antecubital 07/07/25  1242  Antecubital  less than 1    Peripheral IV 07/07/25 1445 20 G Left Antecubital 07/07/25  1445  Antecubital  less than 1                    Labs (abnormal labs have a star):   Labs Reviewed   COMPREHENSIVE METABOLIC PANEL - Abnormal; Notable for the following components:       Result Value    Glucose 118 (*)     BUN 30.0 (*)     Creatinine 1.82 (*)     Sodium 133 (*)     CO2 20.0 (*)     eGFR 38.5 (*)     All other components within normal limits    Narrative:     GFR Categories in Chronic Kidney Disease (CKD)              GFR Category          GFR (mL/min/1.73)    Interpretation  G1                    90 or greater        Normal or high (1)  G2                    60-89                Mild decrease (1)  G3a                   45-59                Mild to moderate decrease  G3b                   30-44                Moderate to severe decrease  G4                    15-29                Severe decrease  G5                    14 or less           Kidney failure    (1)In the absence of evidence of kidney disease, neither GFR category G1 or G2 fulfill the criteria for CKD.    eGFR calculation 2021 CKD-EPI creatinine equation, which does not include race as a factor   BNP (IN-HOUSE) - Abnormal; Notable for the following components:    proBNP 8,062.0 (*)     All other components within normal limits    Narrative:     This assay is used as an aid in the diagnosis of individuals suspected of having heart failure. It can be used as an aid in the diagnosis of acute decompensated heart failure (ADHF) in patients presenting with signs and symptoms of ADHF to the emergency department (ED). In addition, NT-proBNP of <300 pg/mL indicates ADHF is not likely.    Age Range Result Interpretation  NT-proBNP Concentration (pg/mL:      <50             Positive            >450                   Gray                 300-450                    Negative             <300    50-75           Positive             >900                  Gray                300-900                  Negative            <300      >75             Positive            >1800                  Gray                300-1800                  Negative            <300   CBC WITH AUTO DIFFERENTIAL - Abnormal; Notable for the following components:    WBC 3.01 (*)     RBC 1.46 (*)     Hemoglobin 4.9 (*)     Hematocrit 14.9 (*)     .1 (*)     MCH 33.6 (*)     RDW 23.0 (*)     RDW-SD 80.5 (*)     Platelets 97 (*)     Monocyte % 19.6 (*)     Immature Grans % 2.0 (*)     Neutrophils, Absolute 1.62 (*)     Immature Grans, Absolute 0.06 (*)     All other components within normal limits   RESPIRATORY PANEL PCR W/ COVID-19 (SARS-COV-2), NP SWAB IN UTM/VTP, 2 HR TAT - Normal    Narrative:     In the setting of a positive respiratory panel with a viral infection PLUS a negative procalcitonin without other underlying concern for bacterial infection, consider observing off antibiotics or discontinuation of antibiotics and continue supportive care. If the respiratory panel is positive for atypical bacterial infection (Bordetella pertussis, Chlamydophila pneumoniae, or Mycoplasma pneumoniae), consider antibiotic de-escalation to target atypical bacterial infection.   TROPONIN - Normal    Narrative:     High Sensitive Troponin T Reference Range:  <14.0 ng/L- Negative Female for AMI  <22.0 ng/L- Negative Male for AMI  >=14 - Abnormal Female indicating possible myocardial injury.  >=22 - Abnormal Male indicating possible myocardial injury.   Clinicians would have to utilize clinical acumen, EKG, Troponin, and serial changes to determine if it is an Acute Myocardial Infarction or myocardial injury due to an underlying chronic condition.        MAGNESIUM - Normal   PROCALCITONIN - Normal    Narrative:     As a Marker for Sepsis (Non-Neonates):    1. <0.5 ng/mL represents a low risk of severe sepsis and/or septic shock.  2. >2 ng/mL represents a high risk of severe  "sepsis and/or septic shock.    As a Marker for Lower Respiratory Tract Infections that require antibiotic therapy:    PCT on Admission    Antibiotic Therapy       6-12 Hrs later    >0.5                Strongly Recommended  >0.25 - <0.5        Recommended   0.1 - 0.25          Discouraged              Remeasure/reassess PCT  <0.1                Strongly Discouraged     Remeasure/reassess PCT    As 28 day mortality risk marker: \"Change in Procalcitonin Result\" (>80% or <=80%) if Day 0 (or Day 1) and Day 4 values are available. Refer to [email or web address]    Change in PCT <=80%  A decrease of PCT levels below or equal to 80% defines a positive change in PCT test result representing a higher risk for 28-day all-cause mortality of patients diagnosed with severe sepsis for septic shock.    Change in PCT >80%  A decrease of PCT levels of more than 80% defines a negative change in PCT result representing a lower risk for 28-day all-cause mortality of patients diagnosed with severe sepsis or septic shock.      HIGH SENSITIVITIY TROPONIN T 1HR - Normal    Narrative:     High Sensitive Troponin T Reference Range:  <14.0 ng/L- Negative Female for AMI  <22.0 ng/L- Negative Male for AMI  >=14 - Abnormal Female indicating possible myocardial injury.  >=22 - Abnormal Male indicating possible myocardial injury.   Clinicians would have to utilize clinical acumen, EKG, Troponin, and serial changes to determine if it is an Acute Myocardial Infarction or myocardial injury due to an underlying chronic condition.        OCCULT BLOOD X 1, STOOL   TYPE AND SCREEN   PREPARE RBC   CBC AND DIFFERENTIAL    Narrative:     The following orders were created for panel order CBC & Differential.  Procedure                               Abnormality         Status                     ---------                               -----------         ------                     CBC Auto Differential[[id number]]        Abnormal            Final " result                 Please view results for these tests on the individual orders.       EKG:   ECG 12 Lead Dyspnea   Final Result   HEART RATE=72  bpm   RR Prlosfjj=805  ms   OH Stsltlat=628  ms   P Horizontal Axis=253  deg   P Front Axis=  deg   QRSD Vtbootcr=270  ms   QT Sgrniljz=350  ms   AMrM=788  ms   QRS Axis=-59  deg   T Wave Axis=84  deg   - ABNORMAL ECG -   Atrial-paced complexes   Prolonged OH interval   Left bundle branch block   When compared with ECG of 25-Feb-2025 02:56:19,   Significant change in rhythm   Electronically Signed By: Tiffani Chavis ([facility]) 2025-07-07 15:14:53   Date and Time of Study:2025-07-07 12:17:45          Meds given in ED:   Medications   acetaminophen (TYLENOL) tablet 650 mg (has no administration in time range)     Or   acetaminophen (TYLENOL) 160 MG/5ML oral solution 650 mg (has no administration in time range)     Or   acetaminophen (TYLENOL) suppository 650 mg (has no administration in time range)   ondansetron ODT (ZOFRAN-ODT) disintegrating tablet 4 mg (has no administration in time range)     Or   ondansetron (ZOFRAN) injection 4 mg (has no administration in time range)   melatonin tablet 2.5 mg (has no administration in time range)   sennosides-docusate (PERICOLACE) 8.6-50 MG per tablet 2 tablet (has no administration in time range)     And   polyethylene glycol (MIRALAX) packet 17 g (has no administration in time range)     And   bisacodyl (DULCOLAX) EC tablet 5 mg (has no administration in time range)     And   bisacodyl (DULCOLAX) suppository 10 mg (has no administration in time range)   predniSONE (DELTASONE) tablet 50 mg (50 mg Oral Given 7/7/25 1238)   magnesium sulfate 2g/50 mL (PREMIX) infusion (0 g Intravenous Stopped 7/7/25 1309)   ipratropium-albuterol (DUO-NEB) nebulizer solution 3 mL (3 mL Nebulization Given 7/7/25 1252)   albuterol (PROVENTIL) nebulizer solution 0.083% 2.5 mg/3mL (2.5 mg Nebulization Given 7/7/25 1252)   furosemide (LASIX) injection 80 mg  (80 mg Intravenous Given 7/7/25 1456)       Imaging results:  XR Chest 1 View  Result Date: 7/7/2025  As described.  This report was finalized on 7/7/2025 12:59 PM by Dr. Alex Deleon M.D on Workstation: [id number]        Ambulatory status:   -     Social issues:   Social History     Socioeconomic History    Marital status:    Tobacco Use    Smoking status: Some Days     Current packs/day: 1.00     Average packs/day: 1 pack/day for 62.5 years (62.5 ttl pk-yrs)     Types: Cigarettes     Start date: 1963     Passive exposure: Past    Smokeless tobacco: Never    Tobacco comments:     Began smoking at age 13.  Smoked 1 ppd for 28 years and 2 ppd for 30 years for an 88 pack year history.     SMOKES 5 OR LESS CIGARETTES A DAY   Vaping Use    Vaping status: Former   Substance and Sexual Activity    Alcohol use: Yes     Alcohol/week: 7.0 standard drinks of alcohol     Types: 7 Shots of liquor per week     Comment: hx of daily    Drug use: Yes     Types: Marijuana     Comment: history of, doesn't anymore; Reports using THC at bedtime.    Sexual activity: Defer       Peripheral Neurovascular  Peripheral Neurovascular (Adult)  Peripheral Neurovascular WDL: WDL    Neuro Cognitive  Neuro Cognitive (Adult)  Cognitive/Neuro/Behavioral WDL: WDL    Learning  Learning Assessment  Learning Readiness and Ability: no barriers identified    Respiratory  Respiratory WDL  Respiratory WDL: .WDL except, rhythm/pattern  Rhythm/Pattern, Respiratory: tachypneic, shortness of breath  Breath Sounds  All Lung Fields Breath Sounds: All Fields  All Lung Fields Breath Sounds: Anterior:, Lateral:, wheezes, inspiratory, wheezes, expiratory  Breath Sounds Post-Respiratory Treatment  Breath Sounds Posttreatment All Fields: All Fields  Breath Sounds Posttreatment All Fields: Anterior:, Lateral:, aeration increased, wheezes, expiratory    Abdominal Pain       Pain Assessments  Pain (Adult)  (0-10) Pain Rating: Rest: 0    NIH Stroke Scale        Nimo Lew RN  07/07/25 16:59 EDT

## 2025-07-08 LAB
ANION GAP SERPL CALCULATED.3IONS-SCNC: 16 MMOL/L (ref 5–15)
BH BB BLOOD EXPIRATION DATE: NORMAL
BH BB BLOOD EXPIRATION DATE: NORMAL
BH BB BLOOD TYPE BARCODE: 5100
BH BB BLOOD TYPE BARCODE: 5100
BH BB DISPENSE STATUS: NORMAL
BH BB DISPENSE STATUS: NORMAL
BH BB PRODUCT CODE: NORMAL
BH BB PRODUCT CODE: NORMAL
BH BB UNIT NUMBER: NORMAL
BH BB UNIT NUMBER: NORMAL
BILIRUB UR QL STRIP: NEGATIVE
BUN SERPL-MCNC: 29 MG/DL (ref 8–23)
BUN/CREAT SERPL: 15.8 (ref 7–25)
CALCIUM SPEC-SCNC: 8 MG/DL (ref 8.6–10.5)
CHLORIDE SERPL-SCNC: 102 MMOL/L (ref 98–107)
CLARITY UR: CLEAR
CO2 SERPL-SCNC: 17 MMOL/L (ref 22–29)
COLOR UR: YELLOW
CREAT SERPL-MCNC: 1.84 MG/DL (ref 0.76–1.27)
CREAT UR-MCNC: 57.2 MG/DL
CROSSMATCH INTERPRETATION: NORMAL
CROSSMATCH INTERPRETATION: NORMAL
DEPRECATED RDW RBC AUTO: 72.6 FL (ref 37–54)
EGFRCR SERPLBLD CKD-EPI 2021: 38 ML/MIN/1.73
ERYTHROCYTE [DISTWIDTH] IN BLOOD BY AUTOMATED COUNT: 22.2 % (ref 12.3–15.4)
FERRITIN SERPL-MCNC: 1249 NG/ML (ref 30–400)
GLUCOSE SERPL-MCNC: 154 MG/DL (ref 65–99)
GLUCOSE UR STRIP-MCNC: ABNORMAL MG/DL
HCT VFR BLD AUTO: 19.3 % (ref 37.5–51)
HGB BLD-MCNC: 6.6 G/DL (ref 13–17.7)
HGB UR QL STRIP.AUTO: NEGATIVE
IRON 24H UR-MRATE: 131 MCG/DL (ref 59–158)
IRON SATN MFR SERPL: 44 % (ref 20–50)
KETONES UR QL STRIP: NEGATIVE
LEUKOCYTE ESTERASE UR QL STRIP.AUTO: NEGATIVE
MCH RBC QN AUTO: 32.5 PG (ref 26.6–33)
MCHC RBC AUTO-ENTMCNC: 34.2 G/DL (ref 31.5–35.7)
MCV RBC AUTO: 95.1 FL (ref 79–97)
NITRITE UR QL STRIP: NEGATIVE
PH UR STRIP.AUTO: 6.5 [PH] (ref 5–8)
PLATELET # BLD AUTO: 106 10*3/MM3 (ref 140–450)
PMV BLD AUTO: 10 FL (ref 6–12)
POTASSIUM SERPL-SCNC: 4 MMOL/L (ref 3.5–5.2)
PROT UR QL STRIP: ABNORMAL
RBC # BLD AUTO: 2.03 10*6/MM3 (ref 4.14–5.8)
SODIUM SERPL-SCNC: 135 MMOL/L (ref 136–145)
SODIUM UR-SCNC: <20 MMOL/L
SP GR UR STRIP: 1.02 (ref 1–1.03)
TIBC SERPL-MCNC: 295 MCG/DL (ref 298–536)
TRANSFERRIN SERPL-MCNC: 198 MG/DL (ref 200–360)
UNIT  ABO: NORMAL
UNIT  ABO: NORMAL
UNIT  RH: NORMAL
UNIT  RH: NORMAL
UROBILINOGEN UR QL STRIP: ABNORMAL
WBC NRBC COR # BLD AUTO: 2.71 10*3/MM3 (ref 3.4–10.8)

## 2025-07-08 PROCEDURE — 94799 UNLISTED PULMONARY SVC/PX: CPT

## 2025-07-08 PROCEDURE — 84300 ASSAY OF URINE SODIUM: CPT | Performed by: INTERNAL MEDICINE

## 2025-07-08 PROCEDURE — 84466 ASSAY OF TRANSFERRIN: CPT | Performed by: STUDENT IN AN ORGANIZED HEALTH CARE EDUCATION/TRAINING PROGRAM

## 2025-07-08 PROCEDURE — 94664 DEMO&/EVAL PT USE INHALER: CPT

## 2025-07-08 PROCEDURE — 82728 ASSAY OF FERRITIN: CPT | Performed by: STUDENT IN AN ORGANIZED HEALTH CARE EDUCATION/TRAINING PROGRAM

## 2025-07-08 PROCEDURE — 99223 1ST HOSP IP/OBS HIGH 75: CPT | Performed by: INTERNAL MEDICINE

## 2025-07-08 PROCEDURE — 81003 URINALYSIS AUTO W/O SCOPE: CPT | Performed by: INTERNAL MEDICINE

## 2025-07-08 PROCEDURE — 36430 TRANSFUSION BLD/BLD COMPNT: CPT

## 2025-07-08 PROCEDURE — 94761 N-INVAS EAR/PLS OXIMETRY MLT: CPT

## 2025-07-08 PROCEDURE — P9016 RBC LEUKOCYTES REDUCED: HCPCS

## 2025-07-08 PROCEDURE — 63710000001 PREDNISONE PER 1 MG: Performed by: INTERNAL MEDICINE

## 2025-07-08 PROCEDURE — 63710000001 REVEFENACIN 175 MCG/3ML SOLUTION: Performed by: INTERNAL MEDICINE

## 2025-07-08 PROCEDURE — 85027 COMPLETE CBC AUTOMATED: CPT | Performed by: INTERNAL MEDICINE

## 2025-07-08 PROCEDURE — 83540 ASSAY OF IRON: CPT | Performed by: STUDENT IN AN ORGANIZED HEALTH CARE EDUCATION/TRAINING PROGRAM

## 2025-07-08 PROCEDURE — 25010000002 FUROSEMIDE PER 20 MG: Performed by: STUDENT IN AN ORGANIZED HEALTH CARE EDUCATION/TRAINING PROGRAM

## 2025-07-08 PROCEDURE — 82570 ASSAY OF URINE CREATININE: CPT | Performed by: INTERNAL MEDICINE

## 2025-07-08 PROCEDURE — 80048 BASIC METABOLIC PNL TOTAL CA: CPT | Performed by: INTERNAL MEDICINE

## 2025-07-08 PROCEDURE — 86900 BLOOD TYPING SEROLOGIC ABO: CPT

## 2025-07-08 RX ORDER — SODIUM BICARBONATE 650 MG/1
650 TABLET ORAL 2 TIMES DAILY
Status: DISCONTINUED | OUTPATIENT
Start: 2025-07-08 | End: 2025-07-09 | Stop reason: HOSPADM

## 2025-07-08 RX ORDER — FUROSEMIDE 10 MG/ML
20 INJECTION INTRAMUSCULAR; INTRAVENOUS EVERY 12 HOURS
Status: DISCONTINUED | OUTPATIENT
Start: 2025-07-08 | End: 2025-07-09

## 2025-07-08 RX ADMIN — AMIODARONE HYDROCHLORIDE 200 MG: 200 TABLET ORAL at 08:17

## 2025-07-08 RX ADMIN — PREDNISONE 20 MG: 20 TABLET ORAL at 08:17

## 2025-07-08 RX ADMIN — FUROSEMIDE 20 MG: 10 INJECTION, SOLUTION INTRAMUSCULAR; INTRAVENOUS at 20:56

## 2025-07-08 RX ADMIN — BUDESONIDE AND FORMOTEROL FUMARATE DIHYDRATE 2 PUFF: 160; 4.5 AEROSOL RESPIRATORY (INHALATION) at 08:48

## 2025-07-08 RX ADMIN — PANTOPRAZOLE SODIUM 40 MG: 40 TABLET, DELAYED RELEASE ORAL at 06:26

## 2025-07-08 RX ADMIN — Medication 100 MG: at 08:17

## 2025-07-08 RX ADMIN — ATORVASTATIN CALCIUM 80 MG: 80 TABLET, FILM COATED ORAL at 08:17

## 2025-07-08 RX ADMIN — POTASSIUM CHLORIDE 20 MEQ: 1500 TABLET, EXTENDED RELEASE ORAL at 08:17

## 2025-07-08 RX ADMIN — SPIRONOLACTONE 25 MG: 25 TABLET ORAL at 08:17

## 2025-07-08 RX ADMIN — LOSARTAN POTASSIUM 25 MG: 25 TABLET, FILM COATED ORAL at 08:17

## 2025-07-08 RX ADMIN — Medication 1000 MCG: at 08:17

## 2025-07-08 RX ADMIN — SODIUM BICARBONATE 650 MG: 650 TABLET ORAL at 20:57

## 2025-07-08 RX ADMIN — METOPROLOL SUCCINATE 25 MG: 25 TABLET, EXTENDED RELEASE ORAL at 08:17

## 2025-07-08 RX ADMIN — SERTRALINE HYDROCHLORIDE 50 MG: 50 TABLET, FILM COATED ORAL at 08:17

## 2025-07-08 RX ADMIN — REVEFENACIN 175 MCG: 175 SOLUTION RESPIRATORY (INHALATION) at 08:47

## 2025-07-08 RX ADMIN — Medication 2.5 MG: at 20:56

## 2025-07-08 RX ADMIN — BUDESONIDE AND FORMOTEROL FUMARATE DIHYDRATE 2 PUFF: 160; 4.5 AEROSOL RESPIRATORY (INHALATION) at 20:21

## 2025-07-08 RX ADMIN — FERROUS SULFATE TAB 325 MG (65 MG ELEMENTAL FE) 325 MG: 325 (65 FE) TAB at 08:17

## 2025-07-08 RX ADMIN — FUROSEMIDE 20 MG: 10 INJECTION, SOLUTION INTRAMUSCULAR; INTRAVENOUS at 08:17

## 2025-07-08 RX ADMIN — ROPINIROLE 1 MG: 1 TABLET, FILM COATED ORAL at 20:56

## 2025-07-08 RX ADMIN — SODIUM BICARBONATE 650 MG: 650 TABLET ORAL at 08:17

## 2025-07-08 NOTE — CONSULTS
[facility]                                                                                             Nephrology Initial Consult Note    Patient Identification:  Name: Yves Hastings MRN: [id number]  Age: 75 y.o. : 1950  Sex: male  Date:2025    Requesting Physician: As per consult order.  Reason for Consultation: SAHWIN, myelodysplasia  Information from:patient/ family/ chart      History of Present Illness: This is a 75 y.o. year old male with a history of chronic kidney disease, follows in our office with Dr. Molly Brar, last evaluation was a telehealth visit in February that followed a hospitalization in January for ASHWIN due to heart failure/cardiorenal syndrome with systolic heart failure.  Creatinine normalized during that admission with diuresis, baseline creatinine 1.0-1.1.  He has a history of myelodysplasia and presented to the ER with acute dyspnea and fatigue with history of COPD.  He was also recently seen by hematology and found to have a severely low hemoglobin.  No fevers chills cough congestion.  Does have some mild lower extremity edema which she states is about the same as his usual.  He has had good urine output since admission, fatigue has improved.  BP initially soft but no brandi hypotension and O2 sats currently 99% on room air.  Initial hemoglobin 4.9, repeat 6.6 with additional transfusion in progress.  Chest x-ray showed cardiomegaly mildly congested pulmonary vasculature.    The following medical history and medications personally reviewed by me:    Problem List:     Anemia requiring transfusions      Past Medical History:  Past Medical History:   Diagnosis Date    CHF (congestive heart failure)     COPD (chronic obstructive pulmonary disease)     Emphysema of lung     HLD (hyperlipidemia)     [place] (hard of hearing)     ears hearing aids    HTN (hypertension)      Myelodysplastic syndrome, unspecified 11/06/2023    Myocardial infarction     Short-term memory loss     Sleep apnea     no machine    Stroke     Ventral hernia     sched repair       Past Surgical History:  Past Surgical History:   Procedure Laterality Date    CARDIAC CATHETERIZATION N/A 12/14/2018    Procedure: Left Heart Cath;  Surgeon: Mika Carranza MD;  Location: [facility] CATH INVASIVE LOCATION;  Service: Cardiology    CARDIAC CATHETERIZATION N/A 12/14/2018    Procedure: Left ventriculography;  Surgeon: Mika Carranza MD;  Location: [facility] CATH INVASIVE LOCATION;  Service: Cardiology    CARDIAC CATHETERIZATION N/A 12/14/2018    Procedure: Coronary angiography;  Surgeon: Mika Carranza MD;  Location: [facility] CATH INVASIVE LOCATION;  Service: Cardiology    CARDIAC CATHETERIZATION  12/14/2018    Procedure: Functional Flow [place];  Surgeon: Mika Carranza MD;  Location: [facility] CATH INVASIVE LOCATION;  Service: Cardiology    CARDIAC CATHETERIZATION N/A 1/12/2024    Procedure: Left Heart Cath;  Surgeon: Mika Carranza MD;  Location: [facility] CATH INVASIVE LOCATION;  Service: Cardiovascular;  Laterality: N/A;    CARDIAC CATHETERIZATION N/A 1/12/2024    Procedure: Left ventriculography;  Surgeon: Mika Carranza MD;  Location: [facility] CATH INVASIVE LOCATION;  Service: Cardiovascular;  Laterality: N/A;    CARDIAC CATHETERIZATION N/A 1/12/2024    Procedure: Coronary angiography;  Surgeon: Mika Carranza MD;  Location: [facility] CATH INVASIVE LOCATION;  Service: Cardiovascular;  Laterality: N/A;    COLONOSCOPY W/ POLYPECTOMY N/A 11/10/2021    Procedure: COLONOSCOPY; POLYPECTOMY;  Surgeon: Alexander Dobbs MD;  Location: [facility];  Service: Gastroenterology;  Laterality: N/A;  DIVERTICULOSIS  POLYP    CORONARY STENT PLACEMENT      CYST REMOVAL      tailbone    ENDOSCOPY N/A 2/25/2025    Procedure: ESOPHAGOGASTRODUODENOSCOPY WITH COLD BIOPSY;  Surgeon: Neville  MD Amilcar;  Location: [facility] ENDOSCOPY;  Service: Gastroenterology;  Laterality: N/A;  PRE: UPPER GI BLEED RULEOUT  POST: DUODENITIS    INCISION AND DRAINAGE PERIRECTAL ABSCESS N/A 05/29/2021    Procedure: INCISION AND DRAINAGE OF PERIRECTAL ABSCESS;  Surgeon: Reddy Johnson Jr., MD;  Location: [facility] MAIN OR;  Service: General;  Laterality: N/A;    UMBILICAL HERNIA REPAIR      DR. CASEY - YEARS AGO    VEIN LIGATION AND STRIPPING BILATERAL      VENTRAL/INCISIONAL HERNIA REPAIR N/A 02/04/2019    Procedure: VENTRAL/INCISIONAL HERNIA REPAIR LAPAROSCOPIC;  Surgeon: Adelfo Nieves MD;  Location: [facility] OR;  Service: General        Home Meds:   Medications Prior to Admission   Medication Sig Dispense Refill Last Dose/Taking    albuterol (PROVENTIL HFA;VENTOLIN HFA) 108 (90 Base) MCG/ACT inhaler Inhale 2 puffs Every 4 (Four) Hours As Needed for Wheezing.   Taking As Needed    amiodarone (PACERONE) 200 MG tablet Take 1 tablet by mouth Daily. 30 tablet 0 Taking    aspirin 81 MG EC tablet aspirin 81 mg tablet,delayed release   TAKE 1 TABLET BY MOUTH DAILY   Taking    atorvastatin (LIPITOR) 80 MG tablet Take 1 tablet by mouth Daily.   Taking    cyanocobalamin (VITAMIN B-12) 1000 MCG tablet Take 1 tablet by mouth Daily.   Taking    empagliflozin (JARDIANCE) 10 MG tablet tablet Take 1 tablet by mouth Daily. 30 tablet 1 Taking    Ferrous Fumarate (Ferrocite) 324 (106 Fe) MG tablet Take 1 tablet by mouth Every Other Day.   Taking    FLUoxetine (PROzac) 20 MG capsule Take 1 capsule by mouth Daily.   Taking    furosemide (LASIX) 40 MG tablet Take 1 tablet by mouth Daily. 30 tablet 0 Taking    hydrOXYzine (ATARAX) 25 MG tablet Take 1 tablet by mouth 3 (Three) Times a Day As Needed for Itching. 30 tablet 0 Taking As Needed    ipratropium-albuterol (DUO-NEB) 0.5-2.5 mg/3 ml nebulizer ipratropium 0.5 mg-albuterol 3 mg (2.5 mg base)/3 mL nebulization soln   Taking    losartan (COZAAR) 25 MG tablet Take 1 tablet by mouth Daily. 30  tablet 1 Taking    metoprolol succinate XL (TOPROL-XL) 25 MG 24 hr tablet Take 1 tablet by mouth Daily. 30 tablet 1 Taking    nitroglycerin (NITROSTAT) 0.4 MG SL tablet Place 1 tablet under the tongue Every 5 (Five) Minutes As Needed for Chest Pain. Take no more than 3 doses in 15 minutes.   Taking As Needed    pantoprazole (PROTONIX) 20 MG EC tablet Take 1 tablet by mouth Daily.   Taking    potassium chloride 10 MEQ CR tablet Take 2 tablets by mouth Daily.   Taking    predniSONE (DELTASONE) 20 MG tablet Take 1 tablet by mouth Daily.   Taking    rOPINIRole (REQUIP) 0.5 MG tablet Take 2 tablets by mouth Daily.   Taking    sertraline (ZOLOFT) 50 MG tablet Take 1 tablet by mouth Daily.   Taking    spironolactone (ALDACTONE) 25 MG tablet Take 1 tablet by mouth Daily.   Taking    tiotropium bromide monohydrate (SPIRIVA RESPIMAT) 2.5 MCG/ACT aerosol solution inhaler Inhale 2 puffs Daily.   Taking    ferrous gluconate (FERGON) 324 MG tablet Take 1 tablet by mouth.       folic acid (FOLVITE) 1 MG tablet Take 1 tablet by mouth Daily. 30 tablet 0     HYDROcodone-acetaminophen (NORCO) 5-325 MG per tablet Take 1 tablet by mouth As Needed.       thiamine (VITAMIN B1) 100 MG tablet Take 1 tablet by mouth Daily. 30 tablet 1     Trelegy Ellipta 100-62.5-25 MCG/ACT inhaler           Current Meds:   Current Facility-Administered Medications   Medication Dose Route Frequency Provider Last Rate Last Admin    acetaminophen (TYLENOL) tablet 650 mg  650 mg Oral Q4H PRN Naomi Rodriguez MD        Or    acetaminophen (TYLENOL) 160 MG/5ML oral solution 650 mg  650 mg Oral Q4H PRN Naomi Rodriguez MD        Or    acetaminophen (TYLENOL) suppository 650 mg  650 mg Rectal Q4H PRN Naomi Rodriguez MD        amiodarone (PACERONE) tablet 200 mg  200 mg Oral Daily Naomi Rodriguez MD   200 mg at 07/08/25 0817    atorvastatin (LIPITOR) tablet 80 mg  80 mg Oral Daily Naomi Rodriguez MD   80 mg at 07/08/25 0817     sennosides-docusate (PERICOLACE) 8.6-50 MG per tablet 2 tablet  2 tablet Oral BID PRN Naomi Rodriguez MD        And    polyethylene glycol (MIRALAX) packet 17 g  17 g Oral Daily PRN Naomi Rodriguez MD        And    bisacodyl (DULCOLAX) EC tablet 5 mg  5 mg Oral Daily PRN Naomi Rodriguez MD        And    bisacodyl (DULCOLAX) suppository 10 mg  10 mg Rectal Daily PRN Naomi Rodriguez MD        budesonide-formoterol (SYMBICORT) 160-4.5 MCG/ACT inhaler 2 puff  2 puff Inhalation BID - RT Naomi Rodriguez MD   2 puff at 07/08/25 0848    And    revefenacin (YUPELRI) nebulizer solution 175 mcg  175 mcg Nebulization Daily - RT Naomi Rodriguez MD   175 mcg at 07/08/25 0847    ferrous sulfate tablet 325 mg  325 mg Oral Daily With Breakfast Naomi Rodriguez MD   325 mg at 07/08/25 0817    furosemide (LASIX) injection 20 mg  20 mg Intravenous Q12H Areli Major DO   20 mg at 07/08/25 0817    hydrOXYzine (ATARAX) tablet 25 mg  25 mg Oral TID PRN Naomi Rodriguez MD        [Held by provider] losartan (COZAAR) tablet 25 mg  25 mg Oral Daily Naomi Rodriguez MD   25 mg at 07/08/25 0817    melatonin tablet 2.5 mg  2.5 mg Oral Nightly Naomi Rodriguez MD   2.5 mg at 07/07/25 2301    metoprolol succinate XL (TOPROL-XL) 24 hr tablet 25 mg  25 mg Oral Q24H Naomi Rodriguez MD   25 mg at 07/08/25 0817    ondansetron ODT (ZOFRAN-ODT) disintegrating tablet 4 mg  4 mg Oral Q6H PRN Naomi Rodriguez MD        Or    ondansetron (ZOFRAN) injection 4 mg  4 mg Intravenous Q6H PRN Naomi Rodriguez MD        pantoprazole (PROTONIX) EC tablet 40 mg  40 mg Oral Q AM Naomi Rodriguez MD   40 mg at 07/08/25 0626    potassium chloride (KLOR-CON M20) CR tablet 20 mEq  20 mEq Oral Daily Naomi Rodriguez MD   20 mEq at 07/08/25 0817    predniSONE (DELTASONE) tablet 20 mg  20 mg Oral Daily Naomi Rodriguez MD   20 mg at 07/08/25 0817    rOPINIRole (REQUIP)  tablet 1 mg  1 mg Oral Daily Naomi Rodriguez MD   1 mg at 07/07/25 2301    sertraline (ZOLOFT) tablet 50 mg  50 mg Oral Daily Naomi Rodriguez MD   50 mg at 07/08/25 0817    sodium bicarbonate tablet 650 mg  650 mg Oral BID Moriah AreliDO   650 mg at 07/08/25 0817    spironolactone (ALDACTONE) tablet 25 mg  25 mg Oral Daily Naomi Rodriguez MD   25 mg at 07/08/25 0817    thiamine (VITAMIN B-1) tablet 100 mg  100 mg Oral Daily Naomi Rodriguez MD   100 mg at 07/08/25 0817    vitamin B-12 (CYANOCOBALAMIN) tablet 1,000 mcg  1,000 mcg Oral Daily Naomi Rodriguez MD   1,000 mcg at 07/08/25 0817       Allergies:  No Known Allergies    Social History:   Social History     Socioeconomic History    Marital status:    Tobacco Use    Smoking status: Some Days     Current packs/day: 1.00     Average packs/day: 1 pack/day for 62.5 years (62.5 ttl pk-yrs)     Types: Cigarettes     Start date: 1963     Passive exposure: Past    Smokeless tobacco: Never    Tobacco comments:     Began smoking at age 13.  Smoked 1 ppd for 28 years and 2 ppd for 30 years for an 88 pack year history.     SMOKES 5 OR LESS CIGARETTES A DAY   Vaping Use    Vaping status: Former   Substance and Sexual Activity    Alcohol use: Yes     Alcohol/week: 7.0 standard drinks of alcohol     Types: 7 Shots of liquor per week     Comment: hx of daily    Drug use: Yes     Types: Marijuana     Comment: history of, doesn't anymore; Reports using THC at bedtime.    Sexual activity: Defer        Family History:  Family History   Problem Relation Age of Onset    Cancer Mother         breast    Heart disease Mother     Heart disease Father     Cancer Maternal Aunt     Heart disease Paternal Uncle     Malig Hyperthermia Neg Hx         Review of Systems: as per HPI, in addition:    General:      + Weakness / fatigue,                       No fevers / chills                       no weight loss  HEENT;       no dysphagia   Neck:            No swelling  Respiratory: no cough / congestion/wheezing                      Improving shortness of air   CV:              No chest pain                       No palpitations  Abdomen/GI: no nausea / vomiting                      No diarrhea, no constipation                      No abdominal pain  :             no dysuria  Endocrine:   No heat or cold intolerance  Skin:           Denies rashes or skin lesions   Vascular:   + Edema  Musculoskeletal: Denies joint pain or deformities      Physical Exam:  Vitals:   Temp (24hrs), Av.9 °F (36.6 °C), Min:97.5 °F (36.4 °C), Max:98.2 °F (36.8 °C)    /78   Pulse 71   Temp 97.7 °F (36.5 °C)   Resp 18   Wt 104 kg (229 lb 6.4 oz)   SpO2 99%   BMI 32.01 kg/m²   Intake/Output:     Intake/Output Summary (Last 24 hours) at 2025 1154  Last data filed at 2025 1118  Gross per 24 hour   Intake 1327.5 ml   Output --   Net 1327.5 ml       Wt Readings from Last 1 Encounters:   25 0518 104 kg (229 lb 6.4 oz)       Exam:    General Appearance:  Awake, alert, no acute distress  Mildly ill-appearing   Head and Face:  Normocephalic, atraumatic   Eyes:  No icterus   ENMT: Moist mucosa   Neck: Supple  no jugular venous distention   Pulmonary:  Respiratory effort: Normal  Auscultation of lungs: Clear bilaterally  No wheezes or rhonchi  Good air movement, good expansion   Chest wall:  No tenderness or deformity   Cardiovascular:  Auscultation of the heart: Normal rhythm, no murmurs  1+ edema of bilateral lower extremities   Abdomen:  Abdomen: soft, nontender, normal bowel sounds    Musculoskeletal: No joint swelling or gross deformities    Skin: Skin inspection and palpation: No rash   Lymphatic: No focal lymphadenopathy   Psychiatric: Judgement and insight: normal  Orientation to person place and time: normal  Mood and affect: normal       DATA:  Radiology and Labs:  The following labs independently reviewed by me, additional AM labs ordered  Old records  independently reviewed showing previous ASHWIN, history of CKD, previous nephrology hospital notes and Dr. Frankel's office note reviewed  The following radiologic studies independently viewed by me, findings chest x-ray mild congestion  Interval notes, chart personally reviewed by me.  I have reviewed and summarized old records as detailed above  Plan of care discussed with patient himself at bedside  New problems include ASHWIN, severe anemia      Risk/ complexity of medical care/ medical decision making: High complexity, new ASHWIN, severe anemia need for transfusion and IV diuretics  Chronic illness with severe exacerbation or progression      Labs:   Recent Results (from the past 24 hours)   ECG 12 Lead Dyspnea    Collection Time: 07/07/25 12:17 PM   Result Value Ref Range    QT Interval 456 ms    QTC Interval 500 ms   Comprehensive Metabolic Panel    Collection Time: 07/07/25 12:20 PM    Specimen: Arm, Right; Blood   Result Value Ref Range    Glucose 118 (H) 65 - 99 mg/dL    BUN 30.0 (H) 8.0 - 23.0 mg/dL    Creatinine 1.82 (H) 0.76 - 1.27 mg/dL    Sodium 133 (L) 136 - 145 mmol/L    Potassium 4.0 3.5 - 5.2 mmol/L    Chloride 101 98 - 107 mmol/L    CO2 20.0 (L) 22.0 - 29.0 mmol/L    Calcium 8.7 8.6 - 10.5 mg/dL    Total Protein 6.9 6.0 - 8.5 g/dL    Albumin 4.0 3.5 - 5.2 g/dL    ALT (SGPT) 13 1 - 41 U/L    AST (SGOT) 21 1 - 40 U/L    Alkaline Phosphatase 100 39 - 117 U/L    Total Bilirubin 1.1 0.0 - 1.2 mg/dL    Globulin 2.9 gm/dL    A/G Ratio 1.4 g/dL    BUN/Creatinine Ratio 16.5 7.0 - 25.0    Anion Gap 12.0 5.0 - 15.0 mmol/L    eGFR 38.5 (L) >60.0 mL/min/1.73   High Sensitivity Troponin T    Collection Time: 07/07/25 12:20 PM    Specimen: Arm, Right; Blood   Result Value Ref Range    HS Troponin T 19 <22 ng/L   BNP    Collection Time: 07/07/25 12:20 PM    Specimen: Arm, Right; Blood   Result Value Ref Range    proBNP 8,062.0 (H) 0.0 - 900.0 pg/mL   Magnesium    Collection Time: 07/07/25 12:20 PM    Specimen: Arm,  Right; Blood   Result Value Ref Range    Magnesium 2.2 1.6 - 2.4 mg/dL   Procalcitonin    Collection Time: 07/07/25 12:20 PM    Specimen: Arm, Right; Blood   Result Value Ref Range    Procalcitonin 0.11 0.00 - 0.25 ng/mL   CBC Auto Differential    Collection Time: 07/07/25 12:20 PM    Specimen: Arm, Right; Blood   Result Value Ref Range    WBC 3.01 (L) 3.40 - 10.80 10*3/mm3    RBC 1.46 (L) 4.14 - 5.80 10*6/mm3    Hemoglobin 4.9 (C) 13.0 - 17.7 g/dL    Hematocrit 14.9 (C) 37.5 - 51.0 %    .1 (H) 79.0 - 97.0 fL    MCH 33.6 (H) 26.6 - 33.0 pg    MCHC 32.9 31.5 - 35.7 g/dL    RDW 23.0 (H) 12.3 - 15.4 %    RDW-SD 80.5 (H) 37.0 - 54.0 fl    MPV 9.7 6.0 - 12.0 fL    Platelets 97 (L) 140 - 450 10*3/mm3    Neutrophil % 53.9 42.7 - 76.0 %    Lymphocyte % 23.9 19.6 - 45.3 %    Monocyte % 19.6 (H) 5.0 - 12.0 %    Eosinophil % 0.3 0.3 - 6.2 %    Basophil % 0.3 0.0 - 1.5 %    Immature Grans % 2.0 (H) 0.0 - 0.5 %    Neutrophils, Absolute 1.62 (L) 1.70 - 7.00 10*3/mm3    Lymphocytes, Absolute 0.72 0.70 - 3.10 10*3/mm3    Monocytes, Absolute 0.59 0.10 - 0.90 10*3/mm3    Eosinophils, Absolute 0.01 0.00 - 0.40 10*3/mm3    Basophils, Absolute 0.01 0.00 - 0.20 10*3/mm3    Immature Grans, Absolute 0.06 (H) 0.00 - 0.05 10*3/mm3   Type & Screen    Collection Time: 07/07/25 12:20 PM    Specimen: Arm, Right; Blood   Result Value Ref Range    ABO Type O     RH type Positive     Antibody Screen Negative     T&S Expiration Date 7/10/2025 11:59:59 PM    Respiratory Panel PCR w/COVID-19(SARS-CoV-2) ALANNA/KAITLIN/SIMON/PAD/COR/S[facility]LDON In-House, NP Swab in UTM/VTM, 2 HR TAT - Swab, Nasopharynx    Collection Time: 07/07/25 12:50 PM    Specimen: Nasopharynx; Swab   Result Value Ref Range    ADENOVIRUS, PCR Not Detected Not Detected    Coronavirus 229E Not Detected Not Detected    Coronavirus HKU1 Not Detected Not Detected    Coronavirus NL63 Not Detected Not Detected    Coronavirus OC43 Not Detected Not Detected    COVID19 Not Detected Not Detected - Ref.  Range    Human Metapneumovirus Not Detected Not Detected    Human Rhinovirus/Enterovirus Not Detected Not Detected    Influenza A PCR Not Detected Not Detected    Influenza B PCR Not Detected Not Detected    Parainfluenza Virus 1 Not Detected Not Detected    Parainfluenza Virus 2 Not Detected Not Detected    Parainfluenza Virus 3 Not Detected Not Detected    Parainfluenza Virus 4 Not Detected Not Detected    RSV, PCR Not Detected Not Detected    Bordetella pertussis pcr Not Detected Not Detected    Bordetella parapertussis PCR Not Detected Not Detected    Chlamydophila pneumoniae PCR Not Detected Not Detected    Mycoplasma pneumo by PCR Not Detected Not Detected   High Sensitivity Troponin T 1Hr    Collection Time: 07/07/25  1:35 PM    Specimen: Arm, Left; Blood   Result Value Ref Range    HS Troponin T 17 <22 ng/L    Troponin T Numeric Delta -2 Abnormal if >/=3 ng/L   Basic Metabolic Panel    Collection Time: 07/08/25  5:23 AM    Specimen: Blood   Result Value Ref Range    Glucose 154 (H) 65 - 99 mg/dL    BUN 29.0 (H) 8.0 - 23.0 mg/dL    Creatinine 1.84 (H) 0.76 - 1.27 mg/dL    Sodium 135 (L) 136 - 145 mmol/L    Potassium 4.0 3.5 - 5.2 mmol/L    Chloride 102 98 - 107 mmol/L    CO2 17.0 (L) 22.0 - 29.0 mmol/L    Calcium 8.0 (L) 8.6 - 10.5 mg/dL    BUN/Creatinine Ratio 15.8 7.0 - 25.0    Anion Gap 16.0 (H) 5.0 - 15.0 mmol/L    eGFR 38.0 (L) >60.0 mL/min/1.73   CBC (No Diff)    Collection Time: 07/08/25  5:23 AM    Specimen: Blood   Result Value Ref Range    WBC 2.71 (L) 3.40 - 10.80 10*3/mm3    RBC 2.03 (L) 4.14 - 5.80 10*6/mm3    Hemoglobin 6.6 (C) 13.0 - 17.7 g/dL    Hematocrit 19.3 (C) 37.5 - 51.0 %    MCV 95.1 79.0 - 97.0 fL    MCH 32.5 26.6 - 33.0 pg    MCHC 34.2 31.5 - 35.7 g/dL    RDW 22.2 (H) 12.3 - 15.4 %    RDW-SD 72.6 (H) 37.0 - 54.0 fl    MPV 10.0 6.0 - 12.0 fL    Platelets 106 (L) 140 - 450 10*3/mm3   Iron Profile + Ferritin    Collection Time: 07/08/25  5:23 AM    Specimen: Blood   Result Value Ref  Range    Iron 131 59 - 158 mcg/dL    Iron Saturation (TSAT) 44 20 - 50 %    Transferrin 198 (L) 200 - 360 mg/dL    TIBC 295 (L) 298 - 536 mcg/dL    Ferritin 1,249.00 (H) 30.00 - 400.00 ng/mL   Prepare RBC, 2 Units    Collection Time: 07/08/25  6:00 AM   Result Value Ref Range    Product Code N1796D61     Unit Number [id number]     UNIT  ABO O     UNIT  RH POS     Crossmatch Interpretation Compatible     Dispense Status PT     Blood Expiration Date 202508062359     Blood Type Barcode 5100     Product Code M6727G02     Unit Number [id number]     UNIT  ABO O     UNIT  RH POS     Crossmatch Interpretation Compatible     Dispense Status IS     Blood Expiration Date 202508062359     Blood Type Barcode 5100    Prepare RBC, 2 Units    Collection Time: 07/08/25 11:29 AM   Result Value Ref Range    Product Code J5998A18     Unit Number [id number]     UNIT  ABO O     UNIT  RH POS     Crossmatch Interpretation Compatible     Dispense Status IS     Blood Expiration Date 202508082359     Blood Type Barcode 5100     Product Code T0553G90     Unit Number [id number]     UNIT  ABO O     UNIT  RH POS     Crossmatch Interpretation Compatible     Dispense Status IS     Blood Expiration Date 202508082359     Blood Type Barcode 5100        Radiology:  Imaging Results (Last 24 Hours)       Procedure Component Value Units Date/Time    XR Chest 1 View [[id number]] Collected: 07/07/25 1258     Updated: 07/07/25 1302    Narrative:      XR CHEST 1 VW-     HISTORY: Male who is 75 years-old, short of breath     TECHNIQUE: Frontal view of the chest     COMPARISON: 2/23/2025     FINDINGS: The heart is enlarged. Pulmonary vasculature is mildly  congested. Left-sided pacemaker, cardiac leads, sternotomy changes are  noted. Minimal right pleural effusion is suggested. No focal pulmonary  consolidation or pneumothorax. No acute osseous process.       Impression:      As described.     This report was finalized on 7/7/2025 12:59 PM by  Dr. Alex Deleon M.D on Workstation: [id number]                ASSESSMENT:   New ASHWIN, likely multifactorial, perhaps a prerenal component or even possible ATN related to his severe anemia.  Some mild volume overload so acute CHF another possibility  CKD stage IIIa, baseline creatinine 1.0  Acute systolic CHF, EF 40 to 45%  Severe anemia  Myelodysplasia syndrome    Anemia requiring transfusions  COPD  Atrial fibrillation  Coronary artery disease      DISCUSSION/PLAN:   Agree with current IV diuretics while receiving transfusions.  He does not look particularly volume overloaded on exam today and O2 sats upper 90s on room air  Agree with continued transfusion.  May have either a prerenal component related to his severe anemia versus ATN from renal hypoxemia  Will check urine studies  Will monitor renal function, volume, electrolytes closely while on diuretic therapy  Monitor daily weights, check strict I's and O's  Will place on sodium restricted diet and fluid restriction  Follow-up labs in a.m.  Will hold ARB.  Okay to continue spironolactone and current potassium    Continue to monitor electrolytes and volume closely, avoid IV contrast and nephrotoxic medications     I appreciate the consult request.  Please send me a secure chat message with any nonurgent questions regarding patient care.  For any urgent patient care issues please call my office number below.      Eloy Meier MD  [facility]  Office phone number: [phone]  Answering service phone number: [phone]      7/8/2025        Dictation via Dragon dictation software

## 2025-07-08 NOTE — H&P
HISTORY AND PHYSICAL   [facility]        Date of Admission: 2025  Patient Identification:  Name: Yves Hastigns  Age: 75 y.o.  Sex: male  :  1950  MRN: [id number]                     Primary Care Physician: Isael Chopra MD    Chief Complaint:  75 year old gentleman presented to the emergency room with shortness of breath and fatigue; he has a history of copd; he was seen by his hematologist and his hgb was low so he was advised to come to the hospital; no fever or chills; no nausea or vomiting; he is followed by dr adan at [facility]    History of Present Illness:   As above    Past Medical History:  Past Medical History:   Diagnosis Date    CHF (congestive heart failure)     COPD (chronic obstructive pulmonary disease)     Emphysema of lung     HLD (hyperlipidemia)     [place] (hard of hearing)     ears hearing aids    HTN (hypertension)     Myelodysplastic syndrome, unspecified 2023    Myocardial infarction     Short-term memory loss     Sleep apnea     no machine    Stroke     Ventral hernia     sched repair     Past Surgical History:  Past Surgical History:   Procedure Laterality Date    CARDIAC CATHETERIZATION N/A 2018    Procedure: Left Heart Cath;  Surgeon: Mika Carranza MD;  Location[facility];  Service: Cardiology    CARDIAC CATHETERIZATION N/A 2018    Procedure: Left ventriculography;  Surgeon: Mika Carranza MD;  Location:  [facility] CATH INVASIVE LOCATION;  Service: Cardiology    CARDIAC CATHETERIZATION N/A 2018    Procedure: Coronary angiography;  Surgeon: Mika Carranza MD;  [facility];  Service: Cardiology    CARDIAC CATHETERIZATION  2018    Procedure: Functional Flow [place];  Surgeon: Mika Carranza MD;  [facility];  Service: Cardiology    CARDIAC CATHETERIZATION N/A 2024    Procedure: Left Heart Cath;  Surgeon:  Mika Carranza MD;  Location: [facility] CATH INVASIVE LOCATION;  Service: Cardiovascular;  Laterality: N/A;    CARDIAC CATHETERIZATION N/A 1/12/2024    Procedure: Left ventriculography;  Surgeon: Mika Carranza MD;  Location: [facility] CATH INVASIVE LOCATION;  Service: Cardiovascular;  Laterality: N/A;    CARDIAC CATHETERIZATION N/A 1/12/2024    Procedure: Coronary angiography;  Surgeon: Mika Carranza MD;  Location: [facility] CATH INVASIVE LOCATION;  Service: Cardiovascular;  Laterality: N/A;    COLONOSCOPY W/ POLYPECTOMY N/A 11/10/2021    Procedure: COLONOSCOPY; POLYPECTOMY;  Surgeon: Alexander Dobbs MD;  Location: [facility] OR;  Service: Gastroenterology;  Laterality: N/A;  DIVERTICULOSIS  POLYP    CORONARY STENT PLACEMENT      CYST REMOVAL      tailbone    ENDOSCOPY N/A 2/25/2025    Procedure: ESOPHAGOGASTRODUODENOSCOPY WITH COLD BIOPSY;  Surgeon: Amilcar Lozada MD;  Location: [facility] ENDOSCOPY;  Service: Gastroenterology;  Laterality: N/A;  PRE: UPPER GI BLEED RULEOUT  POST: DUODENITIS    INCISION AND DRAINAGE PERIRECTAL ABSCESS N/A 05/29/2021    Procedure: INCISION AND DRAINAGE OF PERIRECTAL ABSCESS;  Surgeon: Reddy Johnson Jr., MD;  Location: [facility] OR;  Service: General;  Laterality: N/A;    UMBILICAL HERNIA REPAIR      DR. CASEY - YEARS AGO    VEIN LIGATION AND STRIPPING BILATERAL      VENTRAL/INCISIONAL HERNIA REPAIR N/A 02/04/2019    Procedure: VENTRAL/INCISIONAL HERNIA REPAIR LAPAROSCOPIC;  Surgeon: Adelfo Nieves MD;  Location: [facility];  Service: General      Home Meds:  Medications Prior to Admission   Medication Sig Dispense Refill Last Dose/Taking    FLUoxetine (PROzac) 20 MG capsule Take 1 capsule by mouth Daily.   Taking    predniSONE (DELTASONE) 20 MG tablet Take 1 tablet by mouth Daily.   Taking    tiotropium bromide monohydrate (SPIRIVA RESPIMAT) 2.5 MCG/ACT aerosol solution inhaler Inhale 2 puffs Daily.   Taking    albuterol (PROVENTIL HFA;VENTOLIN HFA) 108  (90 Base) MCG/ACT inhaler Inhale 2 puffs Every 4 (Four) Hours As Needed for Wheezing.   Taking As Needed    amiodarone (PACERONE) 200 MG tablet Take 1 tablet by mouth Daily. 30 tablet 0 Taking    aspirin 81 MG EC tablet aspirin 81 mg tablet,delayed release   TAKE 1 TABLET BY MOUTH DAILY   Taking    atorvastatin (LIPITOR) 80 MG tablet Take 1 tablet by mouth Daily.   Taking    cyanocobalamin (VITAMIN B-12) 1000 MCG tablet Take 1 tablet by mouth Daily.   Taking    empagliflozin (JARDIANCE) 10 MG tablet tablet Take 1 tablet by mouth Daily. 30 tablet 1 Taking    Ferrous Fumarate (Ferrocite) 324 (106 Fe) MG tablet Take 1 tablet by mouth Every Other Day.   Taking    ferrous gluconate (FERGON) 324 MG tablet Take 1 tablet by mouth.       folic acid (FOLVITE) 1 MG tablet Take 1 tablet by mouth Daily. 30 tablet 0     furosemide (LASIX) 40 MG tablet Take 1 tablet by mouth Daily. 30 tablet 0 Taking    HYDROcodone-acetaminophen (NORCO) 5-325 MG per tablet Take 1 tablet by mouth As Needed.       hydrOXYzine (ATARAX) 25 MG tablet Take 1 tablet by mouth 3 (Three) Times a Day As Needed for Itching. 30 tablet 0 Taking As Needed    ipratropium-albuterol (DUO-NEB) 0.5-2.5 mg/3 ml nebulizer ipratropium 0.5 mg-albuterol 3 mg (2.5 mg base)/3 mL nebulization soln   Taking    losartan (COZAAR) 25 MG tablet Take 1 tablet by mouth Daily. 30 tablet 1 Taking    metoprolol succinate XL (TOPROL-XL) 25 MG 24 hr tablet Take 1 tablet by mouth Daily. 30 tablet 1 Taking    nitroglycerin (NITROSTAT) 0.4 MG SL tablet Place 1 tablet under the tongue Every 5 (Five) Minutes As Needed for Chest Pain. Take no more than 3 doses in 15 minutes.   Taking As Needed    pantoprazole (PROTONIX) 20 MG EC tablet Take 1 tablet by mouth Daily.   Taking    potassium chloride 10 MEQ CR tablet Take 2 tablets by mouth Daily.   Taking    rOPINIRole (REQUIP) 0.5 MG tablet Take 2 tablets by mouth Daily.   Taking    sertraline (ZOLOFT) 50 MG tablet Take 1 tablet by mouth Daily.    Taking    spironolactone (ALDACTONE) 25 MG tablet Take 1 tablet by mouth Daily.   Taking    thiamine (VITAMIN B1) 100 MG tablet Take 1 tablet by mouth Daily. 30 tablet 1     Trelegy Ellipta 100-62.5-25 MCG/ACT inhaler           Allergies:  No Known Allergies  Immunizations:  Immunization History   Administered Date(s) Administered    COVID-19 (MODERNA) 1st,2nd,3rd Dose Monovalent 02/23/2021, 03/23/2021, 08/17/2021    COVID-19 (MODERNA) BIVALENT 12+YRS 11/04/2022    Fluzone Quad >6mos (Multi-dose) 10/31/2016    Pneumococcal, Unspecified 02/18/2016    Td, Not Adsorbed 02/18/2016     Social History:   Social History     Social History Narrative    Not on file     Social History     Socioeconomic History    Marital status:    Tobacco Use    Smoking status: Some Days     Current packs/day: 1.00     Average packs/day: 1 pack/day for 62.5 years (62.5 ttl pk-yrs)     Types: Cigarettes     Start date: 1963     Passive exposure: Past    Smokeless tobacco: Never    Tobacco comments:     Began smoking at age 13.  Smoked 1 ppd for 28 years and 2 ppd for 30 years for an 88 pack year history.     SMOKES 5 OR LESS CIGARETTES A DAY   Vaping Use    Vaping status: Former   Substance and Sexual Activity    Alcohol use: Yes     Alcohol/week: 7.0 standard drinks of alcohol     Types: 7 Shots of liquor per week     Comment: hx of daily    Drug use: Yes     Types: Marijuana     Comment: history of, doesn't anymore; Reports using THC at bedtime.    Sexual activity: Defer       Family History:  Family History   Problem Relation Age of Onset    Cancer Mother         breast    Heart disease Mother     Heart disease Father     Cancer Maternal Aunt     Heart disease Paternal Uncle     Malig Hyperthermia Neg Hx         Review of Systems  See history of present illness and past medical history.  Patient denies headache, dizziness, syncope, falls, trauma, change in vision, change in hearing, change in taste, changes in weight, changes in  appetite, focal weakness, numbness, or paresthesia.  Patient denies chest pain, palpitations,  cough, sinus pressure, rhinorrhea, epistaxis, hemoptysis, nausea, vomiting,hematemesis, diarrhea, constipation or hematochezia.  Denies cold or heat intolerance, polydipsia, polyuria, polyphagia. Denies hematuria, pyuria, dysuria, hesitancy, frequency or urgency. Denies consumption of raw and under cooked meats foods or change in water source.  Denies fever, chills, sweats, night sweats.     Objective:  T Max 24 hrs: Temp (24hrs), Av °F (36.7 °C), Min:97.6 °F (36.4 °C), Max:98.2 °F (36.8 °C)    Vitals Ranges:   Temp:  [97.6 °F (36.4 °C)-98.2 °F (36.8 °C)] 98.2 °F (36.8 °C)  Heart Rate:  [70-76] 70  Resp:  [18-22] 18  BP: (101-118)/(52-80) 105/66      Exam:  /66 (BP Location: Right arm, Patient Position: Sitting)   Pulse 70   Temp 98.2 °F (36.8 °C) (Oral)   Resp 18   SpO2 99%     General Appearance:    Alert, cooperative, no distress, appears stated age   Head:    Normocephalic, without obvious abnormality, atraumatic   Eyes:    PERRL, conjunctivae/corneas clear, EOM's intact, both eyes   Ears:    Normal external ear canals, both ears   Nose:   Nares normal, septum midline, mucosa normal, no drainage    or sinus tenderness   Throat:   Lips, mucosa, and tongue normal   Neck:   Supple, symmetrical, trachea midline, no adenopathy;     thyroid:  no enlargement/tenderness/nodules; no carotid    bruit or JVD   Back:     Symmetric, no curvature, ROM normal, no CVA tenderness   Lungs:     Clear to auscultation bilaterally, respirations unlabored   Chest Wall:    No tenderness or deformity    Heart:    Regular rate and rhythm, S1 and S2 normal, no murmur, rub   or gallop   Abdomen:     Soft, nontender, bowel sounds active all four quadrants,     no masses, no hepatomegaly, no splenomegaly   Extremities:   Extremities normal, atraumatic, no cyanosis or edema                       .    Data Review:  Labs in chart were  reviewed.  WBC   Date Value Ref Range Status   07/07/2025 3.01 (L) 3.40 - 10.80 10*3/mm3 Final     Hemoglobin   Date Value Ref Range Status   07/07/2025 4.9 (C) 13.0 - 17.7 g/dL Final     Hematocrit   Date Value Ref Range Status   07/07/2025 14.9 (C) 37.5 - 51.0 % Final     Platelets   Date Value Ref Range Status   07/07/2025 97 (L) 140 - 450 10*3/mm3 Final     Sodium   Date Value Ref Range Status   07/07/2025 133 (L) 136 - 145 mmol/L Final     Potassium   Date Value Ref Range Status   07/07/2025 4.0 3.5 - 5.2 mmol/L Final     Chloride   Date Value Ref Range Status   07/07/2025 101 98 - 107 mmol/L Final     CO2   Date Value Ref Range Status   07/07/2025 20.0 (L) 22.0 - 29.0 mmol/L Final     BUN   Date Value Ref Range Status   07/07/2025 30.0 (H) 8.0 - 23.0 mg/dL Final     Creatinine   Date Value Ref Range Status   07/07/2025 1.82 (H) 0.76 - 1.27 mg/dL Final     Glucose   Date Value Ref Range Status   07/07/2025 118 (H) 65 - 99 mg/dL Final     Calcium   Date Value Ref Range Status   07/07/2025 8.7 8.6 - 10.5 mg/dL Final     Magnesium   Date Value Ref Range Status   07/07/2025 2.2 1.6 - 2.4 mg/dL Final     AST (SGOT)   Date Value Ref Range Status   07/07/2025 21 1 - 40 U/L Final     ALT (SGPT)   Date Value Ref Range Status   07/07/2025 13 1 - 41 U/L Final     Alkaline Phosphatase   Date Value Ref Range Status   07/07/2025 100 39 - 117 U/L Final                Imaging Results (All)       Procedure Component Value Units Date/Time    XR Chest 1 View [[id number]] Collected: 07/07/25 1258     Updated: 07/07/25 1302    Narrative:      XR CHEST 1 VW-     HISTORY: Male who is 75 years-old, short of breath     TECHNIQUE: Frontal view of the chest     COMPARISON: 2/23/2025     FINDINGS: The heart is enlarged. Pulmonary vasculature is mildly  congested. Left-sided pacemaker, cardiac leads, sternotomy changes are  noted. Minimal right pleural effusion is suggested. No focal pulmonary  consolidation or pneumothorax. No acute  osseous process.       Impression:      As described.     This report was finalized on 7/7/2025 12:59 PM by Dr. Alex Deleon M.D on Workstation: [id number]                 Assessment:  Active Hospital Problems    Diagnosis  POA    **Anemia requiring transfusions [D64.9]  Yes      Resolved Hospital Problems   No resolved problems to display.   Myelodysplasia  Copd  Hypertension  Cad  A fib  Hard of hearing  Sleep apnea  Cognitive impairment  Ckd3  Hyperglycemia  Chronic diastolic chf    Plan:  Transfusion given  Trend labs  Monitor on telemetry  No wheezing noted at this time  Dw patient, family and ed provider    Naomi Rodriguez MD  7/7/2025  20:35 EDT

## 2025-07-08 NOTE — CONSULTS
[facility] CBC GROUP INITIAL INPATIENT CONSULTATION NOTE    REASON FOR CONSULTATION:    MDS    HISTORY OF PRESENT ILLNESS:  Yves Hastings is a 75 y.o. male who we are asked to see today in consultation for the above issue.    Patient with past medical history significant for hypertension, hyperlipidemia, COPD, CHF, coronary artery disease status post MI and previous coronary stent placement, status post CABG, obstructive sleep apnea, stroke.  Patient with atrial fibrillation and status post pacemaker.  He was on previous anticoagulation with warfarin which was discontinued in 2023 in the setting of anemia with concern for blood loss.    The patient has history of myelodysplasia and is followed by Dr. Adame in the [facility], last seen on 6/23/2025.  Per available records, patient was diagnosed with MDS, refractory anemia with ring sideroblasts in 2023.  Patient had anemia and borderline thrombocytopenia as well as borderline leukopenia with immature forms noted in differential.  He did have evidence of a faint IgM lambda and IgG lambda monoclonal protein.  He underwent bone marrow biopsy on 10/6/2023 which showed hypercellular marrow with dismegakaryopoiesis, dysgranulopoiesis, dyserythropoiesis with less than 5% blasts.  There were greater than 15% ring sideroblasts.  MDS FISH panel was negative.  NGS analysis showed ASX L1 ulcerations x 2.  Cytogenetics with normal karyotype.  Patient initiated treatment with a azacitidine in November 2023.  Treatment was interrupted due to hospitalization for cardiac issues.  He received subsequent supportive therapy with DEMI beginning in October 2024.  He continued to require transfusion support despite DEMI use and wanted to pursue a course of intermittent transfusions.  There was discussion regarding potential use of luspatercept.    Patient presented to emergency department on 7/7/2025 with report of shortness of breath.  Hemoglobin on 7/7/2025 was 4.9 with WBC 3.01  with differential 54 segs, 24 lymphs, 20 monocytes, ANC 1.62 and platelet count of 97,000.  Respiratory viral panel was negative.  Procalcitonin was normal at 0.11.  Patient received transfusion support 2 units PRBC.  Hemoglobin up to 6.6 on 7/8/2025 and patient received an additional 2 unit PRBC transfusion.  Additional labs on 7/8/2025 with iron 131, ferritin 1249, iron saturation 44%, TIBC 295.    Patient with ASHWIN/CKD3a with baseline creatinine in the 1-1.3 range with creatinine on 7/7/2025 at 1.82.  Repeat on 7/8/2025 was 1.84.    Patient notes that he feels much better after transfusion today        Past Medical History:   Diagnosis Date    CHF (congestive heart failure)     COPD (chronic obstructive pulmonary disease)     Emphysema of lung     HLD (hyperlipidemia)     [place] (hard of hearing)     ears hearing aids    HTN (hypertension)     Myelodysplastic syndrome, unspecified 11/06/2023    Myocardial infarction     Short-term memory loss     Sleep apnea     no machine    Stroke     Ventral hernia     sched repair       Past Surgical History:   Procedure Laterality Date    CARDIAC CATHETERIZATION N/A 12/14/2018    Procedure: Left Heart Cath;  Surgeon: Mika Carranza MD;  Location: [facility] CATH INVASIVE LOCATION;  Service: Cardiology    CARDIAC CATHETERIZATION N/A 12/14/2018    Procedure: Left ventriculography;  Surgeon: Mika Carranza MD;  Location: [facility] CATH INVASIVE LOCATION;  Service: Cardiology    CARDIAC CATHETERIZATION N/A 12/14/2018    Procedure: Coronary angiography;  Surgeon: Mika Carranza MD;  Location: [facility] CATH INVASIVE LOCATION;  Service: Cardiology    CARDIAC CATHETERIZATION  12/14/2018    Procedure: Functional Flow [place];  Surgeon: Mika Carranza MD;  Location: [facility] CATH INVASIVE LOCATION;  Service: Cardiology    CARDIAC CATHETERIZATION N/A 1/12/2024    Procedure: Left Heart Cath;  Surgeon: Mika Carranza MD;  Location: [facility] INVASIVE LOCATION;   Service: Cardiovascular;  Laterality: N/A;    CARDIAC CATHETERIZATION N/A 1/12/2024    Procedure: Left ventriculography;  Surgeon: Mika Carranza MD;  Location: [facility] CATH INVASIVE LOCATION;  Service: Cardiovascular;  Laterality: N/A;    CARDIAC CATHETERIZATION N/A 1/12/2024    Procedure: Coronary angiography;  Surgeon: Mika Carranza MD;  Location: [facility] CATH INVASIVE LOCATION;  Service: Cardiovascular;  Laterality: N/A;    COLONOSCOPY W/ POLYPECTOMY N/A 11/10/2021    Procedure: COLONOSCOPY; POLYPECTOMY;  Surgeon: Alexander Dobbs MD;  Location: [facility] OR;  Service: Gastroenterology;  Laterality: N/A;  DIVERTICULOSIS  POLYP    CORONARY STENT PLACEMENT      CYST REMOVAL      tailbone    ENDOSCOPY N/A 2/25/2025    Procedure: ESOPHAGOGASTRODUODENOSCOPY WITH COLD BIOPSY;  Surgeon: Amilcar Lozada MD;  Location: [facility] ENDOSCOPY;  Service: Gastroenterology;  Laterality: N/A;  PRE: UPPER GI BLEED RULEOUT  POST: DUODENITIS    INCISION AND DRAINAGE PERIRECTAL ABSCESS N/A 05/29/2021    Procedure: INCISION AND DRAINAGE OF PERIRECTAL ABSCESS;  Surgeon: Reddy Johnson Jr., MD;  Location: [facility] MAIN OR;  Service: General;  Laterality: N/A;    UMBILICAL HERNIA REPAIR      DR. CASEY - YEARS AGO    VEIN LIGATION AND STRIPPING BILATERAL      VENTRAL/INCISIONAL HERNIA REPAIR N/A 02/04/2019    Procedure: VENTRAL/INCISIONAL HERNIA REPAIR LAPAROSCOPIC;  Surgeon: Adelfo Nieves MD;  Location: [facility] OR;  Service: General       SOCIAL HISTORY:   reports that he has been smoking cigarettes. He started smoking about 62 years ago. He has a 62.5 pack-year smoking history. He has been exposed to tobacco smoke. He has never used smokeless tobacco. He reports current alcohol use of about 7.0 standard drinks of alcohol per week. He reports current drug use. Drug: Marijuana.    FAMILY HISTORY:  family history includes Cancer in his maternal aunt and mother; Heart disease in his father, mother, and paternal  uncle.    ALLERGIES:  No Known Allergies    MEDICATIONS:  As listed in the electronic medical record.    Review of Systems  A comprehensive review of systems was obtained with pertinent positive findings as noted in the interval history above.  All other systems negative.    Vitals:    07/07/25 2058 07/07/25 2310 07/08/25 0002 07/08/25 0518   BP: 108/69 113/60 109/63    BP Location: Right arm Right arm Right arm    Patient Position: Sitting Sitting Sitting    Pulse: 71 71 71    Resp: 18 19 18    Temp: 98.2 °F (36.8 °C) 97.9 °F (36.6 °C) 98.2 °F (36.8 °C)    TempSrc: Oral Oral Oral    SpO2: 100% 97% 99%    Weight:    104 kg (229 lb 6.4 oz)       Physical Exam  Constitutional:       Appearance: He is well-developed.   Eyes:      Conjunctiva/sclera: Conjunctivae normal.   Neck:      Thyroid: No thyromegaly.   Cardiovascular:      Rate and Rhythm: Normal rate and regular rhythm.      Heart sounds: No murmur heard.     No friction rub. No gallop.   Pulmonary:      Effort: No respiratory distress.      Breath sounds: Wheezing present.   Abdominal:      General: Bowel sounds are normal. There is no distension.      Palpations: Abdomen is soft.      Tenderness: There is no abdominal tenderness.   Musculoskeletal:         General: Swelling present.      Comments: 1+ bilateral lower extremity edema   Lymphadenopathy:      Head:      Right side of head: No submandibular adenopathy.      Cervical: No cervical adenopathy.      Upper Body:      Right upper body: No supraclavicular adenopathy.      Left upper body: No supraclavicular adenopathy.   Skin:     General: Skin is warm and dry.      Findings: No rash.   Neurological:      Mental Status: He is alert and oriented to person, place, and time.      Cranial Nerves: No cranial nerve deficit.      Motor: No abnormal muscle tone.      Deep Tendon Reflexes: Reflexes normal.   Psychiatric:         Behavior: Behavior normal.         DIAGNOSTIC DATA:    Results from last 7 days   Lab  Units 07/08/25  0523 07/07/25  1220   WBC 10*3/mm3 2.71* 3.01*   HEMOGLOBIN g/dL 6.6* 4.9*   HEMATOCRIT % 19.3* 14.9*   PLATELETS 10*3/mm3 106* 97*      Results from last 7 days   Lab Units 07/08/25  0523 07/07/25  1220   SODIUM mmol/L 135* 133*   POTASSIUM mmol/L 4.0 4.0   CHLORIDE mmol/L 102 101   CO2 mmol/L 17.0* 20.0*   BUN mg/dL 29.0* 30.0*   CREATININE mg/dL 1.84* 1.82*   CALCIUM mg/dL 8.0* 8.7   BILIRUBIN mg/dL  --  1.1   ALK PHOS U/L  --  100   ALT (SGPT) U/L  --  13   AST (SGOT) U/L  --  21   GLUCOSE mg/dL 154* 118*          Assessment & Plan   ASSESSMENT:  This is a 75 y.o. male with:    *MDS  The patient has history of myelodysplasia and is followed by Dr. Adame in the [facility], last seen on 6/23/2025.    Per available records, patient was diagnosed with MDS, refractory anemia with ring sideroblasts in 2023.    Patient had anemia and borderline thrombocytopenia as well as borderline leukopenia with immature forms noted in differential.    He did have evidence of a faint IgM lambda and IgG lambda monoclonal protein.    Bone marrow biopsy on 10/6/2023 showed hypercellular marrow with dismegakaryopoiesis, dysgranulopoiesis, dyserythropoiesis with less than 5% blasts.  There were greater than 15% ring sideroblasts.  MDS FISH panel was negative.  NGS analysis showed ASX L1 ulcerations x 2.  Cytogenetics with normal karyotype.    Patient initiated treatment with a azacitidine in November 2023.  Treatment was interrupted due to hospitalization for cardiac issues.    He received subsequent supportive therapy with DEMI beginning in October 2024.    He continued to require transfusion support despite DEMI use and wanted to pursue a course of intermittent transfusions.    There was discussion regarding potential use of luspatercept.  Patient is being referred to [facility] to discuss potential transplant option  Patient presented to emergency department on 7/7/2025 with report of shortness of  breath.    Hemoglobin on 7/7/2025 was 4.9 with WBC 3.01 with differential 54 segs, 24 lymphs, 20 monocytes, ANC 1.62 and platelet count of 97,000.    Patient received transfusion support 2 units PRBC on 7/7/2025.    Hemoglobin up to 6.6 on 7/8/2025 and patient received an additional 2 unit PRBC transfusion.    Additional labs on 7/8/2025 with iron 131, ferritin 1249, iron saturation 44%, TIBC 295.  Patient with known MDS and chronic pancytopenia, transfusion dependent.  Continue to transfuse, would like to achieve hemoglobin at least up to the 8 range.  Patient volume overloaded, receiving diuresis.  Once hemoglobin has stabilized, patient will need to follow-up in the outpatient setting with Dr. Adame to discuss further recommendations for management of his disease.  Check stool for occult blood to rule out additional GI blood loss on top of MDS.    *ASHWIN/CKDa3  Baseline creatinine in the 1-1.3 range   Creatinine on 7/7/2025 was 1.82.    Patient was seen by nephrology, felt to have prerenal component versus ATN.  ARB held.  Creatinine on 7/8/2025 was 1.84.    *COPD  Patient continuing on Symbicort and Yupelri  Patient continuing on prednisone 20 mg daily    *CHF  Echocardiogram on 10/14/2024 with ejection fraction 41-45%  proBNP on 7/7/2025 elevated at 8062  Patient undergoing diuresis    *Atrial fibrillation  Status post pacemaker.    He was on previous anticoagulation with warfarin which was discontinued in 2023 in the setting of anemia with concern for blood loss.  Patient continuing on amiodarone      PLAN:   Patient received additional 2 unit PRBC transfusion today for hemoglobin of 6.6  Check stool for occult blood  Would like to achieve and maintain stable hemoglobin at least into the 8 range prior to discharge  Following discharge patient will need to follow-up with Dr. Adame at [facility] to discuss further recommendations on management of his disease    Discussed with patient at bedside      Ashok NAGY  MD Jacques

## 2025-07-08 NOTE — PROGRESS NOTES
Name: Yves Hastings ADMIT: 2025   : 1950  PCP: Isael Chopra MD    MRN: [id number] LOS: 0 days   AGE/SEX: 75 y.o. male  ROOM: [facility]     Subjective   Subjective   2025  Patient seen and examined at bedside, states his breathing is better.  Denies overt bleeding.  He is getting PRBC.       Objective   Objective   Vital Signs  Temp:  [97.5 °F (36.4 °C)-98.2 °F (36.8 °C)] 97.7 °F (36.5 °C)  Heart Rate:  [70-76] 71  Resp:  [18-22] 18  BP: (101-122)/(52-80) 122/78  SpO2:  [94 %-100 %] 99 %  on   ;   Device (Oxygen Therapy): room air  Body mass index is 32.01 kg/m².  Physical Exam  Constitutional:       General: He is not in acute distress.  HENT:      Head: Normocephalic and atraumatic.      Nose: Nose normal. No congestion.      Mouth/Throat:      Pharynx: Oropharynx is clear. No oropharyngeal exudate.   Eyes:      General: No scleral icterus.  Cardiovascular:      Rate and Rhythm: Normal rate and regular rhythm.      Heart sounds: No murmur heard.     No friction rub. No gallop.   Pulmonary:      Effort: No respiratory distress.      Breath sounds: No wheezing, rhonchi or rales.   Abdominal:      General: There is no distension.      Tenderness: There is no abdominal tenderness. There is no guarding.   Musculoskeletal:         General: No swelling or deformity.      Cervical back: Normal range of motion. No rigidity.      Comments: Trace lower extremity edema   Skin:     Coloration: Skin is not jaundiced.      Findings: No bruising or lesion.   Neurological:      General: No focal deficit present.      Mental Status: He is alert.      Motor: No weakness.       Results Review     I reviewed the patient's new clinical results.  Results from last 7 days   Lab Units 25  0523 25  1220   WBC 10*3/mm3 2.71* 3.01*   HEMOGLOBIN g/dL 6.6* 4.9*   PLATELETS 10*3/mm3 106* 97*     Results from last 7 days   Lab Units 25  0523 25  1220   SODIUM mmol/L 135* 133*   POTASSIUM mmol/L  "4.0 4.0   CHLORIDE mmol/L 102 101   CO2 mmol/L 17.0* 20.0*   BUN mg/dL 29.0* 30.0*   CREATININE mg/dL 1.84* 1.82*   GLUCOSE mg/dL 154* 118*   EGFR mL/min/1.73 38.0* 38.5*     Results from last 7 days   Lab Units 07/07/25  1220   ALBUMIN g/dL 4.0   BILIRUBIN mg/dL 1.1   ALK PHOS U/L 100   AST (SGOT) U/L 21   ALT (SGPT) U/L 13     Results from last 7 days   Lab Units 07/08/25  0523 07/07/25  1220   CALCIUM mg/dL 8.0* 8.7   ALBUMIN g/dL  --  4.0   MAGNESIUM mg/dL  --  2.2     Results from last 7 days   Lab Units 07/07/25  1220   PROCALCITONIN ng/mL 0.11     No results found for: \"HGBA1C\", \"POCGLU\"    XR Chest 1 View  Result Date: 7/7/2025  As described.  This report was finalized on 7/7/2025 12:59 PM by Dr. Alex Deleon M.D on Workstation: [id number]        I have personally reviewed all medications:  Scheduled Medications  amiodarone, 200 mg, Oral, Daily  atorvastatin, 80 mg, Oral, Daily  budesonide-formoterol, 2 puff, Inhalation, BID - RT   And  revefenacin, 175 mcg, Nebulization, Daily - RT  ferrous sulfate, 325 mg, Oral, Daily With Breakfast  furosemide, 20 mg, Intravenous, Q12H  [Held by provider] losartan, 25 mg, Oral, Daily  melatonin, 2.5 mg, Oral, Nightly  metoprolol succinate XL, 25 mg, Oral, Q24H  pantoprazole, 40 mg, Oral, Q AM  potassium chloride, 20 mEq, Oral, Daily  predniSONE, 20 mg, Oral, Daily  rOPINIRole, 1 mg, Oral, Daily  sertraline, 50 mg, Oral, Daily  sodium bicarbonate, 650 mg, Oral, BID  spironolactone, 25 mg, Oral, Daily  thiamine, 100 mg, Oral, Daily  cyanocobalamin, 1,000 mcg, Oral, Daily    Infusions   Diet  Diet: Cardiac; Healthy Heart (2-3 Na+); Fluid Consistency: Thin (IDDSI 0)    I have personally reviewed:  [x]  Laboratory   [x]  Microbiology   [x]  Radiology   [x]  EKG/Telemetry  [x]  Cardiology/Vascular   []  Pathology    []  Records       Assessment/Plan     Active Hospital Problems    Diagnosis  POA    **Anemia requiring transfusions [D64.9]  Yes      Resolved Hospital Problems "   No resolved problems to display.       75 y.o. male admitted with Anemia requiring transfusions.    #Myelodysplastic syndrome  #Acute on chronic anemia    -Hemoglobin 4.9 on admission, was 6.6 after 2 units PRBC    -No overt bleeding    -Will give another 2 units PRBC    -Heme/ONC to assess    -Iron panel shows anemia of chronic disease    #COPD    -Chronic, stable on room air    -Symbicort twice daily    -Yupelri nebulizer daily    - Prednisone 20 mg p.o. daily    #HFmrEF    -proBNP 8062    -Echo on 10/14/2024 shows EF 41 to 45% with LV hypokinesis    -Lasix 20 mg IV twice daily    -Resume home Aldactone    -Intake and output, weigh daily    -Will recheck echo    - Will have nephrology help with diuretics due to chronic disease with metabolic acidosis and increased anion gap    #CAD    -Patient denies chest pain    - HS troponin 19 > 17, delta -2, suspect secondary to demand    -EKG shows atrial paced complexes with known LBBB without evidence of gross ischemia    -Continue amiodarone 200 mg p.o. daily    #ASHWIN on CKD    - Creatinine 1.84    - Bicarb 17 with AG 16    - Will have nephrology assist with diuresis setting of his chronic kidney disease    - Bicarb started    - Hold home losartan    #Obesity    -BMI 32.01, weight loss encouraged    #SANDHYA    -Supplemental oxygen as needed    #GERD    - PPI    #Anxiety    - Zoloft    #Hypertension    - Toprol 25 mg p.o. daily    #Hyperlipidemia    - Statin      SCDs for DVT prophylaxis.  Full code.  Discussed with patient.  Anticipate discharge home with family in 1-2 days.  Expected Discharge Date: 7/10/2025; Expected Discharge Time:       Areli Major DO  [facility]  07/08/25  11:41 EDT

## 2025-07-08 NOTE — PLAN OF CARE
Goal Outcome Evaluation:  Plan of Care Reviewed With: patient        Progress: improving  Outcome Evaluation: Maintained on R/A. Pt has received 2 units of PRBC's since admit. No s/s of bleeding noted. Repeat labs pending. Pt independent with steady gait. Safety maintained.

## 2025-07-08 NOTE — CASE MANAGEMENT/SOCIAL WORK
Discharge Planning Assessment  [facility]     Patient Name: Yves Hastings  MRN: [id number]  Today's Date: 7/8/2025    Admit Date: 7/7/2025    Plan: Home, family to transport   Discharge Needs Assessment       Row Name 07/08/25 1226       Living Environment    People in Home spouse    Name(s) of People in Home Sonia Hastings    Current Living Arrangements apartment    Potentially Unsafe Housing Conditions none    In the past 12 months has the electric, gas, oil, or water company threatened to shut off services in your home? No    Primary Care Provided by self    Provides Primary Care For no one    Family Caregiver if Needed spouse    Family Caregiver Names Sonia Hastings    Quality of Family Relationships helpful;involved    Able to Return to Prior Arrangements yes       Resource/Environmental Concerns    Resource/Environmental Concerns none    Transportation Concerns none       Transportation Needs    In the past 12 months, has lack of transportation kept you from medical appointments or from getting medications? no    In the past 12 months, has lack of transportation kept you from meetings, work, or from getting things needed for daily living? No       Food Insecurity    Within the past 12 months, you worried that your food would run out before you got the money to buy more. Never true    Within the past 12 months, the food you bought just didn't last and you didn't have money to get more. Never true       Transition Planning    Patient/Family Anticipates Transition to home;home with family    Patient/Family Anticipated Services at Transition none    Transportation Anticipated family or friend will provide       Discharge Needs Assessment    Equipment Currently Used at Home cane, straight;walker, rolling    Concerns to be Addressed discharge planning    Do you want help finding or keeping work or a job? I do not need or want help    Do you want help with school or training? For example, starting or completing job  training or getting a high school diploma, GED or equivalent No    Anticipated Changes Related to Illness none    Equipment Needed After Discharge none                   Discharge Plan       Row Name 07/08/25 6968       Plan    Plan Home, family to transport    Patient/Family in Agreement with Plan yes    Plan Comments Met with pt at bedside. Introduced self and explained role of . Face sheet verified, PCP is Isael Chopra. Pt denies difficulty paying for medications, and he obtains his medications from [facility] pharmacy. Pt lives with his spouse, Sonia, and stated that she could assist with any care needs that may arise. Pt is independent in ADL's, and does not use any assistive devices, although he has a cane/walker available, if needed. Pt has never had home health and he has never been to SNF/Rehab. Pt intends to return home at discharge, family will transport. Explained that CCP would follow to assess for discharge needs.                  Continued Care and Services - Admitted Since 7/7/2025    No active coordination exists.       Expected Discharge Date and Time       Expected Discharge Date Expected Discharge Time    Jul 10, 2025            Demographic Summary    No documentation.                  Functional Status    No documentation.                  Psychosocial    No documentation.                  Abuse/Neglect    No documentation.                  Legal    No documentation.                  Substance Abuse    No documentation.                  Patient Forms    No documentation.                     Denisha Simms RN

## 2025-07-08 NOTE — PROGRESS NOTES
Per chart review, patient was followed by Dr. Brar from [facility] for nephrology care. Will direct this consult to their group for continuity of care. Thanks for the consideration.

## 2025-07-09 ENCOUNTER — READMISSION MANAGEMENT (OUTPATIENT)
Dept: CALL CENTER | Facility: HOSPITAL | Age: 75
End: 2025-07-09
Payer: OTHER GOVERNMENT

## 2025-07-09 VITALS
HEART RATE: 79 BPM | WEIGHT: 228.6 LBS | BODY MASS INDEX: 31.9 KG/M2 | DIASTOLIC BLOOD PRESSURE: 62 MMHG | SYSTOLIC BLOOD PRESSURE: 107 MMHG | RESPIRATION RATE: 18 BRPM | TEMPERATURE: 98.1 F | OXYGEN SATURATION: 99 %

## 2025-07-09 LAB
ALBUMIN SERPL-MCNC: 4.1 G/DL (ref 3.5–5.2)
ANION GAP SERPL CALCULATED.3IONS-SCNC: 13 MMOL/L (ref 5–15)
ANISOCYTOSIS BLD QL: ABNORMAL
BASOPHILS # BLD MANUAL: 0 10*3/MM3 (ref 0–0.2)
BASOPHILS NFR BLD MANUAL: 0 % (ref 0–1.5)
BH BB BLOOD EXPIRATION DATE: NORMAL
BH BB BLOOD EXPIRATION DATE: NORMAL
BH BB BLOOD TYPE BARCODE: 5100
BH BB BLOOD TYPE BARCODE: 5100
BH BB DISPENSE STATUS: NORMAL
BH BB DISPENSE STATUS: NORMAL
BH BB PRODUCT CODE: NORMAL
BH BB PRODUCT CODE: NORMAL
BH BB UNIT NUMBER: NORMAL
BH BB UNIT NUMBER: NORMAL
BUN SERPL-MCNC: 29 MG/DL (ref 8–23)
BUN/CREAT SERPL: 20.1 (ref 7–25)
CALCIUM SPEC-SCNC: 8.1 MG/DL (ref 8.6–10.5)
CHLORIDE SERPL-SCNC: 104 MMOL/L (ref 98–107)
CO2 SERPL-SCNC: 21 MMOL/L (ref 22–29)
CREAT SERPL-MCNC: 1.44 MG/DL (ref 0.76–1.27)
CROSSMATCH INTERPRETATION: NORMAL
CROSSMATCH INTERPRETATION: NORMAL
DACRYOCYTES BLD QL SMEAR: ABNORMAL
DEPRECATED RDW RBC AUTO: 67.6 FL (ref 37–54)
EGFRCR SERPLBLD CKD-EPI 2021: 51 ML/MIN/1.73
EOSINOPHIL # BLD MANUAL: 0 10*3/MM3 (ref 0–0.4)
EOSINOPHIL NFR BLD MANUAL: 0 % (ref 0.3–6.2)
ERYTHROCYTE [DISTWIDTH] IN BLOOD BY AUTOMATED COUNT: 20.8 % (ref 12.3–15.4)
GLUCOSE SERPL-MCNC: 98 MG/DL (ref 65–99)
HCT VFR BLD AUTO: 25.7 % (ref 37.5–51)
HEMOCCULT STL QL: NEGATIVE
HGB BLD-MCNC: 8.6 G/DL (ref 13–17.7)
LAB AP CASE REPORT: NORMAL
LYMPHOCYTES # BLD MANUAL: 1.52 10*3/MM3 (ref 0.7–3.1)
LYMPHOCYTES NFR BLD MANUAL: 12.2 % (ref 5–12)
MCH RBC QN AUTO: 31.6 PG (ref 26.6–33)
MCHC RBC AUTO-ENTMCNC: 33.5 G/DL (ref 31.5–35.7)
MCV RBC AUTO: 94.5 FL (ref 79–97)
METAMYELOCYTES NFR BLD MANUAL: 2 % (ref 0–0)
MICROCYTES BLD QL: ABNORMAL
MONOCYTES # BLD: 0.49 10*3/MM3 (ref 0.1–0.9)
MYELOCYTES NFR BLD MANUAL: 1 % (ref 0–0)
NEUTROPHILS # BLD AUTO: 1.88 10*3/MM3 (ref 1.7–7)
NEUTROPHILS NFR BLD MANUAL: 46.9 % (ref 42.7–76)
NRBC SPEC MANUAL: 3.1 /100 WBC (ref 0–0.2)
OVALOCYTES BLD QL SMEAR: ABNORMAL
PATH REPORT.FINAL DX SPEC: NORMAL
PATHOLOGY REVIEW: YES
PHOSPHATE SERPL-MCNC: 2.1 MG/DL (ref 2.5–4.5)
PLAT MORPH BLD: NORMAL
PLATELET # BLD AUTO: 128 10*3/MM3 (ref 140–450)
PMV BLD AUTO: 10 FL (ref 6–12)
POIKILOCYTOSIS BLD QL SMEAR: ABNORMAL
POLYCHROMASIA BLD QL SMEAR: ABNORMAL
POTASSIUM SERPL-SCNC: 3.9 MMOL/L (ref 3.5–5.2)
RBC # BLD AUTO: 2.72 10*6/MM3 (ref 4.14–5.8)
SODIUM SERPL-SCNC: 138 MMOL/L (ref 136–145)
UNIT  ABO: NORMAL
UNIT  ABO: NORMAL
UNIT  RH: NORMAL
UNIT  RH: NORMAL
URATE SERPL-MCNC: 5.2 MG/DL (ref 3.4–7)
VARIANT LYMPHS NFR BLD MANUAL: 37.8 % (ref 19.6–45.3)
WBC MORPH BLD: NORMAL
WBC NRBC COR # BLD AUTO: 4.01 10*3/MM3 (ref 3.4–10.8)

## 2025-07-09 PROCEDURE — 25010000002 FUROSEMIDE PER 20 MG: Performed by: STUDENT IN AN ORGANIZED HEALTH CARE EDUCATION/TRAINING PROGRAM

## 2025-07-09 PROCEDURE — 63710000001 PREDNISONE PER 1 MG: Performed by: INTERNAL MEDICINE

## 2025-07-09 PROCEDURE — 94799 UNLISTED PULMONARY SVC/PX: CPT

## 2025-07-09 PROCEDURE — 63710000001 REVEFENACIN 175 MCG/3ML SOLUTION: Performed by: INTERNAL MEDICINE

## 2025-07-09 PROCEDURE — 85025 COMPLETE CBC W/AUTO DIFF WBC: CPT | Performed by: INTERNAL MEDICINE

## 2025-07-09 PROCEDURE — 94664 DEMO&/EVAL PT USE INHALER: CPT

## 2025-07-09 PROCEDURE — 85007 BL SMEAR W/DIFF WBC COUNT: CPT | Performed by: INTERNAL MEDICINE

## 2025-07-09 PROCEDURE — 80069 RENAL FUNCTION PANEL: CPT | Performed by: INTERNAL MEDICINE

## 2025-07-09 PROCEDURE — 84550 ASSAY OF BLOOD/URIC ACID: CPT | Performed by: INTERNAL MEDICINE

## 2025-07-09 PROCEDURE — 99232 SBSQ HOSP IP/OBS MODERATE 35: CPT | Performed by: INTERNAL MEDICINE

## 2025-07-09 PROCEDURE — 82272 OCCULT BLD FECES 1-3 TESTS: CPT | Performed by: EMERGENCY MEDICINE

## 2025-07-09 RX ORDER — FUROSEMIDE 40 MG/1
40 TABLET ORAL DAILY
Status: DISCONTINUED | OUTPATIENT
Start: 2025-07-10 | End: 2025-07-09 | Stop reason: HOSPADM

## 2025-07-09 RX ADMIN — Medication 100 MG: at 08:38

## 2025-07-09 RX ADMIN — PANTOPRAZOLE SODIUM 40 MG: 40 TABLET, DELAYED RELEASE ORAL at 05:11

## 2025-07-09 RX ADMIN — METOPROLOL SUCCINATE 25 MG: 25 TABLET, EXTENDED RELEASE ORAL at 08:38

## 2025-07-09 RX ADMIN — PREDNISONE 20 MG: 20 TABLET ORAL at 08:38

## 2025-07-09 RX ADMIN — Medication 1000 MCG: at 08:37

## 2025-07-09 RX ADMIN — ATORVASTATIN CALCIUM 80 MG: 80 TABLET, FILM COATED ORAL at 08:38

## 2025-07-09 RX ADMIN — SERTRALINE HYDROCHLORIDE 50 MG: 50 TABLET, FILM COATED ORAL at 08:38

## 2025-07-09 RX ADMIN — REVEFENACIN 175 MCG: 175 SOLUTION RESPIRATORY (INHALATION) at 08:54

## 2025-07-09 RX ADMIN — HYDROXYZINE HYDROCHLORIDE 25 MG: 25 TABLET, FILM COATED ORAL at 05:16

## 2025-07-09 RX ADMIN — SPIRONOLACTONE 25 MG: 25 TABLET ORAL at 08:38

## 2025-07-09 RX ADMIN — SODIUM BICARBONATE 650 MG: 650 TABLET ORAL at 08:38

## 2025-07-09 RX ADMIN — POTASSIUM CHLORIDE 20 MEQ: 1500 TABLET, EXTENDED RELEASE ORAL at 08:38

## 2025-07-09 RX ADMIN — BUDESONIDE AND FORMOTEROL FUMARATE DIHYDRATE 2 PUFF: 160; 4.5 AEROSOL RESPIRATORY (INHALATION) at 08:54

## 2025-07-09 RX ADMIN — AMIODARONE HYDROCHLORIDE 200 MG: 200 TABLET ORAL at 08:38

## 2025-07-09 RX ADMIN — FUROSEMIDE 20 MG: 10 INJECTION, SOLUTION INTRAMUSCULAR; INTRAVENOUS at 08:37

## 2025-07-09 NOTE — PROGRESS NOTES
LOS: 1 day     Chief Complaint/ Reason for encounter: ASHWIN on CKD    Subjective   07/09/25 : Patient is doing well today with no new complaints  Good appetite with no nausea or vomiting  No shortness of breath chest pain or edema  Voiding well with no dysuria  Feeling better, no new complaints        Medical history reviewed:  History of Present Illness    Subjective    History taken from: Chart and patient/family as able    Vital Signs  Temp:  [97.5 °F (36.4 °C)-98.1 °F (36.7 °C)] 98.1 °F (36.7 °C)  Heart Rate:  [71-87] 74  Resp:  [17-18] 18  BP: (103-136)/(62-94) 107/62       Wt Readings from Last 1 Encounters:   07/09/25 0509 104 kg (228 lb 9.6 oz)   07/08/25 0518 104 kg (229 lb 6.4 oz)       Objective:  Vital signs: (most recent): Blood pressure 107/62, pulse 74, temperature 98.1 °F (36.7 °C), temperature source Oral, resp. rate 18, weight 104 kg (228 lb 9.6 oz), SpO2 98%.                Objective:  General Appearance:  Comfortable, well-appearing, no acute distress   HEENT: Mucous membranes moist, atraumatic  Lungs:  Normal effort and normal respiratory rate.  Breath sounds clear to auscultation: No rhonchi/Rales.  No  respiratory distress.   Heart:  S1, S2 normal.   Abdomen: Abdomen is soft, nontender/nondistended  Extremities: No edema of bilateral lower extremities  Skin:  Warm and dry       Results Review:    Intake/Output:     Intake/Output Summary (Last 24 hours) at 7/9/2025 0855  Last data filed at 7/9/2025 0509  Gross per 24 hour   Intake 1706.25 ml   Output 2100 ml   Net -393.75 ml         DATA:  Radiology and Labs:  The following labs independently reviewed by me. Additional labs ordered for tomorrow a.m.  Interval notes, chart personally reviewed by me.   Old records independently reviewed showing CKD 3 baseline creatinine around 1.2  The following radiologic studies independently viewed by me, findings chest x-ray with mild vascular congestion  New problems include metabolic acidosis, improved  anemia  Discussed with patient    Risk/ complexity of medical care/ medical decision making moderate complexity, ASHWIN on CKD management    Labs:   Recent Results (from the past 24 hours)   Urinalysis With Microscopic If Indicated (No Culture) - Urine, Clean Catch    Collection Time: 07/08/25  5:43 PM    Specimen: Urine, Clean Catch   Result Value Ref Range    Color, UA Yellow Yellow, Straw    Appearance, UA Clear Clear    pH, UA 6.5 5.0 - 8.0    Specific Gravity, UA 1.019 1.005 - 1.030    Glucose, UA >=1000 mg/dL (3+) (A) Negative    Ketones, UA Negative Negative    Bilirubin, UA Negative Negative    Blood, UA Negative Negative    Protein, UA Trace (A) Negative    Leuk Esterase, UA Negative Negative    Nitrite, UA Negative Negative    Urobilinogen, UA 1.0 E.U./dL 0.2 - 1.0 E.U./dL   Sodium, Urine, Random - Urine, Clean Catch    Collection Time: 07/08/25  5:43 PM    Specimen: Urine, Clean Catch   Result Value Ref Range    Sodium, Urine <20 mmol/L   Creatinine Urine Random (kidney function) GFR component - Urine, Clean Catch    Collection Time: 07/08/25  5:43 PM    Specimen: Urine, Clean Catch   Result Value Ref Range    Creatinine, Urine 57.2 mg/dL   Prepare RBC, 2 Units    Collection Time: 07/08/25  6:00 PM   Result Value Ref Range    Product Code M1948U35     Unit Number [id number]     UNIT  ABO O     UNIT  RH POS     Crossmatch Interpretation Compatible     Dispense Status PT     Blood Expiration Date 202508062359     Blood Type Barcode 5100     Product Code K7208J07     Unit Number [id number]     UNIT  ABO O     UNIT  RH POS     Crossmatch Interpretation Compatible     Dispense Status PT     Blood Expiration Date 202508062359     Blood Type Barcode 5100    Uric Acid    Collection Time: 07/09/25  5:49 AM    Specimen: Blood   Result Value Ref Range    Uric Acid 5.2 3.4 - 7.0 mg/dL   Renal Function Panel    Collection Time: 07/09/25  5:49 AM    Specimen: Blood   Result Value Ref Range    Glucose 98 65 - 99  mg/dL    BUN 29.0 (H) 8.0 - 23.0 mg/dL    Creatinine 1.44 (H) 0.76 - 1.27 mg/dL    Sodium 138 136 - 145 mmol/L    Potassium 3.9 3.5 - 5.2 mmol/L    Chloride 104 98 - 107 mmol/L    CO2 21.0 (L) 22.0 - 29.0 mmol/L    Calcium 8.1 (L) 8.6 - 10.5 mg/dL    Albumin 4.1 3.5 - 5.2 g/dL    Phosphorus 2.1 (L) 2.5 - 4.5 mg/dL    Anion Gap 13.0 5.0 - 15.0 mmol/L    BUN/Creatinine Ratio 20.1 7.0 - 25.0    eGFR 51.0 (L) >60.0 mL/min/1.73   CBC Auto Differential    Collection Time: 07/09/25  5:49 AM    Specimen: Blood   Result Value Ref Range    WBC 4.01 3.40 - 10.80 10*3/mm3    RBC 2.72 (L) 4.14 - 5.80 10*6/mm3    Hemoglobin 8.6 (L) 13.0 - 17.7 g/dL    Hematocrit 25.7 (L) 37.5 - 51.0 %    MCV 94.5 79.0 - 97.0 fL    MCH 31.6 26.6 - 33.0 pg    MCHC 33.5 31.5 - 35.7 g/dL    RDW 20.8 (H) 12.3 - 15.4 %    RDW-SD 67.6 (H) 37.0 - 54.0 fl    MPV 10.0 6.0 - 12.0 fL    Platelets 128 (L) 140 - 450 10*3/mm3   Manual Differential    Collection Time: 07/09/25  5:49 AM    Specimen: Blood   Result Value Ref Range    Neutrophil % 46.9 42.7 - 76.0 %    Lymphocyte % 37.8 19.6 - 45.3 %    Monocyte % 12.2 (H) 5.0 - 12.0 %    Eosinophil % 0.0 (L) 0.3 - 6.2 %    Basophil % 0.0 0.0 - 1.5 %    Metamyelocyte % 2.0 (H) 0.0 - 0.0 %    Myelocyte % 1.0 (H) 0.0 - 0.0 %    Neutrophils Absolute 1.88 1.70 - 7.00 10*3/mm3    Lymphocytes Absolute 1.52 0.70 - 3.10 10*3/mm3    Monocytes Absolute 0.49 0.10 - 0.90 10*3/mm3    Eosinophils Absolute 0.00 0.00 - 0.40 10*3/mm3    Basophils Absolute 0.00 0.00 - 0.20 10*3/mm3    nRBC 3.1 (H) 0.0 - 0.2 /100 WBC    Anisocytosis Mod/2+ None Seen    Dacrocytes Slight/1+ None Seen    Microcytes Mod/2+ None Seen    Ovalocytes Mod/2+ None Seen    Poikilocytes Mod/2+ None Seen    Polychromasia Mod/2+ None Seen    WBC Morphology Normal Normal    Platelet Morphology Normal Normal   Prepare RBC, 2 Units    Collection Time: 07/09/25  6:00 AM   Result Value Ref Range    Product Code H1087A27     Unit Number [id number]     UNIT  ABO O      UNIT  RH POS     Crossmatch Interpretation Compatible     Dispense Status PT     Blood Expiration Date 202508082359     Blood Type Barcode 5100     Product Code H4736H79     Unit Number [id number]     UNIT  ABO O     UNIT  RH POS     Crossmatch Interpretation Compatible     Dispense Status PT     Blood Expiration Date 202508082359     Blood Type Barcode 5100        Radiology:  Pertinent radiology studies were reviewed as described above      Medications have been reviewed separately in chart review medication tab      ASSESSMENT:  ASHWIN, likely multifactorial, prerenal due to severe anemia, urine studies confirm prerenal state.  Some mild volume overload so acute CHF another possibility but that is improved with diuresis  CKD stage IIIa, baseline creatinine 1.0 to 1.2.  UA bland other than some trace proteinuria  Acute systolic CHF, EF 40 to 45%, improved with diuresis  New metabolic acidosis  Severe anemia  Myelodysplasia syndrome    Anemia requiring transfusions  COPD  Atrial fibrillation  Coronary artery disease        DISCUSSION/PLAN:   His renal function has stabilized with transfusion.  ASHWIN likely due to prerenal state from severe anemia  He appears euvolemic today on exam  Creatinine still not back to baseline but approaching that level today  Will transition back to oral diuretics    Will monitor renal function, volume, electrolytes closely while on diuretic therapy  Monitor daily weights strict I's and O's  Maintain sodium restricted diet and fluid restriction  Will hold ARB for now.  Okay to continue spironolactone and current potassium  He can follow-up with my partner Dr. Brar in [place] 1 to 2 weeks after discharge          Eloy Meier MD  [facility]   Office phone number: [phone]  Answering service phone number: [phone]    07/09/25  08:55 EDT    Dictation performed using Dragon dictation software

## 2025-07-09 NOTE — CASE MANAGEMENT/SOCIAL WORK
Case Management Discharge Note      Final Note: Home, family to transport         Selected Continued Care - Discharged on 7/9/2025 Admission date: 7/7/2025 - Discharge disposition: Home or Self Care      Destination    No services have been selected for the patient.                Durable Medical Equipment    No services have been selected for the patient.                Dialysis/Infusion    No services have been selected for the patient.                Home Medical Care    No services have been selected for the patient.                Therapy    No services have been selected for the patient.                Community Resources    No services have been selected for the patient.                Community & DME    No services have been selected for the patient.                    Transportation Services  Transportation: Private Transportation  Private: Car    Final Discharge Disposition Code: 01 - home or self-care

## 2025-07-09 NOTE — PLAN OF CARE
Goal Outcome Evaluation:  Plan of Care Reviewed With: patient        Progress: improving  Outcome Evaluation: Maintained on R/A,  Melatonin given at HS, PRN Atarax given, pt rested poorly. Pt reports frustration with being in hospital and is ready to go home. No s/s of bleeding noted. Safety maintained.

## 2025-07-09 NOTE — PROGRESS NOTES
[facility] INPATIENT PROGRESS NOTE    Length of Stay:  1 days    CHIEF COMPLAINT:  MDS, severe anemia, leukopenia, thrombocytopenia, ASHWIN/CKD3a, COPD, CHF, atrial fibrillation    SUBJECTIVE:   Patient with no new complaints today, anxious for discharge home.  He feels overall much better than on admission    ROS:  Review of Systems   A comprehensive review of systems was obtained with pertinent positive findings as noted in the interval history above.  All other systems negative.    OBJECTIVE:  Vitals:    07/08/25 2021 07/08/25 2027 07/08/25 2255 07/09/25 0509   BP:   103/81    BP Location:   Left arm    Patient Position:   Sitting    Pulse: 71 71 71    Resp: 18 18 18    Temp:   97.9 °F (36.6 °C)    TempSrc:   Oral    SpO2: 97%  97%    Weight:    104 kg (228 lb 9.6 oz)         PHYSICAL EXAMINATION:  General: Alert and orient x 3 no distress  Chest/Lungs: Scattered bilateral wheezes, improved  Heart: Irregular, 2/6 murmur  Abdomen/GI: Soft nontender nondistended bowel sounds present  Extremities: 1+ bilateral lower extremity edema    DIAGNOSTIC DATA:  Results Review:     I reviewed the patient's new clinical results.    Results from last 7 days   Lab Units 07/09/25  0549 07/08/25  0523 07/07/25  1220   WBC 10*3/mm3 4.01 2.71* 3.01*   HEMOGLOBIN g/dL 8.6* 6.6* 4.9*   HEMATOCRIT % 25.7* 19.3* 14.9*   PLATELETS 10*3/mm3 128* 106* 97*      Results from last 7 days   Lab Units 07/09/25  0549 07/08/25  0523 07/07/25  1220   SODIUM mmol/L 138 135* 133*   POTASSIUM mmol/L 3.9 4.0 4.0   CHLORIDE mmol/L 104 102 101   CO2 mmol/L 21.0* 17.0* 20.0*   BUN mg/dL 29.0* 29.0* 30.0*   CREATININE mg/dL 1.44* 1.84* 1.82*   CALCIUM mg/dL 8.1* 8.0* 8.7   BILIRUBIN mg/dL  --   --  1.1   ALK PHOS U/L  --   --  100   ALT (SGPT) U/L  --   --  13   AST (SGOT) U/L  --   --  21   GLUCOSE mg/dL 98 154* 118*      Lab Results   Component Value Date    NEUTROABS 1.62 (L) 07/07/2025         Results from last 7 days   Lab Units  07/07/25  1220   MAGNESIUM mg/dL 2.2           Assessment & Plan   ASSESSMENT/PLAN:  This is a 75 y.o. male with:     *MDS  The patient has history of myelodysplasia and is followed by Dr. Adame in the [facility] system, last seen on 6/23/2025.    Per available records, patient was diagnosed with MDS, refractory anemia with ring sideroblasts in 2023.    Patient had anemia and borderline thrombocytopenia as well as borderline leukopenia with immature forms noted in differential.    He did have evidence of a faint IgM lambda and IgG lambda monoclonal protein.    Bone marrow biopsy on 10/6/2023 showed hypercellular marrow with dismegakaryopoiesis, dysgranulopoiesis, dyserythropoiesis with less than 5% blasts.  There were greater than 15% ring sideroblasts.  MDS FISH panel was negative.  NGS analysis showed ASX L1 ulcerations x 2.  Cytogenetics with normal karyotype.    Patient initiated treatment with a azacitidine in November 2023.  Treatment was interrupted due to hospitalization for cardiac issues.    He received subsequent supportive therapy with DEMI beginning in October 2024.    He continued to require transfusion support despite DEMI use and wanted to pursue a course of intermittent transfusions.    There was discussion regarding potential use of luspatercept.  Patient is being referred to [facility] to discuss potential transplant option  Patient presented to emergency department on 7/7/2025 with report of shortness of breath.    Hemoglobin on 7/7/2025 was 4.9 with WBC 3.01 with differential 54 segs, 24 lymphs, 20 monocytes, ANC 1.62 and platelet count of 97,000.    Patient received transfusion support 2 units PRBC on 7/7/2025.    Hemoglobin up to 6.6 on 7/8/2025 and patient received an additional 2 unit PRBC transfusion.    Additional labs on 7/8/2025 with iron 131, ferritin 1249, iron saturation 44%, TIBC 295.  Stool for occult blood negative  Hemoglobin today improved up to 8.6 following 4 unit  transfusion.  WBC increased to 4.01, platelet count increased at 128,000.  Patient ready for discharge home today.  He will follow-up in the outpatient setting with Dr. Adame to discuss further recommendations for management of his disease, reports that he is scheduled to be seen on 7/14/2025.   Discontinue oral iron.     *ASHWIN/CKDa3  Baseline creatinine in the 1-1.3 range   Creatinine on 7/7/2025 was 1.82.    Patient was seen by nephrology, felt to have prerenal component versus ATN.  ARB held.  Creatinine on 7/8/2025 was 1.84.  Creatinine today improved at 1.44     *COPD  Patient continuing on Symbicort and Yupelri  Patient continuing on prednisone 20 mg daily     *CHF  Echocardiogram on 10/14/2024 with ejection fraction 41-45%  proBNP on 7/7/2025 elevated at 8062  Patient undergoing diuresis     *Atrial fibrillation  Status post pacemaker.    He was on previous anticoagulation with warfarin which was discontinued in 2023 in the setting of anemia with concern for blood loss.  Patient continuing on amiodarone        PLAN:   Hold on further transfusion support at this time with hemoglobin 8.6  Discontinue oral iron  Following discharge patient will need to follow-up with Dr. Adame at [facility] to discuss further recommendations on management of his disease (patient reports he is scheduled to be seen on 4/14/2025  We will sign off, please call with further questions    Discussed with patient and family at bedside         Ashok Hanna MD

## 2025-07-09 NOTE — OUTREACH NOTE
Prep Survey      Flowsheet Row Responses   [facility] facility patient discharged from? Likely   Is LACE score < 7 ? No   Eligibility Readm Mgmt   Discharge diagnosis Anemia requiring transfusions   Does the patient have one of the following disease processes/diagnoses(primary or secondary)? Other   Prep survey completed? Yes            Coco CELAYA - Registered Nurse

## 2025-07-09 NOTE — CASE MANAGEMENT/SOCIAL WORK
Continued Stay Note  [facility]     Patient Name: Yves Hastings  MRN: [id number]  Today's Date: 7/9/2025    Admit Date: 7/7/2025    Plan: Home, family to transport   Discharge Plan       Row Name 07/09/25 1339       Plan    Plan Home, family to transport    Plan Comments CCP met briefly with pt at bedside. Confirmed that he has no dc needs. Family will transport                   Discharge Codes    No documentation.                 Expected Discharge Date and Time       Expected Discharge Date Expected Discharge Time    Jul 9, 2025               Denisha Simms RN

## 2025-07-09 NOTE — PLAN OF CARE
Goal Outcome Evaluation:  Plan of Care Reviewed With: patient      Outcome Evaluation: Patient A/O x4. Up ad rubén. No c/o pain this shift. 2 Units PRBC's given. VSS. Safety maintained.     Daily Care Plan Summary: Heart Failure    Diuretic in use (IV or PO): IV Lasix    Daily weight (up or down):  SHARONA    Output > Intake (yes/no): SHARONA, recorded output 500 ml, however patient has had unmeasured urine output this shift.     O2 Requirements (current, any change?): Room air    Symptoms noted with Activity (Respiratory Tolerance, functional state): None noted/ reported    Anticipated Discharge Plans: Home with family

## 2025-07-09 NOTE — DISCHARGE INSTRUCTIONS
#Discharge plan    -Follow-up with PCP in 3 to 5 days    -Follow-up with Dr. Adame within one week    - Follow-up with nephrology within 1 week    -Hold losartan until follow-up with PCP and/or nephrology and instructed to restart

## 2025-07-09 NOTE — DISCHARGE SUMMARY
Patient Name: Yves Hastings  : 1950  MRN: [id number]    Date of Admission: 2025  Date of Discharge:  2025  Primary Care Physician: Isael Chopra MD      Chief Complaint:   Shortness of Breath and Abnormal Lab      Discharge Diagnoses     Active Hospital Problems    Diagnosis  POA    **Anemia requiring transfusions [D64.9]  Yes      Resolved Hospital Problems   No resolved problems to display.        Hospital Course     Mr. Hastings is a 75 y.o. male with a history of hypertension, hyperlipidemia, anxiety, GERD, SANDHYA, obesity, CKD, CAD, HFmrEF, COPD, myelodysplastic syndrome who presented to [facility] initially complaining of dyspnea and fatigue.  He had recently been seen by his hematologist and his hemoglobin was so low that he was advised to come to the hospital.  Please see the admitting history and physical for further details.  He was found to have acute on chronic anemia and was admitted to the hospital for further evaluation and treatment.  Patient's hemoglobin was 4.9 and he was transfused a total of 4 units PRBC and hemoglobin increased to 8.6.  Fecal occult was negative for blood and there was no evidence of overt bleeding.  Anemia appeared to be due to his known MDS and chronic pancytopenia/transfusion dependent.  Heme/ONC was consulted.  Due to ASHWIN nephrology was consulted and his ARB was held.  There is no evidence of volume overload.  Patient's creatinine improved and nephrology was okay with discharge and follow-up as outpatient.  As hemoglobin was greater than 8 with no evidence of bleeding Case was discussed with heme/ONC and they are okay with discharge and follow-up with Dr. Adame as outpatient.  Patient was hemodynamically stable without distress and agreeable discharge.  Discharge placed on 2025 patient discharged home and agreeable febrile.    #Discharge plan    -Follow-up with PCP in 3 to 5 days    -Follow-up with Dr. Adame within one week    -  Follow-up with nephrology within 1 week    -Hold losartan until follow-up with PCP and/or nephrology and instructed to restart  Day of Discharge     Subjective:  Patient's hemoglobin is stable at 8.6 and fecal occult is negative for blood.  He is at baseline without distress.  Nephrology has cleared and discussed heme/ONC who has also cleared and recommended close follow-up with his [facility] provider.  Patient agreement with plan.  Discharge placed    Physical Exam:  Temp:  [97.5 °F (36.4 °C)-98.1 °F (36.7 °C)] 98.1 °F (36.7 °C)  Heart Rate:  [71-87] 79  Resp:  [17-18] 18  BP: (103-135)/(62-94) 107/62  Body mass index is 31.9 kg/m².  Physical Exam  Constitutional:       General: He is not in acute distress.     Appearance: He is obese.   HENT:      Head: Normocephalic and atraumatic.      Nose: Nose normal. No congestion.      Mouth/Throat:      Pharynx: Oropharynx is clear. No oropharyngeal exudate.   Eyes:      General: No scleral icterus.  Cardiovascular:      Rate and Rhythm: Normal rate and regular rhythm.      Heart sounds: No murmur heard.     No friction rub. No gallop.   Pulmonary:      Effort: No respiratory distress.      Breath sounds: No wheezing or rales.   Abdominal:      General: There is no distension.      Tenderness: There is no abdominal tenderness. There is no guarding.   Musculoskeletal:         General: No swelling or deformity.      Cervical back: Normal range of motion. No rigidity.      Right lower leg: No edema.      Left lower leg: No edema.   Skin:     Coloration: Skin is not jaundiced.      Findings: No bruising, lesion or rash.   Neurological:      General: No focal deficit present.      Mental Status: He is alert.         Consultants     Consult Orders (all) (From admission, onward)       Start     Ordered    07/08/25 0911  Inpatient Nephrology Consult  Once        Specialty:  Nephrology  Provider:  Eloy Meier MD    07/08/25 0911    07/08/25 0706  Inpatient Nephrology  Consult  Once,   Status:  Canceled        Specialty:  Nephrology  Provider:  Nitesh Grewal MD    07/08/25 0706    07/08/25 0703  Hematology & Oncology Inpatient Consult  Once        Specialty:  Hematology and Oncology  Provider:  Ras Barillas MD    07/08/25 0702    07/07/25 1436  [facility] (on-call MD unless specified) Details  Once        Specialty:  Hospitalist  Provider:  (Not yet assigned)    07/07/25 1435    Pending  Inpatient Consult to Advance Care Planning  Once        Provider:  (Not yet assigned)    Pending                  Procedures     * Surgery not found *    Imaging Results (All)       Procedure Component Value Units Date/Time    XR Chest 1 View [[id number]] Collected: 07/07/25 1258     Updated: 07/07/25 1302    Narrative:      XR CHEST 1 VW-     HISTORY: Male who is 75 years-old, short of breath     TECHNIQUE: Frontal view of the chest     COMPARISON: 2/23/2025     FINDINGS: The heart is enlarged. Pulmonary vasculature is mildly  congested. Left-sided pacemaker, cardiac leads, sternotomy changes are  noted. Minimal right pleural effusion is suggested. No focal pulmonary  consolidation or pneumothorax. No acute osseous process.       Impression:      As described.     This report was finalized on 7/7/2025 12:59 PM by Dr. Alex Deleon M.D on Workstation: [id number]               Results for orders placed during the hospital encounter of 10/14/24    Adult Transthoracic Echo Limited W/ Cont if Necessary Per Protocol 10/16/2024  8:18 AM    Interpretation Summary    Left ventricular ejection fraction appears to be 41 - 45%.    The left ventricular cavity is dilated.    The following left ventricular wall segments are hypokinetic: mid anterior, apical anterior, apical lateral, apical inferior, apical septal, basal inferoseptal, mid inferoseptal, apex hypokinetic, mid anteroseptal and basal inferoseptal.    Left ventricular diastolic function was normal.    The right ventricular cavity is  "borderline dilated.    The left atrial cavity is mildly dilated.    The right atrial cavity is borderline dilated.    There is calcification of the aortic valve.    Estimated right ventricular systolic pressure from tricuspid regurgitation is mildly elevated (35-45 mmHg).    Pertinent Labs     Results from last 7 days   Lab Units 07/09/25  0549 07/08/25  0523 07/07/25  1220   WBC 10*3/mm3 4.01 2.71* 3.01*   HEMOGLOBIN g/dL 8.6* 6.6* 4.9*   PLATELETS 10*3/mm3 128* 106* 97*     Results from last 7 days   Lab Units 07/09/25  0549 07/08/25  0523 07/07/25  1220   SODIUM mmol/L 138 135* 133*   POTASSIUM mmol/L 3.9 4.0 4.0   CHLORIDE mmol/L 104 102 101   CO2 mmol/L 21.0* 17.0* 20.0*   BUN mg/dL 29.0* 29.0* 30.0*   CREATININE mg/dL 1.44* 1.84* 1.82*   GLUCOSE mg/dL 98 154* 118*   EGFR mL/min/1.73 51.0* 38.0* 38.5*     Results from last 7 days   Lab Units 07/09/25  0549 07/07/25  1220   ALBUMIN g/dL 4.1 4.0   BILIRUBIN mg/dL  --  1.1   ALK PHOS U/L  --  100   AST (SGOT) U/L  --  21   ALT (SGPT) U/L  --  13     Results from last 7 days   Lab Units 07/09/25  0549 07/08/25  0523 07/07/25  1220   CALCIUM mg/dL 8.1* 8.0* 8.7   ALBUMIN g/dL 4.1  --  4.0   MAGNESIUM mg/dL  --   --  2.2   PHOSPHORUS mg/dL 2.1*  --   --        Results from last 7 days   Lab Units 07/07/25  1335 07/07/25  1220   HSTROP T ng/L 17 19   PROBNP pg/mL  --  8,062.0*     Results from last 7 days   Lab Units 07/09/25  0549 07/08/25  1743   SODIUM UR mmol/L  --  <20   CREATININE UR mg/dL  --  57.2   URIC ACID mg/dL 5.2  --          Invalid input(s): \"LDLCALC\"      Results from last 7 days   Lab Units 07/07/25  1250   COVID19  Not Detected       Test Results Pending at Discharge     Pending Results       Procedure [Order ID] Specimen - Date/Time    Adult Transthoracic Echo Complete W/ Cont if Necessary Per Protocol [[id number]]     Pathology Consultation [[id number]] Collected: 07/09/25 0549    Specimen: Blood Updated: 07/09/25 1136              Discharge Details "        Discharge Medications        PAUSE taking these medications        Instructions Start Date   losartan 25 MG tablet  Wait to take this until your doctor or other care provider tells you to start again.  Commonly known as: COZAAR   25 mg, Oral, Daily             Continue These Medications        Instructions Start Date   albuterol sulfate  (90 Base) MCG/ACT inhaler  Commonly known as: PROVENTIL HFA;VENTOLIN HFA;PROAIR HFA   2 puffs, Every 4 Hours PRN      amiodarone 200 MG tablet  Commonly known as: PACERONE   200 mg, Oral, Daily      aspirin 81 MG EC tablet   aspirin 81 mg tablet,delayed release   TAKE 1 TABLET BY MOUTH DAILY      atorvastatin 80 MG tablet  Commonly known as: LIPITOR   1 tablet, Daily      cyanocobalamin 1000 MCG tablet  Commonly known as: VITAMIN B-12   1,000 mcg, Daily      ferrous gluconate 324 MG tablet  Commonly known as: FERGON   1 tablet      folic acid 1 MG tablet  Commonly known as: FOLVITE   1 mg, Oral, Daily      furosemide 40 MG tablet  Commonly known as: LASIX   40 mg, Oral, Daily      HYDROcodone-acetaminophen 5-325 MG per tablet  Commonly known as: NORCO   1 tablet, As Needed      hydrOXYzine 25 MG tablet  Commonly known as: ATARAX   25 mg, Oral, 3 Times Daily PRN      Jardiance 10 MG tablet tablet  Generic drug: empagliflozin   10 mg, Oral, Daily      metoprolol succinate XL 25 MG 24 hr tablet  Commonly known as: TOPROL-XL   25 mg, Oral, Every 24 Hours Scheduled      nitroglycerin 0.4 MG SL tablet  Commonly known as: NITROSTAT   0.4 mg, Every 5 Minutes PRN      pantoprazole 20 MG EC tablet  Commonly known as: PROTONIX   20 mg, Oral, Daily      potassium chloride 10 MEQ CR tablet   20 mEq, Daily      predniSONE 20 MG tablet  Commonly known as: DELTASONE   20 mg, Daily      rOPINIRole 0.5 MG tablet  Commonly known as: REQUIP   1 mg, Oral, Daily      sertraline 50 MG tablet  Commonly known as: ZOLOFT   1 tablet, Daily      spironolactone 25 MG tablet  Commonly known as:  ALDACTONE   25 mg, Daily      thiamine 100 MG tablet  Commonly known as: VITAMIN B1   100 mg, Oral, Daily      tiotropium bromide monohydrate 2.5 MCG/ACT aerosol solution inhaler  Commonly known as: SPIRIVA RESPIMAT   2 puffs, Daily - RT      Trelegy Ellipta 100-62.5-25 MCG/ACT inhaler  Generic drug: Fluticasone-Umeclidin-Vilant              Stop These Medications      Ferrocite 324 (106 Fe) MG tablet  Generic drug: Ferrous Fumarate     FLUoxetine 20 MG capsule  Commonly known as: PROzac     ipratropium-albuterol 0.5-2.5 mg/3 ml nebulizer  Commonly known as: DUO-NEB              No Known Allergies    Discharge Disposition:  Home or Self Care      Discharge Diet:  Diet Order   Procedures    Diet: Cardiac, Fluid Restriction (240 mL/tray); Healthy Heart (2-3 Na+); 1500 mL/day; Fluid Consistency: Thin (IDDSI 0)       Discharge Activity:       CODE STATUS:    Code Status and Medical Interventions: CPR (Attempt to Resuscitate); Full   Ordered at: 07/07/25 1609     Code Status (Patient has no pulse and is not breathing):    CPR (Attempt to Resuscitate)     Medical Interventions (Patient has pulse or is breathing):    Full       Future Appointments   Date Time Provider Department Center   9/9/2025 12:30 PM Mika Carranza MD [facility]   12/2/2025  9:45 AM [facility] S[facility] DEVICE CHECK [facility]      Follow-up Information       Eloy Meier MD Follow up in 1 week(s).    Specialty: Nephrology  Contact information:  [facility] [address] [address] [phone]               Isael Chopra MD .    Specialty: Internal Medicine  Contact information:  [address] [place] [address] [phone]                             Time Spent on Discharge: 40 minutes    DO [facility]  07/09/25  12:59 EDT

## 2025-07-14 ENCOUNTER — READMISSION MANAGEMENT (OUTPATIENT)
Dept: CALL CENTER | Facility: HOSPITAL | Age: 75
End: 2025-07-14
Payer: OTHER GOVERNMENT

## 2025-07-14 NOTE — OUTREACH NOTE
Medical Week 1 Survey      Flowsheet Row Responses   [facility] patient discharged from? [place]   Does the patient have one of the following disease processes/diagnoses(primary or secondary)? Other   Week 1 attempt successful? No   Unsuccessful attempts Attempt 1            Alla Moura Registered Nurse

## 2025-07-23 ENCOUNTER — READMISSION MANAGEMENT (OUTPATIENT)
Dept: CALL CENTER | Facility: HOSPITAL | Age: 75
End: 2025-07-23
Payer: OTHER GOVERNMENT

## 2025-07-23 NOTE — OUTREACH NOTE
Forwarding to ordering provider for review of thyroid labs and recommendations please.     Brigitte Bauman RN  [facility]     Medical Week 2 Survey      Flowsheet Row Responses   [facility] patient discharged from? [place]   Does the patient have one of the following disease processes/diagnoses(primary or secondary)? Other   Week 2 attempt successful? Yes   Call start time 0901   Discharge diagnosis Anemia requiring transfusions   Call end time 0914   Is patient permission given to speak with other caregiver? Yes   List who call center can speak with Margarita   Person spoke with today (if not patient) and relationship Margarita-dtr   Meds reviewed with patient/caregiver? Yes   Is the patient taking all medications as directed (includes completed medication regime)? Yes   Medication comments Pt remains off Losartan per Margarita's report.   Has the patient kept scheduled appointments due by today? Yes   Comments Per daughter, they are on their way today to discuss a bone marrow transplant with a specialist.   Comments Pt's SOA and fatigue have improved but are starting to decline over past few days, per Margarita. Patient's daughter states that she believes that the pt likely needs another blood transfusion and will see Oncology this week.   What is the patient's perception of their health status since discharge? Improving   Is the patient/caregiver able to teach back signs and symptoms related to disease process for when to call 911? Yes   Is the patient/caregiver able to teach back the hierarchy of who to call/visit for symptoms/problems? PCP, Specialist, Home health nurse, Urgent Care, ED, 911 Yes   Week 2 Call Completed? Yes   Revoked No further contact(revokes)-requires comment   Call end time 0914            Alla MEDINA - Registered Nurse

## (undated) DEVICE — INTRO SHEATH ART/FEM ENGAGE .035 6F12CM

## (undated) DEVICE — CATH VENT MIV RADL PIG ST TIP 4F 110CM

## (undated) DEVICE — GW EMR FIX EXCHG J STD .035 3MM 260CM

## (undated) DEVICE — PANTY KNIT WASHABLE LG/XL BRN/GRN LF

## (undated) DEVICE — PK LAP GEN 90

## (undated) DEVICE — LAPAROSCOPIC TROCAR SLEEVE/SINGLE USE: Brand: KII® OPTICAL ACCESS SYSTEM

## (undated) DEVICE — TOWEL,OR,DSP,ST,BLUE,STD,4/PK,20PK/CS: Brand: MEDLINE

## (undated) DEVICE — GLV SURG PREMIERPRO ORTHO LTX PF SZ7.5 BRN

## (undated) DEVICE — GUIDE CATHETER: Brand: MACH1™

## (undated) DEVICE — TUBING, SUCTION, 1/4" X 10', STRAIGHT: Brand: MEDLINE

## (undated) DEVICE — BNDG ADHS CURAD FLX/FABRC 1X3IN STRL LF

## (undated) DEVICE — LN SMPL CO2 SHTRM SD STREAM W/M LUER

## (undated) DEVICE — KT ORCA ORCAPOD DISP STRL

## (undated) DEVICE — METZENBAUM SCISSOR TIP, DISPOSABLE: Brand: RENEW

## (undated) DEVICE — ENDOPATH 5MM ENDOSCOPIC BLUNT TIP DISSECTORS (12 POUCHES CONTAINING 3 DISSECTORS EACH): Brand: ENDOPATH

## (undated) DEVICE — TBG INSUFL W FLTR STRL

## (undated) DEVICE — CATH DIAG IMPULSE FL3.5 5F 100CM

## (undated) DEVICE — Device

## (undated) DEVICE — BNDG ADHS CURAD FLX/FABRC 2X4IN STRL LF

## (undated) DEVICE — SUT MNCRYL 4/0 PS2 18 IN

## (undated) DEVICE — ERASECAUTI INTERMIT TRAY: Brand: MEDLINE INDUSTRIES, INC.

## (undated) DEVICE — DECANTER: Brand: UNBRANDED

## (undated) DEVICE — ADAPT CLN BIOGUARD AIR/H2O DISP

## (undated) DEVICE — PK CATH CARD 40

## (undated) DEVICE — SUCTION CANISTER, 1000CC,SAFELINER: Brand: DEROYAL

## (undated) DEVICE — CATH DIAG IMPULSE FR4 5F 100CM

## (undated) DEVICE — TROCAR SITE CLOSURE DEVICE: Brand: ENDO CLOSE

## (undated) DEVICE — FRCP BX RADJAW4 NDL 2.8 240CM LG OG BX40

## (undated) DEVICE — GLIDESHEATH SLENDER STAINLESS STEEL KIT: Brand: GLIDESHEATH SLENDER

## (undated) DEVICE — BW-412T DISP COMBO CLEANING BRUSH: Brand: SINGLE USE COMBINATION CLEANING BRUSH

## (undated) DEVICE — INTENDED USE FOR SURGICAL MARKING ON INTACT SKIN, ALSO PROVIDES A PERMANENT METHOD OF IDENTIFYING OBJECTS IN THE OPERATING ROOM: Brand: WRITESITE® REGULAR TIP SKIN MARKER

## (undated) DEVICE — SYR LL 3CC

## (undated) DEVICE — PATIENT RETURN ELECTRODE, SINGLE-USE, CONTACT QUALITY MONITORING, ADULT, WITH 9FT CORD, FOR PATIENTS WEIGING OVER 33LBS. (15KG): Brand: MEGADYNE

## (undated) DEVICE — PENCL E/S HNDSWCH PUSHBTN HOLSTR 10FT

## (undated) DEVICE — PERCLOSE™ PROSTYLE™ SUTURE-MEDIATED CLOSURE AND REPAIR SYSTEM: Brand: PERCLOSE™ PROSTYLE™

## (undated) DEVICE — SPNG GZ WOVN 4X4IN 12PLY 10/BX STRL

## (undated) DEVICE — APPL CHLORAPREP W/TINT 26ML ORNG

## (undated) DEVICE — PAD GRND REM POLYHESIVE A/ DISP

## (undated) DEVICE — 3M™ STERI-STRIP™ REINFORCED ADHESIVE SKIN CLOSURES, R1547, 1/2 IN X 4 IN (12 MM X 100 MM), 6 STRIPS/ENVELOPE: Brand: 3M™ STERI-STRIP™

## (undated) DEVICE — GLV SURG EUDERMIC PF LTX 8 STRL

## (undated) DEVICE — SNAR POLYP SENSATION STDOVL 27 240 BX40

## (undated) DEVICE — GLV SURG SENSICARE PI MIC PF SZ7.5 LF STRL

## (undated) DEVICE — CATH DIAG IMPULSE FL4 5F 100CM

## (undated) DEVICE — VIAL FORMALIN CAP 10P 40ML

## (undated) DEVICE — INTRO SHEATH ART/FEM ENGAGE .035 5F12CM

## (undated) DEVICE — SUT VIC 0 TIES 18IN J912G

## (undated) DEVICE — BLCK/BITE BLOX W/DENTL/RIM W/STRAP 54F

## (undated) DEVICE — THE SINGLE USE ETRAP – POLYP TRAP IS USED FOR SUCTION RETRIEVAL OF ENDOSCOPICALLY REMOVED POLYPS.: Brand: ETRAP

## (undated) DEVICE — SENSR O2 OXIMAX FNGR A/ 18IN NONSTR

## (undated) DEVICE — LOU MINOR PROCEDURE: Brand: MEDLINE INDUSTRIES, INC.

## (undated) DEVICE — GW PRESSUREWIRE AERIS W/ AGILE TP 175CM

## (undated) DEVICE — PAD,ABDOMINAL,8"X10",ST,LF: Brand: MEDLINE

## (undated) DEVICE — KT MANIFLD CARDIAC

## (undated) DEVICE — SUT VIC 0 CT1 36IN J946H

## (undated) DEVICE — TROCAR: Brand: KII® SLEEVE

## (undated) DEVICE — ENDOPATH XCEL BLADELESS TROCARS WITH STABILITY SLEEVES: Brand: ENDOPATH XCEL

## (undated) DEVICE — MSK PROC CURAPLEX O2 2/ADAPT 7FT

## (undated) DEVICE — DEV INDEFLATOR

## (undated) DEVICE — SUT VIC 0 CT1 27IN DYED J340H

## (undated) DEVICE — JACKT LAB F/R KNIT CUFF/COLR XLG BLU